# Patient Record
Sex: FEMALE | Race: WHITE | NOT HISPANIC OR LATINO | Employment: OTHER | URBAN - METROPOLITAN AREA
[De-identification: names, ages, dates, MRNs, and addresses within clinical notes are randomized per-mention and may not be internally consistent; named-entity substitution may affect disease eponyms.]

---

## 2017-01-12 ENCOUNTER — OFFICE VISIT (OUTPATIENT)
Dept: UROLOGY | Facility: CLINIC | Age: 72
End: 2017-01-12
Payer: MEDICARE

## 2017-01-12 VITALS
RESPIRATION RATE: 16 BRPM | DIASTOLIC BLOOD PRESSURE: 60 MMHG | BODY MASS INDEX: 25.63 KG/M2 | SYSTOLIC BLOOD PRESSURE: 122 MMHG | HEIGHT: 64 IN | WEIGHT: 150.13 LBS | HEART RATE: 60 BPM

## 2017-01-12 DIAGNOSIS — N39.0 RECURRENT UTI: Primary | ICD-10-CM

## 2017-01-12 PROCEDURE — 1126F AMNT PAIN NOTED NONE PRSNT: CPT | Mod: S$GLB,,, | Performed by: UROLOGY

## 2017-01-12 PROCEDURE — 1157F ADVNC CARE PLAN IN RCRD: CPT | Mod: S$GLB,,, | Performed by: UROLOGY

## 2017-01-12 PROCEDURE — 3078F DIAST BP <80 MM HG: CPT | Mod: S$GLB,,, | Performed by: UROLOGY

## 2017-01-12 PROCEDURE — 99203 OFFICE O/P NEW LOW 30 MIN: CPT | Mod: S$GLB,,, | Performed by: UROLOGY

## 2017-01-12 PROCEDURE — 99999 PR PBB SHADOW E&M-EST. PATIENT-LVL III: CPT | Mod: PBBFAC,,, | Performed by: UROLOGY

## 2017-01-12 PROCEDURE — 1160F RVW MEDS BY RX/DR IN RCRD: CPT | Mod: S$GLB,,, | Performed by: UROLOGY

## 2017-01-12 PROCEDURE — 99499 UNLISTED E&M SERVICE: CPT | Mod: S$GLB,,, | Performed by: UROLOGY

## 2017-01-12 PROCEDURE — 1159F MED LIST DOCD IN RCRD: CPT | Mod: S$GLB,,, | Performed by: UROLOGY

## 2017-01-12 PROCEDURE — 3074F SYST BP LT 130 MM HG: CPT | Mod: S$GLB,,, | Performed by: UROLOGY

## 2017-01-12 RX ORDER — ESTRADIOL 0.1 MG/G
1 CREAM VAGINAL
Qty: 42.5 G | Refills: 11 | Status: SHIPPED | OUTPATIENT
Start: 2017-01-12 | End: 2018-01-12

## 2017-01-12 NOTE — MR AVS SNAPSHOT
[facility] - Urology  [address]  [employer] [address]  Phone: [phone]                  Michael Salgado   2017 1:40 PM   Office Visit    Description:  Female : 1945   Provider:  Nydia Degroot MD   Department:  [facility] - Urology           Reason for Visit     Urinary Tract Infection           Diagnoses this Visit        Comments    Recurrent UTI    -  Primary            To Do List           Goals (5 Years of Data)     None      Follow-Up and Disposition     Return in about 6 months (around 2017) for Follow up on symptoms.       These Medications        Disp Refills Start End    estradiol (ESTRACE) 0.01 % (0.1 mg/gram) vaginal cream 42.5 g 11 2017    Place 1 g vaginally twice a week. - Vaginal    Pharmacy: [facility] [address] [place] [address] AT [facility] [address] Ph #: [phone]         [facility] On Call     [facility] On Call Nurse Care Line -  Assistance  Registered nurses in the [facility] On Call Center provide clinical advisement, health education, appointment booking, and other advisory services.  Call for this free service at [phone].             Medications           Message regarding Medications     Verify the changes and/or additions to your medication regime listed below are the same as discussed with your clinician today.  If any of these changes or additions are incorrect, please notify your healthcare provider.        START taking these NEW medications        Refills    estradiol (ESTRACE) 0.01 % (0.1 mg/gram) vaginal cream 11    Sig: Place 1 g vaginally twice a week.    Class: Normal    Route: Vaginal           Verify that the below list of medications is an accurate representation of the medications you are currently taking.  If none reported, the list may be blank. If incorrect, please contact your healthcare provider. Carry this list with you in case of emergency.            Current Medications     amlodipine (NORVASC) 5 MG tablet Take 1 tablet (5 mg total) by mouth once daily.    aspirin (ECOTRIN) 81 MG EC tablet Take 81 mg by mouth once daily.    atorvastatin (LIPITOR) 40 MG tablet Take 1 tablet (40 mg total) by mouth once daily.    blood sugar diagnostic (TRUETEST TEST STRIPS) Strp Twice daily blood sugar check.    buPROPion (WELLBUTRIN XL) 150 MG TB24 tablet Take 1 tablet (150 mg total) by mouth once daily.    carvedilol (COREG) 25 MG tablet Take 25 mg by mouth 2 (two) times daily with meals.    carvedilol (COREG) 25 MG tablet Take 1 tablet (25 mg total) by mouth 2 (two) times daily with meals.    clobetasol (TEMOVATE) 0.05 % external solution Apply topically 2 (two) times daily. To scalp    diazePAM (VALIUM) 5 MG tablet Take 1 tablet (5 mg total) by mouth every 8 (eight) hours as needed.    diclofenac sodium 1 % Gel Apply topically 2 (two) times daily.    escitalopram oxalate (LEXAPRO) 10 MG tablet Take 1 tablet (10 mg total) by mouth once daily.    furosemide (LASIX) 20 MG tablet TAKE 1/2 TABLET(10 MG) BY MOUTH TWICE DAILY    gabapentin (NEURONTIN) 300 MG capsule Take 300 mg by mouth once daily.    hydrALAZINE (APRESOLINE) 25 MG tablet TAKE 1 TABLET(25 MG) BY MOUTH BID    hydrocodone-acetaminophen 10-325mg (NORCO)  mg Tab Take 1 tablet by mouth every 8 (eight) hours as needed.    insulin glargine (LANTUS) 100 unit/mL injection Inject 20 Units into the skin every evening.    insulin lispro (HUMALOG) 100 unit/mL injection Inject 5 Units into the skin 3 (three) times daily before meals.    levalbuterol (XOPENEX) 1.25 mg/3 mL nebulizer solution USE 1 VIAL VIA NEBULIZER EVERY 8 HOURS AS NEEDED FOR WHEEZING OR SHORTNESS OF BREATH    nut.tx.gluc.intol,lac-free,soy (GLUCERNA) Liqd Once a day oral supplement.    omega-3 acid ethyl esters (LOVAZA) 1 gram capsule TAKE 2 CAPSULES BY MOUTH TWICE DAILY    pantoprazole (PROTONIX) 40 MG tablet TAKE 1 TABLET BY MOUTH EVERY DAY    pen  "needle, diabetic 30 gauge x 5/16" Ndle Use 4 disposable needles per day. Inject medication into skin daily    polyethylene glycol (GLYCOLAX) 17 gram PwPk Take 17 g by mouth once daily.    prasugrel (EFFIENT) 10 mg Tab Take 1 tablet (10 mg total) by mouth once daily.    prasugrel (EFFIENT) 10 mg Tab Take 1 tablet (10 mg total) by mouth once daily.    senna-docusate 8.6-50 mg (PERICOLACE) 8.6-50 mg per tablet Take 1 tablet by mouth 2 (two) times daily.    triamcinolone acetonide 0.1% (KENALOG) 0.1 % cream AAA bid on arms    underpads 23 X 36 " Pads Twice daily change as needed for incontinence.    estradiol (ESTRACE) 0.01 % (0.1 mg/gram) vaginal cream Place 1 g vaginally twice a week.           Clinical Reference Information           Vital Signs - Last Recorded  Most recent update: 1/12/2017  1:21 PM by Azul Lopez MA    BP Pulse Resp Ht Wt LMP    122/60 60 16 5' 4" (1.626 m) 68.1 kg (150 lb 2.1 oz) (LMP Unknown)    BMI                25.77 kg/m2          Blood Pressure          Most Recent Value    BP  122/60      Allergies as of 1/12/2017     Daypro [Oxaprozin]    Pcn [Penicillins]    Vibramycin [Doxycycline Calcium]      Immunizations Administered on Date of Encounter - 1/12/2017     None      [facility] Sign-Up     Activating your [facility] account is as easy as 1-2-3!     1) Visit [email or web address], select Sign Up Now, enter this activation code and your date of birth, then select Next.  [email or web address]  Expires: 2/5/2017  1:12 PM      2) Create a username and password to use when you visit [facility] in the future and select a security question in case you lose your password and select Next.    3) Enter your e-mail address and click Sign Up!    Additional Information  If you have questions, please e-mail [email or web address] or call [phone] to talk to our [facility] staff. Remember, [facility] is NOT to be used for urgent needs. For medical emergencies, dial 911.         "

## 2017-01-12 NOTE — PROGRESS NOTES
"  Subjective:       Michael Salgado is a 71 y.o. female who is a new patient who was referred by Dr Vang for evaluation of recurrent UTIs.      She reports issues with UTIs. She has polypharmacy including DM2 and OAC medications - she has extensive PMH. She has had similar infection since 10/16. These infections were treated with abx from PCP. No infection since 11/16. Denies UTI symptoms currently. UTI symptoms normally were AMS, weakness, frequency - no dysuria. Occasional constipation treated with meds.     She voids in toilet and mostly in diaper at night. Denies UI during day.     She feels that her infections are from her G-tube.     UCx:  11/16 - >100k E coli, >100k Klebsiella (increasing resistances) - given Cipro x 5d and Macrobid x 5d  11/16 - >100k E coli, >100k Klebsiella - given doxycycline x 5d  10/16 - >100k E coli, >100k Klebsiella - given Macrobid x 5d  7/16 - negative  6/16 - >100k E coli      The following portions of the patient's history were reviewed and updated as appropriate: allergies, current medications, past family history, past medical history, past social history, past surgical history and problem list.    Review of Systems  Constitutional: no fever or chills  ENT: no nasal congestion or sore throat  Respiratory: no cough or shortness of breath  Cardiovascular: no chest pain or palpitations  Gastrointestinal: no nausea or vomiting, tolerating diet  Genitourinary: as per HPI  Hematologic/Lymphatic: no easy bruising or lymphadenopathy  Musculoskeletal: no arthralgias or myalgias  Skin: no rashes or lesions  Neurological: no seizures or tremors  Behavioral/Psych: no auditory or visual hallucinations       Objective:    Vitals:   Visit Vitals    /60    Pulse 60    Resp 16    Ht 5' 4" (1.626 m)    Wt 68.1 kg (150 lb 2.1 oz)    LMP  (LMP Unknown)    BMI 25.77 kg/m2       Physical Exam   General: well developed, well nourished in no acute distress  Head: normocephalic, " atraumatic  Neck: supple, trachea midline, no obvious enlargement of thyroid  HEENT: EOMI, mucus membranes moist, sclera anicteric, no hearing impairment  Lungs: symmetric expansion, non-labored breathing  Cardiovascular: regular rate and rhythm, normal pulses  Abdomen: soft, non tender, non distended, no palpable masses, no hepatosplenomegaly, no hernias, no CVA tenderness  Musculoskeletal: no peripheral edema, normal ROM in bilateral upper and lower extremities  Lymphatics: no cervical or inguinal lymphadenopathy  Skin: no rashes or lesions  Neuro: alert and oriented x 3, no gross deficits  Psych: normal judgment and insight, normal mood/affect and non-anxious  Genitourinary:   patient declined exam      Lab Review   Urine analysis today in clinic shows - no urine given     Lab Results   Component Value Date    WBC 4.12 09/16/2016    HGB 9.2 (L) 09/16/2016    HCT 29.3 (L) 09/16/2016    HCT 24 (L) 04/25/2016    MCV 82 09/16/2016     09/16/2016     Lab Results   Component Value Date    CREATININE 1.9 (H) 09/16/2016    BUN 50 (H) 09/16/2016        Imaging  Images and reports were personally reviewed by me and discussed with patient   [facility] 5/16 - wn       Assessment/Plan:      1. Recurrent UTI    - [facility] 5/16 - normal   - Estrace 2x weekly    - Discussed UTI prevention strategies   - Higher risk due to diaper use at night   - No daytime UUI, no need for medication   - Avoid constipation, doing well with this   - Call with UTI symptoms for UCx         Follow up in 6 months

## 2017-01-12 NOTE — LETTER
January 12, 2017      Wesley Watkins MD  [address]  [place] [address]           [facility] - Urology  [address]  [facility] [address]  Phone: [phone]          Patient: Michael Salgado   MR Number: [id number]   YOB: 1945   Date of Visit: 1/12/2017       Dear Dr. Wesley Watkins:    Thank you for referring Michael Salgado to me for evaluation. Attached you will find relevant portions of my assessment and plan of care.    If you have questions, please do not hesitate to call me. I look forward to following Michael Salgado along with you.    Sincerely,    Nydia Degroot MD    Enclosure  CC:  No Recipients    If you would like to receive this communication electronically, please contact [email or web address] or [phone] to request more information on [employer] [facility] access.    For providers and/or their staff who would like to refer a patient to [facility], please contact us through our one-stop-shop provider referral line, [facility] , at [phone].    If you feel you have received this communication in error or would no longer like to receive these types of communications, please e-mail [email or web address]

## 2017-01-13 ENCOUNTER — TELEPHONE (OUTPATIENT)
Dept: FAMILY MEDICINE | Facility: CLINIC | Age: 72
End: 2017-01-13

## 2017-01-13 NOTE — TELEPHONE ENCOUNTER
----- Message from Carrie Leavitt sent at 1/12/2017  9:15 AM CST -----  Contact: daughter  Pt daughter calling to state that PT still has not received orders. Please call today [phone].

## 2017-01-24 RX ORDER — HYDROCODONE BITARTRATE AND ACETAMINOPHEN 10; 325 MG/1; MG/1
1 TABLET ORAL EVERY 8 HOURS PRN
Qty: 90 TABLET | Refills: 0 | Status: CANCELLED | OUTPATIENT
Start: 2017-01-24

## 2017-01-24 NOTE — TELEPHONE ENCOUNTER
----- Message from Rickey Baron sent at 1/24/2017  9:51 AM CST -----  Contact: Miryam/Daughter/[phone]  Refill:  hydrocodone-acetaminophen 10-325mg (NORCO)  mg Tab    Thank you.

## 2017-01-25 RX ORDER — HYDROCODONE BITARTRATE AND ACETAMINOPHEN 10; 325 MG/1; MG/1
1 TABLET ORAL EVERY 8 HOURS PRN
Qty: 90 TABLET | Refills: 0 | Status: SHIPPED | OUTPATIENT
Start: 2017-01-25 | End: 2017-02-20 | Stop reason: SDUPTHER

## 2017-01-25 NOTE — TELEPHONE ENCOUNTER
----- Message from Kelsie Nice sent at 1/25/2017 10:25 AM CST -----  Contact: [phone] Miryam (daughter )  Pt daughter is following up on the refill for hydrocodone-acetaminophen 10-325mg (NORCO)  mg Tab pt fell yesterday and she needs the refill today Please call  at your earliest convenience.  Thanks !

## 2017-01-26 ENCOUNTER — OFFICE VISIT (OUTPATIENT)
Dept: FAMILY MEDICINE | Facility: CLINIC | Age: 72
End: 2017-01-26
Payer: MEDICARE

## 2017-01-26 ENCOUNTER — HOSPITAL ENCOUNTER (OUTPATIENT)
Dept: RADIOLOGY | Facility: HOSPITAL | Age: 72
Discharge: HOME | End: 2017-01-26
Attending: NURSE PRACTITIONER
Payer: MEDICARE

## 2017-01-26 ENCOUNTER — TELEPHONE (OUTPATIENT)
Dept: GASTROENTEROLOGY | Facility: CLINIC | Age: 72
End: 2017-01-26

## 2017-01-26 ENCOUNTER — TELEPHONE (OUTPATIENT)
Dept: FAMILY MEDICINE | Facility: CLINIC | Age: 72
End: 2017-01-26

## 2017-01-26 VITALS
DIASTOLIC BLOOD PRESSURE: 80 MMHG | OXYGEN SATURATION: 99 % | WEIGHT: 154.75 LBS | HEIGHT: 64 IN | HEART RATE: 113 BPM | SYSTOLIC BLOOD PRESSURE: 150 MMHG | TEMPERATURE: 98 F | BODY MASS INDEX: 26.42 KG/M2

## 2017-01-26 DIAGNOSIS — I48.20 CHRONIC ATRIAL FIBRILLATION: ICD-10-CM

## 2017-01-26 DIAGNOSIS — R06.02 SOB (SHORTNESS OF BREATH): ICD-10-CM

## 2017-01-26 DIAGNOSIS — I50.33 ACUTE ON CHRONIC DIASTOLIC CHF (CONGESTIVE HEART FAILURE), NYHA CLASS 3: ICD-10-CM

## 2017-01-26 DIAGNOSIS — R10.9 PAIN IN THE ABDOMEN: ICD-10-CM

## 2017-01-26 DIAGNOSIS — Z79.4 TYPE 2 DIABETES MELLITUS WITH STAGE 4 CHRONIC KIDNEY DISEASE, WITH LONG-TERM CURRENT USE OF INSULIN: ICD-10-CM

## 2017-01-26 DIAGNOSIS — S30.0XXA CONTUSION OF LOWER BACK, INITIAL ENCOUNTER: ICD-10-CM

## 2017-01-26 DIAGNOSIS — N18.4 TYPE 2 DIABETES MELLITUS WITH STAGE 4 CHRONIC KIDNEY DISEASE, WITH LONG-TERM CURRENT USE OF INSULIN: ICD-10-CM

## 2017-01-26 DIAGNOSIS — E11.22 TYPE 2 DIABETES MELLITUS WITH STAGE 4 CHRONIC KIDNEY DISEASE, WITH LONG-TERM CURRENT USE OF INSULIN: ICD-10-CM

## 2017-01-26 DIAGNOSIS — N18.4 CHRONIC KIDNEY DISEASE (CKD) STAGE G4/A1, SEVERELY DECREASED GLOMERULAR FILTRATION RATE (GFR) BETWEEN 15-29 ML/MIN/1.73 SQUARE METER AND ALBUMINURIA CREATININE RATIO LESS THAN 30 MG/G: ICD-10-CM
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
## 2017-01-26 DIAGNOSIS — M54.50 ACUTE RIGHT-SIDED LOW BACK PAIN WITHOUT SCIATICA: ICD-10-CM
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
## 2017-01-26 PROCEDURE — 99215 OFFICE O/P EST HI 40 MIN: CPT | Mod: S$GLB,,, | Performed by: NURSE PRACTITIONER

## 2017-01-26 PROCEDURE — 74000 XR ABDOMEN AP 1 VIEW: CPT | Mod: TC,PO

## 2017-01-26 PROCEDURE — 71020 XR CHEST PA AND LATERAL: CPT | Mod: TC,PO

## 2017-01-26 PROCEDURE — 3060F POS MICROALBUMINURIA REV: CPT | Mod: S$GLB,,, | Performed by: NURSE PRACTITIONER

## 2017-01-26 PROCEDURE — 1160F RVW MEDS BY RX/DR IN RCRD: CPT | Mod: S$GLB,,, | Performed by: NURSE PRACTITIONER

## 2017-01-26 PROCEDURE — 99499 UNLISTED E&M SERVICE: CPT | Mod: S$GLB,,, | Performed by: NURSE PRACTITIONER

## 2017-01-26 PROCEDURE — 74000 XR ABDOMEN AP 1 VIEW: CPT | Mod: 26,,, | Performed by: RADIOLOGY

## 2017-01-26 PROCEDURE — 1157F ADVNC CARE PLAN IN RCRD: CPT | Mod: S$GLB,,, | Performed by: NURSE PRACTITIONER

## 2017-01-26 PROCEDURE — 71020 XR CHEST PA AND LATERAL: CPT | Mod: 26,,, | Performed by: RADIOLOGY

## 2017-01-26 PROCEDURE — 1159F MED LIST DOCD IN RCRD: CPT | Mod: S$GLB,,, | Performed by: NURSE PRACTITIONER

## 2017-01-26 PROCEDURE — 3077F SYST BP >= 140 MM HG: CPT | Mod: S$GLB,,, | Performed by: NURSE PRACTITIONER

## 2017-01-26 PROCEDURE — 3045F PR MOST RECENT HEMOGLOBIN A1C LEVEL 7.0-9.0%: CPT | Mod: S$GLB,,, | Performed by: NURSE PRACTITIONER

## 2017-01-26 PROCEDURE — 99999 PR PBB SHADOW E&M-EST. PATIENT-LVL V: CPT | Mod: PBBFAC,,, | Performed by: NURSE PRACTITIONER

## 2017-01-26 PROCEDURE — 3079F DIAST BP 80-89 MM HG: CPT | Mod: S$GLB,,, | Performed by: NURSE PRACTITIONER

## 2017-01-26 PROCEDURE — 1125F AMNT PAIN NOTED PAIN PRSNT: CPT | Mod: S$GLB,,, | Performed by: NURSE PRACTITIONER

## 2017-01-26 RX ORDER — PREDNISONE 20 MG/1
20 TABLET ORAL 2 TIMES DAILY
Qty: 10 TABLET | Refills: 0 | Status: SHIPPED | OUTPATIENT
Start: 2017-01-26 | End: 2017-01-31

## 2017-01-26 RX ORDER — LEVOFLOXACIN 500 MG/1
500 TABLET, FILM COATED ORAL DAILY
Qty: 10 TABLET | Refills: 0 | Status: SHIPPED | OUTPATIENT
Start: 2017-01-26 | End: 2017-02-05

## 2017-01-26 RX ORDER — HYDROCODONE BITARTRATE AND ACETAMINOPHEN 5; 325 MG/1; MG/1
TABLET ORAL
COMMUNITY
Start: 2017-01-24 | End: 2017-01-30

## 2017-01-26 RX ORDER — AMOXICILLIN 500 MG/1
CAPSULE ORAL
COMMUNITY
Start: 2017-01-24 | End: 2017-05-31

## 2017-01-26 NOTE — TELEPHONE ENCOUNTER
----- Message from Tata Hurd MA sent at 1/24/2017  2:07 PM CST -----  Contact: Miryam (daughter) [phone]  Ray    Needing to set up an appt to have a feeding tube removed  Miryam (daughter) [phone]

## 2017-01-26 NOTE — PROGRESS NOTES
This dictation has been generated using Dragon Dictation some phonetic errors may occur.     Michael was seen today for fall.    Diagnoses and all orders for this visit:    Fall, initial encounter  -     Cancel: X-Ray Abdomen AP 1 View; Future  History of fall.  Discussed position transfers with family.  Patient is in wheelchair.    Acute right-sided low back pain without sciatica  -     Urinalysis; Future  Contusion of lower back, initial encounter  Right-sided low back pain and bruise on lower back.  Check urinalysis and rule out urinary tract infection any hematuria.  I will review results and address accordingly.    SOB (shortness of breath)  -     Comprehensive metabolic panel; Future  -     Brain natriuretic peptide; Future  -     X-Ray Chest PA And Lateral; Future  -     Cancel: X-Ray Abdomen AP 1 View; Future  Short of breath.  History of CHF.  Check BNP and chest x-ray as above and rule out acute heart failure however suspect infectious process.  In reviewing the chest x-ray infiltrate present add Levaquin and prednisone as above.  Called and discussed with family.    Chronic atrial fibrillation  -     Comprehensive metabolic panel; Future  -     Brain natriuretic peptide; Future  -     X-Ray Chest PA And Lateral; Future    Acute on chronic diastolic CHF (congestive heart failure), NYHA class 3  -     Comprehensive metabolic panel; Future  -     Brain natriuretic peptide; Future  -     X-Ray Chest PA And Lateral; Future    Chronic kidney disease (CKD) stage G4/A1, severely decreased glomerular filtration rate (GFR) between 15-29 mL/min/1.73 square meter and albuminuria creatinine ratio less than 30 mg/g  Chronic kidney disease stage IV.  If Lasix required for CHF we will have to do so gently.    Type 2 diabetes mellitus with stage 4 chronic kidney disease, with long-term current use of insulin  Diabetes stable.  Continue with current regimen.  Monitor blood sugar with steroid use.     abdominal pain check  x-ray of abdomen    I have given due consideration to the possibility of pulmonary emboli, but I feel that diagnosis is unlikely based on a careful history and physical examination.     No Follow-up on file.      ________________________________________________________________  ________________________________________________________________        Chief Complaint   Patient presents with    Fall     History of present illness  This 71 y.o. presents today for complaint of fluid retention and fall.  Patient had a fall yesterday.  She indicates that she hit her back.  She has some low back pain.  There is a bruise present.  Patient states pain is a 10 on a scale of 1-10.  She denies any hematuria.  Patient denies any dysuria however frequently absent and elderly females.  Family is concerned about fluid retention and shortness of breath.  She is O2 dependent.  She does have a history of CHF.  Currently short of breath at rest.  Prior chest x-ray does note vascular congestion and pulmonary effusion.  We will repeat chest x-ray and treat accordingly.  Patient notes some cough and cold symptoms.  Review of systems  No fever or chills  No facial pain or pressure  No nausea vomiting or diarrhea.  Patient notes abdominal pain and left flank pain.  Past medical and social history reviewed.    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     Encounter for blood transfusion     Heart attack     History of pleural effusion     HTN (hypertension)     Hyperlipidemia     MI, old     Multiple thyroid nodules     Pulmonary HTN     Right hemiparesis     S/P coronary artery stent  placement     S/P percutaneous endoscopic gastrostomy (PEG) tube placement     Screening for colon cancer      normal 2015 -10 yrs    Stroke        Past Surgical History   Procedure Laterality Date    Knee surgery      Ovary surgery      Tubal ligation      Cardiac bypass      Ovary surgery      Coronary artery bypass graft  6/20/15     [facility]    Coronary angioplasty with stent placement  7/29/15     [facility]    Tonsillectomy         Family History   Problem Relation Age of Onset    Diabetes Mother     Hypertension Mother     Hyperlipidemia Mother     Heart disease Sister     Cancer Sister      colon    Heart disease Brother     Diabetes Brother     Hypertension Brother     No Known Problems Daughter     Heart disease Son     Heart disease Sister     Hypertension Daughter        Social History     Social History    Marital status:      Spouse name: N/A    Number of children: N/A    Years of education: N/A     Social History Main Topics    Smoking status: Never Smoker    Smokeless tobacco: Never Used    Alcohol use No    Drug use: No    Sexual activity: Not Asked     Other Topics Concern    None     Social History Narrative       Current Outpatient Prescriptions   Medication Sig Dispense Refill    amlodipine (NORVASC) 5 MG tablet Take 1 tablet (5 mg total) by mouth once daily. 90 tablet 3    amoxicillin (AMOXIL) 500 MG capsule       aspirin (ECOTRIN) 81 MG EC tablet Take 81 mg by mouth once daily.      atorvastatin (LIPITOR) 40 MG tablet Take 1 tablet (40 mg total) by mouth once daily. 90 tablet 3    blood sugar diagnostic (TRUETEST TEST STRIPS) Strp Twice daily blood sugar check. 200 each 11    buPROPion (WELLBUTRIN XL) 150 MG TB24 tablet Take 1 tablet (150 mg total) by mouth once daily. 30 tablet 12    carvedilol (COREG) 25 MG tablet Take 25 mg by mouth 2 (two) times daily with meals.      carvedilol (COREG) 25 MG tablet Take 1 tablet (25 mg total) by mouth 2 (two) times  "daily with meals. 180 tablet 3    clobetasol (TEMOVATE) 0.05 % external solution Apply topically 2 (two) times daily. To scalp 25 mL 0    diazePAM (VALIUM) 5 MG tablet Take 1 tablet (5 mg total) by mouth every 8 (eight) hours as needed. 45 tablet 3    diclofenac sodium 1 % Gel Apply topically 2 (two) times daily. 100 g 0    escitalopram oxalate (LEXAPRO) 10 MG tablet Take 1 tablet (10 mg total) by mouth once daily. 90 tablet 3    estradiol (ESTRACE) 0.01 % (0.1 mg/gram) vaginal cream Place 1 g vaginally twice a week. 42.5 g 11    furosemide (LASIX) 20 MG tablet TAKE 1/2 TABLET(10 MG) BY MOUTH TWICE DAILY 90 tablet 0    gabapentin (NEURONTIN) 300 MG capsule Take 300 mg by mouth once daily.      hydrALAZINE (APRESOLINE) 25 MG tablet TAKE 1 TABLET(25 MG) BY MOUTH  tablet 3    hydrocodone-acetaminophen 10-325mg (NORCO)  mg Tab Take 1 tablet by mouth every 8 (eight) hours as needed. 90 tablet 0    hydrocodone-acetaminophen 5-325mg (NORCO) 5-325 mg per tablet       insulin glargine (LANTUS) 100 unit/mL injection Inject 20 Units into the skin every evening. 1 vial 3    insulin lispro (HUMALOG) 100 unit/mL injection Inject 5 Units into the skin 3 (three) times daily before meals.      levalbuterol (XOPENEX) 1.25 mg/3 mL nebulizer solution USE 1 VIAL VIA NEBULIZER EVERY 8 HOURS AS NEEDED FOR WHEEZING OR SHORTNESS OF BREATH 792 mL 0    nut.tx.gluc.intol,lac-free,soy (GLUCERNA) Liqd Once a day oral supplement. 30 Bottle 11    omega-3 acid ethyl esters (LOVAZA) 1 gram capsule TAKE 2 CAPSULES BY MOUTH TWICE DAILY (Patient taking differently: TAKE 1 CAPSULE BY MOUTH DAILY AT NIGHT) 360 capsule 0    pantoprazole (PROTONIX) 40 MG tablet TAKE 1 TABLET BY MOUTH EVERY DAY 90 tablet 0    pen needle, diabetic 30 gauge x 5/16" Ndle Use 4 disposable needles per day. Inject medication into skin daily 120 each 11    polyethylene glycol (GLYCOLAX) 17 gram PwPk Take 17 g by mouth once daily. 30 packet 0    " "prasugrel (EFFIENT) 10 mg Tab Take 1 tablet (10 mg total) by mouth once daily. 90 tablet 3    prasugrel (EFFIENT) 10 mg Tab Take 1 tablet (10 mg total) by mouth once daily. 90 tablet 3    senna-docusate 8.6-50 mg (PERICOLACE) 8.6-50 mg per tablet Take 1 tablet by mouth 2 (two) times daily.      triamcinolone acetonide 0.1% (KENALOG) 0.1 % cream AAA bid on arms 454 g 1    underpads 23 X 36 " Pads Twice daily change as needed for incontinence. 150 each 11     No current facility-administered medications for this visit.        Review of patient's allergies indicates:   Allergen Reactions    Daypro [oxaprozin]     Pcn [penicillins] Swelling     Patient states she is not allergic to Penicillin    Vibramycin [doxycycline calcium]        Physical examination  Vitals Reviewed  Gen. Well-dressed well-nourished in mild distress due to pain.  Skin warm dry and intact.  No rashes noted.  HEENT.    Pharynx is unremarkable.  No maxillary or frontal sinus tenderness when percussed.    Neck is supple without adenopathy  Chest.  Respirations are even unlabored.  Lungs are clear to auscultation.  Cardiac regular rate and rhythm.  No chest wall adenopathy noted.  Patient is on 3 L of oxygen per nasal cannula on a concentrator which required plugging in.  She became short of breath when oxygen was discontinued.  She had shortness of breath with standing.  Surprisingly lungs are fairly clear.  Neuro. Awake alert oriented x4.  Normal judgment and cognition noted.  Extremities  no clubbing or cyanosis noted.  There is trace edema noted bilateral lower extremities.     Call or return to clinic prn if these symptoms worsen or fail to improve as anticipated.    "

## 2017-01-26 NOTE — Clinical Note
This is a patient there were short of breath.  There are labs pending which should be in tonight.  I'll try to review them, but may be an box tomorrow.

## 2017-01-27 ENCOUNTER — TELEPHONE (OUTPATIENT)
Dept: GASTROENTEROLOGY | Facility: CLINIC | Age: 72
End: 2017-01-27

## 2017-01-27 ENCOUNTER — TELEPHONE (OUTPATIENT)
Dept: FAMILY MEDICINE | Facility: CLINIC | Age: 72
End: 2017-01-27

## 2017-01-27 DIAGNOSIS — E10.9 TYPE 1 DIABETES MELLITUS WITHOUT COMPLICATION: Primary | ICD-10-CM

## 2017-01-27 DIAGNOSIS — Z43.1 PEG (PERCUTANEOUS ENDOSCOPIC GASTROSTOMY) ADJUSTMENT/REPLACEMENT/REMOVAL: ICD-10-CM

## 2017-01-27 NOTE — TELEPHONE ENCOUNTER
Called daughter about test results. She verbally understood. She wants to know about a xray on her mothers back from her fall and does she need to return for another appointment

## 2017-01-27 NOTE — TELEPHONE ENCOUNTER
----- Message from Luis Alfredo Morel MD sent at 1/27/2017  3:01 PM CST -----  Please schedule EGD for PEG tube removal. She would need to be on 2nd floor. She can continue her ASA for the procedure. Orders are in.

## 2017-01-27 NOTE — TELEPHONE ENCOUNTER
Call family  Blood test are stable. Kidney markers are improved slightly but basically within her usual range. Heart test is also close to last measurement and cxr did not show heart failure. Continue current treatments.

## 2017-01-27 NOTE — TELEPHONE ENCOUNTER
Spoke to patient daughter Miryam she verbalyy understood. I mother is still having problems next week please call clinic back.

## 2017-01-27 NOTE — TELEPHONE ENCOUNTER
Called and spoke with patient's daughter. Will schedule PEG removal on 2nd floor endo (elevated PA pressures) while patient continues her ASA.

## 2017-01-30 ENCOUNTER — TELEPHONE (OUTPATIENT)
Dept: ENDOSCOPY | Facility: HOSPITAL | Age: 72
End: 2017-01-30

## 2017-01-30 ENCOUNTER — HOSPITAL ENCOUNTER (OUTPATIENT)
Dept: RADIOLOGY | Facility: HOSPITAL | Age: 72
Discharge: HOME | End: 2017-01-30
Attending: NURSE PRACTITIONER
Payer: MEDICARE

## 2017-01-30 ENCOUNTER — OFFICE VISIT (OUTPATIENT)
Dept: FAMILY MEDICINE | Facility: CLINIC | Age: 72
End: 2017-01-30
Payer: MEDICARE

## 2017-01-30 VITALS
TEMPERATURE: 99 F | SYSTOLIC BLOOD PRESSURE: 158 MMHG | HEIGHT: 64 IN | BODY MASS INDEX: 25.67 KG/M2 | HEART RATE: 69 BPM | DIASTOLIC BLOOD PRESSURE: 70 MMHG | WEIGHT: 150.38 LBS | OXYGEN SATURATION: 98 %

## 2017-01-30 DIAGNOSIS — E78.5 HYPERLIPIDEMIA, UNSPECIFIED HYPERLIPIDEMIA TYPE: ICD-10-CM

## 2017-01-30 DIAGNOSIS — E11.22 TYPE 2 DIABETES MELLITUS WITH STAGE 4 CHRONIC KIDNEY DISEASE, WITH LONG-TERM CURRENT USE OF INSULIN: ICD-10-CM

## 2017-01-30 DIAGNOSIS — D64.9 ANEMIA OF UNKNOWN ETIOLOGY: ICD-10-CM

## 2017-01-30 DIAGNOSIS — R06.02 SOB (SHORTNESS OF BREATH): ICD-10-CM

## 2017-01-30 DIAGNOSIS — N18.4 CHRONIC KIDNEY DISEASE (CKD) STAGE G4/A1, SEVERELY DECREASED GLOMERULAR FILTRATION RATE (GFR) BETWEEN 15-29 ML/MIN/1.73 SQUARE METER AND ALBUMINURIA CREATININE RATIO LESS THAN 30 MG/G: ICD-10-CM

## 2017-01-30 DIAGNOSIS — I48.20 CHRONIC ATRIAL FIBRILLATION: ICD-10-CM

## 2017-01-30 DIAGNOSIS — I10 ESSENTIAL HYPERTENSION: ICD-10-CM

## 2017-01-30 DIAGNOSIS — R06.02 SOB (SHORTNESS OF BREATH): Primary | ICD-10-CM

## 2017-01-30 DIAGNOSIS — I50.32 CHRONIC DIASTOLIC CONGESTIVE HEART FAILURE: ICD-10-CM

## 2017-01-30 DIAGNOSIS — N18.4 TYPE 2 DIABETES MELLITUS WITH STAGE 4 CHRONIC KIDNEY DISEASE, WITH LONG-TERM CURRENT USE OF INSULIN: ICD-10-CM

## 2017-01-30 DIAGNOSIS — Z79.4 TYPE 2 DIABETES MELLITUS WITH STAGE 4 CHRONIC KIDNEY DISEASE, WITH LONG-TERM CURRENT USE OF INSULIN: ICD-10-CM

## 2017-01-30 PROCEDURE — 1125F AMNT PAIN NOTED PAIN PRSNT: CPT | Mod: S$GLB,,, | Performed by: NURSE PRACTITIONER

## 2017-01-30 PROCEDURE — 3077F SYST BP >= 140 MM HG: CPT | Mod: S$GLB,,, | Performed by: NURSE PRACTITIONER

## 2017-01-30 PROCEDURE — 99999 PR PBB SHADOW E&M-EST. PATIENT-LVL V: CPT | Mod: PBBFAC,,, | Performed by: NURSE PRACTITIONER

## 2017-01-30 PROCEDURE — 99214 OFFICE O/P EST MOD 30 MIN: CPT | Mod: S$GLB,,, | Performed by: NURSE PRACTITIONER

## 2017-01-30 PROCEDURE — 1157F ADVNC CARE PLAN IN RCRD: CPT | Mod: S$GLB,,, | Performed by: NURSE PRACTITIONER

## 2017-01-30 PROCEDURE — 99499 UNLISTED E&M SERVICE: CPT | Mod: S$GLB,,, | Performed by: NURSE PRACTITIONER

## 2017-01-30 PROCEDURE — 71020 XR CHEST PA AND LATERAL: CPT | Mod: TC,PO

## 2017-01-30 PROCEDURE — 3078F DIAST BP <80 MM HG: CPT | Mod: S$GLB,,, | Performed by: NURSE PRACTITIONER

## 2017-01-30 PROCEDURE — 1160F RVW MEDS BY RX/DR IN RCRD: CPT | Mod: S$GLB,,, | Performed by: NURSE PRACTITIONER

## 2017-01-30 PROCEDURE — 1159F MED LIST DOCD IN RCRD: CPT | Mod: S$GLB,,, | Performed by: NURSE PRACTITIONER

## 2017-01-30 PROCEDURE — 3045F PR MOST RECENT HEMOGLOBIN A1C LEVEL 7.0-9.0%: CPT | Mod: S$GLB,,, | Performed by: NURSE PRACTITIONER

## 2017-01-30 PROCEDURE — 71020 XR CHEST PA AND LATERAL: CPT | Mod: 26,,, | Performed by: RADIOLOGY

## 2017-01-30 PROCEDURE — 3060F POS MICROALBUMINURIA REV: CPT | Mod: S$GLB,,, | Performed by: NURSE PRACTITIONER

## 2017-01-30 NOTE — PROGRESS NOTES
Subjective:       Patient ID: Michael Salgado is a 71 y.o. female.    Chief Complaint: Shortness of Breath (back pain left side)    HPI Comments: 71-year-old female presents to the clinic today with her daughter with complaint of shortness of breath.  She is on home oxygen.  Her daughter just started giving her Lasix 20 mg every a.m. Since I Yesterday.   She was seen  by Jitendra Rainey  On 1/26/2017 and was treated with Levaquin and prednisone. Her BNP was 976. She states she feels like she has a little bit of fluid around her lungs. She denies any wheezing.  She denies any known fever, chills, sore throat, sinus congestion, ear pain, abdominal pain, nausea, vomiting, or diarrhea.    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     Encounter for blood transfusion     Heart attack     History of pleural effusion     HTN (hypertension)     Hyperlipidemia     MI, old     Multiple thyroid nodules     Pulmonary HTN     Right hemiparesis     S/P coronary artery stent placement     S/P percutaneous endoscopic gastrostomy (PEG) tube placement     Screening for colon cancer      normal 2015 -10 yrs    Stroke      Past Surgical History   Procedure Laterality Date    Knee surgery      Ovary surgery      Tubal ligation      Cardiac bypass      Ovary surgery      Coronary artery bypass graft  6/20/15     E Dusty    Coronary angioplasty with stent placement  7/29/15     ANGELES Montano    Tonsillectomy        reports that she has never smoked. She has never used smokeless tobacco. She reports that she does not drink alcohol or use illicit drugs.  Review of Systems    Constitutional: Negative for chills and fever.   HENT: Negative for congestion, ear discharge, ear pain, sinus pressure, sneezing and sore throat.    Eyes: Negative for pain, discharge and itching.   Respiratory: Positive for shortness of breath. Negative for cough.    Cardiovascular: Negative for chest pain, palpitations and leg swelling.   Gastrointestinal: Negative for abdominal pain, diarrhea, nausea and vomiting.   Musculoskeletal: Positive for gait problem.        Sitting in a wheelchair    Neurological: Negative for dizziness, light-headedness and headaches.       Objective:      Physical Exam   Constitutional: She is oriented to person, place, and time. She appears well-developed and well-nourished. No distress.   HENT:   Head: Normocephalic and atraumatic.   Right Ear: External ear normal.   Left Ear: External ear normal.   Nose: Nose normal.   Mouth/Throat: Oropharynx is clear and moist. No oropharyngeal exudate.   Eyes: Conjunctivae and EOM are normal. Pupils are equal, round, and reactive to light. Right eye exhibits no discharge. Left eye exhibits no discharge. No scleral icterus.   Neck: Normal range of motion. Neck supple. No JVD present.   Cardiovascular: Normal rate, regular rhythm and normal heart sounds.  Exam reveals no gallop and no friction rub.    No murmur heard.  Pulmonary/Chest: Effort normal and breath sounds normal. No respiratory distress. She has no wheezes. She has no rales.   Abdominal: Soft. Bowel sounds are normal. There is no tenderness.   Musculoskeletal: She exhibits no edema.   Lymphadenopathy:     She has no cervical adenopathy.   Neurological: She is alert and oriented to person, place, and time.   Skin: Skin is warm and dry. She is not diaphoretic.   Psychiatric: She has a normal mood and affect.       Assessment:       1. SOB (shortness of breath)    2. Anemia of unknown etiology    3. Chronic atrial fibrillation    4. Chronic diastolic congestive heart failure    5.  Chronic kidney disease (CKD) stage G4/A1, severely decreased glomerular filtration rate (GFR) between 15-29 mL/min/1.73 square meter and albuminuria creatinine ratio less than 30 mg/g    6. Essential hypertension    7. Hyperlipidemia, unspecified hyperlipidemia type    8. Type 2 diabetes mellitus with stage 4 chronic kidney disease, with long-term current use of insulin        Plan:         SOB (shortness of breath)  -     X-Ray Chest PA And Lateral; Future; Expected date: 1/30/17  I spoke with the patient's daughter and explained that her chest x-ray was normal she verbalized understanding of above     Anemia of unknown etiology  - stable continue to monitor    Chronic atrial fibrillation  - The current medical regimen is effective;  continue present plan and medications.    Chronic diastolic congestive heart failure  -The current medical regimen is effective;  continue present plan and medications.    Chronic kidney disease (CKD) stage G4/A1, severely decreased glomerular filtration rate (GFR) between 15-29 mL/min/1.73 square meter and albuminuria creatinine ratio less than 30 mg/g  - avoid anti-inflammatories     Essential hypertension  - The current medical regimen is effective;  continue present plan and medications.    Hyperlipidemia, unspecified hyperlipidemia type  - The current medical regimen is effective;  continue present plan and medications.    Type 2 diabetes mellitus with stage 4 chronic kidney disease, with long-term current use of insulin  - The current medical regimen is effective;  continue present plan and medications.

## 2017-01-30 NOTE — PROGRESS NOTES
Subjective:       Patient ID: Michael Salgado is a 71 y.o. female.    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     Encounter for blood transfusion     Heart attack     History of pleural effusion     HTN (hypertension)     Hyperlipidemia     MI, old     Multiple thyroid nodules     Pulmonary HTN     Right hemiparesis     S/P coronary artery stent placement     S/P percutaneous endoscopic gastrostomy (PEG) tube placement     Screening for colon cancer      normal 2015 -10 yrs    Stroke      Past Surgical History   Procedure Laterality Date    Knee surgery      Ovary surgery      Tubal ligation      Cardiac bypass      Ovary surgery      Coronary artery bypass graft  6/20/15     E Dusty    Coronary angioplasty with stent placement  7/29/15     E Dusty    Tonsillectomy        reports that she has never smoked. She has never used smokeless tobacco. She reports that she does not drink alcohol or use illicit drugs.  Review of Systems    Objective:      Physical Exam    Assessment:       No diagnosis found.    Plan:       ***    No Follow-up on file.

## 2017-01-30 NOTE — TELEPHONE ENCOUNTER
Pt needs to have peg tube removal, not booked yet, she is on Effient and we need to know if she can hold med 5 days prior to procedure. Please message back with response for documentation.

## 2017-01-31 ENCOUNTER — TELEPHONE (OUTPATIENT)
Dept: FAMILY MEDICINE | Facility: CLINIC | Age: 72
End: 2017-01-31

## 2017-01-31 NOTE — TELEPHONE ENCOUNTER
I left a message for the patient's daughter to call the office to discuss the chest x-ray results.

## 2017-01-31 NOTE — TELEPHONE ENCOUNTER
I spoke with the patient's daughter and explained that her x-ray was normal. I thought it was ok to continue giving her the Lasix 20 mg po daily. Patient's daughter verbalized understanding of above.

## 2017-02-02 RX ORDER — PANTOPRAZOLE SODIUM 40 MG/1
TABLET, DELAYED RELEASE ORAL
Qty: 90 TABLET | Refills: 0 | Status: SHIPPED | OUTPATIENT
Start: 2017-02-02 | End: 2017-05-08 | Stop reason: SDUPTHER

## 2017-02-09 ENCOUNTER — HOSPITAL ENCOUNTER (EMERGENCY)
Facility: HOSPITAL | Age: 72
Discharge: HOME | End: 2017-02-09
Attending: EMERGENCY MEDICINE
Payer: MEDICARE

## 2017-02-09 ENCOUNTER — TELEPHONE (OUTPATIENT)
Dept: FAMILY MEDICINE | Facility: CLINIC | Age: 72
End: 2017-02-09

## 2017-02-09 VITALS
HEART RATE: 70 BPM | HEIGHT: 64 IN | SYSTOLIC BLOOD PRESSURE: 163 MMHG | TEMPERATURE: 98 F | BODY MASS INDEX: 26.29 KG/M2 | DIASTOLIC BLOOD PRESSURE: 66 MMHG | OXYGEN SATURATION: 100 % | RESPIRATION RATE: 18 BRPM | WEIGHT: 154 LBS

## 2017-02-09 DIAGNOSIS — R41.82 ALTERED MENTAL STATUS, UNSPECIFIED ALTERED MENTAL STATUS TYPE: Primary | ICD-10-CM

## 2017-02-09 DIAGNOSIS — E16.2 HYPOGLYCEMIA: ICD-10-CM

## 2017-02-09 LAB
ABO + RH BLD: NORMAL
ALBUMIN SERPL BCP-MCNC: 2.6 G/DL
ALP SERPL-CCNC: 68 U/L
ALT SERPL W/O P-5'-P-CCNC: 9 U/L
ANION GAP SERPL CALC-SCNC: 5 MMOL/L
ANISOCYTOSIS BLD QL SMEAR: SLIGHT
AST SERPL-CCNC: 12 U/L
BACTERIA #/AREA URNS HPF: NORMAL /HPF
BASOPHILS # BLD AUTO: 0.02 K/UL
BASOPHILS NFR BLD: 0.3 %
BILIRUB SERPL-MCNC: 0.3 MG/DL
BILIRUB UR QL STRIP: NEGATIVE
BILIRUB UR QL STRIP: NEGATIVE
BLD GP AB SCN CELLS X3 SERPL QL: NORMAL
BNP SERPL-MCNC: 870 PG/ML
BUN SERPL-MCNC: 29 MG/DL
CALCIUM SERPL-MCNC: 8.4 MG/DL
CHLORIDE SERPL-SCNC: 108 MMOL/L
CLARITY UR: CLEAR
CLARITY UR: CLEAR
CO2 SERPL-SCNC: 30 MMOL/L
COLOR UR: YELLOW
COLOR UR: YELLOW
CREAT SERPL-MCNC: 1.7 MG/DL
DIFFERENTIAL METHOD: ABNORMAL
EOSINOPHIL # BLD AUTO: 0.2 K/UL
EOSINOPHIL NFR BLD: 1.9 %
ERYTHROCYTE [DISTWIDTH] IN BLOOD BY AUTOMATED COUNT: 14.1 %
EST. GFR  (AFRICAN AMERICAN): 34 ML/MIN/1.73 M^2
EST. GFR  (NON AFRICAN AMERICAN): 29 ML/MIN/1.73 M^2
GLUCOSE SERPL-MCNC: 48 MG/DL
GLUCOSE UR QL STRIP: NEGATIVE
GLUCOSE UR QL STRIP: NEGATIVE
HCT VFR BLD AUTO: 31.7 %
HGB BLD-MCNC: 10.1 G/DL
HGB UR QL STRIP: NEGATIVE
HGB UR QL STRIP: NEGATIVE
HYALINE CASTS #/AREA URNS LPF: 0 /LPF
HYPOCHROMIA BLD QL SMEAR: ABNORMAL
INR PPP: 1
KETONES UR QL STRIP: NEGATIVE
KETONES UR QL STRIP: NEGATIVE
LEUKOCYTE ESTERASE UR QL STRIP: NEGATIVE
LEUKOCYTE ESTERASE UR QL STRIP: NEGATIVE
LYMPHOCYTES # BLD AUTO: 1.3 K/UL
LYMPHOCYTES NFR BLD: 16.1 %
MCH RBC QN AUTO: 27 PG
MCHC RBC AUTO-ENTMCNC: 31.9 %
MCV RBC AUTO: 85 FL
MICROSCOPIC COMMENT: NORMAL
MONOCYTES # BLD AUTO: 0.7 K/UL
MONOCYTES NFR BLD: 8.3 %
NEUTROPHILS # BLD AUTO: 5.8 K/UL
NEUTROPHILS NFR BLD: 73.8 %
NITRITE UR QL STRIP: NEGATIVE
NITRITE UR QL STRIP: NEGATIVE
OVALOCYTES BLD QL SMEAR: ABNORMAL
PH UR STRIP: 6 [PH] (ref 5–8)
PH UR STRIP: 6 [PH] (ref 5–8)
PLATELET # BLD AUTO: 255 K/UL
PMV BLD AUTO: 8.8 FL
POCT GLUCOSE: 124 MG/DL (ref 70–110)
POCT GLUCOSE: 47 MG/DL (ref 70–110)
POTASSIUM SERPL-SCNC: 4.4 MMOL/L
PROT SERPL-MCNC: 5.6 G/DL
PROT UR QL STRIP: ABNORMAL
PROT UR QL STRIP: ABNORMAL
PROTHROMBIN TIME: 11 SEC
RBC # BLD AUTO: 3.74 M/UL
RBC #/AREA URNS HPF: 1 /HPF (ref 0–4)
SODIUM SERPL-SCNC: 143 MMOL/L
SP GR UR STRIP: 1.01 (ref 1–1.03)
SP GR UR STRIP: 1.01 (ref 1–1.03)
TROPONIN I SERPL DL<=0.01 NG/ML-MCNC: 0.04 NG/ML
URN SPEC COLLECT METH UR: ABNORMAL
URN SPEC COLLECT METH UR: ABNORMAL
UROBILINOGEN UR STRIP-ACNC: NEGATIVE EU/DL
UROBILINOGEN UR STRIP-ACNC: NEGATIVE EU/DL
WBC # BLD AUTO: 7.93 K/UL
WBC #/AREA URNS HPF: 1 /HPF (ref 0–5)

## 2017-02-09 PROCEDURE — 85610 PROTHROMBIN TIME: CPT

## 2017-02-09 PROCEDURE — 25000003 PHARM REV CODE 250: Performed by: EMERGENCY MEDICINE

## 2017-02-09 PROCEDURE — 87040 BLOOD CULTURE FOR BACTERIA: CPT | Mod: 59

## 2017-02-09 PROCEDURE — 82962 GLUCOSE BLOOD TEST: CPT

## 2017-02-09 PROCEDURE — 81000 URINALYSIS NONAUTO W/SCOPE: CPT

## 2017-02-09 PROCEDURE — 84484 ASSAY OF TROPONIN QUANT: CPT

## 2017-02-09 PROCEDURE — 86901 BLOOD TYPING SEROLOGIC RH(D): CPT

## 2017-02-09 PROCEDURE — 85025 COMPLETE CBC W/AUTO DIFF WBC: CPT

## 2017-02-09 PROCEDURE — 83880 ASSAY OF NATRIURETIC PEPTIDE: CPT

## 2017-02-09 PROCEDURE — 80053 COMPREHEN METABOLIC PANEL: CPT

## 2017-02-09 PROCEDURE — 99284 EMERGENCY DEPT VISIT MOD MDM: CPT | Mod: 25

## 2017-02-09 PROCEDURE — 86900 BLOOD TYPING SEROLOGIC ABO: CPT

## 2017-02-09 RX ORDER — DEXTROSE 50 % IN WATER (D50W) INTRAVENOUS SYRINGE
12.5
Status: COMPLETED | OUTPATIENT
Start: 2017-02-09 | End: 2017-02-09

## 2017-02-09 RX ORDER — INSULIN GLARGINE 100 [IU]/ML
10 INJECTION, SOLUTION SUBCUTANEOUS NIGHTLY
Qty: 1 VIAL | Refills: 3
Start: 2017-02-09

## 2017-02-09 RX ADMIN — DEXTROSE MONOHYDRATE 12.5 G: 25 INJECTION, SOLUTION INTRAVENOUS at 11:02

## 2017-02-09 NOTE — ED NOTES
In/out cath performed on pt using 15 Yoruba cath. Sterile technique maintained throughout procedure. Approximately 200 mls of urine out put obtained

## 2017-02-09 NOTE — ED AVS SNAPSHOT
[facility]  [address] [place] [address]               Michael Salgado   2017 11:13 AM   ED    Description:  Female : 1945   Department:  [facility]           Your Care was Coordinated By:     Provider Role From To    Jin Aragon MD Attending Provider 17 1114 --      Reason for Visit     Altered Mental Status           Diagnoses this Visit        Comments    Altered mental status, unspecified altered mental status type    -  Primary     Hypoglycemia           ED Disposition     ED Disposition Condition Comment    Discharge             To Do List            These Medications        Disp Refills Start End    insulin glargine (LANTUS) 100 unit/mL injection 1 vial 3 2017     Inject 10 Units into the skin every evening. - Subcutaneous    Pharmacy: [facility] [address] [place] [address] AT [facility] [address] Ph #: [phone]         [facility]     [facility] Care [facility] -  Assistance  Registered nurses in the [facility] provide clinical advisement, health education, appointment booking, and other advisory services.  Call for this free service at [phone].             Medications           Message regarding Medications     Verify the changes and/or additions to your medication regime listed below are the same as discussed with your clinician today.  If any of these changes or additions are incorrect, please notify your healthcare provider.        These medications were administered today        Dose Freq    dextrose 50 % in water (D50W) injection 12.5 g 12.5 g ED 1 Time    Sig: Inject 25 mLs (12.5 g total) into the vein ED 1 Time.    Class: Normal    Route: Intravenous      CHANGE how you are taking these medications     Start Taking Instead of    insulin glargine (LANTUS) 100 unit/mL injection insulin glargine (LANTUS) 100 unit/mL injection     Dosage:  Inject 10 Units into the skin every evening. Dosage:  Inject 20 Units into the skin every evening.           Verify that the below list of medications is an accurate representation of the medications you are currently taking.  If none reported, the list may be blank. If incorrect, please contact your healthcare provider. Carry this list with you in case of emergency.           Current Medications     amlodipine (NORVASC) 5 MG tablet Take 1 tablet (5 mg total) by mouth once daily.    amoxicillin (AMOXIL) 500 MG capsule     aspirin (ECOTRIN) 81 MG EC tablet Take 81 mg by mouth once daily.    atorvastatin (LIPITOR) 40 MG tablet Take 1 tablet (40 mg total) by mouth once daily.    blood sugar diagnostic (TRUETEST TEST STRIPS) Strp Twice daily blood sugar check.    buPROPion (WELLBUTRIN XL) 150 MG TB24 tablet Take 1 tablet (150 mg total) by mouth once daily.    carvedilol (COREG) 25 MG tablet Take 25 mg by mouth 2 (two) times daily with meals.    diazePAM (VALIUM) 5 MG tablet Take 1 tablet (5 mg total) by mouth every 8 (eight) hours as needed.    diclofenac sodium 1 % Gel Apply topically 2 (two) times daily.    escitalopram oxalate (LEXAPRO) 10 MG tablet Take 1 tablet (10 mg total) by mouth once daily.    estradiol (ESTRACE) 0.01 % (0.1 mg/gram) vaginal cream Place 1 g vaginally twice a week.    furosemide (LASIX) 20 MG tablet TAKE 1/2 TABLET(10 MG) BY MOUTH TWICE DAILY    gabapentin (NEURONTIN) 300 MG capsule Take 300 mg by mouth once daily.    hydrALAZINE (APRESOLINE) 25 MG tablet TAKE 1 TABLET(25 MG) BY MOUTH BID    hydrocodone-acetaminophen 10-325mg (NORCO)  mg Tab Take 1 tablet by mouth every 8 (eight) hours as needed.    insulin glargine (LANTUS) 100 unit/mL injection Inject 10 Units into the skin every evening.    insulin lispro (HUMALOG) 100 unit/mL injection Inject 5 Units into the skin 3 (three) times daily before meals.    levalbuterol (XOPENEX) 1.25 mg/3 mL nebulizer solution USE 1 VIAL VIA  "NEBULIZER EVERY 8 HOURS AS NEEDED FOR WHEEZING OR SHORTNESS OF BREATH    nut.tx.gluc.intol,lac-free,soy (GLUCERNA) Liqd Once a day oral supplement.    omega-3 acid ethyl esters (LOVAZA) 1 gram capsule TAKE 2 CAPSULES BY MOUTH TWICE DAILY    pantoprazole (PROTONIX) 40 MG tablet TAKE 1 TABLET BY MOUTH EVERY DAY    pen needle, diabetic 30 gauge x 5/16" Ndle Use 4 disposable needles per day. Inject medication into skin daily    polyethylene glycol (GLYCOLAX) 17 gram PwPk Take 17 g by mouth once daily.    prasugrel (EFFIENT) 10 mg Tab Take 1 tablet (10 mg total) by mouth once daily.    underpads 23 X 36 " Pads Twice daily change as needed for incontinence.           Clinical Reference Information           Your Vitals Were     BP Pulse Temp Resp Height Weight    164/72 54 97.7 °F (36.5 °C) (Oral) 18 5' 4" (1.626 m) 69.9 kg (154 lb)    Last Period SpO2 BMI          (LMP Unknown) 100% 26.43 kg/m2        Allergies as of 2/9/2017        Reactions    Daypro [Oxaprozin]     Pcn [Penicillins] Swelling    Patient states she is not allergic to Penicillin    Vibramycin [Doxycycline Calcium]       Immunizations Administered on Date of Encounter - 2/9/2017     None      ED Micro, Lab, POCT     Start Ordered       Status Ordering Provider    02/09/17 1209 02/09/17 1209  POCT glucose  Once      Final result     02/09/17 1133 02/09/17 1132  Blood Culture #1 **CANNOT BE ORDERED STAT**  Once      In process     02/09/17 1133 02/09/17 1132  Blood Culture #2 **CANNOT BE ORDERED STAT**  Once      In process     02/09/17 1132 02/09/17 1131  Urinalysis  STAT      Final result     02/09/17 1132 02/09/17 1131  Urinalysis Catheterized  STAT      Final result     02/09/17 1131 02/09/17 1131  CBC W/ AUTO DIFFERENTIAL  STAT      Final result     02/09/17 1131 02/09/17 1131  Comprehensive metabolic panel  STAT      Final result     02/09/17 1131 02/09/17 1131  Protime-INR  STAT      Final result     02/09/17 1131 02/09/17 1131  Troponin I  STAT      " Final result     02/09/17 1131 02/09/17 1131  B-Type natriuretic peptide  STAT      Final result     02/09/17 1131 02/09/17 1131  POCT glucose  Once      Acknowledged     02/09/17 1131 02/09/17 1131  Urinalysis Microscopic  Once      Final result     02/09/17 1130 02/09/17 1130  POCT glucose  Once      Final result       ED Imaging Orders     Start Ordered       Status Ordering Provider    02/09/17 1130 02/09/17 1131  CT Head Without Contrast  1 time imaging      Final result     02/09/17 1130 02/09/17 1131  X-Ray Chest AP Portable  1 time imaging      Final result       Discharge References/Attachments     CONFUSION (ENGLISH)    DIABETIC INSULIN REACTION (ENGLISH)      Your Scheduled Appointments     Feb 21, 2017  2:20 PM CST   Established Patient [facility] with Wesley [facility] MD   [facility]    [address] [facility] [address]   [phone]            Jul 21, 2017  1:00 PM CDT   Established Patient Visit with Nydia Degroot MD   [facility] - [facility] ([facility]    [address] [facility] [address]  [address]  [facility] [id number] [phone]              Your Future Surgeries/Procedures     Feb 27, 2017   Surgery with Payam Connor MD   [facility] ([facility])    [address] [facility] [id number] [phone]              [facility] Sign-Up     Activating your [facility] account is as easy as 1-2-3!     1) Visit [email or web address], select Sign Up Now, enter this activation code and your date of birth, then select Next.  [email or web address]  Expires: 3/26/2017  2:50 PM      2) Create a username and password to use when you visit [facility] in the future and select a security question in case you lose your password and select Next.    3) Enter your e-mail address and click Sign Up!    Additional Information  If you have questions, please e-mail [email or web address] or call [phone] to talk to our [facility] staff. Remember, [facility] is NOT to be  used for urgent needs. For medical emergencies, dial 911.          [facility] complies with applicable Federal civil rights laws and does not discriminate on the basis of race, color, national origin, age, disability, or sex.        Language Assistance Services     ATTENTION: Language assistance services are available, free of charge. Please call [phone].      ATENCIÓN: Si habla español, tiene a yen disposición servicios gratuitos de asistencia lingüística. Llame al [phone].     CHÚ Ý: N?u b?n nói Ti?ng Vi?t, có các d?ch v? h? tr? ngôn ng? mi?n phí dành cho b?n. G?i s? [phone].

## 2017-02-09 NOTE — TELEPHONE ENCOUNTER
----- Message from Rickey Baron sent at 2/8/2017 11:24 AM CST -----  Contact: Miryam/Daughter/[phone]  Miryam would like an X Ray done on patient's back. She would like to speak to staff in regards to this matter. Thank you.

## 2017-02-09 NOTE — ED PROVIDER NOTES
"Encounter Date: 2/9/2017    SCRIBE #1 NOTE: I, Malgorzata Jennings, am scribing for, and in the presence of,  Jin Aragon MD. I have scribed the following portions of the note - Other sections scribed: HPI and ROS.       History     Chief Complaint   Patient presents with    Altered Mental Status     family states confusion for the past 4 days, fall last week where she hit her head. "i been seeing her have little strokes today"     Review of patient's allergies indicates:   Allergen Reactions    Daypro [oxaprozin]     Pcn [penicillins] Swelling     Patient states she is not allergic to Penicillin    Vibramycin [doxycycline calcium]      HPI Comments: CC: Altered Mental Status    HPI: This 71 y.o. Female who has CVA, Pulmonary HTN, HTN, DM, HLD, MI, CHF, CAD, Chronic headaches, CKD stage 3 presents to the ED for an evaluation of acute onset change in mental status that began today.  Pt's family reports pt appears to have been having "mini strokes" that began this am.  Pt's family reports pt has been having episodes of confusion for the past 4 days and reports pt sustaining a fall 1 week ago in which she hit her head.  Upon arrival to the ED, pt c/o dizziness, generalized weakness, and fatigue.  Pt also reports of shortness of breath, but states she is on oxygen.  Pt denies fever, chills, nausea, emesis, diarrhea, abdominal pain, chest pain, or any other associated symptoms.  No prior tx.  No alleviating factors.    The history is provided by the patient and a relative. No  was used.     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     Encounter for blood " transfusion     Heart attack     History of pleural effusion     HTN (hypertension)     Hyperlipidemia     MI, old     Multiple thyroid nodules     Pulmonary HTN     Right hemiparesis     S/P coronary artery stent placement     S/P percutaneous endoscopic gastrostomy (PEG) tube placement     Screening for colon cancer     normal 2015 -10 yrs    Stroke      Past Medical History Pertinent Negatives:   Diagnosis Date Noted    Transfusion reaction 01/25/2016     Past Surgical History:   Procedure Laterality Date    cardiac bypass      CORONARY ANGIOPLASTY WITH STENT PLACEMENT  7/29/15    [facility]    CORONARY ARTERY BYPASS GRAFT  6/20/15    [facility]    KNEE SURGERY      OVARY SURGERY      OVARY SURGERY      TONSILLECTOMY      TUBAL LIGATION       Family History   Problem Relation Age of Onset    Diabetes Mother     Hypertension Mother     Hyperlipidemia Mother     Heart disease Sister     Cancer Sister      colon    Heart disease Brother     Diabetes Brother     Hypertension Brother     No Known Problems Daughter     Heart disease Son     Heart disease Sister     Hypertension Daughter      Social History   Substance Use Topics    Smoking status: Never Smoker    Smokeless tobacco: Never Used    Alcohol use No     Review of Systems   Constitutional: Positive for activity change and fatigue. Negative for chills and fever.   HENT: Negative for ear pain and sore throat.    Eyes: Negative for pain.   Respiratory: Positive for shortness of breath. Negative for cough.    Cardiovascular: Negative for chest pain.   Gastrointestinal: Negative for abdominal pain, constipation, diarrhea, nausea and vomiting.   Genitourinary: Negative for dysuria.   Musculoskeletal: Negative for back pain and neck pain.   Skin: Negative for rash.   Neurological: Positive for dizziness and weakness. Negative for syncope, numbness and headaches.   Psychiatric/Behavioral: Positive for confusion.       Physical Exam    Initial Vitals   BP Pulse Resp Temp SpO2   02/09/17 1113 02/09/17 1113 02/09/17 1113 02/09/17 1113 02/09/17 1113   180/77 59 18 97.7 °F (36.5 °C) 100 %     Physical Exam  Nursing note and vitals reviewed.  Constitutional: Nontoxic middle-aged female who appears uncomfortable.  HENT:    Head: NC/AT    Eyes: Conjunctivae normal.   (-) scleral icterus.              Mouth/Throat: Dry mucosal membranes..     Neck: Neck supple, normal rom.   Cardiovascular: RRR  Pulmonary/Chest: Diminished BS  Abdominal: Soft. ND/NT w/o guarding or rebound.  (+)BS.  (-) CVA tenderness.  Musculoskeletal: FROM of all major joints. No extremity edema or tenderness.  Neurological: A&Ox4, Normal speech.  No focal motor deficits.  Normal gait  Skin: Skin is warm and dry.   Psychiatric: normal mood and affect.       ED Course   Critical Care  Date/Time: 2/9/2017 11:35 AM  Performed by: ODALIS ANDRADE.  Authorized by: ODALIS ANDRADE   Direct patient critical care time: 20 minutes  Additional history critical care time: 10 minutes  Ordering / reviewing critical care time: 10 minutes  Documentation critical care time: 10 minutes  Total critical care time (exclusive of procedural time) : 50 minutes  Critical care time was exclusive of separately billable procedures and treating other patients and teaching time.  Critical care was necessary to treat or prevent imminent or life-threatening deterioration of the following conditions: endocrine crisis.  Critical care was time spent personally by me on the following activities: development of treatment plan with patient or surrogate, evaluation of patient's response to treatment, examination of patient, obtaining history from patient or surrogate, ordering and performing treatments and interventions, ordering and review of laboratory studies, ordering and review of radiographic studies, pulse oximetry, re-evaluation of patient's condition and review of old charts.        Labs Reviewed   CBC W/  AUTO DIFFERENTIAL - Abnormal; Notable for the following:        Result Value    RBC 3.74 (*)     Hemoglobin 10.1 (*)     Hematocrit 31.7 (*)     MCHC 31.9 (*)     MPV 8.8 (*)     Gran% 73.8 (*)     Lymph% 16.1 (*)     All other components within normal limits   COMPREHENSIVE METABOLIC PANEL - Abnormal; Notable for the following:     CO2 30 (*)     Glucose 48 (*)     BUN, Bld 29 (*)     Creatinine 1.7 (*)     Calcium 8.4 (*)     Total Protein 5.6 (*)     Albumin 2.6 (*)     ALT 9 (*)     Anion Gap 5 (*)     eGFR if  34 (*)     eGFR if non  29 (*)     All other components within normal limits    Narrative:       Glucose critical result(s) called and verbal readback obtained from   Deepika Hargrove., 02/09/2017 12:06   TROPONIN I - Abnormal; Notable for the following:     Troponin I 0.038 (*)     All other components within normal limits   B-TYPE NATRIURETIC PEPTIDE - Abnormal; Notable for the following:      (*)     All other components within normal limits   URINALYSIS - Abnormal; Notable for the following:     Protein, UA 3+ (*)     All other components within normal limits   URINALYSIS - Abnormal; Notable for the following:     Protein, UA 3+ (*)     All other components within normal limits   POCT GLUCOSE - Abnormal; Notable for the following:     POCT Glucose 47 (*)     All other components within normal limits   POCT GLUCOSE - Abnormal; Notable for the following:     POCT Glucose 124 (*)     All other components within normal limits   CULTURE, BLOOD   CULTURE, BLOOD   PROTIME-INR   URINALYSIS MICROSCOPIC   TYPE & SCREEN     EKG Readings: (Independently Interpreted)   Initial Reading: No STEMI.   Sinus bradycardia, rate 54, nonspecific intraventricular block, nonspecific ST/T-wave abnormalities.     X-Rays:   Independently Interpreted Readings:   Chest X-Ray: Normal heart size. Left hemidiaphragm not well-visualized, possible basilar effusion, atelectasis and/or infiltrate.  No  pneumothorax.   Head CT: No hemorrhage.  No skull fracture.  No acute stroke.     Medical Decision Making:   History:   I obtained history from: someone other than patient.  Old Medical Records: I decided to obtain old medical records.  Old Records Summarized: records from clinic visits and other records.  Independently Interpreted Test(s):   I have ordered and independently interpreted X-rays - see prior notes.  I have ordered and independently interpreted EKG Reading(s) - see prior notes  Clinical Tests:   Lab Tests: Ordered and Reviewed  Radiological Study: Ordered and Reviewed  Medical Tests: Ordered and Reviewed    Additional Medical Decision Making:   Emergent evaluation a 71-year-old female with history of diabetes, pulmonary tension, lipidemia, ischemic stroke, CAD, and congestive heart failure who presents the emergency department for evaluation of dizziness, transient episodes of confusion.  Aside from hypertension, initial VS reassuring.  Temp 97.7°.  On exam, she is A&Ox3.  She is warm and clammy to touch.  Initial Accu-Chek 43.  12.5g of D50 emergently given.  CT head, chest x-ray as well as basic labs including cardiac enzymes, blood gas and blood cultures are pending.    - Basic labs significant for a normal white count, baseline H&H and renal insufficiency.  BNP mildly elevated.  Although her EKG is without evidence of acute ischemia or dysrhythmia, and troponin mildly elevated.  Patient denies any chest pain, shortness of breath, nausea vomiting or diaphoresis.  Elevated troponin likely secondary to CHF and chronic renal insufficiency as opposed to ACS.  CT head and chest x-ray without acute findings.  Urinalysis without evidence of infectious/inflammatory process.  After having received IV D50, her mentation has returned and remained at baseline.  She has been observed in the emergency department for several hours during which serial Accu-Cheks were checked.  Her nightly Lantus dose has been  further reduced in an effort to avoid any future hypoglycemic episodes.  She's been advised to follow-up with her primary care physician as soon as possible.  Return instructions discussed prior to discharge.            Scribe Attestation:   Scribe #1: I performed the above scribed service and the documentation accurately describes the services I performed. I attest to the accuracy of the note.    Attending Attestation:           Physician Attestation for Scribe:  Physician Attestation Statement for Scribe #1: I, Jin Aragon MD, reviewed documentation, as scribed by Malgorzata Jennings in my presence, and it is both accurate and complete.                 ED Course     Clinical Impression:   The primary encounter diagnosis was Altered mental status, unspecified altered mental status type. A diagnosis of Hypoglycemia was also pertinent to this visit.    Disposition:   Disposition: Discharged       Jin Aragon MD  02/27/17 2130

## 2017-02-13 RX ORDER — INSULIN LISPRO 100 [IU]/ML
5 INJECTION, SOLUTION INTRAVENOUS; SUBCUTANEOUS
Qty: 100 ML | Refills: 12 | Status: SHIPPED | OUTPATIENT
Start: 2017-02-13

## 2017-02-13 RX ORDER — HYDROCODONE BITARTRATE AND ACETAMINOPHEN 10; 325 MG/1; MG/1
1 TABLET ORAL EVERY 8 HOURS PRN
Qty: 90 TABLET | Refills: 0 | OUTPATIENT
Start: 2017-02-13

## 2017-02-13 NOTE — TELEPHONE ENCOUNTER
----- Message from Soco Goode sent at 2/13/2017  9:04 AM CST -----  Contact: chava Miryam  [phone]  Daughter is requesting to speak to you regarding refill on [place] and her mother insulin and body aches. Pls call daughter Miryam [phone]. Thanks......Luisana

## 2017-02-14 LAB
BACTERIA BLD CULT: NORMAL
BACTERIA BLD CULT: NORMAL

## 2017-02-15 ENCOUNTER — TELEPHONE (OUTPATIENT)
Dept: FAMILY MEDICINE | Facility: CLINIC | Age: 72
End: 2017-02-15

## 2017-02-15 NOTE — TELEPHONE ENCOUNTER
----- Message from Yolis Molina sent at 2/15/2017  8:40 AM CST -----  Contact: Miryam / Daughter  Pt's daughter (Miryam) is requesting a call back .  Please call .    Thanks

## 2017-02-20 RX ORDER — HYDROCODONE BITARTRATE AND ACETAMINOPHEN 10; 325 MG/1; MG/1
1 TABLET ORAL EVERY 8 HOURS PRN
Qty: 90 TABLET | Refills: 0 | Status: SHIPPED | OUTPATIENT
Start: 2017-02-20 | End: 2017-03-24 | Stop reason: SDUPTHER

## 2017-02-20 NOTE — TELEPHONE ENCOUNTER
----- Message from Yolis Molina sent at 2/20/2017 10:36 AM CST -----  Contact: Miryam / daughter  Refill:    hydrocodone-acetaminophen 10-325mg ([facility])  mg Tab    Thanks

## 2017-02-21 ENCOUNTER — TELEPHONE (OUTPATIENT)
Dept: FAMILY MEDICINE | Facility: CLINIC | Age: 72
End: 2017-02-21

## 2017-02-23 ENCOUNTER — LAB VISIT (OUTPATIENT)
Dept: LAB | Facility: HOSPITAL | Age: 72
End: 2017-02-23
Attending: INTERNAL MEDICINE
Payer: MEDICARE

## 2017-02-23 ENCOUNTER — OFFICE VISIT (OUTPATIENT)
Dept: FAMILY MEDICINE | Facility: CLINIC | Age: 72
End: 2017-02-23
Payer: MEDICARE

## 2017-02-23 VITALS
HEIGHT: 64 IN | HEART RATE: 73 BPM | WEIGHT: 153.13 LBS | OXYGEN SATURATION: 96 % | SYSTOLIC BLOOD PRESSURE: 124 MMHG | TEMPERATURE: 98 F | BODY MASS INDEX: 26.14 KG/M2 | DIASTOLIC BLOOD PRESSURE: 60 MMHG
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
## 2017-02-23 DIAGNOSIS — I25.810 CORONARY ARTERY DISEASE INVOLVING AUTOLOGOUS ARTERY CORONARY BYPASS GRAFT WITHOUT ANGINA PECTORIS: ICD-10-CM

## 2017-02-23 DIAGNOSIS — N39.0 FREQUENT UTI: ICD-10-CM

## 2017-02-23 DIAGNOSIS — I50.32 CHRONIC DIASTOLIC CONGESTIVE HEART FAILURE: ICD-10-CM

## 2017-02-23 DIAGNOSIS — M54.50 ACUTE BILATERAL LOW BACK PAIN WITHOUT SCIATICA: Primary | ICD-10-CM

## 2017-02-23 DIAGNOSIS — I10 ESSENTIAL HYPERTENSION: ICD-10-CM

## 2017-02-23 LAB
ALBUMIN SERPL BCP-MCNC: 2.6 G/DL
ALP SERPL-CCNC: 74 U/L
ALT SERPL W/O P-5'-P-CCNC: 10 U/L
ANION GAP SERPL CALC-SCNC: 6 MMOL/L
AST SERPL-CCNC: 20 U/L
BASOPHILS # BLD AUTO: 0.01 K/UL
BASOPHILS NFR BLD: 0.3 %
BILIRUB SERPL-MCNC: 0.2 MG/DL
BUN SERPL-MCNC: 34 MG/DL
CALCIUM SERPL-MCNC: 8.7 MG/DL
CHLORIDE SERPL-SCNC: 109 MMOL/L
CHOLEST/HDLC SERPL: 7.6 {RATIO}
CO2 SERPL-SCNC: 27 MMOL/L
CREAT SERPL-MCNC: 2.2 MG/DL
DIFFERENTIAL METHOD: ABNORMAL
EOSINOPHIL # BLD AUTO: 0.1 K/UL
EOSINOPHIL NFR BLD: 2.4 %
ERYTHROCYTE [DISTWIDTH] IN BLOOD BY AUTOMATED COUNT: 13.8 %
EST. GFR  (AFRICAN AMERICAN): 24.9 ML/MIN/1.73 M^2
EST. GFR  (NON AFRICAN AMERICAN): 21.6 ML/MIN/1.73 M^2
GLUCOSE SERPL-MCNC: 174 MG/DL
HCT VFR BLD AUTO: 30.7 %
HDL/CHOLESTEROL RATIO: 13.1 %
HDLC SERPL-MCNC: 191 MG/DL
HDLC SERPL-MCNC: 25 MG/DL
HGB BLD-MCNC: 9.3 G/DL
LDLC SERPL CALC-MCNC: 130.6 MG/DL
LYMPHOCYTES # BLD AUTO: 1 K/UL
LYMPHOCYTES NFR BLD: 30.8 %
MCH RBC QN AUTO: 26.3 PG
MCHC RBC AUTO-ENTMCNC: 30.3 %
MCV RBC AUTO: 87 FL
MONOCYTES # BLD AUTO: 0.4 K/UL
MONOCYTES NFR BLD: 12.8 %
NEUTROPHILS # BLD AUTO: 1.8 K/UL
NEUTROPHILS NFR BLD: 53.7 %
NONHDLC SERPL-MCNC: 166 MG/DL
PLATELET # BLD AUTO: 181 K/UL
PMV BLD AUTO: 9.4 FL
POTASSIUM SERPL-SCNC: 4.3 MMOL/L
PROT SERPL-MCNC: 5.8 G/DL
RBC # BLD AUTO: 3.54 M/UL
SODIUM SERPL-SCNC: 142 MMOL/L
TRIGL SERPL-MCNC: 177 MG/DL
WBC # BLD AUTO: 3.28 K/UL

## 2017-02-23 PROCEDURE — 1160F RVW MEDS BY RX/DR IN RCRD: CPT | Mod: S$GLB,,, | Performed by: NURSE PRACTITIONER

## 2017-02-23 PROCEDURE — 3060F POS MICROALBUMINURIA REV: CPT | Mod: S$GLB,,, | Performed by: NURSE PRACTITIONER

## 2017-02-23 PROCEDURE — 99214 OFFICE O/P EST MOD 30 MIN: CPT | Mod: S$GLB,,, | Performed by: NURSE PRACTITIONER

## 2017-02-23 PROCEDURE — 3044F HG A1C LEVEL LT 7.0%: CPT | Mod: S$GLB,,, | Performed by: NURSE PRACTITIONER

## 2017-02-23 PROCEDURE — 85025 COMPLETE CBC W/AUTO DIFF WBC: CPT

## 2017-02-23 PROCEDURE — 99999 PR PBB SHADOW E&M-EST. PATIENT-LVL V: CPT | Mod: PBBFAC,,, | Performed by: NURSE PRACTITIONER

## 2017-02-23 PROCEDURE — 3074F SYST BP LT 130 MM HG: CPT | Mod: S$GLB,,, | Performed by: NURSE PRACTITIONER

## 2017-02-23 PROCEDURE — 1157F ADVNC CARE PLAN IN RCRD: CPT | Mod: S$GLB,,, | Performed by: NURSE PRACTITIONER

## 2017-02-23 PROCEDURE — 80053 COMPREHEN METABOLIC PANEL: CPT

## 2017-02-23 PROCEDURE — 1159F MED LIST DOCD IN RCRD: CPT | Mod: S$GLB,,, | Performed by: NURSE PRACTITIONER

## 2017-02-23 PROCEDURE — 1125F AMNT PAIN NOTED PAIN PRSNT: CPT | Mod: S$GLB,,, | Performed by: NURSE PRACTITIONER

## 2017-02-23 PROCEDURE — 3078F DIAST BP <80 MM HG: CPT | Mod: S$GLB,,, | Performed by: NURSE PRACTITIONER

## 2017-02-23 PROCEDURE — 83036 HEMOGLOBIN GLYCOSYLATED A1C: CPT

## 2017-02-23 PROCEDURE — 36415 COLL VENOUS BLD VENIPUNCTURE: CPT | Mod: PO

## 2017-02-23 PROCEDURE — 80061 LIPID PANEL: CPT

## 2017-02-23 PROCEDURE — 99499 UNLISTED E&M SERVICE: CPT | Mod: S$GLB,,, | Performed by: NURSE PRACTITIONER

## 2017-02-23 RX ORDER — INSULIN ASPART 100 [IU]/ML
INJECTION, SOLUTION INTRAVENOUS; SUBCUTANEOUS
COMMUNITY
Start: 2017-02-21 | End: 2017-06-22

## 2017-02-23 NOTE — PROGRESS NOTES
Subjective:       Patient ID: Michael Salgado is a 71 y.o. female.    Chief Complaint: Diabetes and Back Pain    Diabetes   She presents for her follow-up diabetic visit. She has type 2 diabetes mellitus. Her disease course has been improving. Pertinent negatives for hypoglycemia include no dizziness, headaches, pallor, seizures or tremors. Pertinent negatives for diabetes include no blurred vision, no chest pain, no fatigue, no foot ulcerations, no visual change and no weakness. There are no hypoglycemic complications. There are no diabetic complications. Current diabetic treatment includes insulin injections. She is compliant with treatment all of the time. She is following a diabetic diet. She has not had a previous visit with a dietitian. Her home blood glucose trend is decreasing steadily. An ACE inhibitor/angiotensin II receptor blocker is being taken. She does not see a podiatrist.Eye exam is current.   Back Pain   Pertinent negatives include no abdominal pain, chest pain, headaches, numbness or weakness.       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     Social History Main Topics    Smoking status: Never Smoker    Smokeless tobacco: Never Used    Alcohol use No    Drug use: No    Sexual activity: Not on file     Other Topics Concern    Not on file     Social History Narrative       Past Surgical History:   Procedure Laterality Date    cardiac bypass      CORONARY ANGIOPLASTY WITH STENT PLACEMENT  7/29/15    [facility]    CORONARY ARTERY BYPASS GRAFT  6/20/15    [facility]    KNEE SURGERY      OVARY SURGERY      OVARY SURGERY      TONSILLECTOMY      TUBAL LIGATION         Review of Systems   Constitutional: Negative for fatigue and unexpected weight change.   Eyes: Negative for blurred vision, photophobia, redness and visual disturbance.   Respiratory: Negative for chest tightness and shortness of breath.    Cardiovascular: Negative for chest pain, palpitations and leg swelling.   Gastrointestinal: Negative for abdominal pain, nausea and vomiting.   Musculoskeletal: Positive for back pain.   Skin: Negative for pallor.   Neurological: Negative for dizziness, tremors, seizures, syncope, weakness, numbness and headaches.   Hematological: Does not bruise/bleed easily.   All other systems reviewed and are negative.      Objective:     /60  Pulse 73  Temp 98.1 °F (36.7 °C) (Oral)   Ht 5' 4" (1.626 m)  Wt 69.4 kg (153 lb 1.8 oz)  LMP  (LMP Unknown)  SpO2 96%  BMI 26.28 kg/m2     Physical Exam   Constitutional: She is oriented to person, place, and time. She appears well-developed and well-nourished.   HENT:   Head: Normocephalic and atraumatic.   Right Ear: Hearing, tympanic membrane, external ear and ear canal normal.   Left Ear: Hearing, tympanic membrane, external ear and ear canal normal.   Nose: No epistaxis.   Eyes: Conjunctivae, EOM and lids are normal. Pupils are equal, round, and reactive to light.   Neck: Normal carotid pulses and no JVD present. Carotid bruit is not present. No " thyroid mass and no thyromegaly present.   Cardiovascular: Normal rate, regular rhythm and normal heart sounds.    Pulmonary/Chest: Effort normal and breath sounds normal. No respiratory distress. She has no decreased breath sounds.   Abdominal: Soft. Bowel sounds are normal. She exhibits no distension and no mass. There is no tenderness. There is no rebound and no guarding.   Musculoskeletal:        Thoracic back: She exhibits decreased range of motion, tenderness and pain. She exhibits no swelling, no edema and no spasm.   Neurological: She is alert and oriented to person, place, and time.   Skin: Skin is warm, dry and intact. She is not diaphoretic. No pallor.   Psychiatric: She has a normal mood and affect. Her speech is normal and behavior is normal.       Assessment:       1. Acute bilateral low back pain without sciatica    2. Fall against object    3. Uncontrolled type 2 diabetes mellitus with stage 3 chronic kidney disease, with long-term current use of insulin    4. Frequent UTI        Plan:       Michael was seen today for diabetes and back pain.    Diagnoses and all orders for this visit:    Acute bilateral low back pain without sciatica  -     X-Ray Thoracic Spine AP Lateral; Future    Fall against object    Uncontrolled type 2 diabetes mellitus with stage 3 chronic kidney disease, with long-term current use of insulin  -     HEMOGLOBIN A1C; Future    Frequent UTI  -     Urinalysis; Future  -     Urine culture; Future      Will follow up after results available  Return if symptoms worsen or fail to improve.

## 2017-02-24 ENCOUNTER — TELEPHONE (OUTPATIENT)
Dept: FAMILY MEDICINE | Facility: CLINIC | Age: 72
End: 2017-02-24

## 2017-02-24 ENCOUNTER — APPOINTMENT (OUTPATIENT)
Dept: RADIOLOGY | Facility: HOSPITAL | Age: 72
End: 2017-02-24
Attending: NURSE PRACTITIONER
Payer: MEDICARE

## 2017-02-24 DIAGNOSIS — M54.50 ACUTE BILATERAL LOW BACK PAIN WITHOUT SCIATICA: ICD-10-CM

## 2017-02-24 LAB
ESTIMATED AVG GLUCOSE: 128 MG/DL
HBA1C MFR BLD HPLC: 6.1 %

## 2017-02-24 PROCEDURE — 72070 X-RAY EXAM THORAC SPINE 2VWS: CPT | Mod: 26,,, | Performed by: RADIOLOGY

## 2017-02-24 PROCEDURE — 72070 X-RAY EXAM THORAC SPINE 2VWS: CPT | Mod: TC,PN

## 2017-02-24 NOTE — TELEPHONE ENCOUNTER
----- Message from JOLANTA Hanley sent at 2/24/2017 10:03 AM CST -----  Please inform patient's daughter her HgbA1c was 6.1. Her diabetes is well controlled.

## 2017-02-27 ENCOUNTER — ANESTHESIA EVENT (OUTPATIENT)
Dept: ENDOSCOPY | Facility: HOSPITAL | Age: 72
End: 2017-02-27
Payer: MEDICARE

## 2017-02-27 ENCOUNTER — HOSPITAL ENCOUNTER (OUTPATIENT)
Facility: HOSPITAL | Age: 72
Discharge: HOME | End: 2017-02-27
Attending: INTERNAL MEDICINE | Admitting: INTERNAL MEDICINE
Payer: MEDICARE

## 2017-02-27 ENCOUNTER — SURGERY (OUTPATIENT)
Age: 72
End: 2017-02-27

## 2017-02-27 ENCOUNTER — ANESTHESIA (OUTPATIENT)
Dept: ENDOSCOPY | Facility: HOSPITAL | Age: 72
End: 2017-02-27
Payer: MEDICARE

## 2017-02-27 VITALS
WEIGHT: 154 LBS | HEIGHT: 64 IN | HEART RATE: 77 BPM | OXYGEN SATURATION: 100 % | BODY MASS INDEX: 26.29 KG/M2 | SYSTOLIC BLOOD PRESSURE: 134 MMHG | TEMPERATURE: 98 F | RESPIRATION RATE: 20 BRPM | DIASTOLIC BLOOD PRESSURE: 59 MMHG

## 2017-02-27 DIAGNOSIS — Z87.19 HISTORY OF GI BLEED: Primary | ICD-10-CM

## 2017-02-27 DIAGNOSIS — S22.000A: Primary | ICD-10-CM

## 2017-02-27 LAB
POCT GLUCOSE: 169 MG/DL (ref 70–110)
POCT GLUCOSE: 180 MG/DL (ref 70–110)

## 2017-02-27 PROCEDURE — C1773 RET DEV, INSERTABLE: HCPCS | Performed by: INTERNAL MEDICINE

## 2017-02-27 PROCEDURE — D9220A PRA ANESTHESIA: Mod: ,,, | Performed by: ANESTHESIOLOGY

## 2017-02-27 PROCEDURE — 43247 EGD REMOVE FOREIGN BODY: CPT | Mod: GC,,, | Performed by: INTERNAL MEDICINE

## 2017-02-27 PROCEDURE — 37000008 HC ANESTHESIA 1ST 15 MINUTES: Performed by: INTERNAL MEDICINE

## 2017-02-27 PROCEDURE — 25000003 PHARM REV CODE 250: Performed by: INTERNAL MEDICINE

## 2017-02-27 PROCEDURE — 82962 GLUCOSE BLOOD TEST: CPT | Performed by: INTERNAL MEDICINE

## 2017-02-27 PROCEDURE — 63600175 PHARM REV CODE 636 W HCPCS: Performed by: ANESTHESIOLOGY

## 2017-02-27 PROCEDURE — 43247 EGD REMOVE FOREIGN BODY: CPT | Performed by: INTERNAL MEDICINE

## 2017-02-27 PROCEDURE — 37000009 HC ANESTHESIA EA ADD 15 MINS: Performed by: INTERNAL MEDICINE

## 2017-02-27 RX ORDER — LIDOCAINE HYDROCHLORIDE 10 MG/ML
1 INJECTION, SOLUTION EPIDURAL; INFILTRATION; INTRACAUDAL; PERINEURAL ONCE
Status: DISCONTINUED | OUTPATIENT
Start: 2017-02-27 | End: 2017-02-27 | Stop reason: HOSPADM

## 2017-02-27 RX ORDER — SODIUM CHLORIDE 9 MG/ML
INJECTION, SOLUTION INTRAVENOUS CONTINUOUS
Status: DISCONTINUED | OUTPATIENT
Start: 2017-02-27 | End: 2017-02-27 | Stop reason: HOSPADM

## 2017-02-27 RX ORDER — MIDAZOLAM HYDROCHLORIDE 1 MG/ML
INJECTION, SOLUTION INTRAMUSCULAR; INTRAVENOUS
Status: DISCONTINUED | OUTPATIENT
Start: 2017-02-27 | End: 2017-02-27

## 2017-02-27 RX ORDER — ONDANSETRON 2 MG/ML
INJECTION INTRAMUSCULAR; INTRAVENOUS
Status: DISCONTINUED | OUTPATIENT
Start: 2017-02-27 | End: 2017-02-27

## 2017-02-27 RX ORDER — FENTANYL CITRATE 50 UG/ML
INJECTION, SOLUTION INTRAMUSCULAR; INTRAVENOUS
Status: DISCONTINUED | OUTPATIENT
Start: 2017-02-27 | End: 2017-02-27

## 2017-02-27 RX ORDER — PROPOFOL 10 MG/ML
VIAL (ML) INTRAVENOUS CONTINUOUS PRN
Status: DISCONTINUED | OUTPATIENT
Start: 2017-02-27 | End: 2017-02-27

## 2017-02-27 RX ADMIN — PROPOFOL 50 MCG/KG/MIN: 10 INJECTION, EMULSION INTRAVENOUS at 09:02

## 2017-02-27 RX ADMIN — ONDANSETRON 4 MG: 2 INJECTION INTRAMUSCULAR; INTRAVENOUS at 10:02

## 2017-02-27 RX ADMIN — MIDAZOLAM HYDROCHLORIDE 1 MG: 1 INJECTION, SOLUTION INTRAMUSCULAR; INTRAVENOUS at 09:02

## 2017-02-27 RX ADMIN — FENTANYL CITRATE 25 MCG: 50 INJECTION, SOLUTION INTRAMUSCULAR; INTRAVENOUS at 09:02

## 2017-02-27 RX ADMIN — SODIUM CHLORIDE: 0.9 INJECTION, SOLUTION INTRAVENOUS at 09:02

## 2017-02-27 RX ADMIN — SODIUM CHLORIDE: 0.9 INJECTION, SOLUTION INTRAVENOUS at 08:02

## 2017-02-27 NOTE — PLAN OF CARE
Discharge instructions reviewed with patient and daughter. Verbalizes understanding. Copies of instructions given to patient. Tolerating PO fluids. Denies pain or nausea. Safety maintained.

## 2017-02-27 NOTE — IP AVS SNAPSHOT
[facility]  [address]  [facility] [address]  Phone: [phone]           Patient Discharge Instructions     Our goal is to set you up for success. This packet includes information on your condition, medications, and your home care. It will help you to care for yourself so you don't get sicker and need to go back to the hospital.     Please ask your nurse if you have any questions.        There are many details to remember when preparing to leave the hospital. Here is what you will need to do:    1. Take your medicine. If you are prescribed medications, review your Medication List in the following pages. You may have new medications to  at the pharmacy and others that you'll need to stop taking. Review the instructions for how and when to take your medications. Talk with your doctor or nurses if you are unsure of what to do.     2. Go to your follow-up appointments. Specific follow-up information is listed in the following pages. Your may be contacted by a transition nurse or clinical provider about future appointments. Be sure we have all of the phone numbers to reach you, if needed. Please contact your provider's office if you are unable to make an appointment.     3. Watch for warning signs. Your doctor or nurse will give you detailed warning signs to watch for and when to call for assistance. These instructions may also include educational information about your condition. If you experience any of warning signs to your health, call your doctor.               [facility]  Unless otherwise directed by your provider, please contact [facility], our nurse care line that is available for 24/7 assistance.     [phone] (toll-free)    Registered nurses in the [facility] provide clinical advisement, health education, appointment booking, and other advisory services.                    ** Verify the list of medication(s) below is accurate and up  to date. Carry this with you in case of emergency. If your medications have changed, please notify your healthcare provider.             Medication List      CHANGE how you take these medications        Additional Info                      omega-3 acid ethyl esters 1 gram capsule   Commonly known as:  LOVAZA   Quantity:  360 capsule   Refills:  0   What changed:  See the new instructions.   Comments:  **Patient requests 90 days supply**    Instructions:  TAKE 2 CAPSULES BY MOUTH TWICE DAILY     Begin Date    AM    Noon    PM    Bedtime         CONTINUE taking these medications        Additional Info                      amlodipine 5 MG tablet   Commonly known as:  NORVASC   Quantity:  90 tablet   Refills:  3   Dose:  5 mg   Comments:  Pt states she takes amlodipine 5 mg daily not 10 mg.     Instructions:  Take 1 tablet (5 mg total) by mouth once daily.     Begin Date    AM    Noon    PM    Bedtime       amoxicillin 500 MG capsule   Commonly known as:  AMOXIL   Refills:  0      Begin Date    AM    Noon    PM    Bedtime       aspirin 81 MG EC tablet   Commonly known as:  ECOTRIN   Refills:  0   Dose:  81 mg    Instructions:  Take 81 mg by mouth once daily.     Begin Date    AM    Noon    PM    Bedtime       atorvastatin 40 MG tablet   Commonly known as:  LIPITOR   Quantity:  90 tablet   Refills:  3   Dose:  40 mg    Instructions:  Take 1 tablet (40 mg total) by mouth once daily.     Begin Date    AM    Noon    PM    Bedtime       blood sugar diagnostic Strp   Commonly known as:  TRUETEST TEST STRIPS   Quantity:  200 each   Refills:  11    Instructions:  Twice daily blood sugar check.     Begin Date    AM    Noon    PM    Bedtime       buPROPion 150 MG TB24 tablet   Commonly known as:  WELLBUTRIN XL   Quantity:  30 tablet   Refills:  12   Dose:  150 mg    Instructions:  Take 1 tablet (150 mg total) by mouth once daily.     Begin Date    AM    Noon    PM    Bedtime       carvedilol 25 MG tablet   Commonly known as:   COREG   Refills:  0   Dose:  25 mg    Instructions:  Take 25 mg by mouth 2 (two) times daily with meals.     Begin Date    AM    Noon    PM    Bedtime       diazePAM 5 MG tablet   Commonly known as:  VALIUM   Quantity:  45 tablet   Refills:  3   Dose:  5 mg    Instructions:  Take 1 tablet (5 mg total) by mouth every 8 (eight) hours as needed.     Begin Date    AM    Noon    PM    Bedtime       diclofenac sodium 1 % Gel   Quantity:  100 g   Refills:  0    Instructions:  Apply topically 2 (two) times daily.     Begin Date    AM    Noon    PM    Bedtime       escitalopram oxalate 10 MG tablet   Commonly known as:  LEXAPRO   Quantity:  90 tablet   Refills:  3   Dose:  10 mg    Instructions:  Take 1 tablet (10 mg total) by mouth once daily.     Begin Date    AM    Noon    PM    Bedtime       estradiol 0.01 % (0.1 mg/gram) vaginal cream   Commonly known as:  ESTRACE   Quantity:  42.5 g   Refills:  11   Dose:  1 g    Instructions:  Place 1 g vaginally twice a week.     Begin Date    AM    Noon    PM    Bedtime       furosemide 20 MG tablet   Commonly known as:  LASIX   Quantity:  90 tablet   Refills:  0   Comments:  **Patient requests 90 days supply**    Instructions:  TAKE 1/2 TABLET(10 MG) BY MOUTH TWICE DAILY     Begin Date    AM    Noon    PM    Bedtime       gabapentin 300 MG capsule   Commonly known as:  NEURONTIN   Refills:  0   Dose:  300 mg    Instructions:  Take 300 mg by mouth once daily.     Begin Date    AM    Noon    PM    Bedtime       hydrALAZINE 25 MG tablet   Commonly known as:  APRESOLINE   Quantity:  180 tablet   Refills:  3   Comments:  **Patient requests 90 days supply**    Instructions:  TAKE 1 TABLET(25 MG) BY MOUTH BID     Begin Date    AM    Noon    PM    Bedtime       hydrocodone-acetaminophen 10-325mg  mg Tab   Commonly known as:  NORCO   Quantity:  90 tablet   Refills:  0   Dose:  1 tablet    Instructions:  Take 1 tablet by mouth every 8 (eight) hours as needed.     Begin Date    AM     "Noon    PM    Bedtime       insulin glargine 100 unit/mL injection   Commonly known as:  LANTUS   Quantity:  1 vial   Refills:  3   Dose:  10 Units    Instructions:  Inject 10 Units into the skin every evening.     Begin Date    AM    Noon    PM    Bedtime       insulin lispro 100 unit/mL injection   Commonly known as:  HUMALOG   Quantity:  100 mL   Refills:  12   Dose:  5 Units    Instructions:  Inject 5 Units into the skin 3 (three) times daily before meals.     Begin Date    AM    Noon    PM    Bedtime       levalbuterol 1.25 mg/3 mL nebulizer solution   Commonly known as:  XOPENEX   Quantity:  792 mL   Refills:  0   Comments:  **Patient requests 90 days supply**    Instructions:  USE 1 VIAL VIA NEBULIZER EVERY 8 HOURS AS NEEDED FOR WHEEZING OR SHORTNESS OF BREATH     Begin Date    AM    Noon    PM    Bedtime       NOVOLOG 100 unit/mL injection   Refills:  0   Generic drug:  insulin aspart      Begin Date    AM    Noon    PM    Bedtime       nut.tx.gluc.intol,lac-free,soy Liqd   Commonly known as:  GLUCERNA   Quantity:  30 Bottle   Refills:  11    Instructions:  Once a day oral supplement.     Begin Date    AM    Noon    PM    Bedtime       pantoprazole 40 MG tablet   Commonly known as:  PROTONIX   Quantity:  90 tablet   Refills:  0    Instructions:  TAKE 1 TABLET BY MOUTH EVERY DAY     Begin Date    AM    Noon    PM    Bedtime       pen needle, diabetic 30 gauge x 5/16" Ndle   Quantity:  120 each   Refills:  11    Instructions:  Use 4 disposable needles per day. Inject medication into skin daily     Begin Date    AM    Noon    PM    Bedtime       polyethylene glycol 17 gram Pwpk   Commonly known as:  GLYCOLAX   Quantity:  30 packet   Refills:  0   Dose:  17 g    Instructions:  Take 17 g by mouth once daily.     Begin Date    AM    Noon    PM    Bedtime       prasugrel 10 mg Tab   Commonly known as:  EFFIENT   Quantity:  90 tablet   Refills:  3   Dose:  10 mg    Instructions:  Take 1 tablet (10 mg total) by mouth " "once daily.     Begin Date    AM    Noon    PM    Bedtime       underpads 23 X 36 " Pads   Quantity:  150 each   Refills:  11    Instructions:  Twice daily change as needed for incontinence.     Begin Date    AM    Noon    PM    Bedtime                  Please bring to all follow up appointments:    1. A copy of your discharge instructions.  2. All medicines you are currently taking in their original bottles.  3. Identification and insurance card.    Please arrive 15 minutes ahead of scheduled appointment time.    Please call 24 hours in advance if you must reschedule your appointment and/or time.        Your Scheduled Appointments     Jul 21, 2017  1:00 PM CDT   Established Patient Visit with Nydia Degroot MD   [facility] ([facility])    [address] [facility] [address]  [address]  [facility] [address] [phone]                Discharge Instructions     Future Orders    Diet general     Questions:    Total calories:      Fat restriction, if any:      Protein restriction, if any:      Na restriction, if any:      Fluid restriction:      Additional restrictions:          Discharge Instructions         Upper GI Endoscopy     During endoscopy, a long, flexible tube is used to view the inside of your upper GI tract.      Upper GI endoscopy allows your healthcare provider to look directly into the beginning of your gastrointestinal (GI) tract. The esophagus, stomach, and duodenum (the first part of the small intestine) make up the upper GI tract.   Before the exam  Follow these and any other instructions you are given before your endoscopy. If you dont follow the healthcare providers instructions carefully, the test may need to be canceled or done over:  · Don't eat or drink anything after midnight the night before your exam. If your exam is in the afternoon, drink only clear liquids in the morning. Don't eat or drink anything for 8 hours before the exam. In some cases, you may be able to take medicines with " sips of water until 2 hours before the procedure. Speak with your healthcare provider about this.   · Bring your X-rays and any other test results you have.  · Because you will be sedated, arrange for an adult to drive you home after the exam.  · Tell your healthcare provider before the exam if you are taking any medicines or have any medical problems.  The procedure  Here is what to expect:  · You will lie on the endoscopy table. Usually patients lie on the left side.  · You will be monitored and given oxygen.  · Your throat may be numbed with a spray or gargle. You are given medicine through an intravenous (IV) line that will help you relax and remain comfortable. You may be awake or asleep during the procedure.  · The healthcare provider will put the endoscope in your mouth and down your esophagus. It is thinner than most pieces of food that you swallow. It will not affect your breathing. The medicine helps keep you from gagging.  · Air is put into your GI tract to expand it. It can make you burp.  · During the procedure, the healthcare provider can take biopsies (tissue samples), remove abnormalities, such as polyps, or treat abnormalities through a variety of devices placed through the endoscope. You will not feel this.   · The endoscope carries images of your upper GI tract to a video screen. If you are awake, you may be able to look at the images.  · After the procedure is done, you will rest for a time. An adult must drive you home.  When to call your healthcare provider  Contact your healthcare provider if you have:  · Black or tarry stools, or blood in your stool  · Fever  · Pain in your belly that does not go away  · Nausea and vomiting, or vomiting blood   Date Last Reviewed: 7/1/2016  © 6633-1839 [employer]. [address] [place] [address], [place] [address]. All rights reserved. This information is not intended as a substitute for professional medical care. Always follow your healthcare  professional's instructions.            Admission Information     Date & Time Provider Department CSN    2/27/2017  7:20 AM Payam Connor MD [facility] [id number]      Care Providers     Provider Role Specialty Primary office phone    Payam Connor MD Attending Provider Gastroenterology [phone]    Payam Connor MD Surgeon  Gastroenterology [phone]      Your Vitals Were     BP                   142/55           Recent Lab Values        3/3/2015 12/15/2015 1/20/2016 4/26/2016 7/6/2016 9/16/2016 2/23/2017         6:24 AM  4:48 AM  4:21 AM  3:13 AM  6:56 PM 11:12 AM  4:09 PM     A1C 7.7 (H) 10.4 (H) 7.7 (H) 7.0 (H) 6.4 (H) 7.2 (H) 6.1     Comment for A1C at  6:56 PM on 7/6/2016:  According to ADA guidelines, hemoglobin A1C <7.0% represents  optimal control in non-pregnant diabetic patients.  Different  metrics may apply to specific populations.   Standards of Medical Care in Diabetes - 2016.  For the purpose of screening for the presence of diabetes:  <5.7%     Consistent with the absence of diabetes  5.7-6.4%  Consistent with increasing risk for diabetes   (prediabetes)  >or=6.5%  Consistent with diabetes  Currently no consensus exists for use of hemoglobin A1C  for diagnosis of diabetes for children.      Comment for A1C at 11:12 AM on 9/16/2016:  According to ADA guidelines, hemoglobin A1C <7.0% represents  optimal control in non-pregnant diabetic patients.  Different  metrics may apply to specific populations.   Standards of Medical Care in Diabetes - 2016.  For the purpose of screening for the presence of diabetes:  <5.7%     Consistent with the absence of diabetes  5.7-6.4%  Consistent with increasing risk for diabetes   (prediabetes)  >or=6.5%  Consistent with diabetes  Currently no consensus exists for use of hemoglobin A1C  for diagnosis of diabetes for children.      Comment for A1C at  4:09 PM on 2/23/2017:  According to ADA guidelines, hemoglobin A1C <7.0% represents  optimal control in  non-pregnant diabetic patients.  Different  metrics may apply to specific populations.   Standards of Medical Care in Diabetes - 2016.  For the purpose of screening for the presence of diabetes:  <5.7%     Consistent with the absence of diabetes  5.7-6.4%  Consistent with increasing risk for diabetes   (prediabetes)  >or=6.5%  Consistent with diabetes  Currently no consensus exists for use of hemoglobin A1C  for diagnosis of diabetes for children.        Allergies as of 2/27/2017        Reactions    Daypro [Oxaprozin]     Pcn [Penicillins] Swelling    Patient states she is not allergic to Penicillin    Vibramycin [Doxycycline Calcium]       Advance Directives     An advance directive is a document which, in the event you are no longer able to make decisions for yourself, tells your healthcare team what kind of treatment you do or do not want to receive, or who you would like to make those decisions for you.  If you do not currently have an advance directive, [facility] encourages you to create one.  For more information call:  [phone]), [phone] ([phone]),  or log on to [email or web address].        Language Assistance Services     ATTENTION: Language assistance services are available, free of charge. Please call [phone].      ATENCIÓN: Si sylvie gonzales, tiene a yen disposición servicios gratuitos de asistencia lingüística. Llame al [phone].     [facility] Ý: N?u b?n nói Ti?ng Vi?t, có các d?ch v? h? tr? ngôn ng? mi?n phí dành cho b?n. G?i s? [phone].        Stroke Education              Heart Failure Education       Heart Failure: Being Active  You have a condition called heart failure. Being active doesnt mean that you have to wear yourself out. Even a little movement each day helps to strengthen your heart. If you cant get out to exercise, you can do simple stretching and strengthening exercises at home. These are good ways to keep you well-conditioned and prevent you  and your heart from becoming excessively weak.    Ideas to get you started  · Add a little movement to things you do now. Walk to mail letters. Park your car at the far end of the parking lot and walk to the store. Walk up a flight of stairs instead of taking the elevator.  · Choose activities you enjoy. You might walk, swim, or ride an exercise bike. Things like gardening and washing the car count, too. Other possibilities include: washing dishes, walking the dog, walking around the mall, and doing aerobic activities with friends.  · Join a group exercise program at a [facility], a senior center, or a community center. Or look into a hospital cardiac rehabilitation program. Ask your doctor if you qualify.  Tips to keep you going  · Get up and get dressed each day. Go to a coffee shop and read a newspaper or go somewhere that you'll be in the presence of other active people. Youll feel more like being active.  · Make a plan. Choose one or more activities that you enjoy and that you can easily do. Then plan to do at least one each day. You might write your plan on a calendar.  · Go with a friend or a group if you like company. This can help you feel supported and stay motivated, too.  · Plan social events that you enjoy. This will keep you mentally engaged as well as physically motivated to do things you find pleasure in.  For your safety  · Talk with your healthcare provider before starting an exercise program.  · Exercise indoors when its too hot or too cold outside, or when the air quality is poor. Try walking at a shopping mall.  · Wear socks and sturdy shoes to maintain your balance and prevent falls.  · Start slowly. Do a few minutes several times a day at first. Increase your time and speed little by little.  · Stop and rest whenever you feel tired or get short of breath.  · Dont push yourself on days when you dont feel well.  Date Last Reviewed: 3/20/2016  © 3730-7698 [employer] [address]  [place] [address]. All rights reserved. This information is not intended as a substitute for professional medical care. Always follow your healthcare professional's instructions.              Heart Failure: Evaluating Your Heart  You have a condition called heart failure. To evaluate your condition, your doctor will examine you, ask questions, and do some tests. Along with looking for signs of heart failure, the doctor looks for any other health problems that may have led to heart failure. The results of your evaluation will help your doctor form a treatment plan.  Health history and physical exam  Your visit will start with a health history. Tell the doctor about any symptoms youve noticed and about all medicines you take. Then youll have a physical exam. This includes listening to your heartbeat and breathing. Youll also be checked for swelling (edema) in your legs and neck. When you have fluid buildup or fluid in the lungs, it may be called congestive heart failure.  Diagnosing heart failure     During an echocardiogram, sound waves bounce off the heart. These are converted into a picture on the screen.   The following may be done to help your doctor form a diagnosis:  · X-rays show the size and shape of your heart. These pictures can also show fluid in your lungs.  · An electrocardiogram (ECG or EKG) shows the pattern of your heartbeat. Small pads (electrodes) are placed on your chest, arms, and legs. Wires connect the pads to the ECG machine, which records your hearts electrical signals. This can give the doctor information about heart function.  · An echocardiogram uses ultrasound waves to show the structure and movement of your heart muscle. This shows how well the heart pumps. It also shows the thickness of the heart walls, and if the heart is enlarged. It is one of the most useful, non-invasive tests as it provides information about the heart's general function. This helps your  doctor make treatment decisions.  · Lab tests evaluate small amounts of blood or urine for signs of problems. A BNP lab test can help diagnose and evaluate heart failure. BNP stands for B-type natriuretic peptide. The ventricles secrete more BNP when heart failure worsens. Lab tests can also provide information about metabolic dysfunction or heart dysfunction.  Your treatment plan  Based on the results of your evaluation and tests, your doctor will develop a treatment plan. This plan is designed to relieve some of your heart failure symptoms and help make you more comfortable. Your treatment plan may include:  · Medicine to help your heart work better and improve your quality of life  · Changes in what you eat and drink to help prevent fluid from backing up in your body  · Daily monitoring of your weight and heart failure symptoms to see how well your treatment plan is working  · Exercise to help you stay healthy  · Help with quitting smoking  · Emotional and psychological support to help adjust to the changes  · Referrals to other specialists to make sure you are being treated comprehensively  Date Last Reviewed: 3/21/2016  © 7351-4511 [employer]. [address] [place] [address]. All rights reserved. This information is not intended as a substitute for professional medical care. Always follow your healthcare professional's instructions.              Heart Failure: Making Changes to Your Diet  You have a condition called heart failure. When you have heart failure, excess fluid is more likely to build up in your body because your heart isn't working well. This makes the heart work harder to pump blood. Fluid buildup causes symptoms such as shortness of breath and swelling (edema). This is often referred to as congestive heart failure or CHF. Controlling the amount of salt (sodium) you eat may help stop fluid from building up. Your doctor may also tell you to reduce the amount of fluid you  drink.  Reading food labels    Your healthcare provider will tell you how much sodium you can eat each day. Read food labels to keep track. Keep in mind that certain foods are high in salt. These include canned, frozen, and processed foods. Check the amount of sodium in each serving. Watch out for high-sodium ingredients. These include MSG (monosodium glutamate), baking soda, and sodium phosphate.   Eating less salt  Give yourself time to get used to eating less salt. It may take a little while. Here are some tips to help:  · Take the saltshaker off the table. Replace it with salt-free herb mixes and spices.  · Eat fresh or plain frozen vegetables. These have much less salt than canned vegetables.  · Choose low-sodium snacks like sodium-free pretzels, crackers, or air-popped popcorn.  · Dont add salt to your food when youre cooking. Instead, season your foods with pepper, lemon, garlic, or onion.  · When you eat out, ask that your food be cooked without added salt.  · Avoid eating fried foods as these often have a great deal of salt.  If youre told to limit fluids  You may need to limit how much fluid you have to help prevent swelling. This includes anything that is liquid at room temperature, such as ice cream and soup. If your doctor tells you to limit fluid, try these tips:  · Measure drinks in a measuring cup before you drink them. This will help you meet daily goals.  · Chill drinks to make them more refreshing.  · Suck on frozen lemon wedges to quench thirst.  · Only drink when youre thirsty.  · Chew sugarless gum or suck on hard candy to keep your mouth moist.  · Weigh yourself daily to know if your body's fluid content is rising.  My sodium goal  Your healthcare provider may give you a sodium goal to meet each day. This includes sodium found in food as well as salt that you add. My goal is to eat no more than ___________ mg of sodium per day.     When to call your doctor  Call your doctor right away if  you have any symptoms of worsening heart failure. These can include:  · Sudden weight gain  · Increased swelling of your legs or ankles  · Trouble breathing when youre resting or at night  · Increase in the number of pillows you have to sleep on  · Chest pain, pressure, discomfort, or pain in the jaw, neck, or back   Date Last Reviewed: 3/21/2016  © 7400-3380 [employer]. [address] [place] [address], [place] [address]. All rights reserved. This information is not intended as a substitute for professional medical care. Always follow your healthcare professional's instructions.              Heart Failure: Medicines to Help Your Heart    You have a condition called heart failure (also known as congestive heart failure, or CHF). Your doctor will likely prescribe medicines for heart failure and any underlying health problems you have. Most heart failure patients take one or more types of medicinen. Your healthcare provider will work to find the combination of medicines that works best for you.  Heart failure medicines  Here are the most common heart failure medicines:  · ACE inhibitors lower blood pressure and decrease strain on the heart. This makes it easier for the heart to pump. Angiotensin receptor blockers have similar effects. These are prescribed for some patients instead of ACE inhibitors.  · Beta-blockers relieve stress on the heart. They also improve symptoms. They may also improve the heart's pumping action over time.  · Diuretics (also called water pills) help rid your body of excess water. This can help rid your body of swelling (edema). Having less fluid to pump means your heart doesnt have to work as hard. Some diuretics make your body lose a mineral called potassium. Your doctor will tell you if you need to take supplements or eat more foods high in potassium.  · Digoxin helps your heart pump with more strength. This helps your heart pump more blood with each beat. So, more oxygen-rich blood  travels to the rest of the body.  · Aldosterone antagonists help alter hormones and decrease strain on the heart.  · Hydralazine and nitrates are two separate medicines used together to treat heart failure. They may come in one combination pill. They lower blood pressure and decrease how hard the heart has to pump.  Medicines for related conditions  Controlling other heart problems helps keep heart failure under control, too. Depending on other heart problems you have, medicines may be prescribed to:  · Lower blood pressure (antihypertensives).  · Lower cholesterol levels (statins).  · Prevent blood clots (anticoagulants or aspirin).  · Keep the heartbeat steady (antiarrhythmics).  Date Last Reviewed: 3/5/2016  © 1348-0313 [employer]. [address] [place] [address], [place] [address]. All rights reserved. This information is not intended as a substitute for professional medical care. Always follow your healthcare professional's instructions.              Heart Failure: Procedures That May Help    The heart is a muscle that pumps oxygen-rich blood to all parts of the body. When you have heart failure, the heart is not able to pump as well as it should. Blood and fluid may back up into the lungs (congestive heart failure), and some parts of the body dont get enough oxygen-rich blood to work normally. These problems lead to the symptoms of heart failure.     Certain procedures may help the heart pump better in some cases of heart failure. Some procedures are done to treat health problems that may have caused the heart failure such as coronary artery disease or heart rhythm problems. For more serious heart failure, other options are available.  Treating artery and valve problems  If you have coronary artery disease or valve disease, procedures may be done to improve blood flow. This helps the heart pump better, which can improve heart failure symptoms. First, your doctor may do a cardiac catheterization to  help detect clogged blood vessels or valve damage. During this procedure, a  thin tube (catheter) in inserted into a blood vessel and guided to the heart. There a dye is injected and a special type of X-ray (angiogram) is taken of the blood vessels. Procedures to open a blocked artery or fix damaged valves can also be done using catheterization.  · Angioplasty uses a balloon-tipped instrument at the end of the catheter. The balloon is inflated to widen the narrowed artery. In many cases, a stent is expanded to further support the narrowed artery. A stent is a metal mesh tube.  · Valve surgery repairs or replacement of faulty valves can also be done during catheterization so blood can flow properly through the chambers of the heart.  Bypass surgery is another option to help treat blocked arteries. It uses a healthy blood vessel from elsewhere in the body. The healthy blood vessel is attached above and below the blocked area so that blood can flow around the blocked artery.  Treating heart rhythm problems  A device may be placed in the chest to help a weak heart maintain a healthy, heartbeat so the heart can pump more effectively:  · Pacemaker. A pacemaker is an implanted device that regulates your heartbeat electronically. It monitors your heart's rhythm and generates a painless electric impulse that helps the heart beat in a regular rhythm. A pacemaker is programmed to meet your specific heart rhythm needs.  · Biventricular pacing/cardiac resynchronization therapy. A type of pacemaker that paces both pumping chambers of the heart at the same time to coordinate contractions and to improve the heart's function. Some people with heart failure are candidates for this therapy.  · Implantable cardioverter defibrillator. A device similar to a pacemaker that senses when the heart is beating too fast and delivers an electrical shock to convert the fast rhythm to a normal rhythm. This can be a life saving device.  In severe  cases  In more serious cases of heart failure when other treatments no longer work, other options may include:  · Ventricular assist devices (VADs). These are mechanical devices used to take over the pumping function for one or both of the heart's ventricles, or pumping chambers. A VAD may be necessary when heart failure progresses to the point that medicines and other treatments no longer help. In some cases, a VAD may be used as a bridge to transplant.  · Heart transplant. This is replacing the diseased heart with a healthy one from a donor. This is an option for a few people who are very sick. A heart transplant is very serious and not an option for all patients. Your doctor can tell you more.  Date Last Reviewed: 3/20/2016  © 0137-3579 [employer]. [address] [place] [address], [place] [address]. All rights reserved. This information is not intended as a substitute for professional medical care. Always follow your healthcare professional's instructions.              Heart Failure: Tracking Your Weight  You have a condition called heart failure. When you have heart failure, a sudden weight gain or a steady rise in weight is a warning sign that your body is retaining too much water and salt. This could mean your heart failure is getting worse. If left untreated, it can cause problems for your lungs and result in shortness of breath. Weighing yourself each day is the best way to know if youre retaining water. If your weight goes up quickly, call your doctor. You will be given instructions on how to get rid of the excess water. You will likely need medicines and to avoid salt. This will help your heart work better.  Call your doctor if you gain more than 2 pounds in 1 day, more than 5 pounds in 1 week, or whatever weight gain you were told to report by your doctor. This is often a sign of worsening heart failure and needs to be evaluated and treated. Your doctor will tell you what to do next.   Tips for  weighing yourself    · Weigh yourself at the same time each morning, wearing the same clothes. Weigh yourself after urinating and before eating.  · Use the same scale each day. Make sure the numbers are easy to read. Put the scale on a flat, hard surface -- not on a rug or carpet.  · Do not stop weighing yourself. If you forget one day, weigh again the next morning.  How to use your weight chart  · Keep your weight chart near the scale. Write your weight on the chart as soon as you get off the scale.  · Fill in the month and the start date on the chart. Then write down your weight each day. Your chart will look like this:    · If you miss a day, leave the space blank. Weigh yourself the next day and write your weight in the next space.  · Take your weight chart with you when you go to see your doctor.  Date Last Reviewed: 3/20/2016  © 5254-4100 "[employer]. [address] [place] [address], [place] [address]. All rights reserved. This information is not intended as a substitute for professional medical care. Always follow your healthcare professional's instructions.              Heart Failure: Warning Signs of a Flare-Up  You have a condition called heart failure. Once you have heart failure, flare-ups can happen. Below are signs that can mean your heart failure is getting worse. If you notice any of these warning signs, call your healthcare provider.  Swelling    · Your feet, ankles, or lower legs get puffier.  · You notice skin changes on your lower legs.  · Your shoes feel too tight.  · Your clothes are tighter in the waist.  · You have trouble getting rings on or off your fingers.  Shortness of breath  · You have to breathe harder even when youre doing your normal activities or when youre resting.  · You are short of breath walking up stairs or even short distances.  · You wake up at night short of breath or coughing.  · You need to use more pillows or sit up to sleep.  · You wake up tired or  restless.  Other warning signs  · You feel weaker, dizzy, or more tired.  · You have chest pain or changes in your heartbeat.  · You have a cough that wont go away.  · You cant remember things or dont feel like eating.  Tracking your weight  Gaining weight is often the first warning sign that heart failure is getting worse. Gaining even a few pounds can be a sign that your body is retaining excess water and salt. Weighing yourself each day in the morning after you urinate and before you eat, is the best way to know if you're retaining water. Get a scale that is easy to read and make sure you wear the same clothes and use the same scale every time you weigh. Your healthcare provider will show you how to track your weight. Call your doctor if you gain more than 2 pounds in 1 day, 5 pounds in 1 week, or whatever weight gain you were told to report by your doctor. This is often a sign of worsening heart failure and needs to be evaluated and treated before it compromises your breathing. Your doctor will tell you what to do next.    Date Last Reviewed: 3/15/2016  © 1938-1249 [employer]. [address] [place] [address]. All rights reserved. This information is not intended as a substitute for professional medical care. Always follow your healthcare professional's instructions.              Chronic Kindey Disease Education             Diabetes Discharge Instructions                                   [facility] Sign-Up     Activating your [facility] account is as easy as 1-2-3!     1) Visit [email or web address], select Sign Up Now, enter this activation code and your date of birth, then select Next.  [email or web address]  Expires: 3/26/2017  2:50 PM      2) Create a username and password to use when you visit [facility] in the future and select a security question in case you lose your password and select Next.    3) Enter your e-mail address and click Sign Up!    Additional Information  If you have  questions, please e-mail [email or web address] or call [phone] to talk to our [facility] staff. Remember, [facility] is NOT to be used for urgent needs. For medical emergencies, dial 911.          [facility] complies with applicable Federal civil rights laws and does not discriminate on the basis of race, color, national origin, age, disability, or sex.

## 2017-02-27 NOTE — ANESTHESIA PREPROCEDURE EVALUATION
02/27/2017  Michael Salgado is a 71 y.o., female.    [facility] Anesthesia Evaluation         Review of Systems  Anesthesia Hx:  No problems with previous Anesthesia   Cardiovascular:   Hypertension Past MI CAD asymptomatic CABG/stent Dysrhythmias atrial fibrillation  Denies Angina. CHF hyperlipidemia Echo 04/16 EF 60-65% PAP 65   Pulmonary:   On O2 3L   Renal/:   Chronic Renal Disease, CRI Creat 2.2   Neurological:   CVA, residual symptoms Right hemiparesis   Endocrine:   Diabetes, well controlled, using insulin    Psych:   Psychiatric History Dementia         Physical Exam  General:  Well nourished    Airway/Jaw/Neck:  Airway Findings: Mouth Opening: Normal Tongue: Large  Mallampati: III  Improves to II with phonation.  TM Distance: Normal, at least 6 cm  Jaw/Neck Findings:     Neck ROM: Normal ROM      Dental:  Dental Findings: In tact, lower partial dentures   Chest/Lungs:  Chest/Lungs Findings: Clear to auscultation, Normal Respiratory Rate     Heart/Vascular:  Heart Findings: Rate: Normal  Rhythm: Regular Rhythm  Sounds: Normal        Mental Status:  Mental Status Findings:  Cooperative         Anesthesia Plan  Type of Anesthesia, risks & benefits discussed:  Anesthesia Type:  general  Patient's Preference:   Intra-op Monitoring Plan:   Intra-op Monitoring Plan Comments:   Post Op Pain Control Plan:   Post Op Pain Control Plan Comments:   Induction:   IV  Beta Blocker:  Patient is on a Beta-Blocker and has received one dose within the past 24 hours (No further documentation required).       Informed Consent: Patient representative understands risks and agrees with Anesthesia plan.  Questions answered. Anesthesia consent signed with patient representative.  ASA Score: 3     Day of Surgery Review of History & Physical:            Ready For Surgery From Anesthesia Perspective.

## 2017-02-27 NOTE — PROGRESS NOTES
Called for family in waiting room - no answer  Called number listed [phone] this number is not in service  Called [phone] there was no answer

## 2017-02-27 NOTE — DISCHARGE INSTRUCTIONS

## 2017-02-27 NOTE — ANESTHESIA RELEASE NOTE
Anesthesia Release from PACU Note    Anesthesia Release from PACU note    Patient: Michael Salgado    Procedure(s) Performed: Procedure(s) (LRB):  ESOPHAGOGASTRODUODENOSCOPY (EGD) (N/A)    Anesthesia type: general    Post pain: Adequate analgesia    Post assessment: no apparent anesthetic complications, tolerated procedure well and no evidence of recall    Last Vitals:   Vitals:    02/27/17 1030   BP: (!) 142/58   Pulse: 61   Resp: 18   Temp:    SpO2: 100%       Post vital signs: stable    Level of consciousness: awake, alert  and oriented    Nausea/Vomiting: no nausea/no vomiting    Complications: none    Airway Patency: patent    Respiratory: unassisted    Cardiovascular: stable and blood pressure at baseline    Hydration: euvolemic

## 2017-02-27 NOTE — PLAN OF CARE
Problem: Patient Care Overview  Goal: Plan of Care Review  Outcome: Outcome(s) achieved Date Met:  02/27/17    Vitals stable, no complaints from patient.Consents in chart. Patient verbalized understanding discharge instructions.

## 2017-02-27 NOTE — TRANSFER OF CARE
"Anesthesia Transfer of Care Note    Patient: Michael Salgado    Procedure(s) Performed: Procedure(s) (LRB):  ESOPHAGOGASTRODUODENOSCOPY (EGD) (N/A)    Patient location: PACU    Anesthesia Type: general    Transport from OR: Transported from OR on 2-3 L/min O2 by NC with adequate spontaneous ventilation    Post pain: adequate analgesia    Post assessment: no apparent anesthetic complications    Post vital signs: stable    Level of consciousness: responds to stimulation    Nausea/Vomiting: no nausea/vomiting    Complications: none          Last vitals:   Visit Vitals    BP (!) 121/50    Pulse (!) 59    Temp 36.6 °C (97.9 °F) (Temporal)    Resp 17    Ht 5' 4" (1.626 m)    Wt 69.9 kg (154 lb)    LMP  (LMP Unknown)    SpO2 100%    Breastfeeding No    BMI 26.43 kg/m2     "

## 2017-02-27 NOTE — ANESTHESIA POSTPROCEDURE EVALUATION
"Anesthesia Post Evaluation    Patient: Michael Salgado    Procedure(s) Performed: Procedure(s) (LRB):  ESOPHAGOGASTRODUODENOSCOPY (EGD) (N/A)    Final Anesthesia Type: general  Patient location during evaluation: PACU  Patient participation: Yes- Able to Participate  Level of consciousness: awake and alert  Post-procedure vital signs: reviewed and stable  Pain management: adequate  Airway patency: patent  PONV status at discharge: No PONV  Anesthetic complications: no      Cardiovascular status: hemodynamically stable and blood pressure returned to baseline  Respiratory status: unassisted and spontaneous ventilation  Hydration status: euvolemic  Follow-up not needed.        Visit Vitals    BP (!) 142/58    Pulse 61    Temp 36.6 °C (97.9 °F) (Temporal)    Resp 18    Ht 5' 4" (1.626 m)    Wt 69.9 kg (154 lb)    LMP  (LMP Unknown)    SpO2 100%    Breastfeeding No    BMI 26.43 kg/m2       Pain/Jovani Score: Pain Assessment Performed: Yes (2/27/2017 10:08 AM)  Presence of Pain: denies (2/27/2017 10:08 AM)  Jovani Score: 8 (2/27/2017 10:08 AM)      "

## 2017-02-27 NOTE — H&P
Short Stay Endoscopy History and Physical    PCP - Wesley Watkins MD     Procedure - EGD  ASA - per anesthesia  Mallampati - per anesthesia  History of Anesthesia problems - no  Family history Anesthesia problems -  no   Plan of anesthesia - General    HPI:  This is a 71 y.o. female here for evaluation of :     PEG tube removal. Does not want it pulled, would like it endoscopically removed. Has recovered from GI standpoint from stroke.    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    Surgical History:  has a past surgical history that includes Knee surgery; Ovary surgery; Tubal ligation; cardiac bypass; Ovary surgery; Coronary artery bypass graft (6/20/15); Coronary angioplasty with stent (7/29/15); and Tonsillectomy.    Family History: family history includes Cancer in her sister; Diabetes in her brother and mother; Heart disease in her brother, sister, sister, and son; Hyperlipidemia in her mother; Hypertension in her brother, daughter, and mother; No Known Problems in her daughter.. Otherwise no colon cancer, inflammatory bowel disease, or GI malignancies.    Social History:  reports that she has never smoked. She has never  used smokeless tobacco. She reports that she does not drink alcohol or use illicit drugs.    Review of patient's allergies indicates:   Allergen Reactions    Daypro [oxaprozin]     Pcn [penicillins] Swelling     Patient states she is not allergic to Penicillin    Vibramycin [doxycycline calcium]        Medications:   Prescriptions Prior to Admission   Medication Sig Dispense Refill Last Dose    amlodipine (NORVASC) 5 MG tablet Take 1 tablet (5 mg total) by mouth once daily. 90 tablet 3 2/27/2017 at Unknown time    aspirin (ECOTRIN) 81 MG EC tablet Take 81 mg by mouth once daily.   2/27/2017 at Unknown time    atorvastatin (LIPITOR) 40 MG tablet Take 1 tablet (40 mg total) by mouth once daily. 90 tablet 3 2/27/2017 at Unknown time    blood sugar diagnostic (TRUETEST TEST STRIPS) Strp Twice daily blood sugar check. 200 each 11 2/26/2017 at Unknown time    buPROPion (WELLBUTRIN XL) 150 MG TB24 tablet Take 1 tablet (150 mg total) by mouth once daily. 30 tablet 12 2/27/2017 at Unknown time    carvedilol (COREG) 25 MG tablet Take 25 mg by mouth 2 (two) times daily with meals.   2/27/2017 at Unknown time    diazePAM (VALIUM) 5 MG tablet Take 1 tablet (5 mg total) by mouth every 8 (eight) hours as needed. 45 tablet 3 2/26/2017 at Unknown time    escitalopram oxalate (LEXAPRO) 10 MG tablet Take 1 tablet (10 mg total) by mouth once daily. 90 tablet 3 2/27/2017 at Unknown time    estradiol (ESTRACE) 0.01 % (0.1 mg/gram) vaginal cream Place 1 g vaginally twice a week. 42.5 g 11 2/27/2017 at Unknown time    furosemide (LASIX) 20 MG tablet TAKE 1/2 TABLET(10 MG) BY MOUTH TWICE DAILY 90 tablet 0 2/27/2017 at Unknown time    gabapentin (NEURONTIN) 300 MG capsule Take 300 mg by mouth once daily.   2/27/2017 at Unknown time    hydrALAZINE (APRESOLINE) 25 MG tablet TAKE 1 TABLET(25 MG) BY MOUTH  tablet 3 2/27/2017 at Unknown time    hydrocodone-acetaminophen 10-325mg (NORCO)  mg Tab Take 1 tablet by mouth  "every 8 (eight) hours as needed. 90 tablet 0 2/27/2017 at Unknown time    insulin glargine (LANTUS) 100 unit/mL injection Inject 10 Units into the skin every evening. 1 vial 3 2/26/2017 at Unknown time    insulin lispro (HUMALOG) 100 unit/mL injection Inject 5 Units into the skin 3 (three) times daily before meals. 100 mL 12 2/26/2017 at Unknown time    levalbuterol (XOPENEX) 1.25 mg/3 mL nebulizer solution USE 1 VIAL VIA NEBULIZER EVERY 8 HOURS AS NEEDED FOR WHEEZING OR SHORTNESS OF BREATH 792 mL 0 2/27/2017 at Unknown time    NOVOLOG 100 unit/mL injection    2/26/2017 at Unknown time    nut.tx.gluc.intol,lac-free,soy (GLUCERNA) Liqd Once a day oral supplement. 30 Bottle 11 2/26/2017 at Unknown time    omega-3 acid ethyl esters (LOVAZA) 1 gram capsule TAKE 2 CAPSULES BY MOUTH TWICE DAILY (Patient taking differently: TAKE 1 CAPSULE BY MOUTH DAILY AT NIGHT) 360 capsule 0 2/27/2017 at Unknown time    pantoprazole (PROTONIX) 40 MG tablet TAKE 1 TABLET BY MOUTH EVERY DAY 90 tablet 0 2/27/2017 at Unknown time    pen needle, diabetic 30 gauge x 5/16" Ndle Use 4 disposable needles per day. Inject medication into skin daily 120 each 11 2/26/2017 at Unknown time    polyethylene glycol (GLYCOLAX) 17 gram PwPk Take 17 g by mouth once daily. 30 packet 0 2/26/2017 at Unknown time    prasugrel (EFFIENT) 10 mg Tab Take 1 tablet (10 mg total) by mouth once daily. 90 tablet 3 2/27/2017 at Unknown time    underpads 23 X 36 " Pads Twice daily change as needed for incontinence. 150 each 11 2/26/2017 at Unknown time    amoxicillin (AMOXIL) 500 MG capsule    More than a month at Unknown time    diclofenac sodium 1 % Gel Apply topically 2 (two) times daily. 100 g 0 Taking       Physical Exam:    Vital Signs:   Vitals:    02/27/17 0804   BP: (!) 174/73   Pulse: 62   Resp: 16   Temp: 97.8 °F (36.6 °C)       General Appearance: Well appearing in no acute distress  Eyes:    No scleral icterus  ENT: Neck supple, Lips, mucosa, and " tongue normal; teeth and gums normal  Lungs: CTA anteriorly  Heart:  Regular rate, S1, S2 normal, no murmurs heard.  Abdomen: Soft, non tender, non distended with normal bowel sounds. No hepatosplenomegaly, ascites, or mass.  Extremities: No edema  Skin: No rash    Labs:  Lab Results   Component Value Date    WBC 3.28 (L) 02/23/2017    HGB 9.3 (L) 02/23/2017    HCT 30.7 (L) 02/23/2017     02/23/2017    CHOL 191 02/23/2017    TRIG 177 (H) 02/23/2017    HDL 25 (L) 02/23/2017    ALT 10 02/23/2017    AST 20 02/23/2017     02/23/2017    K 4.3 02/23/2017     02/23/2017    CREATININE 2.2 (H) 02/23/2017    BUN 34 (H) 02/23/2017    CO2 27 02/23/2017    TSH 5.427 (H) 09/16/2016    INR 1.0 02/09/2017    HGBA1C 6.1 02/23/2017       I have explained the risks and benefits of endoscopy procedures to the patient including but not limited to bleeding, perforation, infection, and death.      Payam Connor MD

## 2017-03-02 ENCOUNTER — TELEPHONE (OUTPATIENT)
Dept: FAMILY MEDICINE | Facility: CLINIC | Age: 72
End: 2017-03-02

## 2017-03-02 DIAGNOSIS — Z12.31 OTHER SCREENING MAMMOGRAM: ICD-10-CM

## 2017-03-02 NOTE — TELEPHONE ENCOUNTER
----- Message from JOLANTA Hanley sent at 2/27/2017  7:48 AM CST -----  Please inform patient xray shows she has compression fracture. Will place a referral to orthopedics.

## 2017-03-02 NOTE — TELEPHONE ENCOUNTER
I spoke with the patient daughter, Ms. Wilkerson regarding a referral to Orthopedic, she states that she will be contacting the clinic to schedule once the patient finish with PT.

## 2017-03-24 RX ORDER — AMLODIPINE BESYLATE 10 MG/1
TABLET ORAL
Qty: 30 TABLET | Refills: 0 | Status: SHIPPED | OUTPATIENT
Start: 2017-03-24 | End: 2017-03-24 | Stop reason: SDUPTHER

## 2017-03-24 RX ORDER — HYDROCODONE BITARTRATE AND ACETAMINOPHEN 10; 325 MG/1; MG/1
1 TABLET ORAL EVERY 8 HOURS PRN
Qty: 90 TABLET | Refills: 0 | Status: SHIPPED | OUTPATIENT
Start: 2017-03-24 | End: 2017-04-17 | Stop reason: SDUPTHER

## 2017-03-24 NOTE — TELEPHONE ENCOUNTER
----- Message from Soco Goode sent at 3/24/2017  1:59 PM CDT -----  Contact: daughter   Miryam   [phone]  Pt needs refill on hydrocodone . Pls call leatha Witt [phone]. Thanks........Luisana

## 2017-03-26 RX ORDER — AMLODIPINE BESYLATE 10 MG/1
TABLET ORAL
Qty: 90 TABLET | Refills: 0 | Status: SHIPPED | OUTPATIENT
Start: 2017-03-26 | End: 2017-06-22 | Stop reason: DRUGHIGH

## 2017-03-27 ENCOUNTER — TELEPHONE (OUTPATIENT)
Dept: FAMILY MEDICINE | Facility: CLINIC | Age: 72
End: 2017-03-27

## 2017-03-27 NOTE — TELEPHONE ENCOUNTER
----- Message from Alexa Ma sent at 3/27/2017 12:29 PM CDT -----  Contact: daughter- Miryam  Patient's daughter says patient has therapy tomorrow and need her medication refilled  before she  Goes. Please call Miryam at  [phone]

## 2017-04-17 RX ORDER — HYDROCODONE BITARTRATE AND ACETAMINOPHEN 10; 325 MG/1; MG/1
1 TABLET ORAL EVERY 8 HOURS PRN
Qty: 90 TABLET | Refills: 0 | Status: SHIPPED | OUTPATIENT
Start: 2017-04-17 | End: 2017-05-22 | Stop reason: SDUPTHER

## 2017-04-17 NOTE — TELEPHONE ENCOUNTER
----- Message from Rickey Baron sent at 4/17/2017  1:12 PM CDT -----  Contact: Miryam/Elmer/[phone]  Refill:  hydrocodone-acetaminophen 10-325mg (NORCO)  mg Tab    Thank you.

## 2017-04-25 DIAGNOSIS — R52 PAIN: ICD-10-CM

## 2017-04-25 DIAGNOSIS — E78.5 HYPERLIPIDEMIA, UNSPECIFIED HYPERLIPIDEMIA TYPE: Primary | ICD-10-CM

## 2017-04-26 RX ORDER — AMLODIPINE BESYLATE 10 MG/1
TABLET ORAL
Qty: 30 TABLET | Refills: 12 | Status: SHIPPED | OUTPATIENT
Start: 2017-04-26 | End: 2017-06-22 | Stop reason: SDUPTHER

## 2017-04-26 NOTE — TELEPHONE ENCOUNTER
----- Message from Katrin Tinoco sent at 4/26/2017  1:50 PM CDT -----  Contact: daughter  Pt needs omega-3 acid ethyl esters (LOVAZA) 1 gram capsule and gabapentin (NEURONTIN) 300 MG capsule. Please call [phone]. Thanks

## 2017-04-27 RX ORDER — GABAPENTIN 300 MG/1
300 CAPSULE ORAL DAILY
Qty: 90 CAPSULE | Refills: 0 | Status: SHIPPED | OUTPATIENT
Start: 2017-04-27 | End: 2017-08-25 | Stop reason: SDUPTHER

## 2017-04-27 RX ORDER — OMEGA-3-ACID ETHYL ESTERS 1 G/1
2 CAPSULE, LIQUID FILLED ORAL 2 TIMES DAILY
Qty: 360 CAPSULE | Refills: 0 | Status: ON HOLD | OUTPATIENT
Start: 2017-04-27 | End: 2017-07-29

## 2017-05-03 ENCOUNTER — OFFICE VISIT (OUTPATIENT)
Dept: OPTOMETRY | Facility: CLINIC | Age: 72
End: 2017-05-03
Payer: MEDICARE

## 2017-05-03 DIAGNOSIS — E11.9 DIABETES MELLITUS TYPE 2 WITHOUT RETINOPATHY: Primary | ICD-10-CM

## 2017-05-03 DIAGNOSIS — H52.4 HYPEROPIA WITH ASTIGMATISM AND PRESBYOPIA, BILATERAL: ICD-10-CM

## 2017-05-03 DIAGNOSIS — H52.203 HYPEROPIA WITH ASTIGMATISM AND PRESBYOPIA, BILATERAL: ICD-10-CM

## 2017-05-03 DIAGNOSIS — H52.03 HYPEROPIA WITH ASTIGMATISM AND PRESBYOPIA, BILATERAL: ICD-10-CM

## 2017-05-03 DIAGNOSIS — Z13.5 GLAUCOMA SCREENING: ICD-10-CM

## 2017-05-03 DIAGNOSIS — H25.13 NUCLEAR SCLEROSIS, BILATERAL: ICD-10-CM

## 2017-05-03 PROCEDURE — 99999 PR PBB SHADOW E&M-EST. PATIENT-LVL II: CPT | Mod: PBBFAC,,, | Performed by: OPTOMETRIST

## 2017-05-03 PROCEDURE — 92014 COMPRE OPH EXAM EST PT 1/>: CPT | Mod: S$GLB,,, | Performed by: OPTOMETRIST

## 2017-05-03 PROCEDURE — 92015 DETERMINE REFRACTIVE STATE: CPT | Mod: S$GLB,,, | Performed by: OPTOMETRIST

## 2017-05-03 NOTE — PROGRESS NOTES
HPI     Pt is here for diabetic eye exam.  Pt reports blurry vision distance and near with WRx .  (-)Flashes (-)Floaters  (+)Itch, (+)tear, (+)burn, (+)Dryness. (-) OTC Drops   (-)Photophobia  (+)Glare (-)diplopia (+) headaches 2x a week; pt reports HA front and back   of head    Hemoglobin A1C       Date                     Value               Ref Range             Status                02/23/2017               6.1                 4.5 - 6.2 %           Final                     Last edited by Vanessa Huerta, PCT on 5/3/2017  2:28 PM.         Assessment /Plan     For exam results, see Encounter Report.    Diabetes mellitus type 2 without retinopathy  -No retinopathy noted today.  Continued control with primary care physician and annual comprehensive eye exam.    Nuclear sclerosis, bilateral  -Educated patient on presence of cataracts at today's exam, monitor at annual dilated fundus exam. 2-6 years surgical estimate.    Glaucoma screening  -Monitor with annual eye exam and IOP check    Hyperopia with astigmatism and presbyopia, bilateral  Eyeglass Final Rx     Eyeglass Final Rx      Sphere Cylinder Axis Add   Right +2.00 +1.25 175 +2.50   Left +1.50 +1.25 005 +2.50       Type:  Bifocal    Expiration Date:  5/4/2018                  RTC 1 yr

## 2017-05-08 RX ORDER — PANTOPRAZOLE SODIUM 40 MG/1
TABLET, DELAYED RELEASE ORAL
Qty: 90 TABLET | Refills: 0 | Status: SHIPPED | OUTPATIENT
Start: 2017-05-08 | End: 2017-08-20 | Stop reason: SDUPTHER

## 2017-05-17 ENCOUNTER — TELEPHONE (OUTPATIENT)
Dept: FAMILY MEDICINE | Facility: CLINIC | Age: 72
End: 2017-05-17

## 2017-05-17 NOTE — TELEPHONE ENCOUNTER
----- Message from Guerline Holt sent at 5/16/2017  4:18 PM CDT -----  Contact: DAUGHTER -Rhea   Requesting an appt for Thursday with Dr Watkins . She has shingles . She refuses to see anyone else .          [phone]           LL

## 2017-05-18 ENCOUNTER — OFFICE VISIT (OUTPATIENT)
Dept: FAMILY MEDICINE | Facility: CLINIC | Age: 72
End: 2017-05-18
Payer: MEDICARE

## 2017-05-18 VITALS
DIASTOLIC BLOOD PRESSURE: 62 MMHG | HEART RATE: 50 BPM | SYSTOLIC BLOOD PRESSURE: 110 MMHG | HEIGHT: 64 IN | WEIGHT: 144 LBS | TEMPERATURE: 98 F | BODY MASS INDEX: 24.59 KG/M2 | OXYGEN SATURATION: 100 %

## 2017-05-18 DIAGNOSIS — G81.91 RIGHT HEMIPARESIS: ICD-10-CM

## 2017-05-18 DIAGNOSIS — B02.23 ACUTE HERPES ZOSTER NEUROPATHY: Primary | ICD-10-CM

## 2017-05-18 DIAGNOSIS — N18.30 CHRONIC RENAL INSUFFICIENCY, STAGE 3 (MODERATE): ICD-10-CM

## 2017-05-18 DIAGNOSIS — I10 ESSENTIAL HYPERTENSION: ICD-10-CM

## 2017-05-18 DIAGNOSIS — I62.03 CHRONIC SUBDURAL HEMATOMA: ICD-10-CM

## 2017-05-18 PROCEDURE — 1125F AMNT PAIN NOTED PAIN PRSNT: CPT | Mod: S$GLB,,, | Performed by: INTERNAL MEDICINE

## 2017-05-18 PROCEDURE — 3078F DIAST BP <80 MM HG: CPT | Mod: S$GLB,,, | Performed by: INTERNAL MEDICINE

## 2017-05-18 PROCEDURE — 99999 PR PBB SHADOW E&M-EST. PATIENT-LVL III: CPT | Mod: PBBFAC,,, | Performed by: INTERNAL MEDICINE

## 2017-05-18 PROCEDURE — 99214 OFFICE O/P EST MOD 30 MIN: CPT | Mod: S$GLB,,, | Performed by: INTERNAL MEDICINE

## 2017-05-18 PROCEDURE — 99499 UNLISTED E&M SERVICE: CPT | Mod: S$GLB,,, | Performed by: INTERNAL MEDICINE

## 2017-05-18 PROCEDURE — 1159F MED LIST DOCD IN RCRD: CPT | Mod: S$GLB,,, | Performed by: INTERNAL MEDICINE

## 2017-05-18 PROCEDURE — 1160F RVW MEDS BY RX/DR IN RCRD: CPT | Mod: S$GLB,,, | Performed by: INTERNAL MEDICINE

## 2017-05-18 PROCEDURE — 3074F SYST BP LT 130 MM HG: CPT | Mod: S$GLB,,, | Performed by: INTERNAL MEDICINE

## 2017-05-18 RX ORDER — ACYCLOVIR 800 MG/1
TABLET ORAL
COMMUNITY
Start: 2017-05-17 | End: 2017-06-22

## 2017-05-18 RX ORDER — GABAPENTIN 100 MG/1
CAPSULE ORAL
Qty: 120 CAPSULE | Refills: 11 | Status: SHIPPED | OUTPATIENT
Start: 2017-05-18 | End: 2017-05-18 | Stop reason: SDUPTHER

## 2017-05-18 RX ORDER — PREDNISOLONE ACETATE 10 MG/ML
SUSPENSION/ DROPS OPHTHALMIC
COMMUNITY
Start: 2017-05-17 | End: 2017-06-22

## 2017-05-18 RX ORDER — TOBRAMYCIN AND DEXAMETHASONE 3; 1 MG/ML; MG/ML
SUSPENSION/ DROPS OPHTHALMIC
COMMUNITY
Start: 2017-05-10 | End: 2017-06-22

## 2017-05-18 NOTE — PROGRESS NOTES
Chief complaint: Follow-up on shingles    71-year-old white female recently developed shingles over the right face.  It was severely painful for about 5 days.  She is using drops now on is followed by ophthalmology.  The drops seem to be helping.  It started with pain in the eye and then progressed to the typical rash across the 4 head.  The rash is drying up.  She was on gabapentin in the past but was told to reduce it during hospitalization possibly due to fluid retention since she does have CHF.  She has no active CHF at this time.  We discussed increasing the gabapentin by adding a 100 mg pill twice a day and continuing the 300 and they will monitor weight and so forth.  I offered her pain medications but she does not feel she needs them at this time not even tramadol but I be open to give it to her in the future.  All her other medical issues were reviewed as well.  She does have a defect over the right scalp where she had a craniotomy and I reviewed the most recent CT scan which confirms this.  She tends to play with the area according to the family.  I reassured her about it.  The right side of her skull is tender secondary to the shingles recently      ROS:   CONST: weight stable. EYES: no vision change. ENT: no sore throat. CV: no chest pain w/ exertion. RESP: no shortness of breath. GI: no nausea, vomiting, diarrhea. No dysphagia. : no urinary issues. MUSCULOSKELETAL: no new myalgias or arthralgias. SKIN: no new changes. NEURO: no focal deficits. PSYCH: no new issues. ENDOCRINE: no polyuria. HEME: no lymph nodes. ALLERGY: no general pruritis.    Past Medical History   Diagnosis Date    Blood clotting tendency     CAD (coronary artery disease)      stents    CHF (congestive heart failure) -DIASTOLIC      HFpEF    Chronic headaches     CKD stage 3 due to type 2 diabetes mellitus 3/18/2016    CVA (cerebral infarction)     Diabetes     Diabetes mellitus type I     Diverticulosis     Dysphagia as  late effect of cerebrovascular disease     Encounter for blood transfusion     Heart attack     History of pleural effusion     HTN (hypertension)     Hyperlipidemia     MI, old     Multiple thyroid nodules     Pulmonary HTN     Right hemiparesis     S/P coronary artery stent placement     S/P percutaneous endoscopic gastrostomy (PEG) tube placement     Screening for colon cancer      normal 2015 -10 yrs    Stroke    Subdural hematoma 2016 with craniotomy  Zoster 2017      Past Surgical History:   Procedure Laterality Date    cardiac bypass      CORONARY ANGIOPLASTY WITH STENT PLACEMENT  7/29/15    [facility]    CORONARY ARTERY BYPASS GRAFT  6/20/15    [facility]    KNEE SURGERY      OVARY SURGERY      OVARY SURGERY      TONSILLECTOMY      TUBAL LIGATION       family history includes Cancer in her sister; Diabetes in her brother and mother; Heart disease in her brother, sister, sister, and son; Hyperlipidemia in her mother; Hypertension in her brother, daughter, and mother; No Known Problems in her daughter.     Social History     Social History    Marital status:      Spouse name: N/A    Number of children: N/A    Years of education: N/A     Occupational History    Not on file.     Social History Main Topics    Smoking status: Never Smoker    Smokeless tobacco: Never Used    Alcohol use No    Drug use: No    Sexual activity: Not on file     Other Topics Concern    Not on file     Social History Narrative     Gen: no distress   Palpable skull defect on the right.  She has a healing dry area of shingles over the right 4 head    Fabaria was seen today for herpes zoster.    Diagnoses and all orders for this visit:    Acute herpes zoster neuropathy, long discussion regarding the acute and chronic treatment of zoster as well as postherpetic neuralgia.  Her pain severity appears to be decreasing which is a good prognosticator.  Offered her pain medication but she does not feel she needs  anything or than Tylenol.  Discussed potential for recurrence, discussed that she does not need a shingles vaccine, discussed the pathophysiology to patient and her daughter.  We will increase gabapentin taking into account her renal insufficiency and underlying CHF which does not appear to be an active issue at this timeTotal time over 25 minutes with over 50% counseling.    Chronic subdural hematoma, status post craniotomy, CT reviewed    Chronic renal insufficiency, stage 3 (moderate) labs reviewed    Essential hypertension, chronic and stable    Right hemiparesis, no new neurological issues    Other orders  -     gabapentin (NEURONTIN) 100 MG capsule; 1-2 pills morning and afternoon to go along with the other 300mg you take at night

## 2017-05-19 RX ORDER — GABAPENTIN 100 MG/1
CAPSULE ORAL
Qty: 360 CAPSULE | Refills: 11 | Status: ON HOLD | OUTPATIENT
Start: 2017-05-19 | End: 2017-07-14

## 2017-05-22 NOTE — TELEPHONE ENCOUNTER
----- Message from Meka Joy sent at 5/22/2017 10:52 AM CDT -----  Contact: miryam-daughter  Miryam is requesting a refill for the pt. hydrocodone-acetaminophen 10-325mg (NORCO)  mg Tab. [phone]

## 2017-05-24 RX ORDER — HYDROCODONE BITARTRATE AND ACETAMINOPHEN 10; 325 MG/1; MG/1
1 TABLET ORAL EVERY 8 HOURS PRN
Qty: 90 TABLET | Refills: 0 | Status: SHIPPED | OUTPATIENT
Start: 2017-05-24 | End: 2017-06-22

## 2017-05-25 ENCOUNTER — TELEPHONE (OUTPATIENT)
Dept: FAMILY MEDICINE | Facility: CLINIC | Age: 72
End: 2017-05-25

## 2017-05-25 ENCOUNTER — TELEPHONE (OUTPATIENT)
Dept: CARDIOLOGY | Facility: CLINIC | Age: 72
End: 2017-05-25

## 2017-05-25 NOTE — TELEPHONE ENCOUNTER
Stated, mom has been retaining fluid for the past couple of days. Requesting a increase for (Lasix) medication. Medication therapy is being monitor by her Cardiologist. Informed that Dr Watkins is on vacation. Instructed to contact Cardiologist for further instructions. No discomfort/distress reported. Advised to contact office if further assistance is needed. Will refer to any available provider.

## 2017-05-25 NOTE — TELEPHONE ENCOUNTER
----- Message from Connietesha Montgomery sent at 5/25/2017  9:52 AM CDT -----  Contact: Gregory of pt called(Rhea)  Gregory of pt called(Rhea), states she will like to speak with you regarding pt's Lasix. She believes her mother is retaining fluid and pt has shortness of breath. Ph [phone]. Pt of Dr. Davis and LOV 12/22/16. Thank you

## 2017-05-25 NOTE — TELEPHONE ENCOUNTER
Pt c/o SOB. Pt took Lasix 20 mg last night and this morning. Pt refused to come in . Has shingles and is not feeling well. Dr Davis notified.

## 2017-05-25 NOTE — TELEPHONE ENCOUNTER
----- Message from Meka Joy sent at 5/25/2017  9:12 AM CDT -----  Contact: daughter-ashwin Wilkerson called to discuss pt's meds.  furosemide (LASIX) 40 MG tablet  [phone]

## 2017-05-31 ENCOUNTER — OFFICE VISIT (OUTPATIENT)
Dept: FAMILY MEDICINE | Facility: CLINIC | Age: 72
End: 2017-05-31
Payer: MEDICARE

## 2017-05-31 ENCOUNTER — TELEPHONE (OUTPATIENT)
Dept: FAMILY MEDICINE | Facility: CLINIC | Age: 72
End: 2017-05-31

## 2017-05-31 VITALS
BODY MASS INDEX: 25.9 KG/M2 | WEIGHT: 151.69 LBS | DIASTOLIC BLOOD PRESSURE: 58 MMHG | SYSTOLIC BLOOD PRESSURE: 118 MMHG | HEIGHT: 64 IN | OXYGEN SATURATION: 98 % | HEART RATE: 82 BPM | TEMPERATURE: 98 F

## 2017-05-31 DIAGNOSIS — B02.29 PHN (POSTHERPETIC NEURALGIA): Primary | ICD-10-CM

## 2017-05-31 DIAGNOSIS — R51.9 FACIAL PAIN: ICD-10-CM

## 2017-05-31 PROCEDURE — 1125F AMNT PAIN NOTED PAIN PRSNT: CPT | Mod: S$GLB,,, | Performed by: INTERNAL MEDICINE

## 2017-05-31 PROCEDURE — 99499 UNLISTED E&M SERVICE: CPT | Mod: S$GLB,,, | Performed by: INTERNAL MEDICINE

## 2017-05-31 PROCEDURE — 1159F MED LIST DOCD IN RCRD: CPT | Mod: S$GLB,,, | Performed by: INTERNAL MEDICINE

## 2017-05-31 PROCEDURE — 99999 PR PBB SHADOW E&M-EST. PATIENT-LVL III: CPT | Mod: PBBFAC,,, | Performed by: INTERNAL MEDICINE

## 2017-05-31 PROCEDURE — 99215 OFFICE O/P EST HI 40 MIN: CPT | Mod: S$GLB,,, | Performed by: INTERNAL MEDICINE

## 2017-05-31 RX ORDER — PREDNISONE 20 MG/1
40 TABLET ORAL DAILY
Qty: 10 TABLET | Refills: 0 | Status: SHIPPED | OUTPATIENT
Start: 2017-05-31 | End: 2017-06-05

## 2017-05-31 RX ORDER — BACITRACIN 500 [USP'U]/G
OINTMENT OPHTHALMIC
COMMUNITY
Start: 2017-05-24 | End: 2017-06-22

## 2017-05-31 NOTE — TELEPHONE ENCOUNTER
Requesting a portable oxygen tank. Unfortunately, patient's insurance will only cover one or the other. Patient has a tank in possession already.

## 2017-05-31 NOTE — PROGRESS NOTES
Chief complaint: Follow-up on shingles    71-year-old white female recently developed shingles over the right face.  It was severely painful for about 5 days.  She is using drops now on is followed by ophthalmology.  The drops seem to be helping.  It started with pain in the eye and then progressed to the typical rash across the 4 head.  The rash is drying up.  She was on gabapentin in the past but was told to reduce it during hospitalization possibly due to fluid retention since she does have CHF.  She has no active CHF at this time.  We discussed increasing the gabapentin by adding a 100 mg pill twice a day and continuing the 300 and they will monitor weight and so forth.  Apparently she had been given prescriptions of hydrocodone and one of her daughters named Miryam had been calling for them and actually picking them up and feeling them at a pharmacy and had been abusing the medications.  Patient herself never got any of this medication.  The current daughter is the primary caregiver and she has taken steps to assure that this does not happen again.  I placed a high alert on the chart and we reviewed the pharmacy monitoring site confirming that at least for prescriptions #90 were filled at today's pharmacy.  Only recently did refill some very small supplies of hydrocodone from the emergency room.  She continues with severe pain and itching.  She is tolerating the 300 mg of gabapentin at night with good control.  She is only taking about 100 and the morning and then taking 100 up to 300 during the day.  We discussed that length that she has had a good response and the need to titrate up possibly to 300 twice a day and at night and use the 100 mg pills to titrate up even further if necessary.  She has not had any recurrent fluid retention and shortness of breath.  She did have a little bit lately and the Lasix was increased with resolution.  We also discussed a referral to pain management doctor mario who can  possibly evaluate for injections.  We discussed at length today that the duration of her symptoms is unpredictable at this point.  Family wanted her to get back to general physical therapy because she is getting so debilitated and currently the degree of her pain is prohibiting her from going.  I reassured them there is no contagious issues at this point.  Counseled at great length regarding the multitude of these issues.Total time over 45 minutes with over 50% counseling.      ROS:   CONST: weight stable. EYES: no vision change.  No other neurological deficits, no ENT symptoms, no constipation, currently no shortness of breath    Past Medical History   Diagnosis Date    Blood clotting tendency     CAD (coronary artery disease)      stents    CHF (congestive heart failure) -DIASTOLIC      HFpEF    Chronic headaches     CKD stage 3 due to type 2 diabetes mellitus 3/18/2016    CVA (cerebral infarction)     Diabetes     Diabetes mellitus type I     Diverticulosis     Dysphagia as late effect of cerebrovascular disease     Encounter for blood transfusion     Heart attack     History of pleural effusion     HTN (hypertension)     Hyperlipidemia     MI, old     Multiple thyroid nodules     Pulmonary HTN     Right hemiparesis     S/P coronary artery stent placement     S/P percutaneous endoscopic gastrostomy (PEG) tube placement     Screening for colon cancer      normal 2015 -10 yrs    Stroke    Subdural hematoma 2016 with craniotomy  Zoster 2017      Past Surgical History:   Procedure Laterality Date    cardiac bypass      CORONARY ANGIOPLASTY WITH STENT PLACEMENT  7/29/15    [facility]    CORONARY ARTERY BYPASS GRAFT  6/20/15    [facility]    KNEE SURGERY      OVARY SURGERY      OVARY SURGERY      TONSILLECTOMY      TUBAL LIGATION       family history includes Cancer in her sister; Diabetes in her brother and mother; Heart disease in her brother, sister, sister, and son; Hyperlipidemia in her  mother; Hypertension in her brother, daughter, and mother; No Known Problems in her daughter.     Social History     Social History    Marital status:      Spouse name: N/A    Number of children: N/A    Years of education: N/A     Occupational History    Not on file.     Social History Main Topics    Smoking status: Never Smoker    Smokeless tobacco: Never Used    Alcohol use No    Drug use: No    Sexual activity: Not on file     Other Topics Concern    Not on file     Social History Narrative    No narrative on file     Gen: no distress   Resolving dermatitis with slight hyperpigmentation over the right 4 head    Michael was seen today for herpes zoster.    Diagnoses and all orders for this visit:    PHN (postherpetic neuralgia) plan as above.  Issues regarding family member abusing narcotics as above and gave him a copy of the pharmacy monitoring site and encouraged daughter to again discussed with a pharmacy about what can be done to prevent daughter from filling prescriptions and other places so as to not enable her medication abuse problem.  I have put in a high alert on the chart in attempts to prevent Miryam from picking up prescriptions.  We will increase gabapentin and refer for potential evaluation for injection.  He was suggested by cardiology the perhaps a course of steroids may be in order.  We discussed there is no proven indication for steroids and his lungs it does not significant increase her sugar we can try a short course of prednisone 40 mg although at this point in the state of her neuropathy, cannot guarantee in any way that it will add any benefit but may not hurt.  All other side effects of prednisone discussed as well including fluid retention, agitation, insomnia and so forth.  -     Ambulatory referral to Pain Clinic    Facial pain  -     Ambulatory referral to Pain Clinic    Other orders  -     predniSONE (DELTASONE) 20 MG tablet; Take 2 tablets (40 mg total) by mouth  once daily.

## 2017-06-01 RX ORDER — INSULIN GLARGINE 100 [IU]/ML
INJECTION, SOLUTION SUBCUTANEOUS
Qty: 10 ML | Refills: 0 | OUTPATIENT
Start: 2017-06-01

## 2017-06-02 ENCOUNTER — TELEPHONE (OUTPATIENT)
Dept: FAMILY MEDICINE | Facility: CLINIC | Age: 72
End: 2017-06-02

## 2017-06-02 NOTE — TELEPHONE ENCOUNTER
Daughter stated, mom has has a cold for a couple of days. Symptoms: cough, mucus (light yellow) and chest congestion. No other symptom reported. Requesting medication. Please advise.

## 2017-06-02 NOTE — TELEPHONE ENCOUNTER
----- Message from Meka Joy sent at 6/2/2017 11:02 AM CDT -----  Contact: rhea-daughter  Rhea is requesting a script for cough & congestion. Please advise. [phone]

## 2017-06-06 ENCOUNTER — TELEPHONE (OUTPATIENT)
Dept: FAMILY MEDICINE | Facility: CLINIC | Age: 72
End: 2017-06-06

## 2017-06-06 NOTE — TELEPHONE ENCOUNTER
Spoke with patient's daughter Rhea and will get the mucinex and continue with the delsym. Will call if she should get worse.

## 2017-06-06 NOTE — TELEPHONE ENCOUNTER
----- Message from Guerline Holt sent at 6/6/2017 10:20 AM CDT -----  Contact: daughter -Rhea   On Friday she started taking Delsum cough syrup . She is getting worse . She has yellow phlegm . Requesting prescription cough syrup .    [facility] in [place]            Daughter's #   [phone]             LL

## 2017-06-06 NOTE — TELEPHONE ENCOUNTER
Please assess if patient getting worse in other more worrisome ways such as more short of breath, more coughing.    Reassure family that if the cough suppressant does suppress the cough, they will be some buildup of mucus that eventually  will have to come out.     is really not sure what other prescription cough medication will help.  If the mucus is thick there is over-the-counter Mucinex which can break up the mucus but Delsym is still the best cough suppressant

## 2017-06-12 ENCOUNTER — TELEPHONE (OUTPATIENT)
Dept: FAMILY MEDICINE | Facility: CLINIC | Age: 72
End: 2017-06-12

## 2017-06-12 NOTE — LETTER
June 12, 2017    Michael Salgado  [address]  [address]  [place] [address]             [facility]  [address]  [place] [address]  Phone: [phone]  Fax: [phone] Dear Ms. Salgado:    Sorry we were unable to contact you to schedule your pain management appointment. Please give the referral office a call to schedule. [phone].        If you have any questions or concerns, please don't hesitate to call.    Sincerely,        Yolie Cuellar MA

## 2017-06-14 ENCOUNTER — TELEPHONE (OUTPATIENT)
Dept: FAMILY MEDICINE | Facility: CLINIC | Age: 72
End: 2017-06-14

## 2017-06-14 ENCOUNTER — HOSPITAL ENCOUNTER (OUTPATIENT)
Dept: RADIOLOGY | Facility: HOSPITAL | Age: 72
Discharge: HOME | End: 2017-06-14
Attending: NURSE PRACTITIONER
Payer: MEDICARE

## 2017-06-14 ENCOUNTER — OFFICE VISIT (OUTPATIENT)
Dept: FAMILY MEDICINE | Facility: CLINIC | Age: 72
End: 2017-06-14
Payer: MEDICARE

## 2017-06-14 VITALS
HEIGHT: 64 IN | HEART RATE: 88 BPM | DIASTOLIC BLOOD PRESSURE: 58 MMHG | OXYGEN SATURATION: 99 % | SYSTOLIC BLOOD PRESSURE: 99 MMHG | BODY MASS INDEX: 25.9 KG/M2 | WEIGHT: 151.69 LBS | TEMPERATURE: 98 F

## 2017-06-14 DIAGNOSIS — R09.81 NASAL CONGESTION: ICD-10-CM

## 2017-06-14 DIAGNOSIS — I10 ESSENTIAL HYPERTENSION: ICD-10-CM

## 2017-06-14 DIAGNOSIS — R93.89 NEW ABNORMALITY ON CHEST X-RAY: ICD-10-CM

## 2017-06-14 DIAGNOSIS — I48.20 CHRONIC ATRIAL FIBRILLATION: ICD-10-CM

## 2017-06-14 DIAGNOSIS — R09.82 POST-NASAL DRIP: ICD-10-CM

## 2017-06-14 DIAGNOSIS — R06.02 SOB (SHORTNESS OF BREATH): ICD-10-CM

## 2017-06-14 DIAGNOSIS — R91.1 ABNORMAL X-RAY OF LUNGS WITH SINGLE PULMONARY NODULE: Primary | ICD-10-CM

## 2017-06-14 DIAGNOSIS — I27.20 PULMONARY HTN: ICD-10-CM

## 2017-06-14 DIAGNOSIS — I50.33 ACUTE ON CHRONIC DIASTOLIC CONGESTIVE HEART FAILURE: ICD-10-CM

## 2017-06-14 DIAGNOSIS — J06.9 VIRAL URI WITH COUGH: ICD-10-CM

## 2017-06-14 DIAGNOSIS — N18.30 CHRONIC RENAL INSUFFICIENCY, STAGE 3 (MODERATE): ICD-10-CM

## 2017-06-14 PROCEDURE — 99499 UNLISTED E&M SERVICE: CPT | Mod: S$GLB,,, | Performed by: NURSE PRACTITIONER

## 2017-06-14 PROCEDURE — 99999 PR PBB SHADOW E&M-EST. PATIENT-LVL V: CPT | Mod: PBBFAC,,, | Performed by: NURSE PRACTITIONER

## 2017-06-14 PROCEDURE — 71010 XR CHEST 1 VIEW: CPT | Mod: TC,PO

## 2017-06-14 PROCEDURE — 71010 XR CHEST 1 VIEW: CPT | Mod: 26,,, | Performed by: RADIOLOGY

## 2017-06-14 PROCEDURE — 3044F HG A1C LEVEL LT 7.0%: CPT | Mod: S$GLB,,, | Performed by: NURSE PRACTITIONER

## 2017-06-14 PROCEDURE — 1159F MED LIST DOCD IN RCRD: CPT | Mod: S$GLB,,, | Performed by: NURSE PRACTITIONER

## 2017-06-14 PROCEDURE — 99214 OFFICE O/P EST MOD 30 MIN: CPT | Mod: S$GLB,,, | Performed by: NURSE PRACTITIONER

## 2017-06-14 RX ORDER — AZELASTINE 1 MG/ML
1 SPRAY, METERED NASAL 2 TIMES DAILY
Qty: 30 ML | Refills: 0 | Status: SHIPPED | OUTPATIENT
Start: 2017-06-14 | End: 2018-06-14

## 2017-06-14 RX ORDER — LEVOFLOXACIN 250 MG/1
250 TABLET ORAL DAILY
Qty: 10 TABLET | Refills: 0 | Status: SHIPPED | OUTPATIENT
Start: 2017-06-14 | End: 2017-06-24

## 2017-06-14 RX ORDER — FLUTICASONE PROPIONATE 50 MCG
1 SPRAY, SUSPENSION (ML) NASAL DAILY
Qty: 1 BOTTLE | Refills: 0 | Status: SHIPPED | OUTPATIENT
Start: 2017-06-14

## 2017-06-14 NOTE — PATIENT INSTRUCTIONS
Viral Upper Respiratory Illness with Wheezing (Adult)  You have a viral upper respiratory illness (URI), which is another term for the common cold. When the infection causes a lot of irritation, the air passages can go into spasm. This causes wheezing and shortness of breath.    This illness is contagious during the first few days. It is spread through the air by coughing and sneezing. It may also be spread by direct contact (touching the sick person and then touching your own eyes, nose, or mouth). Frequent handwashing will decrease the risk.  Most viral illnesses go away within 7 to 10 days with rest and simple home remedies. Sometimes the illness may last for several weeks. Antibiotics will not kill a virus, and they are generally not prescribed for this condition.  Home care  · If symptoms are severe, rest at home for the first 2 to 3 days. When you resume activity, don't let yourself get too tired.  · Avoid being exposed to cigarette smoke (yours or others).  · You may use acetaminophen or ibuprofen to control pain and fever, unless another medicine was prescribed. (Note: If you have chronic liver or kidney disease, have ever had a stomach ulcer or gastrointestinal bleeding, or are taking blood-thinning medicines, talk with your healthcare provider before using these medicines.) Aspirin should never be given to anyone under 18 years of age who is ill with a viral infection or fever. It may cause severe liver or brain damage.  · Your appetite may be poor, so a light diet is fine. Avoid dehydration by drinking 6 to 8 glasses of fluids per day (water, soft drinks, juices, tea, or soup). Extra fluids will help loosen secretions in the nose and lungs.  · Over-the-counter cold medicines will not shorten the length of time youre sick, but they may be helpful for the following symptoms: cough, sore throat, and nasal and sinus congestion. (Note: Do not use decongestants if you have high blood pressure.)  Follow-up  care  Follow up with your healthcare provider, or as advised.  When to seek medical advice  Call your healthcare provider right away if any of these occur:  · Cough with lots of colored sputum (mucus)  · Severe headache; face, neck, or ear pain  · Difficulty swallowing due to throat pain  · Fever of 100.4ºF (38ºC) or higher, or as directed by your healthcare provider  Call 911, or get immediate medical care  Call emergency services right away if any of these occur:  · Chest pain, shortness of breath, worsening wheezing, or difficulty breathing  · Coughing up blood  · Inability to swallow due to throat pain  Date Last Reviewed: 9/13/2015  © 6186-9562 [employer]. [address] [place] [address], [place] [address]. All rights reserved. This information is not intended as a substitute for professional medical care. Always follow your healthcare professional's instructions.

## 2017-06-14 NOTE — PROGRESS NOTES
"History of Present Illness   Michael Salgado is a 71 y.o. woman with medical history as listed below who presents today for evaluation of persistent URI symptoms for "months." Her son states that since about September she has been having problems with nasal congestion, post nasal drip, sore throat, and a dry hacking cough. Throughout this she has had no fevers or chills. Over the past week her symptoms seem to have worsened greatly. She now feels short of breath during coughing spells and while talking and exerted herself minimally. She also has a decreased appetite and feels fatigued. However, her shortness of breath and fatigue are chronic, and she is on home O2 per nasal canula at 3L. The son does state this seems to be worse than usual. She is taking Delsym and Mucinex for symptoms with no relief. She has had no chest pain, palpitations, diaphoresis, dizziness, or LOC. She has history of GI bleed, son has not noticed any recent black tarry stool or blood in stools. She does have swelling to RLE which son states is normal with right sided hemiparesis. They have noticed no additional lower extremity swelling or abdominal swelling.    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     Encounter for blood transfusion     Heart attack     History of pleural effusion     HTN (hypertension)     Hyperlipidemia     MI, old     Multiple thyroid nodules     Pulmonary HTN     Right hemiparesis     S/P coronary artery stent placement     S/P percutaneous endoscopic gastrostomy (PEG) tube placement     Screening for colon cancer     normal 2015 -10 yrs    Stroke  " "      Past Surgical History:   Procedure Laterality Date    cardiac bypass      CORONARY ANGIOPLASTY WITH STENT PLACEMENT  7/29/15    [facility]    CORONARY ARTERY BYPASS GRAFT  6/20/15    [facility]    KNEE SURGERY      OVARY SURGERY      OVARY SURGERY      TONSILLECTOMY      TUBAL LIGATION         Social History     Social History    Marital status:      Spouse name: N/A    Number of children: N/A    Years of education: N/A     Social History Main Topics    Smoking status: Never Smoker    Smokeless tobacco: Never Used    Alcohol use No    Drug use: No    Sexual activity: Not Asked     Other Topics Concern    None     Social History Narrative    None       Family History   Problem Relation Age of Onset    Diabetes Mother     Hypertension Mother     Hyperlipidemia Mother     Heart disease Sister     Cancer Sister      colon    Heart disease Brother     Diabetes Brother     Hypertension Brother     No Known Problems Daughter     Heart disease Son     Heart disease Sister     Hypertension Daughter        Review of Systems  Review of Systems   Constitutional: Positive for malaise/fatigue. Negative for chills and fever.   HENT: Positive for congestion and sore throat.    Respiratory: Positive for cough and shortness of breath. Negative for sputum production and wheezing.    Cardiovascular: Negative for chest pain, palpitations and leg swelling.   Neurological: Negative for dizziness, loss of consciousness and headaches.     A complete review of systems was otherwise negative.    Physical Exam  BP (!) 99/58 (BP Location: Left arm, Patient Position: Sitting, BP Method: Manual)   Pulse 88   Temp 98.1 °F (36.7 °C) (Oral)   Ht 5' 4" (1.626 m)   Wt 68.8 kg (151 lb 10.8 oz)   LMP  (LMP Unknown)   SpO2 99%   BMI 26.04 kg/m²   General appearance: alert, appears stated age, cooperative, mild distress and wheelchair bound with home O2 per nasal canula in place. speaking in short sentences, " baseline for patient   Eyes: negative findings: lids and lashes normal and conjunctivae and sclerae normal  Ears: normal TM's and external ear canals both ears  Nose: clear discharge, moderate congestion, turbinates red, swollen, no sinus tenderness  Throat: lips, mucosa, and tongue normal; teeth and gums normal and moderate oropharyngeal erythema with clear PND   Neck: supple, trache midline, no JVD  Lungs: clear to auscultation bilaterally and no wheezez, rales, or rhonchi. Increased effort with normal rate  Heart: regularly irregular rhythm and no murmur, rub, or gallop  Extremities: extremities normal, atraumatic, no cyanosis or edema, edema to RLE chronic with right sided hemiparesis   Pulses: 2+ and symmetric  irregular  Skin: pale appearance with dry mucus membranes, nailbed clubbing, no cyanosis noted, skin cool and dry, no diaphoresis  Lymph nodes: Cervical, supraclavicular, and axillary nodes normal.  Neurologic: Grossly normal     Chest X-ray:   1.  Interval increase in cardiac size and pulmonary vascularity with development of mild diffusely increased interstitial opacification in both lungs and bilateral pleural effusions since 2/9/17.  Overall, the findings suggest congestive heart failure.  2.  Interval development of 3 cm nodular opacity in the right upper lung.  The patient's reported history is acute respiratory infection and, certainly, this could represent a focal area of consolidation.  Nonetheless, a pulmonary mass cannot be excluded and followup radiographs to document resolution are recommended.  Alternatively, further evaluation with CT could be performed.    1. Likely CHF exacerbation, will have patient increase Lasix to 20mg twice daily.  2. CAP vs aspiration pneumonia, will cover with low dose Levaquin and have CT of chest performed.    Assessment/Plan  Abnormal x-ray of lungs with single pulmonary nodule  X-ray result as listed above.  Will treat with Levaquin for possible pneumonia with  acute CHF exacerbation.  CT of chest for further evaluation.  Will obtain CBC, CMP, and BNP.  Will increase Lasix to 20mg twice daily.  Patient was stable and at baseline today per son, ER precautions were given and son verbalized understanding.  Continue all other medications as prescribed and continue home O2 per nasal canula.  -     CT Chest Without Contrast; Future; Expected date: 06/14/2017  -     levoFLOXacin (LEVAQUIN) 250 MG tablet; Take 1 tablet (250 mg total) by mouth once daily.  Dispense: 10 tablet; Refill: 0  -     CBC auto differential; Future; Expected date: 06/14/2017  -     Comprehensive metabolic panel; Future; Expected date: 06/14/2017  -     Brain natriuretic peptide; Future; Expected date: 06/14/2017    New abnormality on chest x-ray  As above.  -     X-Ray Chest 1 View; Future; Expected date: 06/14/2017  -     CT Chest Without Contrast; Future; Expected date: 06/14/2017  -     levoFLOXacin (LEVAQUIN) 250 MG tablet; Take 1 tablet (250 mg total) by mouth once daily.  Dispense: 10 tablet; Refill: 0  -     CBC auto differential; Future; Expected date: 06/14/2017  -     Comprehensive metabolic panel; Future; Expected date: 06/14/2017  -     Brain natriuretic peptide; Future; Expected date: 06/14/2017    Acute on chronic diastolic congestive heart failure  As above.  -     CT Chest Without Contrast; Future; Expected date: 06/14/2017  -     CBC auto differential; Future; Expected date: 06/14/2017  -     Comprehensive metabolic panel; Future; Expected date: 06/14/2017  -     Brain natriuretic peptide; Future; Expected date: 06/14/2017    Pulmonary HTN  As above.  The current medical regimen is effective;  continue present plan and medications.  -     CT Chest Without Contrast; Future; Expected date: 06/14/2017  -     CBC auto differential; Future; Expected date: 06/14/2017  -     Comprehensive metabolic panel; Future; Expected date: 06/14/2017  -     Brain natriuretic peptide; Future; Expected date:  06/14/2017    Chronic atrial fibrillation  As above.  The current medical regimen is effective;  continue present plan and medications.  -     CBC auto differential; Future; Expected date: 06/14/2017  -     Comprehensive metabolic panel; Future; Expected date: 06/14/2017  -     Brain natriuretic peptide; Future; Expected date: 06/14/2017    Essential hypertension  The current medical regimen is effective;  continue present plan and medications.  -     CBC auto differential; Future; Expected date: 06/14/2017  -     Comprehensive metabolic panel; Future; Expected date: 06/14/2017  -     Brain natriuretic peptide; Future; Expected date: 06/14/2017    SOB (shortness of breath)  The current medical regimen is effective;  continue present plan and medications.  -     CT Chest Without Contrast; Future; Expected date: 06/14/2017  -     CBC auto differential; Future; Expected date: 06/14/2017  -     Comprehensive metabolic panel; Future; Expected date: 06/14/2017  -     Brain natriuretic peptide; Future; Expected date: 06/14/2017    Uncontrolled type 2 diabetes mellitus with stage 3 chronic kidney disease, with long-term current use of insulin  The current medical regimen is effective;  continue present plan and medications.  -     CBC auto differential; Future; Expected date: 06/14/2017  -     Comprehensive metabolic panel; Future; Expected date: 06/14/2017  -     Brain natriuretic peptide; Future; Expected date: 06/14/2017    Chronic renal insufficiency, stage 3 (moderate)  The current medical regimen is effective;  continue present plan and medications.  -     CBC auto differential; Future; Expected date: 06/14/2017  -     Comprehensive metabolic panel; Future; Expected date: 06/14/2017  -     Brain natriuretic peptide; Future; Expected date: 06/14/2017    Post-nasal drip  As above.  -     azelastine (ASTELIN) 137 mcg (0.1 %) nasal spray; 1 spray (137 mcg total) by Nasal route 2 (two) times daily.  Dispense: 30 mL;  Refill: 0  -     fluticasone (FLONASE) 50 mcg/actuation nasal spray; 1 spray by Each Nare route once daily.  Dispense: 1 Bottle; Refill: 0  -     CBC auto differential; Future; Expected date: 06/14/2017  -     Comprehensive metabolic panel; Future; Expected date: 06/14/2017  -     Brain natriuretic peptide; Future; Expected date: 06/14/2017    Nasal congestion  As above.  -     azelastine (ASTELIN) 137 mcg (0.1 %) nasal spray; 1 spray (137 mcg total) by Nasal route 2 (two) times daily.  Dispense: 30 mL; Refill: 0  -     fluticasone (FLONASE) 50 mcg/actuation nasal spray; 1 spray by Each Nare route once daily.  Dispense: 1 Bottle; Refill: 0  -     CBC auto differential; Future; Expected date: 06/14/2017  -     Comprehensive metabolic panel; Future; Expected date: 06/14/2017  -     Brain natriuretic peptide; Future; Expected date: 06/14/2017    Discussed above assessment and plan with Dr. Isaacs, who agrees.  Will follow-up with testing as scheduled.  ER precautions initiated.  Follow-up with PCP in ten days after completion of antibiotics for re-evaluation or sooner if warranted.

## 2017-06-14 NOTE — TELEPHONE ENCOUNTER
----- Message from Maddie Ma sent at 6/14/2017  8:32 AM CDT -----  Daughter, mishel, thinks patient needs antibiotics and something better than OTC Delsym for congestion. She has been dealing with this for a few days and now her throat is sore. Please call into [facility]. Daughter can be reached at [phone] Thank you!

## 2017-06-14 NOTE — TELEPHONE ENCOUNTER
Last seen 5/31 so , if getting worse w a cough, it might be safest for her to see someone to check chest --after hours appt today ???    Get more info on cough, just a little sore throat does NOT need abx

## 2017-06-15 ENCOUNTER — TELEPHONE (OUTPATIENT)
Dept: FAMILY MEDICINE | Facility: CLINIC | Age: 72
End: 2017-06-15

## 2017-06-15 ENCOUNTER — HOSPITAL ENCOUNTER (OUTPATIENT)
Dept: RADIOLOGY | Facility: HOSPITAL | Age: 72
Discharge: HOME | End: 2017-06-15
Attending: NURSE PRACTITIONER
Payer: MEDICARE

## 2017-06-15 DIAGNOSIS — R91.1 ABNORMAL X-RAY OF LUNGS WITH SINGLE PULMONARY NODULE: ICD-10-CM

## 2017-06-15 DIAGNOSIS — R93.89 NEW ABNORMALITY ON CHEST X-RAY: ICD-10-CM

## 2017-06-15 DIAGNOSIS — I27.20 PULMONARY HTN: ICD-10-CM

## 2017-06-15 DIAGNOSIS — I50.33 ACUTE ON CHRONIC DIASTOLIC CONGESTIVE HEART FAILURE: ICD-10-CM

## 2017-06-15 DIAGNOSIS — R06.02 SOB (SHORTNESS OF BREATH): ICD-10-CM

## 2017-06-15 PROBLEM — I71.21 ANEURYSM, ASCENDING AORTA: Status: ACTIVE | Noted: 2017-06-15

## 2017-06-15 PROBLEM — I70.0 ARTERIOSCLEROSIS OF AORTA: Status: ACTIVE | Noted: 2017-06-15

## 2017-06-15 PROCEDURE — 71250 CT THORAX DX C-: CPT | Mod: 26,,, | Performed by: RADIOLOGY

## 2017-06-15 PROCEDURE — 71250 CT THORAX DX C-: CPT | Mod: TC

## 2017-06-15 NOTE — TELEPHONE ENCOUNTER
Spoke with patient's son to discuss test results. They did get the Levaquin from the pharmacy yesterday and started medication today. Will increase Lasix to 20mg twice daily. Will call to schedule blood work and CT scan of chest today. Will follow-up with those results when available. We discussed ER precautions again. Will have patient follow-up with PCP in ten days at completion of treatment for re-evaluation. Son verbalized understanding and repeated this information back to me.     I will call to check on patient in the next few days to assess for worsening or improvement.    Milena Mock, TRACEY-C

## 2017-06-15 NOTE — TELEPHONE ENCOUNTER
Spoke with patient's brother regarding test results. He will continue to increase the Lasix to 20mg BID as well as take the Levaquin until complete. Additionally, I have suggested weighing the patient daily to monitor for weight gain/further fluid restriction. He will also limit fluid intake. Discussed ER precautions further. Given history and test findings, recommend close follow-up with PCP and cardiologist. Verbalized understanding and repeated plan back to me.    Will follow-up with patient tomorrow.    KELLY Trinidad

## 2017-06-16 ENCOUNTER — TELEPHONE (OUTPATIENT)
Dept: FAMILY MEDICINE | Facility: CLINIC | Age: 72
End: 2017-06-16

## 2017-06-21 DIAGNOSIS — R60.9 EDEMA: ICD-10-CM

## 2017-06-21 DIAGNOSIS — R60.9 EDEMA, UNSPECIFIED TYPE: Primary | ICD-10-CM

## 2017-06-22 ENCOUNTER — HOSPITAL ENCOUNTER (OUTPATIENT)
Dept: CARDIOLOGY | Facility: CLINIC | Age: 72
Discharge: HOME | End: 2017-06-22
Payer: MEDICARE

## 2017-06-22 ENCOUNTER — OFFICE VISIT (OUTPATIENT)
Dept: CARDIOLOGY | Facility: CLINIC | Age: 72
End: 2017-06-22
Payer: MEDICARE

## 2017-06-22 ENCOUNTER — CLINICAL SUPPORT (OUTPATIENT)
Dept: CARDIOLOGY | Facility: CLINIC | Age: 72
End: 2017-06-22
Payer: MEDICARE

## 2017-06-22 ENCOUNTER — TELEPHONE (OUTPATIENT)
Dept: CARDIOLOGY | Facility: CLINIC | Age: 72
End: 2017-06-22

## 2017-06-22 ENCOUNTER — ANTI-COAG VISIT (OUTPATIENT)
Dept: CARDIOLOGY | Facility: CLINIC | Age: 72
End: 2017-06-22

## 2017-06-22 VITALS — SYSTOLIC BLOOD PRESSURE: 109 MMHG | DIASTOLIC BLOOD PRESSURE: 63 MMHG | HEIGHT: 65 IN | HEART RATE: 103 BPM

## 2017-06-22 DIAGNOSIS — Z79.01 LONG TERM (CURRENT) USE OF ANTICOAGULANTS: Primary | ICD-10-CM

## 2017-06-22 DIAGNOSIS — I27.20 PULMONARY HTN: ICD-10-CM

## 2017-06-22 DIAGNOSIS — R60.9 EDEMA, UNSPECIFIED TYPE: ICD-10-CM

## 2017-06-22 DIAGNOSIS — Z79.01 CHRONIC ANTICOAGULATION: ICD-10-CM

## 2017-06-22 DIAGNOSIS — Z95.1 S/P CABG X 2: ICD-10-CM

## 2017-06-22 DIAGNOSIS — Z95.5 S/P CORONARY ARTERY STENT PLACEMENT: ICD-10-CM

## 2017-06-22 DIAGNOSIS — R60.9 EDEMA: ICD-10-CM

## 2017-06-22 DIAGNOSIS — R09.89 SUSPECTED DEEP VEIN THROMBOSIS (DVT): ICD-10-CM

## 2017-06-22 DIAGNOSIS — N18.4 CHRONIC KIDNEY DISEASE (CKD) STAGE G4/A1, SEVERELY DECREASED GLOMERULAR FILTRATION RATE (GFR) BETWEEN 15-29 ML/MIN/1.73 SQUARE METER AND ALBUMINURIA CREATININE RATIO LESS THAN 30 MG/G: ICD-10-CM

## 2017-06-22 DIAGNOSIS — I48.20 CHRONIC ATRIAL FIBRILLATION: Chronic | ICD-10-CM

## 2017-06-22 DIAGNOSIS — I10 ESSENTIAL HYPERTENSION: Chronic | ICD-10-CM

## 2017-06-22 DIAGNOSIS — I50.32 CHRONIC DIASTOLIC CONGESTIVE HEART FAILURE: ICD-10-CM

## 2017-06-22 DIAGNOSIS — I48.20 CHRONIC ATRIAL FIBRILLATION: ICD-10-CM

## 2017-06-22 DIAGNOSIS — E78.00 PURE HYPERCHOLESTEROLEMIA: Chronic | ICD-10-CM

## 2017-06-22 DIAGNOSIS — I50.33 ACUTE ON CHRONIC DIASTOLIC CHF (CONGESTIVE HEART FAILURE), NYHA CLASS 4: Chronic | ICD-10-CM

## 2017-06-22 DIAGNOSIS — I25.810 CORONARY ARTERY DISEASE INVOLVING AUTOLOGOUS ARTERY CORONARY BYPASS GRAFT WITHOUT ANGINA PECTORIS: Chronic | ICD-10-CM

## 2017-06-22 PROCEDURE — 1159F MED LIST DOCD IN RCRD: CPT | Mod: S$GLB,,, | Performed by: NURSE PRACTITIONER

## 2017-06-22 PROCEDURE — 99215 OFFICE O/P EST HI 40 MIN: CPT | Mod: S$GLB,,, | Performed by: NURSE PRACTITIONER

## 2017-06-22 PROCEDURE — 99999 PR PBB SHADOW E&M-EST. PATIENT-LVL III: CPT | Mod: PBBFAC,,, | Performed by: NURSE PRACTITIONER

## 2017-06-22 PROCEDURE — 99499 UNLISTED E&M SERVICE: CPT | Mod: S$GLB,,, | Performed by: NURSE PRACTITIONER

## 2017-06-22 PROCEDURE — 1126F AMNT PAIN NOTED NONE PRSNT: CPT | Mod: S$GLB,,, | Performed by: NURSE PRACTITIONER

## 2017-06-22 PROCEDURE — 93971 EXTREMITY STUDY: CPT | Mod: S$GLB,,, | Performed by: INTERNAL MEDICINE

## 2017-06-22 PROCEDURE — 93000 ELECTROCARDIOGRAM COMPLETE: CPT | Mod: S$GLB,,, | Performed by: INTERNAL MEDICINE

## 2017-06-22 RX ORDER — WARFARIN SODIUM 5 MG/1
5 TABLET ORAL DAILY
Qty: 30 TABLET | Refills: 11 | Status: ON HOLD | OUTPATIENT
Start: 2017-06-22 | End: 2017-07-14

## 2017-06-22 RX ORDER — QUETIAPINE FUMARATE 100 MG/1
100 TABLET, FILM COATED ORAL NIGHTLY
COMMUNITY
End: 2017-08-01

## 2017-06-22 RX ORDER — AMLODIPINE BESYLATE 5 MG/1
5 TABLET ORAL DAILY
COMMUNITY

## 2017-06-22 NOTE — TELEPHONE ENCOUNTER
----- Message from Georgie Fabian MA sent at 6/22/2017  4:02 PM CDT -----  Contact: Teofilo Lamb [phone]  Pt. was seen today 6/22 by Sumaya Dunne/.  Effient is not on list,it was d/c'd. She  wants to know if pt. is suppose to be taking med with Warfarin. Please call Olga

## 2017-06-22 NOTE — PROGRESS NOTES
"72yo F with PMHx: Chronic Afib, blood clotting tendency, hx of CVA, CHF, CAD, HTN, CKD, HLD, pulm HTN, s/p CABG x 2, chronic headaches, DMT1, diverticulosis, hx of MI, multiple thyroid nodules, right hemiparesis, hx of shingles (2017) and hx of subdural hematoma with craniotomy (2016).  Per note on 6/22: "Recommend full anticoagulation with coumadin given atrial fibrillation and CHADS2-Vasc score of 8."    I was able to reach the pt's daughter, Rhea, on 6/23.  She has not yet picked up the pt's Rx for warfarin, but intends to do so later today.  Per the pt's med card, she was given Warfarin 5mg tablets.  She knows to take the warfarin in the evenings and has our contact info.  She will go to the lab for an INR on 6/27 and is booked for a new pt education session on 7/10 at the [facility].  "

## 2017-06-22 NOTE — PROGRESS NOTES
"Ms. Salgado is a patient of Dr. Davis and was last seen in [facility] Cardiology Visit 12/22/16.    Subjective:   Patient ID:  Michael Salgado is a 71 y.o. female who presents for follow-up of Edema    HPI  Ms. Salgado is a 71y.o.  female with a 2 week h/o SOB.  Her daughter is with her. She was started on levoquin and lasix on 6/14/17.  She has a h/o coronary artery disease with MI, stents and CABGx3 in 2015 at [facility], HFpEF (EF 60%), CKD stage 4, DM type II, CVA, IDDM, hypertension, and right hemiparesis secondary to CVA.  Patient denies chest pain with exertion or at rest, palpitations, dizziness, syncope, PND, or claudication.  Reports having one dose left of levaquin. She states that she feels a little better since starting the abx therapy but still feels "lousy and short of breath."  She was unable to stand for her weight today in clinic.  It is difficult to determine if she has gained weight to help in determining her fluid status.  She reports orthopnea.  Denies any underlying lung disease and states that she was placed on oxygen following her CABG in 2015 and has been on it ever since.  Last echo was in 4/2016 which revealed diastolic dysfunction, pulmonary edema, eccentric remodeling, and a preserved EF of 60%.      Review of Systems   Constitution: Negative for decreased appetite, diaphoresis, weakness, malaise/fatigue, weight gain and weight loss.   HENT: Negative for headaches.    Eyes: Negative for visual disturbance.   Cardiovascular: Positive for dyspnea on exertion, leg swelling and orthopnea. Negative for chest pain, claudication, irregular heartbeat, near-syncope, palpitations, paroxysmal nocturnal dyspnea and syncope.        Reports chest pressure   Respiratory: Positive for shortness of breath and sleep disturbances due to breathing. Negative for cough, hemoptysis and snoring.    Endocrine: Negative for cold intolerance and heat intolerance.   Hematologic/Lymphatic: Negative for " bleeding problem. Does not bruise/bleed easily.   Musculoskeletal: Negative for myalgias.   Gastrointestinal: Negative for bloating, abdominal pain, anorexia, change in bowel habit, constipation, diarrhea, nausea and vomiting.   Neurological: Negative for difficulty with concentration, disturbances in coordination, excessive daytime sleepiness, dizziness, light-headedness, loss of balance and numbness.   Psychiatric/Behavioral: The patient does not have insomnia.      Allergies and current medications updated and reviewed:  Review of patient's allergies indicates:   Allergen Reactions    Daypro [oxaprozin]      Tolerates other NSAIDs    Pcn [penicillins] Swelling     Patient states she is not allergic to Penicillin    Vibramycin [doxycycline calcium]      Current Outpatient Prescriptions   Medication Sig    amlodipine (NORVASC) 5 MG tablet Take 5 mg by mouth once daily.    aspirin (ECOTRIN) 81 MG EC tablet Take 81 mg by mouth once daily.    atorvastatin (LIPITOR) 40 MG tablet Take 1 tablet (40 mg total) by mouth once daily.    azelastine (ASTELIN) 137 mcg (0.1 %) nasal spray 1 spray (137 mcg total) by Nasal route 2 (two) times daily.    blood sugar diagnostic (TRUETEST TEST STRIPS) Strp Twice daily blood sugar check.    buPROPion (WELLBUTRIN XL) 150 MG TB24 tablet Take 1 tablet (150 mg total) by mouth once daily.    butalbital-acetaminophen-caffeine -40 mg (FIORICET, ESGIC) -40 mg per tablet Take 1 tablet by mouth every 8 (eight) hours as needed for Headaches.    carvedilol (COREG) 25 MG tablet Take 25 mg by mouth 2 (two) times daily with meals.    dextran 70-hypromellose (TEARS) ophthalmic solution One drop into affected eye 4-6 times daily as needed    diazePAM (VALIUM) 5 MG tablet Take 1 tablet (5 mg total) by mouth every 8 (eight) hours as needed.    diclofenac sodium 1 % Gel Apply topically 2 (two) times daily.    escitalopram oxalate (LEXAPRO) 10 MG tablet Take 1 tablet (10 mg total)  "by mouth once daily. (Patient taking differently: Take 5 mg by mouth once daily. )    estradiol (ESTRACE) 0.01 % (0.1 mg/gram) vaginal cream Place 1 g vaginally twice a week.    fluticasone (FLONASE) 50 mcg/actuation nasal spray 1 spray by Each Nare route once daily.    furosemide (LASIX) 20 MG tablet TAKE 1/2 TABLET(10 MG) BY MOUTH TWICE DAILY (Patient taking differently: TAKE 1 TABLET BY MOUTH TWICE DAILY)    gabapentin (NEURONTIN) 100 MG capsule TAKE 1 TO 2 CAPSULES BY MOUTH EVERY MORNING AND AFTERNOON ALONG WITH 300MG AT NIGHT    gabapentin (NEURONTIN) 300 MG capsule Take 1 capsule (300 mg total) by mouth once daily.    hydrALAZINE (APRESOLINE) 25 MG tablet TAKE 1 TABLET(25 MG) BY MOUTH BID    insulin glargine (LANTUS) 100 unit/mL injection Inject 10 Units into the skin every evening.    insulin lispro (HUMALOG) 100 unit/mL injection Inject 5 Units into the skin 3 (three) times daily before meals.    levalbuterol (XOPENEX) 1.25 mg/3 mL nebulizer solution USE 1 VIAL VIA NEBULIZER EVERY 8 HOURS AS NEEDED FOR WHEEZING OR SHORTNESS OF BREATH    levoFLOXacin (LEVAQUIN) 250 MG tablet Take 1 tablet (250 mg total) by mouth once daily.    omega-3 acid ethyl esters (LOVAZA) 1 gram capsule Take 2 capsules (2 g total) by mouth 2 (two) times daily.    OXYGEN-AIR DELIVERY SYSTEMS MISC by Misc.(Non-Drug; Combo Route) route. Family states that patient is on 2.5 liters nasal cannula at home    pantoprazole (PROTONIX) 40 MG tablet TAKE 1 TABLET BY MOUTH EVERY DAY    pen needle, diabetic 30 gauge x 5/16" Ndle Use 4 disposable needles per day. Inject medication into skin daily    polyethylene glycol (GLYCOLAX) 17 gram PwPk Take 17 g by mouth once daily.    prasugrel (EFFIENT) 10 mg Tab Take 1 tablet (10 mg total) by mouth once daily.    quetiapine (SEROQUEL) 100 MG Tab Take 100 mg by mouth once daily.    underpads 23 X 36 " Pads Twice daily change as needed for incontinence.     No current facility-administered " "medications for this visit.      Objective:     Left Arm BP - Sittin/63 (17 1325)    /63 (BP Location: Left arm, Patient Position: Sitting, BP Method: Automatic)   Pulse 103   Ht 5' 4.5" (1.638 m)   LMP  (LMP Unknown)     Physical Exam   Constitutional: She is oriented to person, place, and time. Vital signs are normal. She appears well-developed and well-nourished. She appears lethargic. She is active and cooperative. She has a sickly appearance. She appears ill. No distress.   Exam performed in the wheel chair because patient too weak to stand and get onto the exam table.    HENT:   Head: Normocephalic and atraumatic.   Eyes: Conjunctivae and lids are normal. No scleral icterus.   Neck: Neck supple. Normal carotid pulses, no hepatojugular reflux and no JVD present. Carotid bruit is not present.   Cardiovascular: Normal rate, S1 normal, S2 normal and intact distal pulses.  An irregularly irregular rhythm present. PMI is not displaced.  Exam reveals no gallop and no friction rub.    Murmur heard.   Harsh midsystolic murmur is present with a grade of 1/6  at the upper right sternal border radiating to the neck  Pulses:       Carotid pulses are 2+ on the right side, and 2+ on the left side.       Radial pulses are 2+ on the right side, and 2+ on the left side.        Dorsalis pedis pulses are 0 on the right side, and 1+ on the left side.        Posterior tibial pulses are 0 on the right side, and 0 on the left side.   Pulmonary/Chest: Effort normal. No respiratory distress. She has decreased breath sounds in the right lower field and the left lower field. She has no wheezes. She has no rhonchi. She has no rales. She exhibits no tenderness.   Breathless with talking   Abdominal: Soft. Normal appearance and bowel sounds are normal. She exhibits no distension, no fluid wave, no abdominal bruit, no ascites and no pulsatile midline mass. There is no hepatosplenomegaly. There is no tenderness. "   Musculoskeletal: She exhibits edema (R>L +3 and +2 respectively ). She exhibits no tenderness.   Neurological: She is oriented to person, place, and time. She appears lethargic. She exhibits abnormal muscle tone (RUE and LLE flaccid, hemiparesis is residual of previous CVA). Gait normal.   Skin: Skin is warm, dry and intact. No rash noted. She is not diaphoretic. There is pallor. Nails show no clubbing.   Psychiatric: She has a normal mood and affect. Her speech is normal and behavior is normal. Judgment and thought content normal. Cognition and memory are normal.   Nursing note and vitals reviewed.      Chemistry        Component Value Date/Time     06/15/2017 1210    K 3.8 06/15/2017 1210     (H) 06/15/2017 1210    CO2 21 (L) 06/15/2017 1210    BUN 69 (H) 06/15/2017 1210    CREATININE 3.2 (H) 06/15/2017 1210     (H) 06/15/2017 1210        Component Value Date/Time    CALCIUM 8.2 (L) 06/15/2017 1210    ALKPHOS 62 06/15/2017 1210    AST 10 06/15/2017 1210    ALT 11 06/15/2017 1210    BILITOT 0.2 06/15/2017 1210        Lab Results   Component Value Date    HGBA1C 6.1 02/23/2017       Recent Labs  Lab 09/16/16  1112 01/26/17  1248 02/09/17  1131 02/23/17  1609  06/15/17  1210   WHITE BLOOD CELL COUNT 4.12  --  7.93 3.28 L  < > 9.03   HEMOGLOBIN 9.2 L  --  10.1 L 9.3 L  < > 8.1 L   HEMATOCRIT 29.3 L  --  31.7 L 30.7 L  < > 26.3 L   MCV 82  --  85 87  < > 90   PLATELETS 188  --  255 181  < > 314   BNP  --  976 H 870 H  --   --  1,698 H   TSH 5.427 H  --   --   --   --   --    CHOLESTEROL 156  --   --  191  --   --    HDL 24 L  --   --  25 L  --   --    LDL CHOLESTEROL 95.4  --   --  130.6  --   --    TRIGLYCERIDES 183 H  --   --  177 H  --   --    HDL/CHOLESTEROL RATIO 15.4 L  --   --  13.1 L  --   --    < > = values in this interval not displayed.      Recent Labs  Lab 04/25/16  1457 05/18/16  2215 02/09/17  1131 05/07/17  1032   INR 1.0 1.0 1.0 1.1      Test(s) Reviewed  I have reviewed the  following in detail:  [] Stress test   [] Angiography   [x] Echocardiogram   [x] Labs   [] Other:       Assessment:     1. Chronic diastolic congestive heart failure  BNP and CRT elevated on 6/15/17; Lower extremity edema; SOB at rest without hypoxia (POX 97% on 2.5L NC).    2. Chronic atrial fibrillation  Not anticoagulated.  Taking a platelet aggregator, prasugrel. Rate controlled on carvediolol.    3. Coronary artery disease involving autologous artery coronary bypass graft without angina pectoris  Not taking ASA. Taking high intensity statin therapy. Asymptomatic   4. Essential hypertension  Well controlled.    5. Chronic kidney disease (CKD) stage G4/A1, severely decreased glomerular filtration rate (GFR) between 15-29 mL/min/1.73 square meter and albuminuria creatinine ratio less than 30 mg/g  Worsening. Consider cardiorenal syndrome: acute HF leads to worsening kidney function (type 1 CRS).  In HF, an elevated BUN/Cr should not deter decongestive or diuretic therapies if evidence of clinical congestion is present. A reduction in renal perfusion may be induced by acute decompensated HF prior to treatment.Patients can be on the flat part of the Barber-Starling curve in which changes in left ventricular end-diastolic pressure have little or no effect on cardiac performance, while others have an increase in GFR following diuretic therapy (uptodate)   6. Chronic anticoagulation  Taking platelet aggregator but not anticoagulation   7. Pure hypercholesterolemia  LDL not at goal on Atorvastatin 40mg. Questionable compliance with therapy   8. Pulmonary HTN     9. S/P CABG x 2     10. S/P coronary artery stent placement        Plan:     Chronic diastolic congestive heart failure  -     Brain natriuretic peptide; Future; Expected date: 06/22/2017  -     2D Echo w/ Color Flow Doppler; Future    Chronic atrial fibrillation  Comments:  Stop prasugrel. Start coumadin. Referred to coumadin clinic for management  Orders:  -      warfarin (COUMADIN) 5 MG tablet; Take 1 tablet (5 mg total) by mouth Daily.  Dispense: 30 tablet; Refill: 11  -     Ambulatory Referral to Anticoagulation Monitoring    Coronary artery disease involving autologous artery coronary bypass graft without angina pectoris  Comments:  Continue atorvastatin 40mg    Essential hypertension  Comments:  Continue current regimen    Chronic kidney disease (CKD) stage G4/A1, severely decreased glomerular filtration rate (GFR) between 15-29 mL/min/1.73 square meter and albuminuria creatinine ratio less than 30 mg/g  Comments:  Renal function worsened after her lasix was increased.   Orders:  -     Basic metabolic panel; Future; Expected date: 06/22/2017    Chronic anticoagulation  -     Ambulatory Referral to Anticoagulation Monitoring    Pure hypercholesterolemia  Comments:  Questionable compliance. Discussed importance of statin therapy    Pulmonary HTN  -     2D Echo w/ Color Flow Doppler; Future    S/P CABG x 2    S/P coronary artery stent placement    Suspected deep vein thrombosis (DVT)  Comments:  Edema in RLE much more significant than left. Ultrasound of RLE to evaluate whether she has a DVT  Orders:  -     CAR Ultrasound doppler venous leg right; Future; Expected date: 06/22/2017    Acute on chronic diastolic CHF (congestive heart failure), NYHA class 4  Comments:  Recommend admission. Pt refused admission. Echo and repeat BNP ordered      Return in about 2 weeks (around 7/6/2017).

## 2017-06-22 NOTE — TELEPHONE ENCOUNTER
Spoke with pharmacy tech at [facility] and informed her that Effient was stopped today and pt started on warfarin - she verbalized understanding.

## 2017-06-23 ENCOUNTER — HOSPITAL ENCOUNTER (OUTPATIENT)
Dept: CARDIOLOGY | Facility: CLINIC | Age: 72
Discharge: HOME | End: 2017-06-23
Payer: MEDICARE

## 2017-06-23 DIAGNOSIS — I50.32 CHRONIC DIASTOLIC CONGESTIVE HEART FAILURE: Primary | ICD-10-CM

## 2017-06-23 DIAGNOSIS — I27.20 PULMONARY HTN: ICD-10-CM

## 2017-06-23 DIAGNOSIS — I50.33 ACUTE ON CHRONIC DIASTOLIC CHF (CONGESTIVE HEART FAILURE), NYHA CLASS 4: ICD-10-CM

## 2017-06-23 DIAGNOSIS — N18.30 CHRONIC RENAL INSUFFICIENCY, STAGE 3 (MODERATE): ICD-10-CM

## 2017-06-23 LAB
ESTIMATED PA SYSTOLIC PRESSURE: 40.95
GLOBAL PERICARDIAL EFFUSION: ABNORMAL
RETIRED EF AND QEF - SEE NOTES: 40 (ref 55–65)
TRICUSPID VALVE REGURGITATION: ABNORMAL

## 2017-06-23 PROCEDURE — 93306 TTE W/DOPPLER COMPLETE: CPT | Mod: S$GLB,,, | Performed by: INTERNAL MEDICINE

## 2017-06-24 ENCOUNTER — TELEPHONE (OUTPATIENT)
Dept: CARDIOLOGY | Facility: CLINIC | Age: 72
End: 2017-06-24

## 2017-06-24 DIAGNOSIS — I50.33 ACUTE ON CHRONIC DIASTOLIC CHF (CONGESTIVE HEART FAILURE), NYHA CLASS 4: Primary | ICD-10-CM

## 2017-06-24 RX ORDER — FUROSEMIDE 20 MG/1
20 TABLET ORAL 2 TIMES DAILY
Qty: 60 TABLET | Refills: 11 | Status: ON HOLD | OUTPATIENT
Start: 2017-06-24 | End: 2017-07-29 | Stop reason: HOSPADM

## 2017-06-24 NOTE — TELEPHONE ENCOUNTER
Renal function improved with diuresis likely cardiorenal syndrome. Discussed results with Dr. Frias and contacted patient.  Instructed patient's care taker, Yesenia her niece, to increase her lasix to a whole pill two times a day for a dose of 20mg twice a day.  Asked her to have Ms. Salgado come into have her blood drawn in one week. Reinforced daily weights and to call if she does not start losing weight and have increased urine output.

## 2017-06-25 DIAGNOSIS — R93.89 NEW ABNORMALITY ON CHEST X-RAY: ICD-10-CM

## 2017-06-25 DIAGNOSIS — R91.1 ABNORMAL X-RAY OF LUNGS WITH SINGLE PULMONARY NODULE: ICD-10-CM

## 2017-06-25 RX ORDER — ESCITALOPRAM OXALATE 10 MG/1
TABLET ORAL
Qty: 90 TABLET | Refills: 0 | Status: ON HOLD | OUTPATIENT
Start: 2017-06-25 | End: 2017-07-14

## 2017-06-26 RX ORDER — LEVOFLOXACIN 250 MG/1
TABLET ORAL
Qty: 10 TABLET | Refills: 0 | OUTPATIENT
Start: 2017-06-26

## 2017-06-27 ENCOUNTER — ANTI-COAG VISIT (OUTPATIENT)
Dept: CARDIOLOGY | Facility: CLINIC | Age: 72
End: 2017-06-27

## 2017-06-27 DIAGNOSIS — Z79.01 LONG TERM (CURRENT) USE OF ANTICOAGULANTS: ICD-10-CM

## 2017-06-27 DIAGNOSIS — I48.20 CHRONIC ATRIAL FIBRILLATION: ICD-10-CM

## 2017-06-30 ENCOUNTER — ANTI-COAG VISIT (OUTPATIENT)
Dept: CARDIOLOGY | Facility: CLINIC | Age: 72
End: 2017-06-30

## 2017-06-30 DIAGNOSIS — I48.20 CHRONIC ATRIAL FIBRILLATION: ICD-10-CM

## 2017-06-30 DIAGNOSIS — Z79.01 LONG TERM (CURRENT) USE OF ANTICOAGULANTS: ICD-10-CM

## 2017-06-30 NOTE — PROGRESS NOTES
The pt's daughter wanted to re-schedule her labs from 7/5 to 7/13, due to an out-of-town trip. Instead, she re-scheduled to 7/3.

## 2017-07-03 ENCOUNTER — ANTI-COAG VISIT (OUTPATIENT)
Dept: CARDIOLOGY | Facility: CLINIC | Age: 72
End: 2017-07-03

## 2017-07-03 DIAGNOSIS — Z79.01 LONG TERM (CURRENT) USE OF ANTICOAGULANTS: ICD-10-CM

## 2017-07-03 DIAGNOSIS — I48.20 CHRONIC ATRIAL FIBRILLATION: ICD-10-CM

## 2017-07-10 ENCOUNTER — TELEPHONE (OUTPATIENT)
Dept: FAMILY MEDICINE | Facility: CLINIC | Age: 72
End: 2017-07-10

## 2017-07-10 NOTE — TELEPHONE ENCOUNTER
----- Message from Guerline Holt sent at 7/10/2017 10:17 AM CDT -----  Contact: niece - Yesenia   Requesting for something to be called in for wheezing . She had pneumonia a few weeks ago . She has had two breathing treatments today .        [phone]      LL

## 2017-07-10 NOTE — TELEPHONE ENCOUNTER
Spoke with Yesenia and explained that pcp is out and that she would need to be seen for an xray to make sure that is what is wrong. Also advised walk in.

## 2017-07-12 ENCOUNTER — HOSPITAL ENCOUNTER (INPATIENT)
Facility: HOSPITAL | Age: 72
LOS: 17 days | Discharge: HOME HEALTH | DRG: 291 | End: 2017-07-29
Attending: EMERGENCY MEDICINE | Admitting: HOSPITALIST
Payer: MEDICARE

## 2017-07-12 ENCOUNTER — ANTI-COAG VISIT (OUTPATIENT)
Dept: CARDIOLOGY | Facility: CLINIC | Age: 72
End: 2017-07-12
Payer: MEDICARE

## 2017-07-12 ENCOUNTER — OFFICE VISIT (OUTPATIENT)
Dept: CARDIOLOGY | Facility: CLINIC | Age: 72
End: 2017-07-12
Payer: MEDICARE

## 2017-07-12 VITALS
SYSTOLIC BLOOD PRESSURE: 107 MMHG | RESPIRATION RATE: 28 BRPM | DIASTOLIC BLOOD PRESSURE: 54 MMHG | OXYGEN SATURATION: 97 % | HEART RATE: 90 BPM

## 2017-07-12 DIAGNOSIS — Z79.01 LONG TERM (CURRENT) USE OF ANTICOAGULANTS: Primary | ICD-10-CM

## 2017-07-12 DIAGNOSIS — J90 PLEURAL EFFUSION: ICD-10-CM

## 2017-07-12 DIAGNOSIS — I50.23 ACUTE ON CHRONIC SYSTOLIC CONGESTIVE HEART FAILURE: ICD-10-CM

## 2017-07-12 DIAGNOSIS — I27.20 PULMONARY HTN: ICD-10-CM

## 2017-07-12 DIAGNOSIS — J81.0 ACUTE PULMONARY EDEMA: ICD-10-CM

## 2017-07-12 DIAGNOSIS — R06.02 SOB (SHORTNESS OF BREATH): ICD-10-CM

## 2017-07-12 DIAGNOSIS — Z86.73 H/O: CVA (CEREBROVASCULAR ACCIDENT): ICD-10-CM

## 2017-07-12 DIAGNOSIS — N18.4 CHRONIC KIDNEY DISEASE (CKD) STAGE G4/A1, SEVERELY DECREASED GLOMERULAR FILTRATION RATE (GFR) BETWEEN 15-29 ML/MIN/1.73 SQUARE METER AND ALBUMINURIA CREATININE RATIO LESS THAN 30 MG/G: ICD-10-CM

## 2017-07-12 DIAGNOSIS — I50.9 CHF (CONGESTIVE HEART FAILURE): ICD-10-CM

## 2017-07-12 DIAGNOSIS — I50.9 ACUTE EXACERBATION OF CHF (CONGESTIVE HEART FAILURE): ICD-10-CM

## 2017-07-12 DIAGNOSIS — I10 ESSENTIAL HYPERTENSION: ICD-10-CM

## 2017-07-12 DIAGNOSIS — I50.33 ACUTE ON CHRONIC DIASTOLIC CONGESTIVE HEART FAILURE: Primary | ICD-10-CM

## 2017-07-12 DIAGNOSIS — I48.20 CHRONIC ATRIAL FIBRILLATION WITH RVR: Primary | ICD-10-CM

## 2017-07-12 DIAGNOSIS — R07.9 CHEST PAIN: ICD-10-CM

## 2017-07-12 DIAGNOSIS — I25.810 CORONARY ARTERY DISEASE INVOLVING AUTOLOGOUS ARTERY CORONARY BYPASS GRAFT WITHOUT ANGINA PECTORIS: ICD-10-CM

## 2017-07-12 DIAGNOSIS — Z79.01 CHRONIC ANTICOAGULATION: ICD-10-CM

## 2017-07-12 DIAGNOSIS — R80.1 PERSISTENT PROTEINURIA: ICD-10-CM

## 2017-07-12 DIAGNOSIS — I48.20 CHRONIC ATRIAL FIBRILLATION: ICD-10-CM

## 2017-07-12 DIAGNOSIS — E78.5 HYPERLIPIDEMIA, UNSPECIFIED HYPERLIPIDEMIA TYPE: ICD-10-CM

## 2017-07-12 DIAGNOSIS — J96.21 ACUTE ON CHRONIC RESPIRATORY FAILURE WITH HYPOXIA: ICD-10-CM

## 2017-07-12 DIAGNOSIS — I25.2 MI, OLD: ICD-10-CM

## 2017-07-12 DIAGNOSIS — I50.33 ACUTE ON CHRONIC DIASTOLIC CONGESTIVE HEART FAILURE: ICD-10-CM

## 2017-07-12 DIAGNOSIS — E78.00 PURE HYPERCHOLESTEROLEMIA: ICD-10-CM

## 2017-07-12 LAB
ALBUMIN SERPL BCP-MCNC: 2 G/DL
ALP SERPL-CCNC: 61 U/L
ALT SERPL W/O P-5'-P-CCNC: 10 U/L
ANION GAP SERPL CALC-SCNC: 10 MMOL/L
AST SERPL-CCNC: 21 U/L
BASOPHILS # BLD AUTO: 0.04 K/UL
BASOPHILS NFR BLD: 0.8 %
BILIRUB SERPL-MCNC: 0.3 MG/DL
BNP SERPL-MCNC: 1413 PG/ML
BUN SERPL-MCNC: 44 MG/DL
CALCIUM SERPL-MCNC: 8.1 MG/DL
CHLORIDE SERPL-SCNC: 109 MMOL/L
CO2 SERPL-SCNC: 22 MMOL/L
CREAT SERPL-MCNC: 2.5 MG/DL
DIFFERENTIAL METHOD: ABNORMAL
EOSINOPHIL # BLD AUTO: 0.1 K/UL
EOSINOPHIL NFR BLD: 1.4 %
ERYTHROCYTE [DISTWIDTH] IN BLOOD BY AUTOMATED COUNT: 16.4 %
EST. GFR  (AFRICAN AMERICAN): 21.3 ML/MIN/1.73 M^2
EST. GFR  (NON AFRICAN AMERICAN): 18.5 ML/MIN/1.73 M^2
GLUCOSE SERPL-MCNC: 178 MG/DL
HCT VFR BLD AUTO: 31.8 %
HGB BLD-MCNC: 9.9 G/DL
INR PPP: 4.4
INR PPP: 4.8 (ref 2–3)
LYMPHOCYTES # BLD AUTO: 1.1 K/UL
LYMPHOCYTES NFR BLD: 20.9 %
MCH RBC QN AUTO: 27.8 PG
MCHC RBC AUTO-ENTMCNC: 31.1 %
MCV RBC AUTO: 89 FL
MONOCYTES # BLD AUTO: 0.5 K/UL
MONOCYTES NFR BLD: 10.1 %
NEUTROPHILS # BLD AUTO: 3.4 K/UL
NEUTROPHILS NFR BLD: 66.4 %
PLATELET # BLD AUTO: 251 K/UL
PMV BLD AUTO: 9.3 FL
POTASSIUM SERPL-SCNC: 4.6 MMOL/L
PROT SERPL-MCNC: 6 G/DL
PROTHROMBIN TIME: 44.5 SEC
RBC # BLD AUTO: 3.56 M/UL
SODIUM SERPL-SCNC: 141 MMOL/L
TROPONIN I SERPL DL<=0.01 NG/ML-MCNC: 0.04 NG/ML
WBC # BLD AUTO: 5.17 K/UL

## 2017-07-12 PROCEDURE — 11000001 HC ACUTE MED/SURG PRIVATE ROOM

## 2017-07-12 PROCEDURE — 63600175 PHARM REV CODE 636 W HCPCS: Performed by: STUDENT IN AN ORGANIZED HEALTH CARE EDUCATION/TRAINING PROGRAM

## 2017-07-12 PROCEDURE — 1159F MED LIST DOCD IN RCRD: CPT | Mod: S$GLB,,, | Performed by: NURSE PRACTITIONER

## 2017-07-12 PROCEDURE — 93005 ELECTROCARDIOGRAM TRACING: CPT

## 2017-07-12 PROCEDURE — 99499 UNLISTED E&M SERVICE: CPT | Mod: S$GLB,,, | Performed by: NURSE PRACTITIONER

## 2017-07-12 PROCEDURE — 99999 PR PBB SHADOW E&M-EST. PATIENT-LVL III: CPT | Mod: PBBFAC,,, | Performed by: NURSE PRACTITIONER

## 2017-07-12 PROCEDURE — 99285 EMERGENCY DEPT VISIT HI MDM: CPT | Mod: 25

## 2017-07-12 PROCEDURE — 99285 EMERGENCY DEPT VISIT HI MDM: CPT | Mod: ,,, | Performed by: EMERGENCY MEDICINE

## 2017-07-12 PROCEDURE — 99215 OFFICE O/P EST HI 40 MIN: CPT | Mod: S$GLB,,, | Performed by: NURSE PRACTITIONER

## 2017-07-12 PROCEDURE — 93010 ELECTROCARDIOGRAM REPORT: CPT | Mod: ,,, | Performed by: INTERNAL MEDICINE

## 2017-07-12 PROCEDURE — 99211 OFF/OP EST MAY X REQ PHY/QHP: CPT | Mod: 25,S$GLB,, | Performed by: PHARMACIST

## 2017-07-12 PROCEDURE — 84484 ASSAY OF TROPONIN QUANT: CPT

## 2017-07-12 PROCEDURE — 96374 THER/PROPH/DIAG INJ IV PUSH: CPT

## 2017-07-12 PROCEDURE — 85610 PROTHROMBIN TIME: CPT

## 2017-07-12 PROCEDURE — 80053 COMPREHEN METABOLIC PANEL: CPT

## 2017-07-12 PROCEDURE — 85025 COMPLETE CBC W/AUTO DIFF WBC: CPT

## 2017-07-12 PROCEDURE — 83880 ASSAY OF NATRIURETIC PEPTIDE: CPT

## 2017-07-12 PROCEDURE — 85610 PROTHROMBIN TIME: CPT | Mod: QW,S$GLB,, | Performed by: PHARMACIST

## 2017-07-12 RX ORDER — CARVEDILOL 25 MG/1
25 TABLET ORAL 2 TIMES DAILY WITH MEALS
Status: DISCONTINUED | OUTPATIENT
Start: 2017-07-13 | End: 2017-07-16

## 2017-07-12 RX ORDER — AMLODIPINE BESYLATE 5 MG/1
5 TABLET ORAL DAILY
Status: DISCONTINUED | OUTPATIENT
Start: 2017-07-13 | End: 2017-07-15

## 2017-07-12 RX ORDER — ACETAMINOPHEN 325 MG/1
650 TABLET ORAL EVERY 6 HOURS PRN
Status: DISCONTINUED | OUTPATIENT
Start: 2017-07-13 | End: 2017-07-29 | Stop reason: HOSPADM

## 2017-07-12 RX ORDER — QUETIAPINE FUMARATE 100 MG/1
100 TABLET, FILM COATED ORAL DAILY
Status: DISCONTINUED | OUTPATIENT
Start: 2017-07-13 | End: 2017-07-14

## 2017-07-12 RX ORDER — SODIUM CHLORIDE 0.9 % (FLUSH) 0.9 %
3 SYRINGE (ML) INJECTION EVERY 8 HOURS
Status: DISCONTINUED | OUTPATIENT
Start: 2017-07-13 | End: 2017-07-29 | Stop reason: HOSPADM

## 2017-07-12 RX ORDER — ESCITALOPRAM OXALATE 5 MG/1
5 TABLET ORAL DAILY
Status: DISCONTINUED | OUTPATIENT
Start: 2017-07-13 | End: 2017-07-26

## 2017-07-12 RX ORDER — ENOXAPARIN SODIUM 100 MG/ML
40 INJECTION SUBCUTANEOUS EVERY 24 HOURS
Status: DISCONTINUED | OUTPATIENT
Start: 2017-07-13 | End: 2017-07-13

## 2017-07-12 RX ORDER — BUTALBITAL, ACETAMINOPHEN AND CAFFEINE 50; 325; 40 MG/1; MG/1; MG/1
1 TABLET ORAL EVERY 8 HOURS PRN
Status: DISCONTINUED | OUTPATIENT
Start: 2017-07-13 | End: 2017-07-23

## 2017-07-12 RX ORDER — POLYETHYLENE GLYCOL 3350 17 G/17G
17 POWDER, FOR SOLUTION ORAL DAILY
Status: DISCONTINUED | OUTPATIENT
Start: 2017-07-13 | End: 2017-07-15

## 2017-07-12 RX ORDER — GABAPENTIN 300 MG/1
300 CAPSULE ORAL NIGHTLY
Status: DISCONTINUED | OUTPATIENT
Start: 2017-07-13 | End: 2017-07-29 | Stop reason: HOSPADM

## 2017-07-12 RX ORDER — ONDANSETRON 8 MG/1
8 TABLET, ORALLY DISINTEGRATING ORAL EVERY 8 HOURS PRN
Status: DISCONTINUED | OUTPATIENT
Start: 2017-07-13 | End: 2017-07-29 | Stop reason: HOSPADM

## 2017-07-12 RX ORDER — FUROSEMIDE 10 MG/ML
40 INJECTION INTRAMUSCULAR; INTRAVENOUS 2 TIMES DAILY
Status: DISCONTINUED | OUTPATIENT
Start: 2017-07-13 | End: 2017-07-14

## 2017-07-12 RX ORDER — ASPIRIN 81 MG/1
81 TABLET ORAL DAILY
Status: DISCONTINUED | OUTPATIENT
Start: 2017-07-13 | End: 2017-07-13

## 2017-07-12 RX ORDER — FUROSEMIDE 10 MG/ML
80 INJECTION INTRAMUSCULAR; INTRAVENOUS
Status: COMPLETED | OUTPATIENT
Start: 2017-07-12 | End: 2017-07-12

## 2017-07-12 RX ORDER — PANTOPRAZOLE SODIUM 40 MG/1
40 TABLET, DELAYED RELEASE ORAL DAILY
Status: DISCONTINUED | OUTPATIENT
Start: 2017-07-13 | End: 2017-07-29 | Stop reason: HOSPADM

## 2017-07-12 RX ORDER — BUPROPION HYDROCHLORIDE 150 MG/1
150 TABLET ORAL DAILY
Status: DISCONTINUED | OUTPATIENT
Start: 2017-07-13 | End: 2017-07-26

## 2017-07-12 RX ORDER — ATORVASTATIN CALCIUM 20 MG/1
40 TABLET, FILM COATED ORAL DAILY
Status: DISCONTINUED | OUTPATIENT
Start: 2017-07-13 | End: 2017-07-29 | Stop reason: HOSPADM

## 2017-07-12 RX ORDER — HYDRALAZINE HYDROCHLORIDE 25 MG/1
25 TABLET, FILM COATED ORAL EVERY 12 HOURS
Status: DISCONTINUED | OUTPATIENT
Start: 2017-07-13 | End: 2017-07-15

## 2017-07-12 RX ADMIN — FUROSEMIDE 80 MG: 10 INJECTION, SOLUTION INTRAVENOUS at 06:07

## 2017-07-12 NOTE — ED NOTES
Appearance:  Pt awake, alert & oriented to person, place & time.   Skin:  Skin warm, dry & intact.  Mucous membranes dry.   Skin turgor normal.  Respiratory:  Respirations shallow and tachypneic.   Neurologic:  Pt moving all extremities without difficulty.  Sensation intact.     Peripheral Vascular:  All peripheral pulses present via doppler..  4+ edema to BLE, 1+ to BUE.    Abdomen:  Abdomen soft.  Non tender.

## 2017-07-12 NOTE — PROGRESS NOTES
Written and verbal instructions given to the patient on the importance of follow-up monitoring, compliance issues, dietary restrictions, and potential for adverse drug reactions and interactions. All questions were answered to his/her satisfaction and understanding.  The pt reports that she likes greens and would like to continue eating them regularly.  She denied any alcohol intake, no changes to diet, no recent extra doses of warfarin and no new medications.  She does report having swelling in her arm and feet.  As her INR is elevated, I have recommended to hold her coumadin x 2 days and decrease her weekly dose.  She reports that she does not want to continue her coumadin and would prefer to resume effient.  She has an appt with cardiology later today and will be discussing this with them at that time.  I will check status to see if she will agree to resume her warfarin after holding x 2 days.

## 2017-07-12 NOTE — ED TRIAGE NOTES
"Pt c/o worse than normal SOB x few days.  Daughter states "she filled up with fluid last night".  Pt went to a her routine appt in clinic today and was sent down to ED to have Lasix infusion.  Denies chest pain.   "

## 2017-07-12 NOTE — ED PROVIDER NOTES
Encounter Date: 7/12/2017    SCRIBE #1 NOTE: I, Lyndsey Buck, am scribing for, and in the presence of,  Dr. Corey. I have scribed the following portions of the note - the Resident attestation.       History     Chief Complaint   Patient presents with    Shortness of Breath     on home oxygen sent from cards clinic,      HPI   Patient is 70 yo female with history of CVA, diastolic CHF (EF=60%) with NYHA 4, CAD (CABGx3), CKD and A.fib on coumadin who presents with recurrent dyspnea that has gotten worse for the past 4 days. Patient has orthopnea, PND and is wheelchair bound. Patient recently had pneumonia and shingles since then she has not recovered fully and has not used her walker. She now sleeps on a chair due to severe orthopnea. She denies chest pain, cough, fever, chills.   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     Encounter for blood transfusion     Heart attack     History of pleural effusion     HTN (hypertension)     Hyperlipidemia     MI, old     Multiple thyroid nodules     Pulmonary HTN     Right hemiparesis     S/P coronary artery stent placement     S/P percutaneous endoscopic gastrostomy (PEG) tube placement     Screening for colon cancer     normal 2015 -10 yrs    Stroke      Past Surgical History:   Procedure Laterality Date    cardiac bypass      CORONARY ANGIOPLASTY WITH STENT PLACEMENT  7/29/15    E  Dusty    CORONARY ARTERY BYPASS GRAFT  6/20/15    ANGELES Dusty    KNEE SURGERY      OVARY SURGERY      OVARY SURGERY      TONSILLECTOMY      TUBAL LIGATION       Family History   Problem Relation Age of Onset    Diabetes Mother     Hypertension Mother     Hyperlipidemia Mother     Heart disease Sister     Cancer Sister      colon    Heart disease Brother     Diabetes Brother     Hypertension Brother     No Known Problems Daughter     Heart disease Son     Heart disease Sister     Hypertension Daughter      Social History   Substance Use Topics    Smoking status: Never Smoker    Smokeless tobacco: Never Used    Alcohol use No     Review of Systems   Constitutional: Positive for fatigue. Negative for chills, diaphoresis, fever and unexpected weight change.   HENT: Negative for sinus pressure, sore throat, trouble swallowing and voice change.    Eyes: Negative for photophobia and visual disturbance.   Respiratory: Positive for shortness of breath. Negative for cough and chest tightness.    Cardiovascular: Positive for leg swelling. Negative for chest pain and palpitations.   Gastrointestinal: Negative for abdominal distention, abdominal pain, constipation, diarrhea, nausea and vomiting.   Genitourinary: Negative for difficulty urinating, dysuria, flank pain, frequency and urgency.   Musculoskeletal: Negative for myalgias, neck pain and neck stiffness.   Skin: Negative for pallor and rash.   Neurological: Negative for dizziness, tremors, weakness, light-headedness and headaches.       Physical Exam     Initial Vitals [07/12/17 1425]   BP Pulse Resp Temp SpO2   123/65 100 (!) 24 98.3 °F (36.8 °C) 97 %      MAP       84.33         Physical Exam    Constitutional: She appears well-developed and well-nourished.   Patient speaks in phrases with obvious dyspnea   HENT:   Head: Normocephalic and atraumatic.   Eyes: EOM are normal. Pupils are equal, round, and reactive to light.   Neck: Normal range of motion.  Neck supple.   Cardiovascular: Normal rate and regular rhythm. Exam reveals no friction rub.    Murmur heard.  Pulmonary/Chest: No respiratory distress. She has no wheezes. She has rales.   Abdominal: Soft. Bowel sounds are normal. She exhibits no distension. There is tenderness. There is no rebound and no guarding.   Musculoskeletal: Normal range of motion. She exhibits edema. She exhibits no tenderness.   Bilateral LE edema   Neurological: She is alert and oriented to person, place, and time. No cranial nerve deficit.   Skin: Skin is warm. No erythema.         ED Course   Procedures  Labs Reviewed   CBC W/ AUTO DIFFERENTIAL - Abnormal; Notable for the following:        Result Value    RBC 3.56 (*)     Hemoglobin 9.9 (*)     Hematocrit 31.8 (*)     MCHC 31.1 (*)     RDW 16.4 (*)     All other components within normal limits   COMPREHENSIVE METABOLIC PANEL - Abnormal; Notable for the following:     CO2 22 (*)     Glucose 178 (*)     BUN, Bld 44 (*)     Creatinine 2.5 (*)     Calcium 8.1 (*)     Albumin 2.0 (*)     eGFR if  21.3 (*)     eGFR if non  18.5 (*)     All other components within normal limits   TROPONIN I - Abnormal; Notable for the following:     Troponin I 0.044 (*)     All other components within normal limits   B-TYPE NATRIURETIC PEPTIDE - Abnormal; Notable for the following:     BNP 1,413 (*)     All other components within normal limits   PROTIME-INR - Abnormal; Notable for the following:     Prothrombin Time 44.5 (*)     INR 4.4 (*)     All other components within normal limits             Medical Decision Making:   History:   Old Medical Records: I decided to obtain old medical records.  Initial Assessment:   Patient is 72 yo female with history of CVA, diastolic CHF (EF=60%) with NYHA 4, CAD (CABGx3), CKD and A.fib on coumadin who presents with recurrent dyspnea that has gotten worse for the past 4 days. Patient has orthopnea, PND and is wheelchair  bound.  Differential Diagnosis:   CHF exacerbation vs pleural effusion  Clinical Tests:   Lab Tests: Ordered and Reviewed  Radiological Study: Ordered and Reviewed  Medical Tests: Ordered and Reviewed  ED Management:  CBC  CMP  Troponin   Protime-INR  Chest xray  BNP  Gave 80 mg IV lasix  Patient will be admitted to medicine for further treatment for CHF exacerbation.             Scribe Attestation:   Scribe #1: I performed the above scribed service and the documentation accurately describes the services I performed. I attest to the accuracy of the note.    Attending Attestation:   Physician Attestation Statement for Resident:  As the supervising MD   Physician Attestation Statement: I have personally seen and examined this patient.   I agree with the above history. -: 71 y.o. female who presents with SOB, sent from Cardiology clinic for diuresis.  She speaks 1-2 words before needing to breath.  She will need to be admitted.  Discussed with Medicine.   As the supervising MD I agree with the above PE.    As the supervising MD I agree with the above treatment, course, plan, and disposition.          Physician Attestation for Scribe:  Physician Attestation Statement for Scribe #1: I, Dr. Corey, reviewed documentation, as scribed by Lyndsey Buck in my presence, and it is both accurate and complete.                 ED Course     Clinical Impression:   The primary encounter diagnosis was Acute on chronic diastolic congestive heart failure. Diagnoses of SOB (shortness of breath), Acute exacerbation of CHF (congestive heart failure), Chest pain, and Chronic atrial fibrillation were also pertinent to this visit.                           Shahid Corey MD  07/15/17 0012

## 2017-07-13 PROBLEM — F32.A DEPRESSION: Status: ACTIVE | Noted: 2017-07-13

## 2017-07-13 PROBLEM — R30.0 DYSURIA: Status: ACTIVE | Noted: 2017-07-13

## 2017-07-13 LAB
ABO + RH BLD: NORMAL
ALBUMIN SERPL BCP-MCNC: 1.7 G/DL
ALLENS TEST: NORMAL
ALP SERPL-CCNC: 54 U/L
ALT SERPL W/O P-5'-P-CCNC: 7 U/L
ANION GAP SERPL CALC-SCNC: 8 MMOL/L
AST SERPL-CCNC: 12 U/L
BASOPHILS # BLD AUTO: 0.01 K/UL
BASOPHILS # BLD AUTO: 0.02 K/UL
BASOPHILS # BLD AUTO: 0.02 K/UL
BASOPHILS NFR BLD: 0.3 %
BASOPHILS NFR BLD: 0.6 %
BASOPHILS NFR BLD: 0.7 %
BILIRUB SERPL-MCNC: 0.3 MG/DL
BLD GP AB SCN CELLS X3 SERPL QL: NORMAL
BUN SERPL-MCNC: 44 MG/DL
CALCIUM SERPL-MCNC: 7.8 MG/DL
CHLORIDE SERPL-SCNC: 111 MMOL/L
CO2 SERPL-SCNC: 25 MMOL/L
CREAT SERPL-MCNC: 2.4 MG/DL
DIFFERENTIAL METHOD: ABNORMAL
EOSINOPHIL # BLD AUTO: 0.1 K/UL
EOSINOPHIL NFR BLD: 1.7 %
EOSINOPHIL NFR BLD: 1.9 %
EOSINOPHIL NFR BLD: 1.9 %
ERYTHROCYTE [DISTWIDTH] IN BLOOD BY AUTOMATED COUNT: 16.2 %
ERYTHROCYTE [DISTWIDTH] IN BLOOD BY AUTOMATED COUNT: 16.3 %
ERYTHROCYTE [DISTWIDTH] IN BLOOD BY AUTOMATED COUNT: 16.4 %
EST. GFR  (AFRICAN AMERICAN): 22.4 ML/MIN/1.73 M^2
EST. GFR  (NON AFRICAN AMERICAN): 19.4 ML/MIN/1.73 M^2
ESTIMATED AVG GLUCOSE: 120 MG/DL
GLUCOSE SERPL-MCNC: 151 MG/DL
HAPTOGLOB SERPL-MCNC: 241 MG/DL
HAPTOGLOB SERPL-MCNC: 244 MG/DL
HBA1C MFR BLD HPLC: 5.8 %
HCO3 UR-SCNC: 25.6 MMOL/L (ref 24–28)
HCT VFR BLD AUTO: 25.9 %
HCT VFR BLD AUTO: 26.7 %
HCT VFR BLD AUTO: 27.2 %
HGB BLD-MCNC: 7.9 G/DL
HGB BLD-MCNC: 8.1 G/DL
HGB BLD-MCNC: 8.1 G/DL
INR PPP: 4.3
LACTATE SERPL-SCNC: 1.4 MMOL/L
LDH SERPL L TO P-CCNC: 168 U/L
LYMPHOCYTES # BLD AUTO: 0.7 K/UL
LYMPHOCYTES # BLD AUTO: 0.8 K/UL
LYMPHOCYTES # BLD AUTO: 1 K/UL
LYMPHOCYTES NFR BLD: 22.6 %
LYMPHOCYTES NFR BLD: 24.6 %
LYMPHOCYTES NFR BLD: 26 %
MAGNESIUM SERPL-MCNC: 1.4 MG/DL
MCH RBC QN AUTO: 27 PG
MCH RBC QN AUTO: 27.2 PG
MCH RBC QN AUTO: 27.3 PG
MCHC RBC AUTO-ENTMCNC: 29.8 %
MCHC RBC AUTO-ENTMCNC: 30.3 %
MCHC RBC AUTO-ENTMCNC: 30.5 %
MCV RBC AUTO: 90 FL
MCV RBC AUTO: 90 FL
MCV RBC AUTO: 91 FL
MONOCYTES # BLD AUTO: 0.3 K/UL
MONOCYTES # BLD AUTO: 0.4 K/UL
MONOCYTES # BLD AUTO: 0.4 K/UL
MONOCYTES NFR BLD: 11.6 %
MONOCYTES NFR BLD: 7.9 %
MONOCYTES NFR BLD: 9.5 %
NEUTROPHILS # BLD AUTO: 1.9 K/UL
NEUTROPHILS # BLD AUTO: 2.1 K/UL
NEUTROPHILS # BLD AUTO: 2.3 K/UL
NEUTROPHILS NFR BLD: 62 %
NEUTROPHILS NFR BLD: 63.1 %
NEUTROPHILS NFR BLD: 64.7 %
PCO2 BLDA: 43.7 MMHG (ref 35–45)
PH SMN: 7.38 [PH] (ref 7.35–7.45)
PHOSPHATE SERPL-MCNC: 4.1 MG/DL
PLATELET # BLD AUTO: 165 K/UL
PLATELET # BLD AUTO: 168 K/UL
PLATELET # BLD AUTO: 173 K/UL
PMV BLD AUTO: 9.3 FL
PMV BLD AUTO: 9.3 FL
PMV BLD AUTO: 9.6 FL
PO2 BLDA: 99 MMHG (ref 80–100)
POC BE: 0 MMOL/L
POC SATURATED O2: 98 % (ref 95–100)
POC TCO2: 27 MMOL/L (ref 23–27)
POCT GLUCOSE: 108 MG/DL (ref 70–110)
POCT GLUCOSE: 146 MG/DL (ref 70–110)
POCT GLUCOSE: 193 MG/DL (ref 70–110)
POCT GLUCOSE: 250 MG/DL (ref 70–110)
POTASSIUM SERPL-SCNC: 3.8 MMOL/L
PROCALCITONIN SERPL IA-MCNC: 0.09 NG/ML
PROT SERPL-MCNC: 4.5 G/DL
PROTHROMBIN TIME: 43.3 SEC
RBC # BLD AUTO: 2.89 M/UL
RBC # BLD AUTO: 2.98 M/UL
RBC # BLD AUTO: 3 M/UL
SAMPLE: NORMAL
SITE: NORMAL
SODIUM SERPL-SCNC: 144 MMOL/L
TROPONIN I SERPL DL<=0.01 NG/ML-MCNC: 0.04 NG/ML
TROPONIN I SERPL DL<=0.01 NG/ML-MCNC: 0.05 NG/ML
WBC # BLD AUTO: 3.01 K/UL
WBC # BLD AUTO: 3.17 K/UL
WBC # BLD AUTO: 3.77 K/UL

## 2017-07-13 PROCEDURE — 99223 1ST HOSP IP/OBS HIGH 75: CPT | Mod: AI,GC,, | Performed by: HOSPITALIST

## 2017-07-13 PROCEDURE — 27000221 HC OXYGEN, UP TO 24 HOURS

## 2017-07-13 PROCEDURE — 25000003 PHARM REV CODE 250: Performed by: STUDENT IN AN ORGANIZED HEALTH CARE EDUCATION/TRAINING PROGRAM

## 2017-07-13 PROCEDURE — 86920 COMPATIBILITY TEST SPIN: CPT

## 2017-07-13 PROCEDURE — 93010 ELECTROCARDIOGRAM REPORT: CPT | Mod: ,,, | Performed by: INTERNAL MEDICINE

## 2017-07-13 PROCEDURE — 82308 ASSAY OF CALCITONIN: CPT

## 2017-07-13 PROCEDURE — 84145 PROCALCITONIN (PCT): CPT

## 2017-07-13 PROCEDURE — 86901 BLOOD TYPING SEROLOGIC RH(D): CPT

## 2017-07-13 PROCEDURE — 36415 COLL VENOUS BLD VENIPUNCTURE: CPT

## 2017-07-13 PROCEDURE — 93010 ELECTROCARDIOGRAM REPORT: CPT | Mod: 77,,, | Performed by: INTERNAL MEDICINE

## 2017-07-13 PROCEDURE — 94761 N-INVAS EAR/PLS OXIMETRY MLT: CPT

## 2017-07-13 PROCEDURE — 82803 BLOOD GASES ANY COMBINATION: CPT

## 2017-07-13 PROCEDURE — 83010 ASSAY OF HAPTOGLOBIN QUANT: CPT | Mod: 91

## 2017-07-13 PROCEDURE — 25000242 PHARM REV CODE 250 ALT 637 W/ HCPCS: Performed by: HOSPITALIST

## 2017-07-13 PROCEDURE — 11000001 HC ACUTE MED/SURG PRIVATE ROOM

## 2017-07-13 PROCEDURE — 84484 ASSAY OF TROPONIN QUANT: CPT

## 2017-07-13 PROCEDURE — 63600175 PHARM REV CODE 636 W HCPCS: Performed by: HOSPITALIST

## 2017-07-13 PROCEDURE — 93005 ELECTROCARDIOGRAM TRACING: CPT

## 2017-07-13 PROCEDURE — 86900 BLOOD TYPING SEROLOGIC ABO: CPT

## 2017-07-13 PROCEDURE — 84100 ASSAY OF PHOSPHORUS: CPT

## 2017-07-13 PROCEDURE — 83735 ASSAY OF MAGNESIUM: CPT

## 2017-07-13 PROCEDURE — 80053 COMPREHEN METABOLIC PANEL: CPT

## 2017-07-13 PROCEDURE — 25000003 PHARM REV CODE 250: Performed by: HOSPITALIST

## 2017-07-13 PROCEDURE — 85025 COMPLETE CBC W/AUTO DIFF WBC: CPT

## 2017-07-13 PROCEDURE — 83010 ASSAY OF HAPTOGLOBIN QUANT: CPT

## 2017-07-13 PROCEDURE — 94640 AIRWAY INHALATION TREATMENT: CPT

## 2017-07-13 PROCEDURE — 83036 HEMOGLOBIN GLYCOSYLATED A1C: CPT

## 2017-07-13 PROCEDURE — 36600 WITHDRAWAL OF ARTERIAL BLOOD: CPT

## 2017-07-13 PROCEDURE — 94760 N-INVAS EAR/PLS OXIMETRY 1: CPT

## 2017-07-13 PROCEDURE — 99900035 HC TECH TIME PER 15 MIN (STAT)

## 2017-07-13 PROCEDURE — 84484 ASSAY OF TROPONIN QUANT: CPT | Mod: 91

## 2017-07-13 PROCEDURE — 63600175 PHARM REV CODE 636 W HCPCS: Performed by: STUDENT IN AN ORGANIZED HEALTH CARE EDUCATION/TRAINING PROGRAM

## 2017-07-13 PROCEDURE — 85610 PROTHROMBIN TIME: CPT

## 2017-07-13 PROCEDURE — 83605 ASSAY OF LACTIC ACID: CPT

## 2017-07-13 PROCEDURE — 83615 LACTATE (LD) (LDH) ENZYME: CPT

## 2017-07-13 RX ORDER — INSULIN ASPART 100 [IU]/ML
1-10 INJECTION, SOLUTION INTRAVENOUS; SUBCUTANEOUS
Status: DISCONTINUED | OUTPATIENT
Start: 2017-07-13 | End: 2017-07-29 | Stop reason: HOSPADM

## 2017-07-13 RX ORDER — GLUCAGON 1 MG
1 KIT INJECTION
Status: DISCONTINUED | OUTPATIENT
Start: 2017-07-13 | End: 2017-07-29 | Stop reason: HOSPADM

## 2017-07-13 RX ORDER — ACETAMINOPHEN 325 MG/1
650 TABLET ORAL
Status: DISCONTINUED | OUTPATIENT
Start: 2017-07-13 | End: 2017-07-25

## 2017-07-13 RX ORDER — HYDROCODONE BITARTRATE AND ACETAMINOPHEN 500; 5 MG/1; MG/1
TABLET ORAL
Status: DISCONTINUED | OUTPATIENT
Start: 2017-07-13 | End: 2017-07-24

## 2017-07-13 RX ORDER — DIPHENHYDRAMINE HCL 25 MG
25 CAPSULE ORAL
Status: DISCONTINUED | OUTPATIENT
Start: 2017-07-13 | End: 2017-07-25

## 2017-07-13 RX ORDER — PHYTONADIONE 5 MG/1
10 TABLET ORAL DAILY
Status: DISCONTINUED | OUTPATIENT
Start: 2017-07-13 | End: 2017-07-14

## 2017-07-13 RX ORDER — IBUPROFEN 200 MG
16 TABLET ORAL
Status: DISCONTINUED | OUTPATIENT
Start: 2017-07-13 | End: 2017-07-29 | Stop reason: HOSPADM

## 2017-07-13 RX ORDER — INSULIN ASPART 100 [IU]/ML
5 INJECTION, SOLUTION INTRAVENOUS; SUBCUTANEOUS
Status: DISCONTINUED | OUTPATIENT
Start: 2017-07-13 | End: 2017-07-13

## 2017-07-13 RX ORDER — INSULIN ASPART 100 [IU]/ML
3 INJECTION, SOLUTION INTRAVENOUS; SUBCUTANEOUS
Status: DISCONTINUED | OUTPATIENT
Start: 2017-07-13 | End: 2017-07-16

## 2017-07-13 RX ORDER — HEPARIN SODIUM 5000 [USP'U]/ML
5000 INJECTION, SOLUTION INTRAVENOUS; SUBCUTANEOUS EVERY 8 HOURS
Status: DISCONTINUED | OUTPATIENT
Start: 2017-07-13 | End: 2017-07-13

## 2017-07-13 RX ORDER — IBUPROFEN 200 MG
24 TABLET ORAL
Status: DISCONTINUED | OUTPATIENT
Start: 2017-07-13 | End: 2017-07-29 | Stop reason: HOSPADM

## 2017-07-13 RX ORDER — ENOXAPARIN SODIUM 100 MG/ML
40 INJECTION SUBCUTANEOUS EVERY 24 HOURS
Status: DISCONTINUED | OUTPATIENT
Start: 2017-07-13 | End: 2017-07-13

## 2017-07-13 RX ORDER — AZITHROMYCIN 250 MG/1
500 TABLET, FILM COATED ORAL DAILY
Status: DISCONTINUED | OUTPATIENT
Start: 2017-07-13 | End: 2017-07-14

## 2017-07-13 RX ORDER — IPRATROPIUM BROMIDE AND ALBUTEROL SULFATE 2.5; .5 MG/3ML; MG/3ML
3 SOLUTION RESPIRATORY (INHALATION) EVERY 6 HOURS
Status: DISCONTINUED | OUTPATIENT
Start: 2017-07-13 | End: 2017-07-15

## 2017-07-13 RX ADMIN — QUETIAPINE FUMARATE 100 MG: 100 TABLET, FILM COATED ORAL at 01:07

## 2017-07-13 RX ADMIN — GABAPENTIN 300 MG: 300 CAPSULE ORAL at 01:07

## 2017-07-13 RX ADMIN — PANTOPRAZOLE SODIUM 40 MG: 40 TABLET, DELAYED RELEASE ORAL at 08:07

## 2017-07-13 RX ADMIN — ASPIRIN 81 MG: 81 TABLET, COATED ORAL at 08:07

## 2017-07-13 RX ADMIN — GABAPENTIN 300 MG: 300 CAPSULE ORAL at 09:07

## 2017-07-13 RX ADMIN — IPRATROPIUM BROMIDE AND ALBUTEROL SULFATE 3 ML: .5; 3 SOLUTION RESPIRATORY (INHALATION) at 12:07

## 2017-07-13 RX ADMIN — CARVEDILOL 25 MG: 25 TABLET, FILM COATED ORAL at 08:07

## 2017-07-13 RX ADMIN — FUROSEMIDE 40 MG: 10 INJECTION, SOLUTION INTRAVENOUS at 08:07

## 2017-07-13 RX ADMIN — QUETIAPINE FUMARATE 100 MG: 100 TABLET, FILM COATED ORAL at 08:07

## 2017-07-13 RX ADMIN — Medication 3 ML: at 03:07

## 2017-07-13 RX ADMIN — AZITHROMYCIN 500 MG: 250 TABLET, FILM COATED ORAL at 04:07

## 2017-07-13 RX ADMIN — IPRATROPIUM BROMIDE AND ALBUTEROL SULFATE 3 ML: .5; 3 SOLUTION RESPIRATORY (INHALATION) at 07:07

## 2017-07-13 RX ADMIN — HYDRALAZINE HYDROCHLORIDE 25 MG: 25 TABLET, FILM COATED ORAL at 01:07

## 2017-07-13 RX ADMIN — ATORVASTATIN CALCIUM 40 MG: 20 TABLET, FILM COATED ORAL at 08:07

## 2017-07-13 RX ADMIN — BUTALBITAL, ACETAMINOPHEN AND CAFFEINE 1 TABLET: 50; 325; 40 TABLET ORAL at 09:07

## 2017-07-13 RX ADMIN — HEPARIN SODIUM 5000 UNITS: 5000 INJECTION, SOLUTION INTRAVENOUS; SUBCUTANEOUS at 05:07

## 2017-07-13 RX ADMIN — INSULIN ASPART 3 UNITS: 100 INJECTION, SOLUTION INTRAVENOUS; SUBCUTANEOUS at 12:07

## 2017-07-13 RX ADMIN — HYDRALAZINE HYDROCHLORIDE 25 MG: 25 TABLET, FILM COATED ORAL at 09:07

## 2017-07-13 RX ADMIN — CEFTRIAXONE 1 G: 1 INJECTION, SOLUTION INTRAVENOUS at 05:07

## 2017-07-13 RX ADMIN — INSULIN ASPART 3 UNITS: 100 INJECTION, SOLUTION INTRAVENOUS; SUBCUTANEOUS at 05:07

## 2017-07-13 RX ADMIN — Medication 3 ML: at 09:07

## 2017-07-13 RX ADMIN — AMLODIPINE BESYLATE 5 MG: 5 TABLET ORAL at 08:07

## 2017-07-13 RX ADMIN — INSULIN DETEMIR 8 UNITS: 100 INJECTION, SOLUTION SUBCUTANEOUS at 09:07

## 2017-07-13 RX ADMIN — POLYETHYLENE GLYCOL 3350 17 G: 17 POWDER, FOR SOLUTION ORAL at 08:07

## 2017-07-13 RX ADMIN — HYDRALAZINE HYDROCHLORIDE 25 MG: 25 TABLET, FILM COATED ORAL at 08:07

## 2017-07-13 RX ADMIN — INSULIN ASPART 2 UNITS: 100 INJECTION, SOLUTION INTRAVENOUS; SUBCUTANEOUS at 05:07

## 2017-07-13 RX ADMIN — FUROSEMIDE 40 MG: 10 INJECTION, SOLUTION INTRAVENOUS at 05:07

## 2017-07-13 RX ADMIN — ESCITALOPRAM 5 MG: 5 TABLET, FILM COATED ORAL at 08:07

## 2017-07-13 RX ADMIN — BUPROPION HYDROCHLORIDE 150 MG: 150 TABLET, FILM COATED, EXTENDED RELEASE ORAL at 08:07

## 2017-07-13 RX ADMIN — Medication 3 ML: at 05:07

## 2017-07-13 RX ADMIN — PHYTONADIONE 10 MG: 5 TABLET ORAL at 05:07

## 2017-07-13 NOTE — PROGRESS NOTES
Pt blood sugar 250. No orders for PRN sliding scale insulin ordered. Dr Sibley and IM 1 paged (2 separate pages). Page back states IM 1 team is in a conference meeting. Will continue to monitor pt.     1237- Lunch tray at bedside. Still no orders for PRN sliding scale insulin. Administered scheduled insulin 3 units (see MAR). Will continue to monitor pt.

## 2017-07-13 NOTE — PROGRESS NOTES
Pt c/o SOB and tightness in chest. Vitals stable (see flowsheet). BP 91/52. Dr Sibley notified. Dr Sibley at bedside. Stat CBC and troponin ordered. Will continue to monitor.

## 2017-07-13 NOTE — PT/OT/SLP PROGRESS
Physical Therapy      Michael CL Salgado  MRN: [id number]    Patient not seen today secondary to declining to work with therapy services. Pt stated she did not want to do anything and was not going to get OOB. PT/OT educated pt on the benefits of OOB activity to improve functional outcomes. Pt continued to decline therapy services  . Will follow-up tomorrow.    Markie Salgado, PT

## 2017-07-13 NOTE — ASSESSMENT & PLAN NOTE
-Cr on admission 2.5, improved from 3.2 ~1 mo ago  -continue to monitor RFTs  -avoid nephrotoxic agents

## 2017-07-13 NOTE — PT/OT/SLP PROGRESS
Occupational Therapy      Michael CL Salgado  MRN: [id number]    Patient not seen today secondary to Patient unwilling to participate. Writing therapist attempted to evaluate pt this PM. Pt refused to participate in evaluation with max encouragement and education on importance of OOB activity. Will follow-up as scheduled.    Kathi pacheco OT  7/13/2017

## 2017-07-13 NOTE — PROGRESS NOTES
Pt refused scheduled mealtime insulin. Pt BS = 146 this AM. Pt states she does not take insulin at home if her BS is under 150 because her sugar drops and it makes her feel sick. IM 1 paged concerning situation. Waiting on response. Will continue to monitor.     0911- Homra with IM 1 notified and states okay to hold insulin and check BS again at lunch. Will continue to monitor.

## 2017-07-13 NOTE — ASSESSMENT & PLAN NOTE
-daughter reports to takes LA insulin 10 units qhs and SA insulin 5 units WM  -A1c 6.1 2/2017  -LD SSI, detemir 10, aspart 5 WM, continue to monitor BG and adjust basal, prandial regimen as needed

## 2017-07-13 NOTE — H&P
[facility] Medicine  History & Physical    Patient Name: Michael Salgado  MRN: [id number]  Admission Date: 7/12/2017  Attending Physician: Kira Ruiz MD   Primary Care Provider: Wesley Watkins MD    [facility] Team: [facility] MED 1 Emelia Rosado MD     Patient information was obtained from patient, relative(s) and ER records.     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 Patient has baseline MILIAN, orthopnea, PND, LE edema, and abdo swelling that has also progressively worsened over the past few days. She is compliant with her home medications which include lasix 20 BID, coreg, hydralazine, amlodipine, and warfarin. She follows a low salt, fluid restrict diet and she has not deviated from it recently. She reports similar symptoms about a year ago that required hospitalization for CHF exacerbation. Patient recently had pneumonia and shingles in May and has not recovered full function since. She previously was able to ambulate with a walker and now uses a wheelchair. Prior to the shingles and PNA, she only required 10 mg lasix, but her lasix was increased afterwords due to increased SOB and volume overload.  She uses home O2 2.5 L. She is complaining of decreased UO, dysuria, and abdominal discomfort. She denies chest pain, palpitations, constipation (last BM today), diarrhea, cough, fever, chills, or any other complaints at this time. She lives at home with her daughter who  provides with with significant assistance with her ADL's. Her cardiologist is Dr. Davis. She was last seen in cardiology clinic today and was sent to the ED from there due to her SOB and edema.     In the ED, CXR showed increased pulmonary edema, BNP 1413 (which is above her baseline), tn 0.044, EKG with a fib with RVR but no acute ischemic changes. Patient was given lasix IV 80 mg.    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     Encounter for blood transfusion     Heart attack     History of pleural effusion     HTN (hypertension)     Hyperlipidemia     MI, old     Multiple thyroid nodules     Pulmonary HTN     Right hemiparesis     S/P coronary artery stent placement     S/P percutaneous endoscopic gastrostomy (PEG) tube placement     Screening for colon cancer     normal 2015 -10 yrs    Stroke        Past Surgical History:   Procedure Laterality Date    cardiac bypass      CORONARY ANGIOPLASTY WITH STENT PLACEMENT  7/29/15    [facility]    CORONARY ARTERY BYPASS GRAFT  6/20/15    [facility]    KNEE SURGERY      OVARY SURGERY      OVARY SURGERY      TONSILLECTOMY      TUBAL LIGATION         Review of patient's allergies indicates:   Allergen Reactions    Daypro [oxaprozin] Itching     Tolerates other NSAIDs    Pcn [penicillins] Swelling     Patient states she is not allergic to Penicillin    Vibramycin [doxycycline calcium]        No current facility-administered medications on file prior to encounter.      Current Outpatient Prescriptions on File Prior to Encounter   Medication Sig    amlodipine (NORVASC) 5 MG tablet Take 5 mg by mouth once daily.     aspirin (ECOTRIN) 81 MG EC tablet Take 81 mg by mouth once daily.    atorvastatin (LIPITOR) 40 MG tablet Take 1 tablet (40 mg total) by mouth once daily.    azelastine (ASTELIN) 137 mcg (0.1 %) nasal spray 1 spray (137 mcg total) by Nasal route 2 (two) times daily.    blood sugar diagnostic (TRUETEST TEST STRIPS) Strp Twice daily blood sugar check.    buPROPion (WELLBUTRIN XL) 150 MG TB24 tablet Take 1 tablet (150 mg total) by mouth once daily.    butalbital-acetaminophen-caffeine -40 mg (FIORICET, ESGIC) -40 mg per tablet Take 1 tablet by mouth every 8 (eight) hours as needed for Headaches.    carvedilol (COREG) 25 MG tablet Take 25 mg by mouth 2 (two) times daily with meals.    dextran 70-hypromellose (TEARS) ophthalmic solution One drop into affected eye 4-6 times daily as needed    diazePAM (VALIUM) 5 MG tablet Take 1 tablet (5 mg total) by mouth every 8 (eight) hours as needed.    diclofenac sodium 1 % Gel Apply topically 2 (two) times daily.    escitalopram oxalate (LEXAPRO) 10 MG tablet Take 1 tablet (10 mg total) by mouth once daily. (Patient taking differently: Take 5 mg by mouth once daily. )    escitalopram oxalate (LEXAPRO) 10 MG tablet TAKE 1 TABLET(10 MG) BY MOUTH EVERY DAY    estradiol (ESTRACE) 0.01 % (0.1 mg/gram) vaginal cream Place 1 g vaginally twice a week.    fluticasone (FLONASE) 50 mcg/actuation nasal spray 1 spray by Each Nare route once daily.    furosemide (LASIX) 20 MG tablet Take 1 tablet (20 mg total) by mouth 2 (two) times daily.    gabapentin (NEURONTIN) 100 MG capsule TAKE 1 TO 2 CAPSULES BY MOUTH EVERY MORNING AND AFTERNOON ALONG WITH 300MG AT NIGHT    gabapentin (NEURONTIN) 300 MG capsule Take 1 capsule (300 mg total) by mouth once daily.    hydrALAZINE (APRESOLINE) 25 MG tablet TAKE 1 TABLET(25 MG) BY MOUTH BID    insulin glargine (LANTUS) 100 unit/mL injection Inject 10 Units into the skin every evening.    insulin lispro (HUMALOG) 100 unit/mL  "injection Inject 5 Units into the skin 3 (three) times daily before meals.    levalbuterol (XOPENEX) 1.25 mg/3 mL nebulizer solution USE 1 VIAL VIA NEBULIZER EVERY 8 HOURS AS NEEDED FOR WHEEZING OR SHORTNESS OF BREATH    omega-3 acid ethyl esters (LOVAZA) 1 gram capsule Take 2 capsules (2 g total) by mouth 2 (two) times daily.    OXYGEN-AIR DELIVERY SYSTEMS MISC by [facility].(Non-Drug; Combo Route) route. Family states that patient is on 2.5 liters nasal cannula at home    pantoprazole (PROTONIX) 40 MG tablet TAKE 1 TABLET BY MOUTH EVERY DAY    pen needle, diabetic 30 gauge x 5/16" Ndle Use 4 disposable needles per day. Inject medication into skin daily    polyethylene glycol (GLYCOLAX) 17 gram PwPk Take 17 g by mouth once daily.    quetiapine (SEROQUEL) 100 MG Tab Take 100 mg by mouth once daily.    underpads 23 X 36 " Pads Twice daily change as needed for incontinence.    warfarin (COUMADIN) 5 MG tablet Take 1 tablet (5 mg total) by mouth Daily.     Family History     Problem Relation (Age of Onset)    Cancer Sister    Diabetes Mother, Brother    Heart disease Sister, Brother, Son, Sister    Hyperlipidemia Mother    Hypertension Mother, Brother, Daughter    No Known Problems Daughter        Social History Main Topics    Smoking status: Never Smoker    Smokeless tobacco: Never Used    Alcohol use No    Drug use: No    Sexual activity: Not on file     Review of Systems   Constitutional: Negative for chills and fever.   HENT: Negative for congestion and rhinorrhea.    Eyes: Negative for pain and visual disturbance.   Respiratory: Positive for shortness of breath. Negative for cough.    Cardiovascular: Positive for leg swelling. Negative for chest pain and palpitations.   Gastrointestinal: Positive for abdominal distention and abdominal pain. Negative for blood in stool, constipation, diarrhea, nausea and vomiting.   Genitourinary: Positive for difficulty urinating and dysuria. Negative for frequency and " hematuria.   Skin: Negative for rash and wound.   Neurological: Negative for syncope, light-headedness and headaches.   Psychiatric/Behavioral: Negative for agitation and behavioral problems.     Objective:     Vital Signs (Most Recent):  Temp: 98.3 °F (36.8 °C) (07/12/17 1425)  Pulse: 94 (07/12/17 2300)  Resp: (!) 24 (07/12/17 1958)  BP: 119/65 (07/12/17 2131)  SpO2: 99 % (07/12/17 2253) Vital Signs (24h Range):  Temp:  [98.3 °F (36.8 °C)] 98.3 °F (36.8 °C)  Pulse:  [] 94  Resp:  [22-28] 24  SpO2:  [95 %-99 %] 99 %  BP: (103-136)/(54-79) 119/65     Weight: 66.7 kg (147 lb)  Body mass index is 25.23 kg/m².    Physical Exam   Constitutional: She is oriented to person, place, and time. She appears well-developed and well-nourished. No distress.   HENT:   Head: Normocephalic and atraumatic.   Nose: Nose normal.   Mouth/Throat: Oropharynx is clear and moist. No oropharyngeal exudate.   Eyes: EOM are normal. Pupils are equal, round, and reactive to light. No scleral icterus.   Neck: Neck supple. No JVD present. No thyromegaly present.   Cardiovascular: Normal rate, normal heart sounds and intact distal pulses.    No murmur heard.  Irregular rhythm   Pulmonary/Chest: Effort normal. No respiratory distress. She has no wheezes. She has rales.   Coarse BS and crackles bilaterally   Abdominal: Soft. Bowel sounds are normal. She exhibits no distension. There is tenderness (mild, RLQ).   Musculoskeletal: She exhibits edema (1+ LE's). She exhibits no tenderness or deformity.   Lymphadenopathy:     She has no cervical adenopathy.   Neurological: She is alert and oriented to person, place, and time. No cranial nerve deficit.   Skin: Skin is warm and dry.   Right forehead hyperpigmentation, does not cross midline, from resolving shingles rash   Psychiatric: She has a normal mood and affect. Her behavior is normal.        Significant Labs:   A1C:   Recent Labs  Lab 02/23/17  1609   HGBA1C 6.1     CBC:   Recent Labs  Lab  07/12/17  1739   WBC 5.17   HGB 9.9*   HCT 31.8*        CMP:   Recent Labs  Lab 07/12/17  1739      K 4.6      CO2 22*   *   BUN 44*   CREATININE 2.5*   CALCIUM 8.1*   PROT 6.0   ALBUMIN 2.0*   BILITOT 0.3   ALKPHOS 61   AST 21   ALT 10   ANIONGAP 10   EGFRNONAA 18.5*     Cardiac Markers:   Recent Labs  Lab 07/12/17  1739   BNP 1,413*     Coagulation:   Recent Labs  Lab 07/12/17  1739   INR 4.4*     Troponin:   Recent Labs  Lab 07/12/17  1739   TROPONINI 0.044*       Significant Imaging: I have reviewed all pertinent imaging results/findings within the past 24 hours.     Imaging Results          X-Ray Chest AP Portable (Final result)  Result time 07/12/17 18:42:10    Final result by Sridhar Nelson MD (07/12/17 18:42:10)                 Impression:      1. Increased bilateral middle and lower lung zone airspace opacities, more prominent on the right and suspicious for edema or infectious/inflammatory disease.    2. Increased moderate right and small left pleural effusions.      Electronically signed by: SRIDHAR NELSON MD  Date:     07/12/17  Time:    18:42              Narrative:    Chest AP    Comparison: June 14, 2017    Findings: Increased bilateral middle and lower lung zone airspace opacities, more prominent on the right. Increased moderate right and small left pleural effusions. No pneumothorax. No displaced fracture. Sternotomy wires.                                Assessment/Plan:     Depression    -continue lexapro 5 mg, Wellbutrin 150 mg  -Seroquel qhs           Dysuria    -pt reports decreased UO and dysuria for a few days, abdominal discomfort RLQ, but could be secondary to fluid overload. Denies hematuria   -no fever, no leukocytosis  -ordered UA          Chronic kidney disease (CKD) stage G4/A1, severely decreased glomerular filtration rate (GFR) between 15-29 mL/min/1.73 square meter and albuminuria creatinine ratio less than 30 mg/g    -Cr on admission 2.5, improved from  3.2 ~1 mo ago  -continue to monitor RFTs  -avoid nephrotoxic agents          Atrial fibrillation    -patient denying palpitations, lightheadedness, CP  -admission EKG with a fib with RVR   -chadsvasc of 8  -INR 4.4, continue to monitor  -goal INR of 2-3  -holding warfarin for now  -patient states she does not like warfarin and wishes to try another drug  -continue coreg 25 BID          Insulin dependent diabetes mellitus    -daughter reports to takes LA insulin 10 units qhs and SA insulin 5 units WM  -A1c 6.1 2/2017  -LD SSI, detemir 10, aspart 5 WM, continue to monitor BG and adjust basal, prandial regimen as needed          Hyperlipidemia    -atorvastatin 40 mg          H/O: CVA (cerebrovascular accident)    -hx of stroke before 2012 per daughter  -pt with residual right sided deficit   -continue home ASA and atorvastatin 50 mg          HTN (hypertension)    -amlodipine 5, coreg 25 BID, hyralazine 25 BID, lasix 40 IV BID  -on admission pt normotensive with /79, continue to monitor and hold home meds PRN for low BP          * Acute on chronic diastolic congestive heart failure    -pt presents from cardiology clinic with SOB, MILIAN, LE edema for past 5 days  -on home O2 2.5 L  -patient with increase lasix requirement since shingles and PNA in May (got abx as an out pt)  -home meds include lasix 20 mg BID, coreg 25 BID, hydralazine 25 BID, amlodipine 5 mg  -she is compliant with her home meds and a low salt, fluid restricted diet  -most recent echo 6/2017 below  -in ED, CXR shows worsening pulmonary edema, BNP 1413 (above baseline), tn 0.04, EKG shows afib with RVR without acute ischemic changes  -given lasix 80 mg IV in ED  -replete lytes to keep potassium >4, mg >2  -Cr on admission 2.5, continue to monitor RFTs  -continue diuresis with 40 mg IV BID  -strict I/o, daily weights, low salt fluid restricted diet  -consider cardiology consult if no improvement with diuresis     Echo 6/2017  1 - Mildly to moderately  depressed left ventricular systolic function (EF 40-45%). Abnormal septal motion due to underlying LBBB. Significant drop in LV function, compared to the prior reported, Images could not be retrieved for comparison.     2 - Normal right ventricular systolic function .     3 - Pulmonary hypertension. The estimated PA systolic pressure is 41 mmHg.     4 - Mild tricuspid regurgitation.     5 - Small pericardial effusion.     6 - Intermediate central venous pressure.     7 - Biatrial enlargement.          VTE Risk Mitigation         Ordered     heparin (porcine) injection 5,000 Units  Every 8 hours     Route:  Subcutaneous        07/13/17 0018     Medium Risk of VTE  Once      07/12/17 7020        Emelia Rosado MD  Department of Hospital Medicine   [facility]

## 2017-07-13 NOTE — HOSPITAL COURSE
Ms. Salgado presented with acute heart failure decompensation after several days of worsening dyspnea. Her hospital course included intensive diuresis with discordant urine output (possibly unmeasured). She slowly improved to the point where she only required PO lasix. She had received IV lasix blouses and gtts, metolazone, and clorothiazide. She developed contraction alakalosis and elevated BUN. Metolazone was added to decrease her bicarb. There was some discussion of a therapeutic thoracentesis to provide symptomatic relief of her SOB, however it was determined that the risks outweighed the benefit. Her other hospital problem of chronic AFib was addressed by replacing her home coreg with metoprolol for better control as per cardiology. She was also started on apixaban and her coumadin was discontinued. She was discharged home in fair condition to be followed by home health. Her detailed hospital course is as reported below.    7/13: Admitted to IM1. Started on Lasix 40 mg IV BID. Change in HH, 9.9->7.9. Denies changes in color/consistency of BMs and FOBT negative. Hemolytic anemia work-up negative. ABG normal. Added duoneb q6h.  7/14: Continued respiratory effort despite duoneb therapy. Vitals have remained stable. Pulmonology consult advised against thoracentesis at this moment after ultrasound showed bilateral effusions are likely not contributing to SOB. Also recommends discontinuing antibiotics as pneumonia is unlikely and it is adding fluids to her volume overload. Additional recommendation to increase diuretic therapy despite CKD. Consults to cards and pulm placed.    7/15: Patient's respiratory effort improving. Decided to discontinue coumadin at home. Patient is now taking Apixaban and will continue at home upon discharge.  7/16: Patient markedly improved today. Restarted Aspirin now that hemoglobin has stabilized.  7/17: Continued improvement. Increased hydralazine to 50 TID and replaced coreg with  metoprolol for better AFib control, per cardiology.  7/18: Pt's family is refusing stress test. Lasix gtt transitioned to IV pushes.  7/19: Patient regressed overnight per family. Patient is dyspneic when speaking and can only make a few words/sounds. Lasix IV pushes transitioned back to Lasix infusion with increase to 20 mg/hr and an initial Lasix 80 mg IV push. Barraza placed for better monitoring of I/Os. Nephrology consulted for low urine output. Appreciate recommendations when available.  7/20: Patient improved on Lasix gtt. Metolazone to enhance diuresis. Barraza removed secondary to patient wishes.  7/21: Continued improvement. Patient responded well to metolazone with higher urine output. CT chest showed moderate pleural effusions  7/22: Pulmonology consulted for pleural effusions. Effusions may be contributing to SOB more so than pulmonary edema as diuresis and urine output has been adequate. thora planned for 7/24, apixaban is held. Cont to refuse barraza and with unclear I/O, tenuous resp status with inadequate diuresis  7/23: Continued diuretic therapy. Patient remains SOB and has intermittent trouble breathing. Holding apixaban for thoracentesis tomorrow.  7/24: Pulmonology re-evaluated today and believes the risk of thoracentesis outweighs benefits - advises that IR may be able to perform a therapeutic thoracentesis. Patient continues to be SOB this morning. Hemoglobin at 6.8 this morning, but no more symptomatic than normal. Transfused 1 PRBC and continuing diuresis with lasix and doses of chlorothiazide. Repeat 2D echo unchanged from previous.  7/25: No change in patient's dyspnea. Planned to transition patient to Lasix 80 mg PO BID per cardiology recommendation however we will continue Lasix 80 mg IV BID with Metolazone 5 mg daily to encourage further diuresis in hopes of resolving pleural effusions. Consulted IR today who believe pulmonology would be better suited for thoracentesis. Holding apixaban  until this is resolved.   7/26: Cardiology has signed off secondary to adequate diuresis with contraction alkalosis, elevated BUN, and no clinical signs of volume overload. Patient slightly improved, but dyspneic with coarse lung sounds. Thoracentesis for therapeutic tap still an option.  7/27: CXR lateral decubitus today for pleural effusions evaluation. Will coordinate with IR or pulmonology  7/2: Cont diuresis. No thora. Correcting metabolic alkalosis with diamox  7/29: Patient improved back to baseline. She is speaking in more complete sentences without getting SOB. As she has been thoroughly diuresed and the pleural effusions remain unchanged, there is no further medical reason to have her remain inpatient. Both patient and family are eager for her to return home. She will continue her lasix and add metolazone as needed for weight gain. Home health will be coordinated as outpatient.

## 2017-07-13 NOTE — PROGRESS NOTES
Patient with history of L CVA with residual R hemiparesis, use of wheelchair. Assist with ADLs provided by daughter, however unable to stand during recent appt. Not a candidate for DM HF Program at this time.    Removed from hf list.

## 2017-07-13 NOTE — ASSESSMENT & PLAN NOTE
-amlodipine 5, coreg 25 BID, hyralazine 25 BID, lasix 40 IV BID  -on admission pt normotensive with /79, continue to monitor and hold home meds PRN for low BP

## 2017-07-13 NOTE — PLAN OF CARE
Extended Emergency Contact Information  Primary Emergency Contact: Rhea Apple   [place]  Mobile Phone: [phone]  Relation: Daughter    Wesley KAYLYN MD June  [address] / [place] [id number]    Future Appointments  Date Time Provider Department Center   7/21/2017 1:00 PM Nydia Degroot MD [facility]     Payor: [employer]ICARE / Plan: [employer] CHOICES 65 / Product Type: Medicare Advantage /       [facility] [id number] [place] [address] [id number] AT [facility] [address]  [address]  [facility] [address]  Phone: [phone] Fax: [phone]       07/13/17 1705   Discharge Assessment   Assessment Type Discharge Planning Assessment   Confirmed/corrected address and phone number on facesheet? Yes   Assessment information obtained from? Patient;Caregiver;Medical Record   Expected Length of Stay (days) 4   Communicated expected length of stay with patient/caregiver yes   Prior to hospitilization cognitive status: Alert/Oriented   Prior to hospitalization functional status: Assistive Equipment   Current cognitive status: Alert/Oriented   Current Functional Status: Assistive Equipment   Arrived From home or self-care   Lives With child(amado), adult;spouse   Able to Return to Prior Arrangements yes   Is patient able to care for self after discharge? Unable to determine at this time (comments)   How many people do you have in your home that can help with your care after discharge? 1   Who are your caregiver(s) and their phone number(s)? Rhea Apple Daughter     [phone]    Patient's perception of discharge disposition home or selfcare   Readmission Within The Last 30 Days no previous admission in last 30 days   Patient currently being followed by outpatient case management? No   Patient currently receives home health services? No   Does the patient currently use HME? Yes   Patient currently receives private duty nursing? N/A    Patient currently receives any other outside agency services? No   Equipment Currently Used at Home oxygen;bedside commode;3-in-1 commode;CPAP;wheelchair;walker, rolling   Do you have any problems affording any of your prescribed medications? No   Is the patient taking medications as prescribed? yes   Do you have any financial concerns preventing you from receiving the healthcare you need? No   Does the patient have transportation to healthcare appointments? Yes   Transportation Available van, wheelchair accessible   On Dialysis? No   Does the patient receive services at the Coumadin Clinic? No   Are there any open cases? No   Discharge Plan A Home with family   Discharge Plan B Home with family;Home Health   Patient/Family In Agreement With Plan yes

## 2017-07-13 NOTE — PLAN OF CARE
Problem: Pressure Ulcer Risk (Shiva Scale) (Adult,Obstetrics,Pediatric)  Goal: Identify Related Risk Factors and Signs and Symptoms  Related risk factors and signs and symptoms are identified upon initiation of Human Response Clinical Practice Guideline (CPG)   Outcome: Ongoing (interventions implemented as appropriate)  Pt admitted. Debilitated with numerous conditions. Tonite we are to start her care, give pt plenty of rest, monitor pt with tele, with glucometer and also per Wick device to measure urine.

## 2017-07-13 NOTE — ASSESSMENT & PLAN NOTE
-pt presents from cardiology clinic with SOB, MILIAN, LE edema for past 5 days  -on home O2 2.5 L  -patient with increase lasix requirement since shingles and PNA in May (got abx as an out pt)  -home meds include lasix 20 mg BID, coreg 25 BID, hydralazine 25 BID, amlodipine 5 mg  -she is compliant with her home meds and a low salt, fluid restricted diet  -most recent echo 6/2017 below  -in ED, CXR shows worsening pulmonary edema, BMP 1413 (above baseline), tn 0.04, EKG shows afib with RVR without acute ischemic changes  -given lasix 80 mg IV in ED  -replete lytes to keep potassium >4, mg >2  -Cr on admission 2.5, continue to monitor RFTs  -continue diuresis with 40 mg IV BID  -strict I/o, daily weights, low salt fluid restricted diet  -consider cardiology consult if no improvement with diuresis     Echo 6/2017  1 - Mildly to moderately depressed left ventricular systolic function (EF 40-45%). Abnormal septal motion due to underlying LBBB. Significant drop in LV function, compared to the prior reported, Images could not be retrieved for comparison.     2 - Normal right ventricular systolic function .     3 - Pulmonary hypertension. The estimated PA systolic pressure is 41 mmHg.     4 - Mild tricuspid regurgitation.     5 - Small pericardial effusion.     6 - Intermediate central venous pressure.     7 - Biatrial enlargement.

## 2017-07-13 NOTE — ASSESSMENT & PLAN NOTE
-pt reports decreased UO and dysuria for a few days, abdominal discomfort RLQ, but could be secondary to fluid overload. Denies hematuria   -no fever, no leukocytosis  -ordered UA

## 2017-07-13 NOTE — PLAN OF CARE
Problem: Patient Care Overview  Goal: Plan of Care Review  Outcome: Ongoing (interventions implemented as appropriate)  Pt is free from injury and falls during shift. Pt shows no signs of acute distress. Pt denies pain. AAO. Vitals stable. Blood sugar checked during shift with insulin administered as ordered (see MAR). Bed is in low position with breaks locked. Side rails are up x 2. Environment is clutter free. Call light is within reach of the patient. Will continue to monitor.

## 2017-07-13 NOTE — ED NOTES
Attempted to call report to SARAHI Conde. Nurse is in the middle of 9pm med pass and will call back for report.

## 2017-07-13 NOTE — ASSESSMENT & PLAN NOTE
-patient denying palpitations, lightheadedness, CP  -admission EKG with a fib with RVR   -chadsvasc of 8  -INR 4.4, continue to monitor  -goal INR of 2-3  -holding warfarin for now  -patient states she does not like warfarin and wishes to try another drug  -continue coreg 25 BID

## 2017-07-13 NOTE — HPI
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 Patient has baseline MILIAN, orthopnea, PND, LE edema, and abdo swelling that has also progressively worsened over the past few days. She is compliant with her home medications which include lasix 20 BID, coreg, hydralazine, amlodipine, and warfarin. She follows a low salt, fluid restrict diet and she has not deviated from it recently. She reports similar symptoms about a year ago that required hospitalization for CHF exacerbation. Patient recently had pneumonia and shingles in May and has not recovered full function since. She previously was able to ambulate with a walker and now uses a wheelchair. Prior to the shingles and PNA, she only required 10 mg lasix, but her lasix was increased afterwords due to increased SOB and volume overload.  She uses home O2 2.5 L. She is complaining of decreased UO, dysuria, and abdominal discomfort. She denies chest pain, palpitations, constipation (last BM today), diarrhea, cough, fever, chills, or any other complaints at this time. She lives at home with her daughter who provides with with significant assistance with her ADL's. Her cardiologist is Dr. Davis. She was last seen in cardiology clinic today and was sent to the ED from there due to her SOB and edema.     In the ED, CXR showed increased pulmonary edema, BNP 1413 (which is above her baseline), tn 0.044, EKG with a fib with RVR but no acute ischemic changes. Patient was given lasix IV 80 mg.

## 2017-07-14 PROBLEM — J18.9 CAP (COMMUNITY ACQUIRED PNEUMONIA): Status: ACTIVE | Noted: 2017-07-14

## 2017-07-14 PROBLEM — J18.9 PNEUMONIA DUE TO INFECTIOUS ORGANISM: Status: ACTIVE | Noted: 2017-07-14

## 2017-07-14 PROBLEM — J96.21 ACUTE ON CHRONIC RESPIRATORY FAILURE WITH HYPOXEMIA: Status: ACTIVE | Noted: 2017-07-14

## 2017-07-14 LAB
ALBUMIN SERPL BCP-MCNC: 1.8 G/DL
ALBUMIN SERPL BCP-MCNC: 1.9 G/DL
ALP SERPL-CCNC: 56 U/L
ALP SERPL-CCNC: 59 U/L
ALT SERPL W/O P-5'-P-CCNC: 8 U/L
ALT SERPL W/O P-5'-P-CCNC: 9 U/L
ANION GAP SERPL CALC-SCNC: 8 MMOL/L
ANION GAP SERPL CALC-SCNC: 8 MMOL/L
AST SERPL-CCNC: 13 U/L
AST SERPL-CCNC: 14 U/L
BASOPHILS # BLD AUTO: 0.02 K/UL
BASOPHILS NFR BLD: 0.5 %
BILIRUB SERPL-MCNC: 0.2 MG/DL
BILIRUB SERPL-MCNC: 0.3 MG/DL
BUN SERPL-MCNC: 47 MG/DL
BUN SERPL-MCNC: 47 MG/DL
CALCIUM SERPL-MCNC: 7.7 MG/DL
CALCIUM SERPL-MCNC: 7.8 MG/DL
CHLORIDE SERPL-SCNC: 106 MMOL/L
CHLORIDE SERPL-SCNC: 109 MMOL/L
CO2 SERPL-SCNC: 24 MMOL/L
CO2 SERPL-SCNC: 25 MMOL/L
CREAT SERPL-MCNC: 2.6 MG/DL
CREAT SERPL-MCNC: 2.6 MG/DL
DIFFERENTIAL METHOD: ABNORMAL
EOSINOPHIL # BLD AUTO: 0.1 K/UL
EOSINOPHIL NFR BLD: 1.8 %
ERYTHROCYTE [DISTWIDTH] IN BLOOD BY AUTOMATED COUNT: 16.2 %
EST. GFR  (AFRICAN AMERICAN): 20.3 ML/MIN/1.73 M^2
EST. GFR  (AFRICAN AMERICAN): 20.3 ML/MIN/1.73 M^2
EST. GFR  (NON AFRICAN AMERICAN): 17.6 ML/MIN/1.73 M^2
EST. GFR  (NON AFRICAN AMERICAN): 17.6 ML/MIN/1.73 M^2
GLUCOSE SERPL-MCNC: 143 MG/DL
GLUCOSE SERPL-MCNC: 156 MG/DL
HCT VFR BLD AUTO: 26.7 %
HGB BLD-MCNC: 8.1 G/DL
INR PPP: 2
LDH SERPL L TO P-CCNC: 179 U/L
LYMPHOCYTES # BLD AUTO: 1.1 K/UL
LYMPHOCYTES NFR BLD: 25.6 %
MAGNESIUM SERPL-MCNC: 1.4 MG/DL
MCH RBC QN AUTO: 27.6 PG
MCHC RBC AUTO-ENTMCNC: 30.3 %
MCV RBC AUTO: 91 FL
MONOCYTES # BLD AUTO: 0.5 K/UL
MONOCYTES NFR BLD: 11.6 %
NEUTROPHILS # BLD AUTO: 2.6 K/UL
NEUTROPHILS NFR BLD: 60.3 %
PHOSPHATE SERPL-MCNC: 3.9 MG/DL
PLATELET # BLD AUTO: 202 K/UL
PMV BLD AUTO: 9.7 FL
POCT GLUCOSE: 128 MG/DL (ref 70–110)
POCT GLUCOSE: 162 MG/DL (ref 70–110)
POCT GLUCOSE: 181 MG/DL (ref 70–110)
POCT GLUCOSE: 189 MG/DL (ref 70–110)
POTASSIUM SERPL-SCNC: 4 MMOL/L
POTASSIUM SERPL-SCNC: 4.8 MMOL/L
PROT SERPL-MCNC: 4.8 G/DL
PROT SERPL-MCNC: 5.3 G/DL
PROTHROMBIN TIME: 20.1 SEC
RBC # BLD AUTO: 2.93 M/UL
SODIUM SERPL-SCNC: 138 MMOL/L
SODIUM SERPL-SCNC: 142 MMOL/L
WBC # BLD AUTO: 4.38 K/UL

## 2017-07-14 PROCEDURE — 83735 ASSAY OF MAGNESIUM: CPT

## 2017-07-14 PROCEDURE — 25000242 PHARM REV CODE 250 ALT 637 W/ HCPCS: Performed by: HOSPITALIST

## 2017-07-14 PROCEDURE — 99233 SBSQ HOSP IP/OBS HIGH 50: CPT | Mod: GC,,, | Performed by: INTERNAL MEDICINE

## 2017-07-14 PROCEDURE — 80053 COMPREHEN METABOLIC PANEL: CPT | Mod: 91

## 2017-07-14 PROCEDURE — 94761 N-INVAS EAR/PLS OXIMETRY MLT: CPT

## 2017-07-14 PROCEDURE — 36415 COLL VENOUS BLD VENIPUNCTURE: CPT

## 2017-07-14 PROCEDURE — 99233 SBSQ HOSP IP/OBS HIGH 50: CPT | Mod: GC,,, | Performed by: HOSPITALIST

## 2017-07-14 PROCEDURE — 94640 AIRWAY INHALATION TREATMENT: CPT

## 2017-07-14 PROCEDURE — 84100 ASSAY OF PHOSPHORUS: CPT

## 2017-07-14 PROCEDURE — 97162 PT EVAL MOD COMPLEX 30 MIN: CPT

## 2017-07-14 PROCEDURE — 11000001 HC ACUTE MED/SURG PRIVATE ROOM

## 2017-07-14 PROCEDURE — 27000221 HC OXYGEN, UP TO 24 HOURS

## 2017-07-14 PROCEDURE — 25000003 PHARM REV CODE 250: Performed by: STUDENT IN AN ORGANIZED HEALTH CARE EDUCATION/TRAINING PROGRAM

## 2017-07-14 PROCEDURE — 85610 PROTHROMBIN TIME: CPT

## 2017-07-14 PROCEDURE — 80053 COMPREHEN METABOLIC PANEL: CPT

## 2017-07-14 PROCEDURE — 85025 COMPLETE CBC W/AUTO DIFF WBC: CPT

## 2017-07-14 PROCEDURE — 97166 OT EVAL MOD COMPLEX 45 MIN: CPT

## 2017-07-14 PROCEDURE — 63600175 PHARM REV CODE 636 W HCPCS: Performed by: STUDENT IN AN ORGANIZED HEALTH CARE EDUCATION/TRAINING PROGRAM

## 2017-07-14 PROCEDURE — 83615 LACTATE (LD) (LDH) ENZYME: CPT

## 2017-07-14 PROCEDURE — 25000003 PHARM REV CODE 250: Performed by: HOSPITALIST

## 2017-07-14 PROCEDURE — 97530 THERAPEUTIC ACTIVITIES: CPT

## 2017-07-14 PROCEDURE — 99222 1ST HOSP IP/OBS MODERATE 55: CPT | Mod: GC,,, | Performed by: INTERNAL MEDICINE

## 2017-07-14 RX ORDER — MULTIVITAMIN
0.5 TABLET ORAL 2 TIMES DAILY
COMMUNITY

## 2017-07-14 RX ORDER — PRASUGREL 10 MG/1
10 TABLET, FILM COATED ORAL DAILY
Status: ON HOLD | COMMUNITY
End: 2017-07-29 | Stop reason: HOSPADM

## 2017-07-14 RX ORDER — IBUPROFEN 400 MG/1
400 TABLET ORAL DAILY PRN
COMMUNITY

## 2017-07-14 RX ORDER — WARFARIN 2.5 MG/1
2.5 TABLET ORAL DAILY
Status: ON HOLD | COMMUNITY
End: 2017-07-29 | Stop reason: HOSPADM

## 2017-07-14 RX ORDER — QUETIAPINE FUMARATE 25 MG/1
100 TABLET, FILM COATED ORAL NIGHTLY
Status: DISCONTINUED | OUTPATIENT
Start: 2017-07-14 | End: 2017-07-27

## 2017-07-14 RX ORDER — LANOLIN ALCOHOL/MO/W.PET/CERES
100 CREAM (GRAM) TOPICAL DAILY
COMMUNITY

## 2017-07-14 RX ORDER — FUROSEMIDE 10 MG/ML
80 INJECTION INTRAMUSCULAR; INTRAVENOUS ONCE
Status: COMPLETED | OUTPATIENT
Start: 2017-07-14 | End: 2017-07-14

## 2017-07-14 RX ORDER — DOCUSATE SODIUM 100 MG/1
100 CAPSULE, LIQUID FILLED ORAL DAILY
COMMUNITY

## 2017-07-14 RX ORDER — ACETAMINOPHEN 500 MG
1000 TABLET ORAL DAILY PRN
COMMUNITY

## 2017-07-14 RX ORDER — ESCITALOPRAM OXALATE 5 MG/1
5 TABLET ORAL DAILY
COMMUNITY

## 2017-07-14 RX ORDER — IPRATROPIUM BROMIDE 0.5 MG/2.5ML
SOLUTION RESPIRATORY (INHALATION)
COMMUNITY

## 2017-07-14 RX ORDER — ERYTHROMYCIN 5 MG/G
OINTMENT OPHTHALMIC NIGHTLY
COMMUNITY

## 2017-07-14 RX ORDER — MAGNESIUM SULFATE HEPTAHYDRATE 40 MG/ML
2 INJECTION, SOLUTION INTRAVENOUS
Status: COMPLETED | OUTPATIENT
Start: 2017-07-14 | End: 2017-07-14

## 2017-07-14 RX ADMIN — HYDRALAZINE HYDROCHLORIDE 25 MG: 25 TABLET, FILM COATED ORAL at 08:07

## 2017-07-14 RX ADMIN — INSULIN ASPART 1 UNITS: 100 INJECTION, SOLUTION INTRAVENOUS; SUBCUTANEOUS at 08:07

## 2017-07-14 RX ADMIN — POTASSIUM BICARBONATE 50 MEQ: 25 TABLET, EFFERVESCENT ORAL at 12:07

## 2017-07-14 RX ADMIN — INSULIN ASPART 3 UNITS: 100 INJECTION, SOLUTION INTRAVENOUS; SUBCUTANEOUS at 08:07

## 2017-07-14 RX ADMIN — QUETIAPINE FUMARATE 100 MG: 100 TABLET, FILM COATED ORAL at 08:07

## 2017-07-14 RX ADMIN — CARVEDILOL 25 MG: 25 TABLET, FILM COATED ORAL at 08:07

## 2017-07-14 RX ADMIN — FUROSEMIDE 80 MG: 10 INJECTION, SOLUTION INTRAVENOUS at 04:07

## 2017-07-14 RX ADMIN — IPRATROPIUM BROMIDE AND ALBUTEROL SULFATE 3 ML: .5; 3 SOLUTION RESPIRATORY (INHALATION) at 01:07

## 2017-07-14 RX ADMIN — INSULIN ASPART 3 UNITS: 100 INJECTION, SOLUTION INTRAVENOUS; SUBCUTANEOUS at 12:07

## 2017-07-14 RX ADMIN — GABAPENTIN 300 MG: 300 CAPSULE ORAL at 08:07

## 2017-07-14 RX ADMIN — FUROSEMIDE 40 MG: 10 INJECTION, SOLUTION INTRAVENOUS at 08:07

## 2017-07-14 RX ADMIN — ACETAMINOPHEN 650 MG: 325 TABLET ORAL at 08:07

## 2017-07-14 RX ADMIN — Medication 3 ML: at 03:07

## 2017-07-14 RX ADMIN — FUROSEMIDE 10 MG/HR: 10 INJECTION, SOLUTION INTRAVENOUS at 06:07

## 2017-07-14 RX ADMIN — IPRATROPIUM BROMIDE AND ALBUTEROL SULFATE 3 ML: .5; 3 SOLUTION RESPIRATORY (INHALATION) at 08:07

## 2017-07-14 RX ADMIN — AZITHROMYCIN 500 MG: 250 TABLET, FILM COATED ORAL at 08:07

## 2017-07-14 RX ADMIN — POLYETHYLENE GLYCOL 3350 17 G: 17 POWDER, FOR SOLUTION ORAL at 08:07

## 2017-07-14 RX ADMIN — PANTOPRAZOLE SODIUM 40 MG: 40 TABLET, DELAYED RELEASE ORAL at 08:07

## 2017-07-14 RX ADMIN — ATORVASTATIN CALCIUM 40 MG: 20 TABLET, FILM COATED ORAL at 08:07

## 2017-07-14 RX ADMIN — PHYTONADIONE 10 MG: 5 TABLET ORAL at 08:07

## 2017-07-14 RX ADMIN — INSULIN ASPART 3 UNITS: 100 INJECTION, SOLUTION INTRAVENOUS; SUBCUTANEOUS at 05:07

## 2017-07-14 RX ADMIN — BUPROPION HYDROCHLORIDE 150 MG: 150 TABLET, FILM COATED, EXTENDED RELEASE ORAL at 08:07

## 2017-07-14 RX ADMIN — POTASSIUM BICARBONATE 50 MEQ: 25 TABLET, EFFERVESCENT ORAL at 04:07

## 2017-07-14 RX ADMIN — AMLODIPINE BESYLATE 5 MG: 5 TABLET ORAL at 08:07

## 2017-07-14 RX ADMIN — ESCITALOPRAM 5 MG: 5 TABLET, FILM COATED ORAL at 08:07

## 2017-07-14 RX ADMIN — INSULIN ASPART 2 UNITS: 100 INJECTION, SOLUTION INTRAVENOUS; SUBCUTANEOUS at 08:07

## 2017-07-14 RX ADMIN — MAGNESIUM SULFATE IN WATER 2 G: 40 INJECTION, SOLUTION INTRAVENOUS at 05:07

## 2017-07-14 RX ADMIN — INSULIN DETEMIR 8 UNITS: 100 INJECTION, SOLUTION SUBCUTANEOUS at 08:07

## 2017-07-14 RX ADMIN — CARVEDILOL 25 MG: 25 TABLET, FILM COATED ORAL at 05:07

## 2017-07-14 RX ADMIN — Medication 3 ML: at 08:07

## 2017-07-14 RX ADMIN — Medication 3 ML: at 07:07

## 2017-07-14 RX ADMIN — MAGNESIUM SULFATE IN WATER 2 G: 40 INJECTION, SOLUTION INTRAVENOUS at 03:07

## 2017-07-14 NOTE — HPI
71yoF with hx of CVA, CAD s/p 3vCABG, HFrEF, CKD IV, AFib who presented with ~5 days of progressive dyspnea.  Has baseline dyspnea, orthopnea and wears 2.5L of O2 at home.  She was treated for a possible pneumonia 2 weeks ago after a clinic visit where she had some increased dyspnea.  Feels like she did not recover.  No worsening cough productive of sputum.  No fevers, chills. She states she has had a thoracentesis in the past but it may have been related to her CABG procedure.

## 2017-07-14 NOTE — PROGRESS NOTES
[facility] Hospital Medicine  Progress Note    Patient Name: Michael Salgado  MRN: [id number]  Patient Class: IP- Inpatient   Admission Date: 7/12/2017  Length of Stay: 2 days  Attending Physician: Kira Ruiz MD  Primary Care Provider: Wesley Watkins MD    [facility] Medicine Team: [facility] MED 1 Deuce Sexton MD    Subjective:     Principal Problem:Acute on chronic diastolic congestive heart failure    HPI:  71 year old  yo female with history of CAD s/p CABGx3 2015, ICM, HFrEF (EF 40%, PA 41 echo 6/2017), CKD stage IV, IDDM2, HTN, 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 Patient has baseline MILIAN, orthopnea, PND, LE edema, and abdo swelling that has also progressively worsened over the past few days. She is compliant with her home medications which include lasix 20 BID, coreg, hydralazine, amlodipine, and warfarin. She follows a low salt, fluid restrict diet and she has not deviated from it recently. She reports similar symptoms about a year ago that required hospitalization for CHF exacerbation. Patient recently had pneumonia and shingles in May and has not recovered full function since. She previously was able to ambulate with a walker and now uses a wheelchair. Prior to the shingles and PNA, she only required 10 mg lasix, but her lasix was increased afterwords due to increased SOB and volume overload.  She uses home O2 2.5 L. She is complaining of decreased UO, dysuria, and abdominal discomfort. She denies chest pain, palpitations, constipation (last BM today), diarrhea, cough, fever, chills, or any other complaints at this time. She lives at home with her daughter who provides with with significant assistance with her ADL's. Her cardiologist is Dr. Davis. She was last seen in cardiology clinic today and was sent to the ED from  there due to her SOB and edema.     In the ED, CXR showed increased pulmonary edema, BNP 1413 (which is above her baseline), tn 0.044, EKG with a fib with RVR but no acute ischemic changes. Patient was given lasix IV 80 mg.    Hospital Course:  7/13: Admitted to IM1. Started on Lasix 40 mg IV BID. Change in HH, 9.9->7.9. Denies changes in color/consistency of BMs and FOBT negative. Hemolytic anemia work-up negative. ABG normal. Added duoneb q6h.  7/14: Continued respiratory effort despite duoneb therapy. Vitals have remained stable. Consults to cards and pulm placed.    Interval History: No events overnight. Patient does not feel any improvement. She still labors to breath and reports fatigue, despite duoneb therapy. Discussed our goals to get her feeling better and ensuring this won't reoccur before discharging her home.     Review of Systems   Constitutional: Negative for chills and fever.   HENT: Negative for congestion and rhinorrhea.    Eyes: Negative for pain and visual disturbance.   Respiratory: Positive for shortness of breath. Negative for cough.    Cardiovascular: Positive for leg swelling. Negative for chest pain and palpitations.   Gastrointestinal: Positive for abdominal distention and abdominal pain. Negative for blood in stool, constipation, diarrhea, nausea and vomiting.   Genitourinary: Negative for difficulty urinating, dysuria, frequency and hematuria.   Skin: Negative for rash and wound.   Neurological: Negative for syncope, light-headedness and headaches.   Psychiatric/Behavioral: Negative for agitation and behavioral problems.     Objective:     Vital Signs (Most Recent):  Temp: 98 °F (36.7 °C) (07/14/17 0800)  Pulse: 105 (07/14/17 1049)  Resp: 18 (07/14/17 0858)  BP: (!) 156/78 (07/14/17 0800)  SpO2: 98 % (07/14/17 0858) Vital Signs (24h Range):  Temp:  [97.9 °F (36.6 °C)-99 °F (37.2 °C)] 98 °F (36.7 °C)  Pulse:  [] 105  Resp:  [18-28] 18  SpO2:  [95 %-99 %] 98 %  BP: ()/(52-80)  156/78     Weight: 75.7 kg (166 lb 12.8 oz)  Body mass index is 28.63 kg/m².    Intake/Output Summary (Last 24 hours) at 07/14/17 1140  Last data filed at 07/14/17 0630   Gross per 24 hour   Intake                0 ml   Output              700 ml   Net             -700 ml      Physical Exam   Constitutional: She is oriented to person, place, and time. She appears well-developed and well-nourished. No distress.   HENT:   Head: Normocephalic and atraumatic.   Nose: Nose normal.   Mouth/Throat: Oropharynx is clear and moist. No oropharyngeal exudate.   Eyes: EOM are normal. Pupils are equal, round, and reactive to light. No scleral icterus.   Neck: Neck supple. No JVD present. No thyromegaly present.   Cardiovascular: Normal rate, normal heart sounds and intact distal pulses.    No murmur heard.  Irregular rhythm   Pulmonary/Chest: Effort normal. No respiratory distress. She has no wheezes. She has rales.   Coarse BS and crackles bilaterally   Abdominal: Soft. Bowel sounds are normal. She exhibits no distension. There is tenderness (mild, RLQ).   Musculoskeletal: She exhibits edema (1+ LE's). She exhibits no tenderness or deformity.   Lymphadenopathy:     She has no cervical adenopathy.   Neurological: She is alert and oriented to person, place, and time. No cranial nerve deficit.   Skin: Skin is warm and dry.   Right forehead hyperpigmentation, does not cross midline, from resolving shingles rash   Psychiatric: She has a normal mood and affect. Her behavior is normal.       Significant Labs:   BMP:   Recent Labs  Lab 07/14/17  0441   *      K 4.0      CO2 25   BUN 47*   CREATININE 2.6*   CALCIUM 7.7*   MG 1.4*     CBC:   Recent Labs  Lab 07/13/17  1039 07/13/17  1507 07/14/17  0440   WBC 3.01* 3.17* 4.38   HGB 7.9* 8.1* 8.1*   HCT 25.9* 27.2* 26.7*    168 202       Significant Imaging: CXR: I have reviewed all pertinent results/findings within the past 24 hours and my personal findings are:   7/14 CXR showing partial clearing of air space consolidation in the right mid and lower lung zones and decreasing pleural fluid compared to 7/12    Assessment/Plan:      Pneumonia due to infectious organism    - On admit (7/12) CXR showed R-sided airspace opacities concerning for edema or infectious process (SIRS 2/4, HR and RR). No fevers/chills or productive cough  - ABG normal  - Previously treated PNA with Levaquin on 6/15  - Started on azithromycin and ceftriaxone for CAP          Depression    -continue lexapro 5 mg, Wellbutrin 150 mg  -Seroquel qhs           Dysuria    -pt reports decreased UO and dysuria for a few days, abdominal discomfort RLQ, but could be secondary to fluid overload. Denies hematuria   -no fever, no leukocytosis  -ordered UA          Chronic kidney disease (CKD) stage G4/A1, severely decreased glomerular filtration rate (GFR) between 15-29 mL/min/1.73 square meter and albuminuria creatinine ratio less than 30 mg/g    -Cr on admission 2.5, improved from 3.2 ~1 mo ago  -continue to monitor RFTs  -avoid nephrotoxic agents          Atrial fibrillation    -patient denying palpitations, lightheadedness, CP  -admission EKG with a fib with RVR   -chadsvasc of 8  -INR 4.4, continue to monitor  -goal INR of 2-3  -holding warfarin for now  -patient states she does not like warfarin and wishes to try another drug  -continue coreg 25 BID          Anemia of unknown etiology    - On admit (7/12), Hgb 9.9 and dropped to 7.9 (7/13) w/ SOB  - Hemolysis work-up negative, FOBT negative with no reported changes in stool  - Stable at 7.9-8.1, maintain low-threshold to transfuse          Insulin dependent diabetes mellitus    -daughter reports to takes LA insulin 10 units qhs and SA insulin 5 units WM  -A1c 6.1 2/2017  -LD SSI, detemir 8, aspart 3 WM, continue to monitor BG and adjust basal, prandial regimen as needed          Hyperlipidemia    -atorvastatin 40 mg          H/O: CVA (cerebrovascular accident)     -hx of stroke before 2012 per daughter  -pt with residual right sided deficit   -continue atorvastatin 50 mg  -Supratherapeutic on warfarin (INR 4.0) on admit -> now INR 2.0  - Holding heparin and asa, received 2/3 doses vit. K.          HTN (hypertension)    -amlodipine 5, coreg 25 BID, hyralazine 25 BID, lasix 40 IV BID  -on admission pt normotensive with /79, continue to monitor and hold home meds PRN for low BP          * Acute on chronic diastolic congestive heart failure    -pt presents from cardiology clinic with SOB, MILIAN, LE edema for past 5 days  -on home O2 2.5 L  -patient with increase lasix requirement since shingles and PNA in May (got abx as an out pt)  -home meds include lasix 20 mg BID, coreg 25 BID, hydralazine 25 BID, amlodipine 5 mg  -she is compliant with her home meds and a low salt, fluid restricted diet  -most recent echo 6/2017 below  -On admit (7/12), CXR shows worsening pulmonary edema, BMP 1413 (above baseline), tn 0.04, EKG shows afib with RVR without acute ischemic changes  -Cr on admission 2.5, continue to monitor RFTs  -continue diuresis with 40 mg IV BID  - Net -0.8L from admission  - CXR 7/14 shows interval improvement from 7/12  - Pulmonology and cardiology consulted    Echo 6/2017  1 - Mildly to moderately depressed left ventricular systolic function (EF 40-45%). Abnormal septal motion due to underlying LBBB. Significant drop in LV function, compared to the prior reported, Images could not be retrieved for comparison.     2 - Normal right ventricular systolic function .     3 - Pulmonary hypertension. The estimated PA systolic pressure is 41 mmHg.     4 - Mild tricuspid regurgitation.     5 - Small pericardial effusion.     6 - Intermediate central venous pressure.     7 - Biatrial enlargement.          VTE Risk Mitigation         Ordered     Medium Risk of VTE  Once      07/12/17 6158        Dispo: Pending recommendations from pulmonology and cardiology, will discharge to  home following resolution of acute hypoxic respiratory failure.    Deuce Sexton MD  Department of Hospital Medicine   [facility]

## 2017-07-14 NOTE — SUBJECTIVE & OBJECTIVE
Interval History: No events overnight. Patient does not feel any improvement. She still labors to breath and reports fatigue, despite duoneb therapy. Discussed our goals to get her feeling better and ensuring this won't reoccur before discharging her home.     Review of Systems   Constitutional: Negative for chills and fever.   HENT: Negative for congestion and rhinorrhea.    Eyes: Negative for pain and visual disturbance.   Respiratory: Positive for shortness of breath. Negative for cough.    Cardiovascular: Positive for leg swelling. Negative for chest pain and palpitations.   Gastrointestinal: Positive for abdominal distention and abdominal pain. Negative for blood in stool, constipation, diarrhea, nausea and vomiting.   Genitourinary: Negative for difficulty urinating, dysuria, frequency and hematuria.   Skin: Negative for rash and wound.   Neurological: Negative for syncope, light-headedness and headaches.   Psychiatric/Behavioral: Negative for agitation and behavioral problems.     Objective:     Vital Signs (Most Recent):  Temp: 98 °F (36.7 °C) (07/14/17 0800)  Pulse: 105 (07/14/17 1049)  Resp: 18 (07/14/17 0858)  BP: (!) 156/78 (07/14/17 0800)  SpO2: 98 % (07/14/17 0858) Vital Signs (24h Range):  Temp:  [97.9 °F (36.6 °C)-99 °F (37.2 °C)] 98 °F (36.7 °C)  Pulse:  [] 105  Resp:  [18-28] 18  SpO2:  [95 %-99 %] 98 %  BP: ()/(52-80) 156/78     Weight: 75.7 kg (166 lb 12.8 oz)  Body mass index is 28.63 kg/m².    Intake/Output Summary (Last 24 hours) at 07/14/17 1140  Last data filed at 07/14/17 0630   Gross per 24 hour   Intake                0 ml   Output              700 ml   Net             -700 ml      Physical Exam   Constitutional: She is oriented to person, place, and time. She appears well-developed and well-nourished. No distress.   HENT:   Head: Normocephalic and atraumatic.   Nose: Nose normal.   Mouth/Throat: Oropharynx is clear and moist. No oropharyngeal exudate.   Eyes: EOM are normal.  Pupils are equal, round, and reactive to light. No scleral icterus.   Neck: Neck supple. No JVD present. No thyromegaly present.   Cardiovascular: Normal rate, normal heart sounds and intact distal pulses.    No murmur heard.  Irregular rhythm   Pulmonary/Chest: Effort normal. No respiratory distress. She has no wheezes. She has rales.   Coarse BS and crackles bilaterally   Abdominal: Soft. Bowel sounds are normal. She exhibits no distension. There is tenderness (mild, RLQ).   Musculoskeletal: She exhibits edema (1+ LE's). She exhibits no tenderness or deformity.   Lymphadenopathy:     She has no cervical adenopathy.   Neurological: She is alert and oriented to person, place, and time. No cranial nerve deficit.   Skin: Skin is warm and dry.   Right forehead hyperpigmentation, does not cross midline, from resolving shingles rash   Psychiatric: She has a normal mood and affect. Her behavior is normal.       Significant Labs:   BMP:   Recent Labs  Lab 07/14/17  0441   *      K 4.0      CO2 25   BUN 47*   CREATININE 2.6*   CALCIUM 7.7*   MG 1.4*     CBC:   Recent Labs  Lab 07/13/17  1039 07/13/17  1507 07/14/17  0440   WBC 3.01* 3.17* 4.38   HGB 7.9* 8.1* 8.1*   HCT 25.9* 27.2* 26.7*    168 202       Significant Imaging: CXR: I have reviewed all pertinent results/findings within the past 24 hours and my personal findings are:  7/14 CXR showing partial clearing of air space consolidation in the right mid and lower lung zones and decreasing pleural fluid compared to 7/12

## 2017-07-14 NOTE — PLAN OF CARE
Problem: Physical Therapy Goal  Goal: Physical Therapy Goal  Goals to be met by: 2017    Patient will increase functional independence with mobility by performin. Supine to sit with MInimal Assistance  2. Sit to supine with MInimal Assistance  3. Rolling to Right with Minimal Assistance.  4. Sit to stand transfer with Stand-by Assistance  5. Bed to chair transfer with Stand-by Assistance using Rolling Walker  6. Gait  x 10 feet with Minimal Assistance using Rolling Walker.       Outcome: Ongoing (interventions implemented as appropriate)  Evaluation complete. Goals appropriate to improve functional mobility.    Al Lyman III, DPT  2017

## 2017-07-14 NOTE — PLAN OF CARE
Problem: Occupational Therapy Goal  Goal: Occupational Therapy Goal  Goals to be met by: 07/30     Patient will increase functional independence with ADLs by performing:    UE Dressing with Stand-by Assistance.  LE Dressing with Moderate Assistance and Assistive Devices as needed.  Grooming while seated with Stand-by Assistance.  Toileting from bedside commode with Moderate Assistance and Assistive Devices as needed for hygiene and clothing management.   Stand pivot transfers with Contact Guard Assistance.  Toilet transfer to bedside commode with Contact Guard Assistance.      LURDES Hinton  7/14/2017

## 2017-07-14 NOTE — ASSESSMENT & PLAN NOTE
Recently treated for pneumonia though there was not particularly strong evidence at that time for infection (though there was a more focal area of alveolar filling seen on CXR at that time).  Since admission, limited evidence to suggest ongoing infectious process.  Her Procalcitonin is negative.    - Would recommend discontinuing abx as they are just contributing more volume at this point; using oral agents may be a reasonable alternative if there is considerable concern for infection

## 2017-07-14 NOTE — ASSESSMENT & PLAN NOTE
-daughter reports to takes LA insulin 10 units qhs and SA insulin 5 units WM  -A1c 6.1 2/2017  -LD SSI, detemir 8, aspart 3 WM, continue to monitor BG and adjust basal, prandial regimen as needed

## 2017-07-14 NOTE — PROGRESS NOTES
IV infiltrated this AM. PICC consult placed after 3 attempts to start IV. Darshan with IM 1 notified. Darshan states it is okay for scheduled mag to be given once peripheral IV is placed. Will continue to monitor.

## 2017-07-14 NOTE — PT/OT/SLP EVAL
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     Encounter for blood transfusion     Heart attack     History of pleural effusion     HTN (hypertension)     Hyperlipidemia     MI, old     Multiple thyroid nodules     Pulmonary HTN     Right hemiparesis     S/P coronary artery stent placement     S/P percutaneous endoscopic gastrostomy (PEG) tube placement     Screening for colon cancer     normal 2015 -10 yrs    Stroke       Past Surgical History:   Procedure Laterality Date    cardiac bypass      CORONARY ANGIOPLASTY WITH STENT PLACEMENT  7/29/15    [facility]    CORONARY ARTERY BYPASS GRAFT  6/20/15    [facility]    KNEE SURGERY      OVARY SURGERY      OVARY SURGERY      TONSILLECTOMY      TUBAL LIGATION         General Precautions: Standard, fall    Patient History:  Living Environment  Lives With: spouse  Living Arrangements: house  Home Accessibility:  (no concerns)  Home Layout: Able to live on 1st floor  Living Environment Comment: Patient lives with spouse in 1-story home with ramp to enter. Regular tub/toilet with grab bars. Assist with ADLs and mobility PTA. Patient states  is not in good health. Daughter reports current 24hr assist from family to assist with mobility and  ADLs.  Equipment Currently Used at Home: CPAP, oxygen, bedside commode, 3-in-1 commode, wheelchair, walker, rolling    Prior level of function:   Bed Mobility/Transfers: needs device  Grooming: independent  Bathing: needs device and assist  Upper Body Dressing: independent  Lower Body Dressing: needs assist  Toileting: independent    Subjective:  Communicated with RN prior to session.    Pt agreeable to Evaluation with encouragement    Pain/Comfort  Pain Rating 1: 0/10    Objective:  Patient found with: oxygen (purewick)    Upper Extremity Range of Motion:  Right Upper Extremity: no AROM  Left Upper Extremity: WFL    Upper Extremity Strength:  Right Upper Extremity: 0-1/5  Left Upper Extremity: 4/5    Functional Mobility:  Bed Mobility:  Scooting/Bridging: Moderate Assistance  Supine to Sit: Moderate Assistance     Transfers:  Sit <> Stand Assistance: Minimum Assistance 2 trials  Sit <> Stand Assistive Device: No Assistive Device    Bed <> Chair Technique: Stand Pivot  Bed <> Chair Transfer Assistance: Moderate Assistance  Bed <> Chair Assistive Device: No Assistive Device    Activities of Daily Living:  UE Dressing Level of Assistance: Moderate assistance    LE Dressing Level of Assistance: Total assistance    Therapeutic Activities and Exercises:  Pt educated on  · OT role/POC  · Importance of OOB activity and sitting UIC throughout hospitalization    AM-PAC 6 CLICK ADL  How much help from another person does this patient currently need?  1 = Unable, Total/Dependent Assistance  2 = A lot, Maximum/Moderate Assistance  3 = A little, Minimum/Contact Guard/Supervision  4 = None, Modified [place]/Independent    Putting on and taking off regular lower body clothing? : 2  Bathing (including washing, rinsing, drying)?: 2  Toileting, which includes using toilet, bedpan, or urinal? : 2  Putting on and taking off regular upper body clothing?: 2  Taking care of personal grooming such as brushing teeth?: 3  Eating meals?:  "3  Total Score: 14    AM-PAC Raw Score CMS "G-Code Modifier Level of Impairment Assistance   6 % Total / Unable   7 - 9 CM 80 - 100% Maximal Assist   10-14 CL 60 - 80% Moderate Assist   15 - 19 CK 40 - 60% Moderate Assist   20 - 22 CJ 20 - 40% Minimal Assist   23 CI 1-20% SBA / CGA   24 CH 0% Independent/ Mod I       Patient left up in chair with all lines intact, call button in reach and RN notified    Assessment:  Michael Salgado is a 71 y.o. female with a medical diagnosis of Acute on chronic diastolic congestive heart failure and presents with overall strength and endurance deficits impeding her ability to perform ADLs and functional mobility and would benefit from OT services to maximize functional (I) and safety.    Rehab identified problem list/impairments: Rehab identified problem list/impairments: weakness, impaired endurance, impaired self care skills, impaired functional mobilty, gait instability, impaired balance, decreased lower extremity function, decreased upper extremity function, decreased safety awareness, decreased ROM    Rehab potential is good.    Activity tolerance: Good    Discharge recommendations: Discharge Facility/Level Of Care Needs: nursing facility, skilled     Barriers to discharge: Barriers to Discharge: None    Equipment recommendations: none     GOALS:    Occupational Therapy Goals        Problem: Occupational Therapy Goal    Goal Priority Disciplines Outcome Interventions   Occupational Therapy Goal     OT, PT/OT     Description:  Goals to be met by: 07/30     Patient will increase functional independence with ADLs by performing:    UE Dressing with Stand-by Assistance.  LE Dressing with Moderate Assistance and Assistive Devices as needed.  Grooming while seated with Stand-by Assistance.  Toileting from bedside commode with Moderate Assistance and Assistive Devices as needed for hygiene and clothing management.   Stand pivot transfers with Contact Guard " Assistance.  Toilet transfer to bedside commode with Contact Guard Assistance.                      PLAN:  Patient to be seen 4 x/week to address the above listed problems via self-care/home management, therapeutic activities, therapeutic exercises  Plan of Care reviewed with: patient, daughter      Alanna BEJARANO LURDES Shaffer  07/14/2017

## 2017-07-14 NOTE — ASSESSMENT & PLAN NOTE
- On admit (7/12), Hgb 9.9 and dropped to 7.9 (7/13) w/ SOB  - Hemolysis work-up negative, FOBT negative with no reported changes in stool  - Stable at 7.9-8.1, maintain low-threshold to transfuse

## 2017-07-14 NOTE — PLAN OF CARE
Problem: Patient Care Overview  Goal: Plan of Care Review  Outcome: Ongoing (interventions implemented as appropriate)  Pt is free from injury and falls during shift. Pt shows no signs of acute distress. Pt denies pain. AAO. Vitals stable. Bed is in low position with breaks locked. Side rails are up x 2. Environment is clutter free. Call light is within reach of the patient. Will continue to monitor.

## 2017-07-14 NOTE — HOSPITAL COURSE
She has been diuresed with IV Lasix BID with low-moderate UOP.  Net I/Os not recorded but if her outputs are accurate, suspect she is at best even.  She is being covered for possible CAP as well.  She reports not feel much better from a respiratory standpoint since admission.  CXR on 7/12 with suggestion of moderate pleural effusions; repeat CXR today with some improvement.

## 2017-07-14 NOTE — ASSESSMENT & PLAN NOTE
Suspect this in large part due to significant pulmonary edema.  Her lung ultrasound suggests significant edema and her estimated EF at bedside appeared notably less than her previous EF of 40%.      - Escalate diuresis; her baseline CKD may make it more difficult to diurese her although she is on relatively low doses as an outpatient; could consider adding a synergistic agent like  Metolazone - Cardiology consult may be warranted  - Re: her pleural effusions; on ultrasound, the R effusion does not appear as though it can be safely tapped; the left is somewhat larger and could conceivably be drained but it's size does not suggest that it would be contributing significantly to her degree of dyspnea  - Her INR is presently 2.0, in the event we perform a thoracentesis, would like it < 1.8; could bridge her with lovenox (heparin gtt would give her additional volume) and hold the day before the procedure  - Though her INR has been supratherapeutic, can consider getting a LE ultrasound to evaluate for VTE; if present then using an alternative agent for anticoagulation would be necessary - We will follow with you and if she fails to improve clinically with more aggressive diuresis, can consider thoracentesis (risk of procedure if it can be managed strictly with medical therapy likely not warranted)

## 2017-07-14 NOTE — PHARMACY MED REC
"Admission Medication Reconciliation - Pharmacy Consult Note    The home medication history was taken by Chasidy Brennan, Pharmacy Tech. Based on information gathered and subsequent review by the clinical pharmacist, the items below may need attention.    You may go to "Admission" then "Reconcile Home Medications" tabs to review and/or act upon these items.      No issues noted with the medication reconciliation.      Leah Avitia, PharmD  [id number]      Patient's prior to admission medication regimen was as follows:  Medication Sig    acetaminophen (TYLENOL) 500 MG tablet Take 1,000 mg by mouth daily as needed for Pain.    amlodipine (NORVASC) 5 MG tablet Take 5 mg by mouth once daily.    aspirin (ECOTRIN) 81 MG EC tablet Take 81 mg by mouth once daily.    atorvastatin (LIPITOR) 40 MG tablet Take 1 tablet (40 mg total) by mouth once daily.    azelastine (ASTELIN) 137 mcg (0.1 %) nasal spray 1 spray (137 mcg total) by Nasal route 2 (two) times daily. (Patient taking differently: 1 spray by Nasal route 2 (two) times daily as needed for Rhinitis. )    blood sugar diagnostic (TRUETEST TEST STRIPS) Strp Twice daily blood sugar check. (Patient taking differently: Three times daily for blood sugar check.)    buPROPion (WELLBUTRIN XL) 150 MG TB24 tablet Take 1 tablet (150 mg total) by mouth once daily.    butalbital-acetaminophen-caffeine -40 mg (FIORICET, ESGIC) -40 mg per tablet Take 1 tablet by mouth every 8 (eight) hours as needed for Headaches.    carvedilol (COREG) 25 MG tablet Take 25 mg by mouth 2 (two) times daily with meals.    cyanocobalamin (VITAMIN B-12) 1000 MCG tablet Take 100 mcg by mouth once daily.    dextran 70-hypromellose (TEARS) ophthalmic solution One drop into affected eye 4-6 times daily as needed    diazePAM (VALIUM) 5 MG tablet Take 1 tablet (5 mg total) by mouth every 8 (eight) hours as needed. (Patient taking differently: Take 5 mg by mouth every 8 (eight) hours as needed for " Anxiety. )    diclofenac sodium 1 % Gel Apply topically 2 (two) times daily. (Patient taking differently: Apply topically 2 (two) times daily as needed (for pain). )    docusate sodium (COLACE) 100 MG capsule Take 100 mg by mouth once daily.    erythromycin (ROMYCIN) ophthalmic ointment Place into the right eye every evening.    escitalopram oxalate (LEXAPRO) 5 MG Tab Take 5 mg by mouth once daily.    estradiol (ESTRACE) 0.01 % (0.1 mg/gram) vaginal cream Place 1 g vaginally twice a week. (Patient taking differently: Place 1 g vaginally twice a week. As needed)    fluticasone (FLONASE) 50 mcg/actuation nasal spray 1 spray by Each Nare route once daily. (Patient taking differently: 1 spray by Each Nare route daily as needed for Rhinitis or Allergies. )    furosemide (LASIX) 20 MG tablet Take 1 tablet (20 mg total) by mouth 2 (two) times daily.    gabapentin (NEURONTIN) 300 MG capsule Take 1 capsule (300 mg total) by mouth once daily.    hydrALAZINE (APRESOLINE) 25 MG tablet TAKE 1 TABLET(25 MG) BY MOUTH BID (Patient taking differently: TAKE 1 TABLET(25 MG) BY MOUTH TWICE DAILY)    ibuprofen (ADVIL,MOTRIN) 400 MG tablet Take 400 mg by mouth daily as needed (PAIN).    insulin glargine (LANTUS) 100 unit/mL injection Inject 10 Units into the skin every evening.    insulin lispro (HUMALOG) 100 unit/mL injection Inject 5 Units into the skin 3 (three) times daily before meals.    ipratropium (ATROVENT) 0.02 % nebulizer solution USE VIA NEBULIZER DAILY AS NEEDED FOR SHORTNESS OF BREATH/WHEEZING    levalbuterol (XOPENEX) 1.25 mg/3 mL nebulizer solution USE 1 VIAL VIA NEBULIZER EVERY 8 HOURS AS NEEDED FOR WHEEZING OR SHORTNESS OF BREATH    multivitamin (THERAGRAN) per tablet Take 0.5 tablets by mouth 2 (two) times daily.    omega-3 acid ethyl esters (LOVAZA) 1 gram capsule Take 2 capsules (2 g total) by mouth 2 (two) times daily. (Patient taking differently: Take 1 g by mouth once daily. )    pantoprazole  "(PROTONIX) 40 MG tablet TAKE 1 TABLET BY MOUTH EVERY DAY    pen needle, diabetic 30 gauge x 5/16" Ndle Use 4 disposable needles per day. Inject medication into skin daily    polyethylene glycol (GLYCOLAX) 17 gram PwPk Take 17 g by mouth once daily. (Patient taking differently: Take 17 g by mouth daily as needed (CONSTIPATION). )    prasugrel (EFFIENT) 10 mg Tab Take 10 mg by mouth once daily.    quetiapine (SEROQUEL) 100 MG Tab Take 100 mg by mouth nightly.     warfarin (COUMADIN) 2.5 MG tablet Take 2.5 mg by mouth Daily.    OXYGEN-AIR DELIVERY SYSTEMS MISC by Misc.(Non-Drug; Combo Route) route. Family states that patient is on 2.5 liters nasal cannula at home    underpads 23 X 36 " Pads Twice daily change as needed for incontinence.         Please add appropriate    SmartPhrase below:        "

## 2017-07-14 NOTE — ASSESSMENT & PLAN NOTE
-hx of stroke before 2012 per daughter  -pt with residual right sided deficit   -continue atorvastatin 50 mg  -Supratherapeutic on warfarin (INR 4.0) on admit -> now INR 2.0  - Holding heparin and asa, received 2/3 doses vit. K.

## 2017-07-14 NOTE — SUBJECTIVE & OBJECTIVE
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     Encounter for blood transfusion     Heart attack     History of pleural effusion     HTN (hypertension)     Hyperlipidemia     MI, old     Multiple thyroid nodules     Pulmonary HTN     Right hemiparesis     S/P coronary artery stent placement     S/P percutaneous endoscopic gastrostomy (PEG) tube placement     Screening for colon cancer     normal 2015 -10 yrs    Stroke        Past Surgical History:   Procedure Laterality Date    cardiac bypass      CORONARY ANGIOPLASTY WITH STENT PLACEMENT  7/29/15    [facility]    CORONARY ARTERY BYPASS GRAFT  6/20/15    [facility]    KNEE SURGERY      OVARY SURGERY      OVARY SURGERY      TONSILLECTOMY      TUBAL LIGATION         Review of patient's allergies indicates:   Allergen Reactions    Daypro [oxaprozin] Itching     Tolerates other NSAIDs    Vibramycin [doxycycline calcium]        Family History     Problem Relation (Age of Onset)    Cancer Sister    Diabetes Mother, Brother    Heart disease Sister, Brother, Son, Sister    Hyperlipidemia Mother    Hypertension Mother, Brother, Daughter    No Known Problems Daughter        Social History Main Topics    Smoking status: Never Smoker    Smokeless tobacco: Never Used    Alcohol use No    Drug use: No    Sexual activity: Not on file         Review of Systems   Constitutional: Negative for fatigue, fever and unexpected weight change.   HENT: Negative.    Respiratory: Positive for shortness of breath. Negative for cough, wheezing and stridor.    Cardiovascular: Positive for leg swelling. Negative for palpitations.    Gastrointestinal: Positive for abdominal distention.   Endocrine: Negative.    Allergic/Immunologic: Negative.    Hematological: Negative.      Objective:     Vital Signs (Most Recent):  Temp: 97.8 °F (36.6 °C) (07/14/17 1238)  Pulse: (!) 113 (07/14/17 1238)  Resp: 18 (07/14/17 1238)  BP: 126/77 (07/14/17 1238)  SpO2: (!) 93 % (07/14/17 1238) Vital Signs (24h Range):  Temp:  [97.8 °F (36.6 °C)-99 °F (37.2 °C)] 97.8 °F (36.6 °C)  Pulse:  [] 113  Resp:  [18-28] 18  SpO2:  [93 %-99 %] 93 %  BP: ()/(52-80) 126/77     Weight: 75.7 kg (166 lb 12.8 oz)  Body mass index is 28.63 kg/m².      Intake/Output Summary (Last 24 hours) at 07/14/17 1313  Last data filed at 07/14/17 0630   Gross per 24 hour   Intake                0 ml   Output              700 ml   Net             -700 ml       Physical Exam   Vitals reviewed.    General: Moderate work of breathing, on nasal cannula  HEENT: JVP elevated  Cardiac: Irregular, tachycardic  Respiratory: Using some accessory muscles, crackles bilaterally  Abdomen: Soft, NT  Extremities: 2+ edema, R>L (reportedly chronic)  Neuro: Grossly intact to brief exam.    Vents:       Lines/Drains/Airways     Drain                 Gastrostomy/Enterostomy 01/25/16 0835 Percutaneous endoscopic gastrostomy (PEG) midline feeding 536 days    Female External Urinary Catheter 07/13/17 1208 1 day                Significant Labs:    CBC/Anemia Profile:    Recent Labs  Lab 07/13/17  1039 07/13/17  1507 07/14/17  0440   WBC 3.01* 3.17* 4.38   HGB 7.9* 8.1* 8.1*   HCT 25.9* 27.2* 26.7*    168 202   MCV 90 91 91   RDW 16.2* 16.4* 16.2*        Chemistries:    Recent Labs  Lab 07/12/17  1739 07/13/17  0412 07/14/17  0441    144 142   K 4.6 3.8 4.0    111* 109   CO2 22* 25 25   BUN 44* 44* 47*   CREATININE 2.5* 2.4* 2.6*   CALCIUM 8.1* 7.8* 7.7*   ALBUMIN 2.0* 1.7* 1.8*   PROT 6.0 4.5* 4.8*   BILITOT 0.3 0.3 0.3   ALKPHOS 61 54* 56   ALT 10 7* 9*   AST 21 12 14   MG  --  1.4* 1.4*    PHOS  --  4.1 3.9       CMP:   Recent Labs  Lab 07/12/17  1739 07/13/17  0412 07/14/17  0441    144 142   K 4.6 3.8 4.0    111* 109   CO2 22* 25 25   * 151* 143*   BUN 44* 44* 47*   CREATININE 2.5* 2.4* 2.6*   CALCIUM 8.1* 7.8* 7.7*   PROT 6.0 4.5* 4.8*   ALBUMIN 2.0* 1.7* 1.8*   BILITOT 0.3 0.3 0.3   ALKPHOS 61 54* 56   AST 21 12 14   ALT 10 7* 9*   ANIONGAP 10 8 8   EGFRNONAA 18.5* 19.4* 17.6*     Coagulation:   Recent Labs  Lab 07/14/17  0441   INR 2.0*       Significant Imaging:   CXR: I have reviewed all pertinent results/findings within the past 24 hours and my personal findings are:  Bilateral interstitial and alveolar opacities with mild/moderate sized pleural effusions

## 2017-07-14 NOTE — ASSESSMENT & PLAN NOTE
- On admit (7/12) CXR showed R-sided airspace opacities concerning for edema or infectious process (SIRS 2/4, HR and RR). No fevers/chills or productive cough  - ABG normal  - Previously treated PNA with Levaquin on 6/15  - Started on azithromycin and ceftriaxone for CAP

## 2017-07-14 NOTE — CONSULTS
[facility]  Pulmonology  Consult Note    Patient Name: Michael Salgado  MRN: [id number]  Admission Date: 7/12/2017  Hospital Length of Stay: 2 days  Code Status: Full Code  Attending Physician: Kira Ruiz MD  Primary Care Provider: Wesley Watkins MD   Principal Problem: Acute on chronic diastolic congestive heart failure    Inpatient consult to Pulmonology  Consult performed by: ANNALISE BALDERRAMA  Consult ordered by: BURT ALVAREZ        Subjective:     HPI:  71yoF with hx of CVA, CAD s/p 3vCABG, HFrEF, CKD IV, AFib who presented with ~5 days of progressive dyspnea.  Has baseline dyspnea, orthopnea and wears 2.5L of O2 at home.  She was treated for a possible pneumonia 2 weeks ago after a clinic visit where she had some increased dyspnea.  Feels like she did not recover.  No worsening cough productive of sputum.  No fevers, chills. She states she has had a thoracentesis in the past but it may have been related to her CABG procedure.    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     Encounter for blood transfusion     Heart attack     History of pleural effusion     HTN (hypertension)     Hyperlipidemia     MI, old     Multiple thyroid nodules     Pulmonary HTN     Right hemiparesis     S/P coronary artery stent placement     S/P percutaneous endoscopic gastrostomy (PEG) tube placement     Screening for colon cancer     normal 2015 -10 yrs    Stroke        Past Surgical History:   Procedure Laterality Date    cardiac bypass      CORONARY ANGIOPLASTY WITH STENT PLACEMENT  7/29/15    E Dusty    CORONARY ARTERY BYPASS GRAFT  6/20/15     ANGELES Dusty    KNEE SURGERY      OVARY SURGERY      OVARY SURGERY      TONSILLECTOMY      TUBAL LIGATION         Review of patient's allergies indicates:   Allergen Reactions    Daypro [oxaprozin] Itching     Tolerates other NSAIDs    Vibramycin [doxycycline calcium]        Family History     Problem Relation (Age of Onset)    Cancer Sister    Diabetes Mother, Brother    Heart disease Sister, Brother, Son, Sister    Hyperlipidemia Mother    Hypertension Mother, Brother, Daughter    No Known Problems Daughter        Social History Main Topics    Smoking status: Never Smoker    Smokeless tobacco: Never Used    Alcohol use No    Drug use: No    Sexual activity: Not on file         Review of Systems   Constitutional: Negative for fatigue, fever and unexpected weight change.   HENT: Negative.    Respiratory: Positive for shortness of breath. Negative for cough, wheezing and stridor.    Cardiovascular: Positive for leg swelling. Negative for palpitations.   Gastrointestinal: Positive for abdominal distention.   Endocrine: Negative.    Allergic/Immunologic: Negative.    Hematological: Negative.      Objective:     Vital Signs (Most Recent):  Temp: 97.8 °F (36.6 °C) (07/14/17 1238)  Pulse: (!) 113 (07/14/17 1238)  Resp: 18 (07/14/17 1238)  BP: 126/77 (07/14/17 1238)  SpO2: (!) 93 % (07/14/17 1238) Vital Signs (24h Range):  Temp:  [97.8 °F (36.6 °C)-99 °F (37.2 °C)] 97.8 °F (36.6 °C)  Pulse:  [] 113  Resp:  [18-28] 18  SpO2:  [93 %-99 %] 93 %  BP: ()/(52-80) 126/77     Weight: 75.7 kg (166 lb 12.8 oz)  Body mass index is 28.63 kg/m².      Intake/Output Summary (Last 24 hours) at 07/14/17 1313  Last data filed at 07/14/17 0630   Gross per 24 hour   Intake                0 ml   Output              700 ml   Net             -700 ml       Physical Exam   Vitals reviewed.    General: Moderate work of breathing, on nasal cannula  HEENT: JVP elevated  Cardiac: Irregular, tachycardic  Respiratory: Using some  accessory muscles, crackles bilaterally  Abdomen: Soft, NT  Extremities: 2+ edema, R>L (reportedly chronic)  Neuro: Grossly intact to brief exam.    Vents:       Lines/Drains/Airways     Drain                 Gastrostomy/Enterostomy 01/25/16 0835 Percutaneous endoscopic gastrostomy (PEG) midline feeding 536 days    Female External Urinary Catheter 07/13/17 1208 1 day                Significant Labs:    CBC/Anemia Profile:    Recent Labs  Lab 07/13/17  1039 07/13/17  1507 07/14/17  0440   WBC 3.01* 3.17* 4.38   HGB 7.9* 8.1* 8.1*   HCT 25.9* 27.2* 26.7*    168 202   MCV 90 91 91   RDW 16.2* 16.4* 16.2*        Chemistries:    Recent Labs  Lab 07/12/17  1739 07/13/17  0412 07/14/17  0441    144 142   K 4.6 3.8 4.0    111* 109   CO2 22* 25 25   BUN 44* 44* 47*   CREATININE 2.5* 2.4* 2.6*   CALCIUM 8.1* 7.8* 7.7*   ALBUMIN 2.0* 1.7* 1.8*   PROT 6.0 4.5* 4.8*   BILITOT 0.3 0.3 0.3   ALKPHOS 61 54* 56   ALT 10 7* 9*   AST 21 12 14   MG  --  1.4* 1.4*   PHOS  --  4.1 3.9       CMP:   Recent Labs  Lab 07/12/17  1739 07/13/17  0412 07/14/17  0441    144 142   K 4.6 3.8 4.0    111* 109   CO2 22* 25 25   * 151* 143*   BUN 44* 44* 47*   CREATININE 2.5* 2.4* 2.6*   CALCIUM 8.1* 7.8* 7.7*   PROT 6.0 4.5* 4.8*   ALBUMIN 2.0* 1.7* 1.8*   BILITOT 0.3 0.3 0.3   ALKPHOS 61 54* 56   AST 21 12 14   ALT 10 7* 9*   ANIONGAP 10 8 8   EGFRNONAA 18.5* 19.4* 17.6*     Coagulation:   Recent Labs  Lab 07/14/17  0441   INR 2.0*       Significant Imaging:   CXR: I have reviewed all pertinent results/findings within the past 24 hours and my personal findings are:  Bilateral interstitial and alveolar opacities with mild/moderate sized pleural effusions    Assessment/Plan:     CAP (community acquired pneumonia)    Recently treated for pneumonia though there was not particularly strong evidence at that time for infection (though there was a more focal area of alveolar filling seen on CXR at that time).  Since  admission, limited evidence to suggest ongoing infectious process.  Her Procalcitonin is negative.    - Would recommend discontinuing abx as they are just contributing more volume at this point; using oral agents may be a reasonable alternative if there is considerable concern for infection        Acute on chronic respiratory failure with hypoxemia    Suspect this in large part due to significant pulmonary edema.  Her lung ultrasound suggests significant edema and her estimated EF at bedside appeared notably less than her previous EF of 40%.      - Escalate diuresis; her baseline CKD may make it more difficult to diurese her although she is on relatively low doses as an outpatient; could consider adding a synergistic agent like  Metolazone - Cardiology consult may be warranted  - Re: her pleural effusions; on ultrasound, the R effusion does not appear as though it can be safely tapped; the left is somewhat larger and could conceivably be drained but it's size does not suggest that it would be contributing significantly to her degree of dyspnea  - Her INR is presently 2.0, in the event we perform a thoracentesis, would like it < 1.8; could bridge her with lovenox (heparin gtt would give her additional volume) and hold the day before the procedure  - Though her INR has been supratherapeutic, can consider getting a LE ultrasound to evaluate for VTE; if present then using an alternative agent for anticoagulation would be necessary - We will follow with you and if she fails to improve clinically with more aggressive diuresis, can consider thoracentesis (risk of procedure if it can be managed strictly with medical therapy likely not warranted)        * Acute on chronic diastolic congestive heart failure    Please see plan for acute on chronic respiratory failure for details              Thank you for your consult. I will follow-up with patient. Please contact us if you have any additional questions.     Rocky Cr,  MD  Pulmonology  [facility]

## 2017-07-14 NOTE — CONSULTS
Cardiology Consult Note  Attending Physician: Kira Ruiz MD  Chief Complaint: Shortness of Breath (on home oxygen sent from cards clinic, )     HPI:   71 y.o. 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 She had been seen in Cardiology clinic on 06/22 and was found to be profoundly volume overloaded, and thus her diuretic regimen was increased, but despite that, she developed worsening SOB and was found to have HERNANDEZ on labwork and was thus seen in clinic on 07/12, where she was advised to be admitted for heart failure exacerbation, for which she declined. She however, changed her mind and thankfully presented to the ED, where she was diuresed with lasix 40 IV BID with minimal improvement. She states that she has poor appetite, but when she does eat, she'll have beans (and adds a ham hock into it), and does not watch her fluid intake. She also does not weigh herself daily at this time.     A 2D echo was performed with the initial signs of volume overload, and she was found to have a new onset HFrEF with a normal LVEF 1 year ago. She denies CP, but does admit to SOB, orthopnea, PND and LE swelling.   ROS:    Constitution: Negative for fever, chills, weight loss or gain.   HENT: Negative for sore throat, rhinorrhea, or headache.  Eyes: Negative for blurred or double vision.   Cardiovascular: See above  Pulmonary: Positive for SOB   Gastrointestinal: Negative for abdominal pain, nausea, vomiting, or diarrhea.   : Negative for dysuria.   Neurological: Negative for focal weakness or sensory changes.  PMH:     PTA Medications   Medication Sig    acetaminophen (TYLENOL) 500 MG tablet Take 1,000 mg by mouth daily as needed for Pain.    amlodipine (NORVASC) 5 MG tablet Take 5  mg by mouth once daily.    aspirin (ECOTRIN) 81 MG EC tablet Take 81 mg by mouth once daily.    atorvastatin (LIPITOR) 40 MG tablet Take 1 tablet (40 mg total) by mouth once daily.    azelastine (ASTELIN) 137 mcg (0.1 %) nasal spray 1 spray (137 mcg total) by Nasal route 2 (two) times daily. (Patient taking differently: 1 spray by Nasal route 2 (two) times daily as needed for Rhinitis. )    blood sugar diagnostic (TRUETEST TEST STRIPS) Strp Twice daily blood sugar check. (Patient taking differently: Three times daily for blood sugar check.)    buPROPion (WELLBUTRIN XL) 150 MG TB24 tablet Take 1 tablet (150 mg total) by mouth once daily.    butalbital-acetaminophen-caffeine -40 mg (FIORICET, ESGIC) -40 mg per tablet Take 1 tablet by mouth every 8 (eight) hours as needed for Headaches.    carvedilol (COREG) 25 MG tablet Take 25 mg by mouth 2 (two) times daily with meals.    cyanocobalamin (VITAMIN B-12) 1000 MCG tablet Take 100 mcg by mouth once daily.    dextran 70-hypromellose (TEARS) ophthalmic solution One drop into affected eye 4-6 times daily as needed    diazePAM (VALIUM) 5 MG tablet Take 1 tablet (5 mg total) by mouth every 8 (eight) hours as needed. (Patient taking differently: Take 5 mg by mouth every 8 (eight) hours as needed for Anxiety. )    diclofenac sodium 1 % Gel Apply topically 2 (two) times daily. (Patient taking differently: Apply topically 2 (two) times daily as needed (for pain). )    docusate sodium (COLACE) 100 MG capsule Take 100 mg by mouth once daily.    erythromycin (ROMYCIN) ophthalmic ointment Place into the right eye every evening.    escitalopram oxalate (LEXAPRO) 5 MG Tab Take 5 mg by mouth once daily.    estradiol (ESTRACE) 0.01 % (0.1 mg/gram) vaginal cream Place 1 g vaginally twice a week. (Patient taking differently: Place 1 g vaginally twice a week. As needed)    fluticasone (FLONASE) 50 mcg/actuation nasal spray 1 spray by Each Nare route once daily.  "(Patient taking differently: 1 spray by Each Nare route daily as needed for Rhinitis or Allergies. )    furosemide (LASIX) 20 MG tablet Take 1 tablet (20 mg total) by mouth 2 (two) times daily.    gabapentin (NEURONTIN) 300 MG capsule Take 1 capsule (300 mg total) by mouth once daily.    hydrALAZINE (APRESOLINE) 25 MG tablet TAKE 1 TABLET(25 MG) BY MOUTH BID (Patient taking differently: TAKE 1 TABLET(25 MG) BY MOUTH TWICE DAILY)    ibuprofen (ADVIL,MOTRIN) 400 MG tablet Take 400 mg by mouth daily as needed (PAIN).    insulin glargine (LANTUS) 100 unit/mL injection Inject 10 Units into the skin every evening.    insulin lispro (HUMALOG) 100 unit/mL injection Inject 5 Units into the skin 3 (three) times daily before meals.    ipratropium (ATROVENT) 0.02 % nebulizer solution USE VIA NEBULIZER DAILY AS NEEDED FOR SHORTNESS OF BREATH/WHEEZING    levalbuterol (XOPENEX) 1.25 mg/3 mL nebulizer solution USE 1 VIAL VIA NEBULIZER EVERY 8 HOURS AS NEEDED FOR WHEEZING OR SHORTNESS OF BREATH    multivitamin (THERAGRAN) per tablet Take 0.5 tablets by mouth 2 (two) times daily.    omega-3 acid ethyl esters (LOVAZA) 1 gram capsule Take 2 capsules (2 g total) by mouth 2 (two) times daily. (Patient taking differently: Take 1 g by mouth once daily. )    pantoprazole (PROTONIX) 40 MG tablet TAKE 1 TABLET BY MOUTH EVERY DAY    pen needle, diabetic 30 gauge x 5/16" Ndle Use 4 disposable needles per day. Inject medication into skin daily    polyethylene glycol (GLYCOLAX) 17 gram PwPk Take 17 g by mouth once daily. (Patient taking differently: Take 17 g by mouth daily as needed (CONSTIPATION). )    prasugrel (EFFIENT) 10 mg Tab Take 10 mg by mouth once daily.    quetiapine (SEROQUEL) 100 MG Tab Take 100 mg by mouth nightly.     warfarin (COUMADIN) 2.5 MG tablet Take 2.5 mg by mouth Daily.    OXYGEN-AIR DELIVERY SYSTEMS MISC by Misc.(Non-Drug; Combo Route) route. Family states that patient is on 2.5 liters nasal cannula at " "home    underpads 23 X 36 " Pads Twice daily change as needed for incontinence.      Review of patient's allergies indicates:   Allergen Reactions    Daypro [oxaprozin] Itching     Tolerates other NSAIDs    Vibramycin [doxycycline calcium]        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     Encounter for blood transfusion     Heart attack     History of pleural effusion     HTN (hypertension)     Hyperlipidemia     MI, old     Multiple thyroid nodules     Pulmonary HTN     Right hemiparesis     S/P coronary artery stent placement     S/P percutaneous endoscopic gastrostomy (PEG) tube placement     Screening for colon cancer     normal 2015 -10 yrs    Stroke        Past Surgical History:   Procedure Laterality Date    cardiac bypass      CORONARY ANGIOPLASTY WITH STENT PLACEMENT  7/29/15    [facility]    CORONARY ARTERY BYPASS GRAFT  6/20/15    [facility]    KNEE SURGERY      OVARY SURGERY      OVARY SURGERY      TONSILLECTOMY      TUBAL LIGATION        Family History   Problem Relation Age of Onset    Diabetes Mother     Hypertension Mother     Hyperlipidemia Mother     Heart disease Sister     Cancer Sister      colon    Heart disease Brother     Diabetes Brother     Hypertension Brother     No Known Problems Daughter     Heart disease Son     Heart disease Sister     Hypertension Daughter        Social History   Substance Use Topics    Smoking status: Never Smoker    Smokeless tobacco: Never Used    Alcohol use No        Allergies:     Review of patient's allergies indicates:   Allergen Reactions    Daypro [oxaprozin] Itching     Tolerates " other NSAIDs    Vibramycin [doxycycline calcium]      Medications:     No current facility-administered medications on file prior to encounter.      Current Outpatient Prescriptions on File Prior to Encounter   Medication Sig Dispense Refill    amlodipine (NORVASC) 5 MG tablet Take 5 mg by mouth once daily.      aspirin (ECOTRIN) 81 MG EC tablet Take 81 mg by mouth once daily.      atorvastatin (LIPITOR) 40 MG tablet Take 1 tablet (40 mg total) by mouth once daily. 90 tablet 3    azelastine (ASTELIN) 137 mcg (0.1 %) nasal spray 1 spray (137 mcg total) by Nasal route 2 (two) times daily. (Patient taking differently: 1 spray by Nasal route 2 (two) times daily as needed for Rhinitis. ) 30 mL 0    blood sugar diagnostic (TRUETEST TEST STRIPS) Strp Twice daily blood sugar check. (Patient taking differently: Three times daily for blood sugar check.) 200 each 11    buPROPion (WELLBUTRIN XL) 150 MG TB24 tablet Take 1 tablet (150 mg total) by mouth once daily. 30 tablet 12    butalbital-acetaminophen-caffeine -40 mg (FIORICET, ESGIC) -40 mg per tablet Take 1 tablet by mouth every 8 (eight) hours as needed for Headaches. 10 tablet 0    carvedilol (COREG) 25 MG tablet Take 25 mg by mouth 2 (two) times daily with meals.      dextran 70-hypromellose (TEARS) ophthalmic solution One drop into affected eye 4-6 times daily as needed 15 mL 0    diazePAM (VALIUM) 5 MG tablet Take 1 tablet (5 mg total) by mouth every 8 (eight) hours as needed. (Patient taking differently: Take 5 mg by mouth every 8 (eight) hours as needed for Anxiety. ) 45 tablet 3    diclofenac sodium 1 % Gel Apply topically 2 (two) times daily. (Patient taking differently: Apply topically 2 (two) times daily as needed (for pain). ) 100 g 0    estradiol (ESTRACE) 0.01 % (0.1 mg/gram) vaginal cream Place 1 g vaginally twice a week. (Patient taking differently: Place 1 g vaginally twice a week. As needed) 42.5 g 11    fluticasone (FLONASE) 50  "mcg/actuation nasal spray 1 spray by Each Nare route once daily. (Patient taking differently: 1 spray by Each Nare route daily as needed for Rhinitis or Allergies. ) 1 Bottle 0    furosemide (LASIX) 20 MG tablet Take 1 tablet (20 mg total) by mouth 2 (two) times daily. 60 tablet 11    gabapentin (NEURONTIN) 300 MG capsule Take 1 capsule (300 mg total) by mouth once daily. 90 capsule 0    hydrALAZINE (APRESOLINE) 25 MG tablet TAKE 1 TABLET(25 MG) BY MOUTH BID (Patient taking differently: TAKE 1 TABLET(25 MG) BY MOUTH TWICE DAILY) 180 tablet 3    insulin glargine (LANTUS) 100 unit/mL injection Inject 10 Units into the skin every evening. 1 vial 3    insulin lispro (HUMALOG) 100 unit/mL injection Inject 5 Units into the skin 3 (three) times daily before meals. 100 mL 12    levalbuterol (XOPENEX) 1.25 mg/3 mL nebulizer solution USE 1 VIAL VIA NEBULIZER EVERY 8 HOURS AS NEEDED FOR WHEEZING OR SHORTNESS OF BREATH 792 mL 0    omega-3 acid ethyl esters (LOVAZA) 1 gram capsule Take 2 capsules (2 g total) by mouth 2 (two) times daily. (Patient taking differently: Take 1 g by mouth once daily. ) 360 capsule 0    pantoprazole (PROTONIX) 40 MG tablet TAKE 1 TABLET BY MOUTH EVERY DAY 90 tablet 0    pen needle, diabetic 30 gauge x 5/16" Ndle Use 4 disposable needles per day. Inject medication into skin daily 120 each 11    polyethylene glycol (GLYCOLAX) 17 gram PwPk Take 17 g by mouth once daily. (Patient taking differently: Take 17 g by mouth daily as needed (CONSTIPATION). ) 30 packet 0    quetiapine (SEROQUEL) 100 MG Tab Take 100 mg by mouth nightly.       OXYGEN-A[place]IV[place]S [place] [facility] [place]; Combo Route) route. Family states that patient is on 2.5 liters nasal cannula at home      underpads 23 X 36 " Pads Twice daily change as needed for incontinence. 150 each 11    [DISCONTINUED] escitalopram oxalate (LEXAPRO) 10 MG tablet Take 1 tablet (10 mg total) by mouth once daily. (Patient taking " differently: Take 5 mg by mouth once daily. ) 90 tablet 3    [DISCONTINUED] escitalopram oxalate (LEXAPRO) 10 MG tablet TAKE 1 TABLET(10 MG) BY MOUTH EVERY DAY 90 tablet 0    [DISCONTINUED] gabapentin (NEURONTIN) 100 MG capsule TAKE 1 TO 2 CAPSULES BY MOUTH EVERY MORNING AND AFTERNOON ALONG WITH 300MG AT NIGHT 360 capsule 11    [DISCONTINUED] warfarin (COUMADIN) 5 MG tablet Take 1 tablet (5 mg total) by mouth Daily. 30 tablet 11       Inpatient Medications   Continuous Infusions:   furosemide (LASIX) 5 mg/mL infusion (non-titrating)       Scheduled Meds:   albuterol-ipratropium 2.5mg-0.5mg/3mL  3 mL Nebulization Q6H    amlodipine  5 mg Oral Daily    atorvastatin  40 mg Oral Daily    buPROPion  150 mg Oral Daily    carvedilol  25 mg Oral BID WM    escitalopram oxalate  5 mg Oral Daily    furosemide  80 mg Intravenous Once    gabapentin  300 mg Oral QHS    hydrALAZINE  25 mg Oral Q12H    insulin aspart  3 Units Subcutaneous TIDWM    insulin detemir  8 Units Subcutaneous QHS    magnesium sulfate in water  2 g Intravenous Q2H    pantoprazole  40 mg Oral Daily    polyethylene glycol  17 g Oral Daily    potassium bicarbonate  50 mEq Oral Once    quetiapine  100 mg Oral QHS    sodium chloride 0.9%  3 mL Intravenous Q8H     PRN Meds:sodium chloride, acetaminophen, acetaminophen, butalbital-acetaminophen-caffeine -40 mg, dextrose 50%, dextrose 50%, diphenhydrAMINE, glucagon (human recombinant), glucose, glucose, insulin aspart, ondansetron     Social History:     Social History   Substance Use Topics    Smoking status: Never Smoker    Smokeless tobacco: Never Used    Alcohol use No     Family History:     Family History   Problem Relation Age of Onset    Diabetes Mother     Hypertension Mother     Hyperlipidemia Mother     Heart disease Sister     Cancer Sister      colon    Heart disease Brother     Diabetes Brother     Hypertension Brother     No Known Problems Daughter     Heart  disease Son     Heart disease Sister     Hypertension Daughter      Physical Exam:     Vitals:  Temp:  [97.8 °F (36.6 °C)-99 °F (37.2 °C)]   Pulse:  [100-121]   Resp:  [18-28]   BP: (108-156)/(65-80)   SpO2:  [93 %-99 %]  on 2L N/C I/O's:    Intake/Output Summary (Last 24 hours) at 07/14/17 1601  Last data filed at 07/14/17 0630   Gross per 24 hour   Intake                0 ml   Output              700 ml   Net             -700 ml        Constitutional: Appearing dyspneic  HEENT: Sclera anicteric, PERRLA, EOMI  Neck: 16-18 cm JVD, plethoric neck veins  CV: Irregularly irregular, no murmur, normal S1/S2  Pulm: Bibasilar crackles and decreased breath sounds at the bases.  GI: Abdomen soft, NTND, +BS  Extremities: 3+ doughy LE edema and 3+ sacral edema, warm and well perfused  Skin: No ecchymosis, erythema, or ulcers  Psych: AOx3, appropriate affect  Neuro: CNII-XII intact, no focal deficits    Labs:     CBC: CMP:      Recent Labs  Lab 07/13/17  1039 07/13/17  1507 07/14/17  0440   WBC 3.01* 3.17* 4.38   HGB 7.9* 8.1* 8.1*   HCT 25.9* 27.2* 26.7*    168 202   MCV 90 91 91   RDW 16.2* 16.4* 16.2*      Recent Labs  Lab 07/12/17  1739 07/13/17  0412 07/14/17  0441    144 142   K 4.6 3.8 4.0    111* 109   CO2 22* 25 25   BUN 44* 44* 47*   CREATININE 2.5* 2.4* 2.6*   CALCIUM 8.1* 7.8* 7.7*   PROT 6.0 4.5* 4.8*   BILITOT 0.3 0.3 0.3   ALKPHOS 61 54* 56   ALT 10 7* 9*   AST 21 12 14   MG  --  1.4* 1.4*   PHOS  --  4.1 3.9        Cholesterol: Coagulation   Lab Results   Component Value Date    CHOL 191 02/23/2017    HDL 25 (L) 02/23/2017    LDLCALC 130.6 02/23/2017    TRIG 177 (H) 02/23/2017      Recent Labs  Lab 07/14/17  0441   INR 2.0*        Misc:     Recent Labs  Lab 07/13/17  1506   TROPONINI 0.037*      Lab Results   Component Value Date    HGBA1C 5.8 (H) 07/13/2017        Micro:   Microbiology Results (last 7 days)     ** No results found for the last 168 hours. **           Imaging:     CXR  (07/14/2017):   Partial clearing of air space consolidation in the right mid and lower lung zones since the most recent prior chest radiograph of 7/12/17 at 6:12 PM is observed, as is slight diminution in the volume of pleural fluid on this side.  No significant detrimental interval change in the appearance of the chest since that time is appreciated.     EKG (07/13/2017):   Atrial Fibrillation with old LBBB     Telemetry:   AFib     2D Echo (06/23/2017):   1 - Mildly to moderately depressed left ventricular systolic function (EF 40-45%). Abnormal septal motion due to underlying LBBB. Significant drop in LV function, compared to the prior reported, Images could not be retrieved for comparison.     2 - Normal right ventricular systolic function .     3 - Pulmonary hypertension. The estimated PA systolic pressure is 41 mmHg.     4 - Mild tricuspid regurgitation.     5 - Small pericardial effusion.     6 - Intermediate central venous pressure.     7 - Biatrial enlargement.      Assessment:     71 y.o. 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 Titrate up as tolerated  --strict I&O's with fluid restriction of 1.5L  --daily weights  --patient will require ischemic evaluation via PET scan when euvolemic  --CPAP/BiPAP PRN for respiratory distress    #Chronic AFib  --patient is chronically in AFib at this time  --[id number] of 8 - patient requires anticoagulation. With her prior CVA, she must be on  anticoagulation to prevent future strokes  --agree with discontinuation of effient.    Thank you for the consult. We will continue to follow. Staff addendum to follow    Signed:  Fabiana Mendoza MD  Cardiology Fellow, PGY5  7/14/2017 4:01 PM

## 2017-07-14 NOTE — PT/OT/SLP EVAL
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     Encounter for blood transfusion     Heart attack     History of pleural effusion     HTN (hypertension)     Hyperlipidemia     MI, old     Multiple thyroid nodules     Pulmonary HTN     Right hemiparesis     S/P coronary artery stent placement     S/P percutaneous endoscopic gastrostomy (PEG) tube placement     Screening for colon cancer     normal 2015 -10 yrs    Stroke       Past Surgical History:   Procedure Laterality Date    cardiac bypass      CORONARY ANGIOPLASTY WITH STENT PLACEMENT  7/29/15    [facility]    CORONARY ARTERY BYPASS GRAFT  6/20/15    [facility]    KNEE SURGERY      OVARY SURGERY      OVARY SURGERY      TONSILLECTOMY      TUBAL LIGATION         Referring physician: Joseph  Date referred to PT: 7/13/2017    General Precautions: Standard, fall  Orthopedic Precautions: N/A   Braces: N/A       Do you have any cultural, spiritual, Yazidi conflicts, given your current situation?: none stated    Patient History:  Lives With: spouse  Living Arrangements: house  Home Accessibility:  (ramp to enter: no concerns)  Transportation Available: van, wheelchair accessible  Living Environment Comment: Patient lives with spouse in 1-story home with ramp to enter.  "Regular tub/toilet with grab bars. Assist with ADLs and mobility PTA. Patient states  is not in good health. Daughter reports current 24hr assist from family to assist with mobility and ADLs.  Equipment Currently Used at Home: oxygen, CPAP, bedside commode, walker, rolling, wheelchair, 3-in-1 commode  DME owned (not currently used): none    Previous Level of Function:  Ambulation Skills: needs device and assist  Transfer Skills: needs device and assist  ADL Skills: needs device and assist    Subjective:  Communicated with RN prior to session.  Patient agreeable to PT session. Daughter present. Patient states, "I dont want to fall" "if you drop me I'll punch you in the mouth". Daughter reports patient has not walked in two months.  Chief Complaint: "R leg is heavy"  Patient goals: return home    Pain/Comfort  Pain Rating 1: 0/10  Pain Rating Post-Intervention 1: 0/10      Objective:   Patient found with: oxygen (purewick)     Cognitive Exam:  Oriented to: Person, Place, Time and Situation    Follows Commands/attention: Follows multistep  commands  Communication: clear/fluent  Safety awareness/insight to disability: intact    Physical Exam:  Postural examination/scapula alignment: Rounded shoulder, Head forward and Kyphosis    Skin integrity: Visible skin intact  Edema: Mild RLE    Sensation:   Intact to LLE; impaired RLE    Lower Extremity Range of Motion:  Right Lower Extremity: PROM WFL; AROM limited by decreased strength of RLE secondary to hemiparesis  Left Lower Extremity: WFL    Lower Extremity Strength:  Right Lower Extremity: Deficits: generalized 2-/5  Left Lower Extremity: WFL    Gross motor coordination: impaired secondary to R hemiparesis    Functional Mobility:  Bed Mobility:  Rolling/Turning Right: Moderate assistance  Scooting/Bridging: Moderate Assistance  Supine to Sit: Moderate Assistance  Sit to Supine:  (no assessed patient up in bedside chair)    Transfers:  Sit <> Stand Assistance: " Minimum Assistance (x2)  Sit <> Stand Assistive Device: No Assistive Device  Bed <> Chair Technique: Stand Pivot  Bed <> Chair Assistance: Moderate Assistance  Bed <> Chair Assistive Device: No Assistive Device    Gait:   Gait Distance: 3ft to bedside chair  Assistance 1: Moderate assistance  Gait Assistive Device: No device  Gait Pattern: swing-to gait  Gait Deviation(s):  (no foot clearance with RLE. Stepping with LLE and pivoted on RLE. )    Balance:   Static Sit: FAIR: Maintains without assist, but unable to take any challenges   Dynamic Sit: FAIR+: Maintains balance through MINIMAL excursions of active trunk motion  Static Stand: POOR+: Needs MINIMAL assist to maintain  Dynamic stand: FAIR: Needs Moderate Assist during gait    Therapeutic Activities and Exercises:  Patient educated on role of PT/POC.  Patient needs maximum encouragement to participate with rehab services.    Bed mobility: R rolling, supine<>sit Mod Assist  Sit<>stand x2: Oswald with R hand on bedrail  Stand pivot to bedside chair Mod Assist without assistive device  Gait 3ft to bedside chair with Mod Assist without assistive device    White board updated: safe to transfer with RN staff to [facility].    AM-PAC 6 CLICK MOBILITY  How much help from another person does this patient currently need?   1 = Unable, Total/Dependent Assistance  2 = A lot, Maximum/Moderate Assistance  3 = A little, Minimum/Contact Guard/Supervision  4 = None, Modified [place]/Independent    Turning over in bed (including adjusting bedclothes, sheets and blankets)?: 2  Sitting down on and standing up from a chair with arms (e.g., wheelchair, bedside commode, etc.): 3  Moving from lying on back to sitting on the side of the bed?: 2  Moving to and from a bed to a chair (including a wheelchair)?: 2  Need to walk in hospital room?: 2  Climbing 3-5 steps with a railing?: 1  Total Score: 12     AM-PAC Raw Score CMS G-Code Modifier Level of Impairment Assistance   6 %  Total / Unable   7 - 9 CM 80 - 100% Maximal Assist   10 - 14 CL 60 - 80% Moderate Assist   15 - 19 CK 40 - 60% Moderate Assist   20 - 22 CJ 20 - 40% Minimal Assist   23 CI 1-20% SBA / CGA   24 CH 0% Independent/ Mod I     Patient left up in chair with all lines intact, call button in reach, RN notified and daughter and MD present.    Assessment:   Michael Salgado is a 71 y.o. female with a medical diagnosis of Acute on chronic diastolic congestive heart failure and presents with R-sided weakness, impaired dynamic balance, and gait instability limiting functional mobility. Stand pivot transfer to bedside chair with Mod Assist. To benefit from continued skilled intervention to address deficits prior to transition to SNF to improve safety and overall functional mobility.    History: personal factors and/or comorbidities that impact the plan of care: acute on chronic CHF, R-hemiparesis, fall risk  Evaluation of Body Systems: cardiovascular/pulmonary, integumentary, musculoskeletal, neuromuscular, cognition/communication  Functional Outcome Tools: AMPAC, ROM, MMT  Clinical Presentation: evolving      Evaluation Complexity Level: Moderate     Rehab identified problem list/impairments: Rehab identified problem list/impairments: weakness, impaired endurance, impaired sensation, gait instability, edema, impaired functional mobilty, impaired self care skills, impaired balance, decreased coordination, decreased lower extremity function, decreased safety awareness, impaired coordination, impaired cardiopulmonary response to activity    Rehab potential is good.    Activity tolerance: Fair    Discharge recommendations: Discharge Facility/Level Of Care Needs: nursing facility, skilled     Barriers to discharge: Barriers to Discharge: None    Equipment recommendations: Equipment Needed After Discharge: none     GOALS:    Physical Therapy Goals        Problem: Physical Therapy Goal    Goal Priority Disciplines Outcome Goal  Variances Interventions   Physical Therapy Goal     PT/OT, PT Ongoing (interventions implemented as appropriate)     Description:  Goals to be met by: 2017    Patient will increase functional independence with mobility by performin. Supine to sit with MInimal Assistance  2. Sit to supine with MInimal Assistance  3. Rolling to Right with Minimal Assistance.  4. Sit to stand transfer with Stand-by Assistance  5. Bed to chair transfer with Stand-by Assistance using Rolling Walker  6. Gait  x 10 feet with Minimal Assistance using Rolling Walker.                         PLAN:    Patient to be seen 4 x/week to address the above listed problems via gait training, therapeutic activities, therapeutic exercises, neuromuscular re-education, wheelchair management/training  Plan of Care expires: 17  Plan of Care reviewed with: patient, daughter          Al PATIÑO Esmer CLINE, PT  2017

## 2017-07-14 NOTE — ASSESSMENT & PLAN NOTE
-pt presents from cardiology clinic with SOB, MILIAN, LE edema for past 5 days  -on home O2 2.5 L  -patient with increase lasix requirement since shingles and PNA in May (got abx as an out pt)  -home meds include lasix 20 mg BID, coreg 25 BID, hydralazine 25 BID, amlodipine 5 mg  -she is compliant with her home meds and a low salt, fluid restricted diet  -most recent echo 6/2017 below  -On admit (7/12), CXR shows worsening pulmonary edema, BMP 1413 (above baseline), tn 0.04, EKG shows afib with RVR without acute ischemic changes  -Cr on admission 2.5, continue to monitor RFTs  -continue diuresis with 40 mg IV BID  - Net -0.8L from admission  - CXR 7/14 shows interval improvement from 7/12  - Pulmonology and cardiology consulted    Echo 6/2017  1 - Mildly to moderately depressed left ventricular systolic function (EF 40-45%). Abnormal septal motion due to underlying LBBB. Significant drop in LV function, compared to the prior reported, Images could not be retrieved for comparison.     2 - Normal right ventricular systolic function .     3 - Pulmonary hypertension. The estimated PA systolic pressure is 41 mmHg.     4 - Mild tricuspid regurgitation.     5 - Small pericardial effusion.     6 - Intermediate central venous pressure.     7 - Biatrial enlargement.

## 2017-07-14 NOTE — PLAN OF CARE
"Problem: Patient Care Overview  Goal: Plan of Care Review  Outcome: Ongoing (interventions implemented as appropriate)  Discussed with patient plan of care. Anxious to get home. Verbalizes "nothing is being done." Reminded pt of tx plan: to monitor heart per telemetry, to receive IV meds, to monitor symptoms for new meds, to be seen by heart specialist, to evaluate and update tx plan. Pt and daughter a little more relaxed after explanations.       "

## 2017-07-15 LAB
ALBUMIN SERPL BCP-MCNC: 1.8 G/DL
ALBUMIN SERPL BCP-MCNC: 2 G/DL
ALP SERPL-CCNC: 56 U/L
ALP SERPL-CCNC: 59 U/L
ALT SERPL W/O P-5'-P-CCNC: 7 U/L
ALT SERPL W/O P-5'-P-CCNC: 8 U/L
ANION GAP SERPL CALC-SCNC: 7 MMOL/L
ANION GAP SERPL CALC-SCNC: 7 MMOL/L
AST SERPL-CCNC: 11 U/L
AST SERPL-CCNC: 13 U/L
BASOPHILS # BLD AUTO: 0.02 K/UL
BASOPHILS NFR BLD: 0.5 %
BILIRUB SERPL-MCNC: 0.3 MG/DL
BILIRUB SERPL-MCNC: 0.3 MG/DL
BUN SERPL-MCNC: 48 MG/DL
BUN SERPL-MCNC: 49 MG/DL
CALCIT SERPL-MCNC: <5 PG/ML
CALCIUM SERPL-MCNC: 7.6 MG/DL
CALCIUM SERPL-MCNC: 8.2 MG/DL
CHLORIDE SERPL-SCNC: 107 MMOL/L
CHLORIDE SERPL-SCNC: 108 MMOL/L
CO2 SERPL-SCNC: 27 MMOL/L
CO2 SERPL-SCNC: 28 MMOL/L
CREAT SERPL-MCNC: 2.6 MG/DL
CREAT SERPL-MCNC: 2.6 MG/DL
DIFFERENTIAL METHOD: ABNORMAL
EOSINOPHIL # BLD AUTO: 0.1 K/UL
EOSINOPHIL NFR BLD: 1.1 %
ERYTHROCYTE [DISTWIDTH] IN BLOOD BY AUTOMATED COUNT: 16.4 %
EST. GFR  (AFRICAN AMERICAN): 20.3 ML/MIN/1.73 M^2
EST. GFR  (AFRICAN AMERICAN): 20.3 ML/MIN/1.73 M^2
EST. GFR  (NON AFRICAN AMERICAN): 17.6 ML/MIN/1.73 M^2
EST. GFR  (NON AFRICAN AMERICAN): 17.6 ML/MIN/1.73 M^2
GLUCOSE SERPL-MCNC: 145 MG/DL
GLUCOSE SERPL-MCNC: 172 MG/DL
HCT VFR BLD AUTO: 26.9 %
HGB BLD-MCNC: 8 G/DL
INR PPP: 1.1
LDH SERPL L TO P-CCNC: 178 U/L
LYMPHOCYTES # BLD AUTO: 1.3 K/UL
LYMPHOCYTES NFR BLD: 28.6 %
MAGNESIUM SERPL-MCNC: 2.1 MG/DL
MCH RBC QN AUTO: 27 PG
MCHC RBC AUTO-ENTMCNC: 29.7 %
MCV RBC AUTO: 91 FL
MONOCYTES # BLD AUTO: 0.4 K/UL
MONOCYTES NFR BLD: 9.8 %
NEUTROPHILS # BLD AUTO: 2.6 K/UL
NEUTROPHILS NFR BLD: 59.5 %
PHOSPHATE SERPL-MCNC: 3.4 MG/DL
PLATELET # BLD AUTO: 202 K/UL
PMV BLD AUTO: 9.7 FL
POCT GLUCOSE: 128 MG/DL (ref 70–110)
POCT GLUCOSE: 140 MG/DL (ref 70–110)
POCT GLUCOSE: 233 MG/DL (ref 70–110)
POCT GLUCOSE: 272 MG/DL (ref 70–110)
POCT GLUCOSE: 62 MG/DL (ref 70–110)
POTASSIUM SERPL-SCNC: 4.8 MMOL/L
POTASSIUM SERPL-SCNC: 5.1 MMOL/L
PROT SERPL-MCNC: 4.9 G/DL
PROT SERPL-MCNC: 5.3 G/DL
PROTHROMBIN TIME: 11.8 SEC
RBC # BLD AUTO: 2.96 M/UL
SODIUM SERPL-SCNC: 142 MMOL/L
SODIUM SERPL-SCNC: 142 MMOL/L
WBC # BLD AUTO: 4.41 K/UL

## 2017-07-15 PROCEDURE — 94640 AIRWAY INHALATION TREATMENT: CPT

## 2017-07-15 PROCEDURE — 25000242 PHARM REV CODE 250 ALT 637 W/ HCPCS: Performed by: HOSPITALIST

## 2017-07-15 PROCEDURE — 85610 PROTHROMBIN TIME: CPT

## 2017-07-15 PROCEDURE — 25000003 PHARM REV CODE 250: Performed by: STUDENT IN AN ORGANIZED HEALTH CARE EDUCATION/TRAINING PROGRAM

## 2017-07-15 PROCEDURE — 11000001 HC ACUTE MED/SURG PRIVATE ROOM

## 2017-07-15 PROCEDURE — 63600175 PHARM REV CODE 636 W HCPCS: Performed by: STUDENT IN AN ORGANIZED HEALTH CARE EDUCATION/TRAINING PROGRAM

## 2017-07-15 PROCEDURE — 83735 ASSAY OF MAGNESIUM: CPT

## 2017-07-15 PROCEDURE — 80053 COMPREHEN METABOLIC PANEL: CPT

## 2017-07-15 PROCEDURE — 99233 SBSQ HOSP IP/OBS HIGH 50: CPT | Mod: GC,,, | Performed by: HOSPITALIST

## 2017-07-15 PROCEDURE — 25000003 PHARM REV CODE 250: Performed by: HOSPITALIST

## 2017-07-15 PROCEDURE — 85025 COMPLETE CBC W/AUTO DIFF WBC: CPT

## 2017-07-15 PROCEDURE — 36415 COLL VENOUS BLD VENIPUNCTURE: CPT

## 2017-07-15 PROCEDURE — 84100 ASSAY OF PHOSPHORUS: CPT

## 2017-07-15 PROCEDURE — 94761 N-INVAS EAR/PLS OXIMETRY MLT: CPT

## 2017-07-15 PROCEDURE — 97802 MEDICAL NUTRITION INDIV IN: CPT

## 2017-07-15 PROCEDURE — 27000221 HC OXYGEN, UP TO 24 HOURS

## 2017-07-15 PROCEDURE — 83615 LACTATE (LD) (LDH) ENZYME: CPT

## 2017-07-15 PROCEDURE — 99222 1ST HOSP IP/OBS MODERATE 55: CPT | Mod: ,,, | Performed by: INTERNAL MEDICINE

## 2017-07-15 RX ORDER — HEPARIN SODIUM 5000 [USP'U]/ML
5000 INJECTION, SOLUTION INTRAVENOUS; SUBCUTANEOUS EVERY 8 HOURS
Status: DISCONTINUED | OUTPATIENT
Start: 2017-07-15 | End: 2017-07-15

## 2017-07-15 RX ORDER — HYDRALAZINE HYDROCHLORIDE 25 MG/1
25 TABLET, FILM COATED ORAL 3 TIMES DAILY
Status: DISCONTINUED | OUTPATIENT
Start: 2017-07-15 | End: 2017-07-17

## 2017-07-15 RX ORDER — FUROSEMIDE 10 MG/ML
80 INJECTION INTRAMUSCULAR; INTRAVENOUS ONCE
Status: COMPLETED | OUTPATIENT
Start: 2017-07-15 | End: 2017-07-15

## 2017-07-15 RX ORDER — ISOSORBIDE DINITRATE 10 MG/1
10 TABLET ORAL 3 TIMES DAILY
Status: DISCONTINUED | OUTPATIENT
Start: 2017-07-15 | End: 2017-07-26

## 2017-07-15 RX ADMIN — POLYETHYLENE GLYCOL 3350 17 G: 17 POWDER, FOR SOLUTION ORAL at 09:07

## 2017-07-15 RX ADMIN — HYDRALAZINE HYDROCHLORIDE 25 MG: 25 TABLET, FILM COATED ORAL at 09:07

## 2017-07-15 RX ADMIN — IPRATROPIUM BROMIDE AND ALBUTEROL SULFATE 3 ML: .5; 3 SOLUTION RESPIRATORY (INHALATION) at 12:07

## 2017-07-15 RX ADMIN — INSULIN ASPART 3 UNITS: 100 INJECTION, SOLUTION INTRAVENOUS; SUBCUTANEOUS at 05:07

## 2017-07-15 RX ADMIN — HYDRALAZINE HYDROCHLORIDE 25 MG: 25 TABLET, FILM COATED ORAL at 08:07

## 2017-07-15 RX ADMIN — BUPROPION HYDROCHLORIDE 150 MG: 150 TABLET, FILM COATED, EXTENDED RELEASE ORAL at 09:07

## 2017-07-15 RX ADMIN — ISOSORBIDE DINITRATE 10 MG: 10 TABLET ORAL at 02:07

## 2017-07-15 RX ADMIN — IPRATROPIUM BROMIDE AND ALBUTEROL SULFATE 3 ML: .5; 3 SOLUTION RESPIRATORY (INHALATION) at 08:07

## 2017-07-15 RX ADMIN — ESCITALOPRAM 5 MG: 5 TABLET, FILM COATED ORAL at 09:07

## 2017-07-15 RX ADMIN — PANTOPRAZOLE SODIUM 40 MG: 40 TABLET, DELAYED RELEASE ORAL at 09:07

## 2017-07-15 RX ADMIN — FUROSEMIDE 80 MG: 10 INJECTION, SOLUTION INTRAVENOUS at 06:07

## 2017-07-15 RX ADMIN — QUETIAPINE FUMARATE 100 MG: 100 TABLET, FILM COATED ORAL at 08:07

## 2017-07-15 RX ADMIN — CARVEDILOL 25 MG: 25 TABLET, FILM COATED ORAL at 09:07

## 2017-07-15 RX ADMIN — AMLODIPINE BESYLATE 5 MG: 5 TABLET ORAL at 09:07

## 2017-07-15 RX ADMIN — INSULIN ASPART 3 UNITS: 100 INJECTION, SOLUTION INTRAVENOUS; SUBCUTANEOUS at 09:07

## 2017-07-15 RX ADMIN — FUROSEMIDE 15 MG/HR: 10 INJECTION, SOLUTION INTRAVENOUS at 06:07

## 2017-07-15 RX ADMIN — ISOSORBIDE DINITRATE 10 MG: 10 TABLET ORAL at 08:07

## 2017-07-15 RX ADMIN — HYDRALAZINE HYDROCHLORIDE 25 MG: 25 TABLET, FILM COATED ORAL at 02:07

## 2017-07-15 RX ADMIN — ATORVASTATIN CALCIUM 40 MG: 20 TABLET, FILM COATED ORAL at 09:07

## 2017-07-15 RX ADMIN — APIXABAN 5 MG: 5 TABLET, FILM COATED ORAL at 08:07

## 2017-07-15 RX ADMIN — INSULIN DETEMIR 8 UNITS: 100 INJECTION, SOLUTION SUBCUTANEOUS at 08:07

## 2017-07-15 RX ADMIN — ACETAMINOPHEN 650 MG: 325 TABLET ORAL at 06:07

## 2017-07-15 RX ADMIN — GABAPENTIN 300 MG: 300 CAPSULE ORAL at 08:07

## 2017-07-15 RX ADMIN — INSULIN ASPART 6 UNITS: 100 INJECTION, SOLUTION INTRAVENOUS; SUBCUTANEOUS at 05:07

## 2017-07-15 RX ADMIN — ACETAMINOPHEN 650 MG: 325 TABLET ORAL at 12:07

## 2017-07-15 NOTE — PLAN OF CARE
Problem: Patient Care Overview  Goal: Plan of Care Review  Outcome: Ongoing (interventions implemented as appropriate)  Pt with tachypnea andsob throughout shift. Pt up in chair most of the night. Dr. Meyers to bedside to evaluate pt and order to continue to monitor. Pt on lasix gtt continuous. Pt incontinent and has pure wick in place but not all urine able to be caught in canister. Pt weight recorded this am as well. Pt other VSS at this time. Monitoring.

## 2017-07-15 NOTE — ASSESSMENT & PLAN NOTE
- On admit (7/12) CXR showed R-sided airspace opacities concerning for edema or infectious process (SIRS 2/4, HR and RR). No fevers/chills or productive cough  - ABG normal  - Previously treated PNA with Levaquin on 6/15  - Started on azithromycin and ceftriaxone for CAP, D/C'd 2/2 low suspicion for PNA

## 2017-07-15 NOTE — NURSING
Pt requested to get out of bed to sit up in chair due to difficulty breathing. Pt tachypneac this evening. Getting breathing txs scheduled. Pt using abdominal muscles to breath. VSS otherwise. Spoke with overnight cover will come to bedside to evaluate pt.

## 2017-07-15 NOTE — PLAN OF CARE
Problem: Patient Care Overview  Goal: Plan of Care Review  Recommendations  Recommendation/Intervention:   1. Continue Low Sodium 2gm diet with fluid restriction per MD.   2. Please add Diabetic Diet (2000 calorie) restriction to diet if po intake >75%   3. Order Boost Glucose Control TID if po intake <50% meals to help optimize cals/protein   4. RD to monitor and follow-up     Goals: 1. Pt will consume >50% meals 2. Patient will receive ONS if po intake 25-50% meals  Nutrition Goal Status: new  Communication of RD Recs: reviewed with RN

## 2017-07-15 NOTE — ASSESSMENT & PLAN NOTE
-patient denying palpitations, lightheadedness, CP  -admission EKG with a fib with RVR   -chadsvasc of 8  -INR 4.4, continue to monitor  -goal INR of 2-3, subtherapeutic (1.1)  -patient states she does not like warfarin and wishes to try another drug  -continue coreg 25 BID  -currently on heparin

## 2017-07-15 NOTE — SUBJECTIVE & OBJECTIVE
Interval History: No acute events overnight. Patient reports SOB improving from yesterday. No other complaints.    Review of Systems   Constitutional: Negative for chills and fever.   HENT: Negative for congestion and rhinorrhea.    Eyes: Negative for pain and visual disturbance.   Respiratory: Positive for shortness of breath. Negative for cough.    Cardiovascular: Positive for leg swelling. Negative for chest pain and palpitations.   Gastrointestinal: Positive for abdominal distention and abdominal pain. Negative for blood in stool, constipation, diarrhea, nausea and vomiting.   Genitourinary: Negative for difficulty urinating, dysuria, frequency and hematuria.   Skin: Negative for rash and wound.   Neurological: Negative for syncope, light-headedness and headaches.   Psychiatric/Behavioral: Negative for agitation and behavioral problems.     Objective:     Vital Signs (Most Recent):  Temp: 97.3 °F (36.3 °C) (07/15/17 0836)  Pulse: (!) 111 (07/15/17 0855)  Resp: 20 (07/15/17 0855)  BP: 131/79 (07/15/17 0836)  SpO2: 98 % (07/15/17 0855) Vital Signs (24h Range):  Temp:  [97.3 °F (36.3 °C)-97.9 °F (36.6 °C)] 97.3 °F (36.3 °C)  Pulse:  [] 111  Resp:  [18-32] 20  SpO2:  [93 %-99 %] 98 %  BP: (121-140)/(74-84) 131/79     Weight: 69.8 kg (153 lb 14.1 oz)  Body mass index is 26.4 kg/m².    Intake/Output Summary (Last 24 hours) at 07/15/17 0944  Last data filed at 07/15/17 0500   Gross per 24 hour   Intake                0 ml   Output              450 ml   Net             -450 ml      Physical Exam   Constitutional: She is oriented to person, place, and time. She appears well-developed and well-nourished. No distress.   HENT:   Head: Normocephalic and atraumatic.   Nose: Nose normal.   Mouth/Throat: Oropharynx is clear and moist. No oropharyngeal exudate.   Eyes: EOM are normal. Pupils are equal, round, and reactive to light. No scleral icterus.   Neck: Neck supple. No JVD present. No thyromegaly present.    Cardiovascular: Normal rate, normal heart sounds and intact distal pulses.    No murmur heard.  Irregular rhythm   Pulmonary/Chest: Effort normal. No respiratory distress. She has no wheezes. She has rales.   Coarse BS and crackles bilaterally   Abdominal: Soft. Bowel sounds are normal. She exhibits no distension. There is tenderness (mild, RLQ).   Musculoskeletal: She exhibits edema (1+ LE's). She exhibits no tenderness or deformity.   Lymphadenopathy:     She has no cervical adenopathy.   Neurological: She is alert and oriented to person, place, and time. No cranial nerve deficit.   Skin: Skin is warm and dry.   Right forehead hyperpigmentation, does not cross midline, from resolving shingles rash   Psychiatric: She has a normal mood and affect. Her behavior is normal.       Significant Labs:   CBC:   Recent Labs  Lab 07/13/17  1507 07/14/17  0440 07/15/17  0355   WBC 3.17* 4.38 4.41   HGB 8.1* 8.1* 8.0*   HCT 27.2* 26.7* 26.9*    202 202     CMP:   Recent Labs  Lab 07/14/17  0441 07/14/17  1731    138   K 4.0 4.8    106   CO2 25 24   * 156*   BUN 47* 47*   CREATININE 2.6* 2.6*   CALCIUM 7.7* 7.8*   PROT 4.8* 5.3*   ALBUMIN 1.8* 1.9*   BILITOT 0.3 0.2   ALKPHOS 56 59   AST 14 13   ALT 9* 8*   ANIONGAP 8 8   EGFRNONAA 17.6* 17.6*       Significant Imaging: I have reviewed all pertinent imaging results/findings within the past 24 hours.

## 2017-07-15 NOTE — ASSESSMENT & PLAN NOTE
Nutrition Diagnosis  Inadequate energy intake    Related to (etiology):   SOB and CHF, depression and subsequent decreased appetite     Signs and Symptoms (as evidenced by):   Po intake ~25% meals and fluid restriction per MD    Interventions/Recommendations (treatment strategy):  Add ADA 2000 calorie restriction to Low Na diet if Po intake >50-75% meals. Order Boost Glucose Control TID. For RD recs, see full RD note from 7/15/17    Nutrition Diagnosis Status:   New

## 2017-07-15 NOTE — CONSULTS
[facility]  Adult Nutrition  Consult Note    SUMMARY     Recommendations  Recommendation/Intervention:   1. Continue Low Sodium 2gm diet with fluid restriction per MD.   2. Please add Diabetic Diet (2000 calorie) restriction to diet if po intake >75%   3. Order Boost Glucose Control TID if po intake <50% meals to help optimize cals/protein   4. RD to monitor and follow-up    Goals: 1. Pt will consume >50% meals 2. Patient will receive ONS if po intake 25-50% meals  Nutrition Goal Status: new  Communication of RD Recs: reviewed with RN    Reason for Assessment  Reason for Assessment: nurse/nurse practitioner consult  Diagnosis: cardiac disease (acute on chronic CHF, SOB)  Relevent Medical History: CAD, blood clotting tendency, CKD stg III 2/2 T1DM, CVA, T1DM, diverticulosis, HTN, MI, HLD, stroke    Interdisciplinary Rounds: did not attend     General Information Comments: Pt c/o decreased appetite. A1c noted and pt typically compliant with diet and insulin to manage T1DM. No weight loss noted. Pt receiving lasix infusion. Will monitor    Nutrition Discharge Planning: Continue Low Na 2gm with fluid restriction per MD recommendations. Oral nutritional supplement 1x daily to help meet protein needs    Nutrition Prescription Ordered  Current Diet Order: Low Na 2m - 1500 mL fluid restriction     Evaluation of Received Nutrients/Fluid Intake   Energy Calories Required: not meeting needs      Protein Required: not meeting needs   Fluid Required: not meeting needs     % Intake of Estimated Energy Needs: 25 - 50 %  % Meal Intake: 25%     Nutrition Risk Screen     Nutrition Risk Screen: no indicators present    Nutrition/Diet History  Patient Reported Diet/Restrictions/Preferences: diabetic diet  Typical Food/Fluid Intake: 25%  Food Preferences: no cultural or Worship needs identified     Factors Affecting Nutritional Intake: abdominal pain (SOB)     Labs/Tests/Procedures/Meds  Pertinent Labs Reviewed:  "reviewed  Pertinent Labs Comments: BUN 47, Cr 2.6, Glu 156, POCT 181-189, A1c 5.8%, Ca 7.8  Pertinent Medications Reviewed: reviewed  Pertinent Medications Comments: statin, furosemide infusion, insulin, gabapentin, pantoprazole    Physical Findings  Overall Physical Appearance: overweight     Skin: other (see comments) (bruises)    Anthropometrics  Temp: 99.3 °F (37.4 °C)     Height: 5' 4.02" (162.6 cm)  Weight Method: Standard Scale  Weight: 69.8 kg (153 lb 14.1 oz)     Ideal Body Weight (IBW), Female: 120.1 lb     % Ideal Body Weight, Female (lb): 128.13 lb  BMI (Calculated): 26.5  BMI Grade: 25 - 29.9 - overweight     Estimated/Assessed Needs  Weight Used For Calorie Calculations: 69.8 kg (153 lb 14.1 oz)      Energy Calorie Requirements (kcal): 5272-0316 kcal   Energy Need Method: Kcal/kg     25 kcal/kg (kcal): 1745 and 30 kcal/kg (kcal): 2094   RMR (Kings-St. Jeor Equation): 1188.25     Weight Used For Protein Calculations: 69.8 kg (153 lb 14.1 oz)  Protein Requirements: 70-90 g   1.0 gm Protein (gm): 69.95 and 1.3 gm Protein (gm): 90.93     Fluid Requirements (mL): 1500 mL per MD      RDA Method (mL): 1700     CHO Requirement: 45-50% of total kcals     Assessment and Plan    SOB (shortness of breath)    Nutrition Diagnosis  Inadequate energy intake    Related to (etiology):   SOB and CHF, depression and subsequent decreased appetite     Signs and Symptoms (as evidenced by):   Po intake ~25% meals and fluid restriction per MD    Interventions/Recommendations (treatment strategy):  Add ADA 2000 calorie restriction to Low Na diet if Po intake >50-75% meals. Order Boost Glucose Control TID. For RD recs, see full RD note from 7/15/17    Nutrition Diagnosis Status:   New              Monitor and Evaluation  Food and Nutrient Intake: food and beverage intake, energy intake  Food and Nutrient Adminstration: diet order     Anthropometric Measurements: weight, weight change  Biochemical Data, Medical Tests and " Procedures: electrolyte and renal panel, glucose/endocrine profile, lipid profile  Nutrition-Focused Physical Findings: overall appearance    Nutrition Risk  Level of Risk: other (see comments) (1x/week)    Nutrition Follow-Up  RD Follow-up?: Yes

## 2017-07-15 NOTE — PROGRESS NOTES
[facility] Hospital Medicine  Progress Note    Patient Name: Michael Salgado  MRN: [id number]  Patient Class: IP- Inpatient   Admission Date: 7/12/2017  Length of Stay: 3 days  Attending Physician: Kira Ruiz MD  Primary Care Provider: Wesley Watkins MD    [facility] Medicine Team: [facility] Deuce Sexton MD    Subjective:     Principal Problem:Acute on chronic diastolic congestive heart failure    HPI:  71 year old  yo female with history of CAD s/p CABGx3 2015, ICM, HFrEF (EF 40%, PA 41 echo 6/2017), CKD stage IV, IDDM2, HTN, 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 Patient has baseline MILIAN, orthopnea, PND, LE edema, and abdo swelling that has also progressively worsened over the past few days. She is compliant with her home medications which include lasix 20 BID, coreg, hydralazine, amlodipine, and warfarin. She follows a low salt, fluid restrict diet and she has not deviated from it recently. She reports similar symptoms about a year ago that required hospitalization for CHF exacerbation. Patient recently had pneumonia and shingles in May and has not recovered full function since. She previously was able to ambulate with a walker and now uses a wheelchair. Prior to the shingles and PNA, she only required 10 mg lasix, but her lasix was increased afterwords due to increased SOB and volume overload.  She uses home O2 2.5 L. She is complaining of decreased UO, dysuria, and abdominal discomfort. She denies chest pain, palpitations, constipation (last BM today), diarrhea, cough, fever, chills, or any other complaints at this time. She lives at home with her daughter who provides with with significant assistance with her ADL's. Her cardiologist is Dr. Davis. She was last seen in cardiology clinic today and was sent to the ED from  there due to her SOB and edema.     In the ED, CXR showed increased pulmonary edema, BNP 1413 (which is above her baseline), tn 0.044, EKG with a fib with RVR but no acute ischemic changes. Patient was given lasix IV 80 mg.    Hospital Course:  7/13: Admitted to IM1. Started on Lasix 40 mg IV BID. Change in HH, 9.9->7.9. Denies changes in color/consistency of BMs and FOBT negative. Hemolytic anemia work-up negative. ABG normal. Added duoneb q6h.  7/14: Continued respiratory effort despite duoneb therapy. Vitals have remained stable. Pulmonology consult advised against thoracentesis at this moment after ultrasound showed bilateral effusions are likely not contributing to SOB. Also recommends discontinuing antibiotics as pneumonia is unlikely and it is adding fluids to her volume overload. Additional recommendation to increase diuretic therapy despite CKD. Consults to cards and pulm placed.    7/15: Patient's effort improving. INR subtherapeutic (1.1), restarted heparin    Interval History: No acute events overnight. Patient reports SOB improving from yesterday. No other complaints.    Review of Systems   Constitutional: Negative for chills and fever.   HENT: Negative for congestion and rhinorrhea.    Eyes: Negative for pain and visual disturbance.   Respiratory: Positive for shortness of breath. Negative for cough.    Cardiovascular: Positive for leg swelling. Negative for chest pain and palpitations.   Gastrointestinal: Positive for abdominal distention and abdominal pain. Negative for blood in stool, constipation, diarrhea, nausea and vomiting.   Genitourinary: Negative for difficulty urinating, dysuria, frequency and hematuria.   Skin: Negative for rash and wound.   Neurological: Negative for syncope, light-headedness and headaches.   Psychiatric/Behavioral: Negative for agitation and behavioral problems.     Objective:     Vital Signs (Most Recent):  Temp: 97.3 °F (36.3 °C) (07/15/17 0836)  Pulse: (!) 111  (07/15/17 0855)  Resp: 20 (07/15/17 0855)  BP: 131/79 (07/15/17 0836)  SpO2: 98 % (07/15/17 0855) Vital Signs (24h Range):  Temp:  [97.3 °F (36.3 °C)-97.9 °F (36.6 °C)] 97.3 °F (36.3 °C)  Pulse:  [] 111  Resp:  [18-32] 20  SpO2:  [93 %-99 %] 98 %  BP: (121-140)/(74-84) 131/79     Weight: 69.8 kg (153 lb 14.1 oz)  Body mass index is 26.4 kg/m².    Intake/Output Summary (Last 24 hours) at 07/15/17 0944  Last data filed at 07/15/17 0500   Gross per 24 hour   Intake                0 ml   Output              450 ml   Net             -450 ml      Physical Exam   Constitutional: She is oriented to person, place, and time. She appears well-developed and well-nourished. No distress.   HENT:   Head: Normocephalic and atraumatic.   Nose: Nose normal.   Mouth/Throat: Oropharynx is clear and moist. No oropharyngeal exudate.   Eyes: EOM are normal. Pupils are equal, round, and reactive to light. No scleral icterus.   Neck: Neck supple. No JVD present. No thyromegaly present.   Cardiovascular: Normal rate, normal heart sounds and intact distal pulses.    No murmur heard.  Irregular rhythm   Pulmonary/Chest: Effort normal. No respiratory distress. She has no wheezes. She has rales.   Coarse BS and crackles bilaterally   Abdominal: Soft. Bowel sounds are normal. She exhibits no distension. There is tenderness (mild, RLQ).   Musculoskeletal: She exhibits edema (1+ LE's). She exhibits no tenderness or deformity.   Lymphadenopathy:     She has no cervical adenopathy.   Neurological: She is alert and oriented to person, place, and time. No cranial nerve deficit.   Skin: Skin is warm and dry.   Right forehead hyperpigmentation, does not cross midline, from resolving shingles rash   Psychiatric: She has a normal mood and affect. Her behavior is normal.       Significant Labs:   CBC:   Recent Labs  Lab 07/13/17  1507 07/14/17  0440 07/15/17  0355   WBC 3.17* 4.38 4.41   HGB 8.1* 8.1* 8.0*   HCT 27.2* 26.7* 26.9*    202 202      CMP:   Recent Labs  Lab 07/14/17  0441 07/14/17  1731    138   K 4.0 4.8    106   CO2 25 24   * 156*   BUN 47* 47*   CREATININE 2.6* 2.6*   CALCIUM 7.7* 7.8*   PROT 4.8* 5.3*   ALBUMIN 1.8* 1.9*   BILITOT 0.3 0.2   ALKPHOS 56 59   AST 14 13   ALT 9* 8*   ANIONGAP 8 8   EGFRNONAA 17.6* 17.6*       Significant Imaging: I have reviewed all pertinent imaging results/findings within the past 24 hours.    Assessment/Plan:      Pneumonia due to infectious organism    - On admit (7/12) CXR showed R-sided airspace opacities concerning for edema or infectious process (SIRS 2/4, HR and RR). No fevers/chills or productive cough  - ABG normal  - Previously treated PNA with Levaquin on 6/15  - Started on azithromycin and ceftriaxone for CAP, D/C'd 2/2 low suspicion for PNA          Depression    -continue lexapro 5 mg, Wellbutrin 150 mg  -Seroquel qhs           Dysuria    -pt reports decreased UO and dysuria for a few days, abdominal discomfort RLQ, but could be secondary to fluid overload. Denies hematuria   -no fever, no leukocytosis  -ordered UA          Chronic kidney disease (CKD) stage G4/A1, severely decreased glomerular filtration rate (GFR) between 15-29 mL/min/1.73 square meter and albuminuria creatinine ratio less than 30 mg/g    -Cr on admission 2.5, improved from 3.2 ~1 mo ago  -continue to monitor RFTs  - Cr elevated, stable at 2.6  -avoid nephrotoxic agents          Atrial fibrillation    -patient denying palpitations, lightheadedness, CP  -admission EKG with a fib with RVR   -chadsvasc of 8  -INR 4.4, continue to monitor  -goal INR of 2-3, subtherapeutic (1.1)  -patient states she does not like warfarin and wishes to try another drug  -continue coreg 25 BID  -currently on heparin          Anemia of unknown etiology    - On admit (7/12), Hgb 9.9 and dropped to 7.9 (7/13) w/ SOB  - Hemolysis work-up negative, FOBT negative with no reported changes in stool  - Stable at 7.9-8.1, maintain  low-threshold to transfuse          Insulin dependent diabetes mellitus    -daughter reports to takes LA insulin 10 units qhs and SA insulin 5 units WM  -A1c 6.1 2/2017  -LD SSI, detemir 8, aspart 3 WM, continue to monitor BG and adjust basal, prandial regimen as needed          Hyperlipidemia    -atorvastatin 40 mg          H/O: CVA (cerebrovascular accident)    -hx of stroke before 2012 per daughter  -pt with residual right sided deficit   -continue atorvastatin 50 mg  -Supratherapeutic on warfarin (INR 4.0) on admit -> now INR 2.0  - Holding heparin and asa, received 2/3 doses vit. K.          HTN (hypertension)    -Coreg 25 BID, hyralazine 25 TID, isordil 10 10 TID, lasix 10 mg/hr infusion  -on admission pt normotensive with /79, continue to monitor and hold home meds PRN for low BP          * Acute on chronic diastolic congestive heart failure    -pt presents from cardiology clinic with SOB, MILIAN, LE edema for past 5 days  -on home O2 2.5 L  -patient with increase lasix requirement since shingles and PNA in May (got abx as an out pt)  -home meds include lasix 20 mg BID, coreg 25 BID, hydralazine 25 BID, amlodipine 5 mg  -she is compliant with her home meds and a low salt, fluid restricted diet  -most recent echo 6/2017 below  -On admit (7/12), CXR shows worsening pulmonary edema, BMP 1413 (above baseline), tn 0.04, EKG shows afib with RVR without acute ischemic changes  -Cr on admission 2.5, continue to monitor RFTs  - Net -1.2L from admission  - CXR 7/14 shows interval improvement from 7/12  - Lasix 10 mg/hr infusion, per cardiology. Will consider increasing Lasix or adding Metolazone. Awaiting cardiology recommendation  - Pleural effusions not contributing to dyspnea, no thoracentesis warranted, per pulmonology    Echo 6/2017  1 - Mildly to moderately depressed left ventricular systolic function (EF 40-45%). Abnormal septal motion due to underlying LBBB. Significant drop in LV function, compared to the  prior reported, Images could not be retrieved for comparison.     2 - Normal right ventricular systolic function .     3 - Pulmonary hypertension. The estimated PA systolic pressure is 41 mmHg.     4 - Mild tricuspid regurgitation.     5 - Small pericardial effusion.     6 - Intermediate central venous pressure.     7 - Biatrial enlargement.          VTE Risk Mitigation         Ordered     heparin (porcine) injection 5,000 Units  Every 8 hours     Route:  Subcutaneous        07/15/17 0920     Medium Risk of VTE  Once      07/12/17 8300        Dispo: Dscharge to SNF following resolution of acute hypoxic respiratory failure.    Deuce Sexton MD  Department of Hospital Medicine   [facility]

## 2017-07-15 NOTE — ASSESSMENT & PLAN NOTE
-Coreg 25 BID, hyralazine 25 TID, isordil 10 10 TID, lasix 10 mg/hr infusion  -on admission pt normotensive with /79, continue to monitor and hold home meds PRN for low BP

## 2017-07-15 NOTE — PLAN OF CARE
Problem: Patient Care Overview  Goal: Plan of Care Review  Outcome: Ongoing (interventions implemented as appropriate)  Pt A&O X4. No falls/injuries this shift. Pt c/o tachypnea and sob throughout shift. Lasix continuous drip at 3ml/hr maintained throughout shift. Pure wick in place. Blood glucose monitored per orders and treated with scheduled insulin and prn sliding scale. PRN pain medications given per orders. Vital signs remained stable throughout shift. Pt monitored on telemetry throughout shift. Family at bedside, bed in lowest position, wheels locked, and call light within reach. Will continue to monitor.

## 2017-07-15 NOTE — SUBJECTIVE & OBJECTIVE
Interval History/Significant Events: Feels better, still on 2L NC. Still pretty SOB, does not feel she has made much urine.     Review of Systems       Positive findings are in BOLD. Otherwise negative ROS as below.     CONSTITUTIONAL: weight loss, fever, chills, weakness or fatigue.   HEENT: visual loss, blurred vision, double vision or yellow sclerae. Ears, Nose, Throat: No hearing loss, sneezing, congestion, runny nose or sore throat.   CARDIOVASCULAR: chest pain, chest pressure or chest discomfort. No palpitations or edema.   RESPIRATORY: shortness of breath, cough or sputum.   GASTROINTESTINAL:anorexia, nausea, vomiting or diarrhea. No abdominal pain or blood.   NEUROLOGICAL: headache, dizziness, syncope, paralysis, ataxia, numbness or tingling in the extremities. No change in bowel or bladder control.   MUSCULOSKELETAL: muscle, back pain, joint pain or stiffness.   HEMATOLOGIC: anemia, bleeding or bruising.   LYMPHATICS: enlarged nodes. No history of splenectomy.   PSYCHIATRIC: history of depression or anxiety.   ENDOCRINOLOGIC: No reports of sweating, cold or heat intolerance. No polyuria or polydipsia.     Objective:     Vital Signs (Most Recent):  Temp: 99.3 °F (37.4 °C) (07/15/17 1436)  Pulse: 107 (07/15/17 1501)  Resp: 20 (07/15/17 1436)  BP: 107/63 (07/15/17 1436)  SpO2: 98 % (07/15/17 1436) Vital Signs (24h Range):  Temp:  [97.3 °F (36.3 °C)-99.3 °F (37.4 °C)] 99.3 °F (37.4 °C)  Pulse:  [] 107  Resp:  [19-32] 20  SpO2:  [95 %-99 %] 98 %  BP: (107-134)/(63-84) 107/63   Weight: 69.8 kg (153 lb 14.1 oz)  Body mass index is 26.4 kg/m².      Intake/Output Summary (Last 24 hours) at 07/15/17 1633  Last data filed at 07/15/17 0500   Gross per 24 hour   Intake                0 ml   Output              200 ml   Net             -200 ml       Physical Exam     Gen: on NC   HEENT: oral mucosa moist, free of lesions, no dental abnormalities, neck free of lymphadenopathy. JVD negative, no eye abnormalities.  Pupils appears equal and reactive.   Chest: crackles bilaterally.   Heart: RRR, No M/G/r.   Abdomen: soft, non tender, no masses. No g/r/r  Extremities: Moderate pitting edema.       Significant Labs:    CBC/Anemia Profile:    Recent Labs  Lab 07/14/17  0440 07/15/17  0355   WBC 4.38 4.41   HGB 8.1* 8.0*   HCT 26.7* 26.9*    202   MCV 91 91   RDW 16.2* 16.4*        Chemistries:    Recent Labs  Lab 07/14/17  0441 07/14/17  1731 07/15/17  0354 07/15/17  0918 07/15/17  1426    138  --  142 142   K 4.0 4.8  --  4.8 5.1    106  --  107 108   CO2 25 24  --  28 27   BUN 47* 47*  --  48* 49*   CREATININE 2.6* 2.6*  --  2.6* 2.6*   CALCIUM 7.7* 7.8*  --  8.2* 7.6*   ALBUMIN 1.8* 1.9*  --  2.0* 1.8*   PROT 4.8* 5.3*  --  5.3* 4.9*   BILITOT 0.3 0.2  --  0.3 0.3   ALKPHOS 56 59  --  59 56   ALT 9* 8*  --  8* 7*   AST 14 13  --  13 11   MG 1.4*  --  2.1  --   --    PHOS 3.9  --  3.4  --   --

## 2017-07-15 NOTE — ASSESSMENT & PLAN NOTE
-Cr on admission 2.5, improved from 3.2 ~1 mo ago  -continue to monitor RFTs  - Cr elevated, stable at 2.6  -avoid nephrotoxic agents

## 2017-07-15 NOTE — PROGRESS NOTES
Pt bg was 62 before breakfast, pt refused glucose tablets stated she wanted it rechecked after breakfast. After breakfast bg rechecked 233; gave scheduled 3 units of novolog per MD.

## 2017-07-15 NOTE — PROGRESS NOTES
LOS: 3 days     24h events and S:  Michael Salgado is a 71 y.o. female with no CP overnight. She did sleep in a recliner because of orthopnea overnight.     O:  Vitals:  Temp: 97.3 °F (36.3 °C) (07/15/17 0836)  Pulse: (!) 111 (07/15/17 0855)  Resp: 20 (07/15/17 0855)  BP: 131/79 (07/15/17 0836)  SpO2: 98 % (07/15/17 0855)    Ins/Outs:    Intake/Output Summary (Last 24 hours) at 07/15/17 0953  Last data filed at 07/15/17 0500   Gross per 24 hour   Intake                0 ml   Output              450 ml   Net             -450 ml       Physical Exam:  General: alert and oriented, conversant  Heart: RRR, no r/g appreciated  Lungs: poor air movement bilaterally  Abdomen: soft, NT, ND, +BS  Vascular: 2+ radial pulse, 2+ edema    Medications:   amlodipine  5 mg Oral Daily    atorvastatin  40 mg Oral Daily    buPROPion  150 mg Oral Daily    carvedilol  25 mg Oral BID WM    escitalopram oxalate  5 mg Oral Daily    gabapentin  300 mg Oral QHS    heparin (porcine)  5,000 Units Subcutaneous Q8H    hydrALAZINE  25 mg Oral Q12H    insulin aspart  3 Units Subcutaneous TIDWM    insulin detemir  8 Units Subcutaneous QHS    pantoprazole  40 mg Oral Daily    polyethylene glycol  17 g Oral Daily    quetiapine  100 mg Oral QHS    sodium chloride 0.9%  3 mL Intravenous Q8H      furosemide (LASIX) 5 mg/mL infusion (non-titrating) 10 mg/hr (07/14/17 1832)     sodium chloride, acetaminophen, acetaminophen, butalbital-acetaminophen-caffeine -40 mg, dextrose 50%, dextrose 50%, diphenhydrAMINE, glucagon (human recombinant), glucose, glucose, insulin aspart, ondansetron    Review of patient's allergies indicates:   Allergen Reactions    Daypro [oxaprozin] Itching     Tolerates other NSAIDs    Vibramycin [doxycycline calcium]        Labs:  CBC with Diff:     Recent Labs  Lab 07/13/17  1507 07/14/17  0440 07/15/17  0355   WBC 3.17* 4.38 4.41   HGB 8.1* 8.1* 8.0*   HCT 27.2* 26.7* 26.9*    202 202   LYMPH 24.6   0.8* 25.6  1.1 28.6  1.3   MONO 7.9  0.3 11.6  0.5 9.8  0.4   EOSINOPHIL 1.9 1.8 1.1       COAG:    Recent Labs  Lab 07/13/17  0412 07/14/17  0441 07/15/17  0354   INR 4.3* 2.0* 1.1       CMP:   Recent Labs  Lab 07/13/17  0412 07/14/17  0441 07/14/17  1731 07/15/17  0354   * 143* 156*  --    CALCIUM 7.8* 7.7* 7.8*  --    ALBUMIN 1.7* 1.8* 1.9*  --    PROT 4.5* 4.8* 5.3*  --     142 138  --    K 3.8 4.0 4.8  --    CO2 25 25 24  --    * 109 106  --    BUN 44* 47* 47*  --    CREATININE 2.4* 2.6* 2.6*  --    ALKPHOS 54* 56 59  --    ALT 7* 9* 8*  --    AST 12 14 13  --    BILITOT 0.3 0.3 0.2  --    MG 1.4* 1.4*  --  2.1   PHOS 4.1 3.9  --  3.4     Estimated Creatinine Clearance: 18.5 mL/min (based on Cr of 2.6).    .  Recent Labs  Lab 07/12/17  1739 07/13/17  0056 07/13/17  1506   TROPONINI 0.044* 0.046* 0.037*   BNP 1,413*  --   --          Diagnostic Results:  Ejection Fractions   EF   Date Value Ref Range Status   06/23/2017 40 (A) 55 - 65    04/29/2016 60 55 - 65    04/26/2016 60 55 - 65         Infectious disease labs:  Microbiology Results (last 7 days)     ** No results found for the last 168 hours. **          Assessment and Plan:  71 y.o. 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    Acute systolic HF  - BB, hydralazine, IV Lasix gtt  - ischemic evaluation (non-invasive) once euvolemic  - give 80 IVP Lasix followed by IV lasix gtt at 15 mg/h  - add ISDN 10 mg TID    Chronic AF  - continue AC  - [id number] = 8  - caution: NOAC not studied in pt's with CrCl <25    Otoniel Ponce MD

## 2017-07-15 NOTE — ASSESSMENT & PLAN NOTE
-pt presents from cardiology clinic with SOB, MILIAN, LE edema for past 5 days  -on home O2 2.5 L  -patient with increase lasix requirement since shingles and PNA in May (got abx as an out pt)  -home meds include lasix 20 mg BID, coreg 25 BID, hydralazine 25 BID, amlodipine 5 mg  -she is compliant with her home meds and a low salt, fluid restricted diet  -most recent echo 6/2017 below  -On admit (7/12), CXR shows worsening pulmonary edema, BMP 1413 (above baseline), tn 0.04, EKG shows afib with RVR without acute ischemic changes  -Cr on admission 2.5, continue to monitor RFTs  - Net -1.2L from admission  - CXR 7/14 shows interval improvement from 7/12  - Lasix 10 mg/hr infusion, per cardiology. Will consider increasing Lasix or adding Metolazone. Awaiting cardiology recommendation  - Pleural effusions not contributing to dyspnea, no thoracentesis warranted, per pulmonology    Echo 6/2017  1 - Mildly to moderately depressed left ventricular systolic function (EF 40-45%). Abnormal septal motion due to underlying LBBB. Significant drop in LV function, compared to the prior reported, Images could not be retrieved for comparison.     2 - Normal right ventricular systolic function .     3 - Pulmonary hypertension. The estimated PA systolic pressure is 41 mmHg.     4 - Mild tricuspid regurgitation.     5 - Small pericardial effusion.     6 - Intermediate central venous pressure.     7 - Biatrial enlargement.

## 2017-07-16 LAB
ALBUMIN SERPL BCP-MCNC: 1.8 G/DL
ALBUMIN SERPL BCP-MCNC: 1.9 G/DL
ALP SERPL-CCNC: 52 U/L
ALP SERPL-CCNC: 54 U/L
ALT SERPL W/O P-5'-P-CCNC: 7 U/L
ALT SERPL W/O P-5'-P-CCNC: 9 U/L
ANION GAP SERPL CALC-SCNC: 5 MMOL/L
ANION GAP SERPL CALC-SCNC: 9 MMOL/L
AST SERPL-CCNC: 11 U/L
AST SERPL-CCNC: 12 U/L
BASOPHILS # BLD AUTO: 0.01 K/UL
BASOPHILS NFR BLD: 0.2 %
BILIRUB SERPL-MCNC: 0.3 MG/DL
BILIRUB SERPL-MCNC: 0.3 MG/DL
BUN SERPL-MCNC: 48 MG/DL
BUN SERPL-MCNC: 50 MG/DL
CALCIUM SERPL-MCNC: 7.8 MG/DL
CALCIUM SERPL-MCNC: 8.3 MG/DL
CHLORIDE SERPL-SCNC: 105 MMOL/L
CHLORIDE SERPL-SCNC: 108 MMOL/L
CO2 SERPL-SCNC: 27 MMOL/L
CO2 SERPL-SCNC: 29 MMOL/L
CREAT SERPL-MCNC: 2.5 MG/DL
CREAT SERPL-MCNC: 2.5 MG/DL
DIFFERENTIAL METHOD: ABNORMAL
EOSINOPHIL # BLD AUTO: 0.2 K/UL
EOSINOPHIL NFR BLD: 3.2 %
ERYTHROCYTE [DISTWIDTH] IN BLOOD BY AUTOMATED COUNT: 16.3 %
EST. GFR  (AFRICAN AMERICAN): 21.3 ML/MIN/1.73 M^2
EST. GFR  (AFRICAN AMERICAN): 21.3 ML/MIN/1.73 M^2
EST. GFR  (NON AFRICAN AMERICAN): 18.5 ML/MIN/1.73 M^2
EST. GFR  (NON AFRICAN AMERICAN): 18.5 ML/MIN/1.73 M^2
GLUCOSE SERPL-MCNC: 139 MG/DL
GLUCOSE SERPL-MCNC: 40 MG/DL
HCT VFR BLD AUTO: 25.3 %
HGB BLD-MCNC: 7.8 G/DL
LYMPHOCYTES # BLD AUTO: 1.4 K/UL
LYMPHOCYTES NFR BLD: 28.3 %
MAGNESIUM SERPL-MCNC: 1.8 MG/DL
MCH RBC QN AUTO: 28 PG
MCHC RBC AUTO-ENTMCNC: 30.8 %
MCV RBC AUTO: 91 FL
MONOCYTES # BLD AUTO: 0.5 K/UL
MONOCYTES NFR BLD: 10.2 %
NEUTROPHILS # BLD AUTO: 2.9 K/UL
NEUTROPHILS NFR BLD: 57.5 %
PHOSPHATE SERPL-MCNC: 3.7 MG/DL
PLATELET # BLD AUTO: 226 K/UL
PMV BLD AUTO: 9.1 FL
POCT GLUCOSE: 107 MG/DL (ref 70–110)
POCT GLUCOSE: 126 MG/DL (ref 70–110)
POCT GLUCOSE: 169 MG/DL (ref 70–110)
POCT GLUCOSE: 243 MG/DL (ref 70–110)
POCT GLUCOSE: 318 MG/DL (ref 70–110)
POTASSIUM SERPL-SCNC: 4.7 MMOL/L
POTASSIUM SERPL-SCNC: 4.7 MMOL/L
PROT SERPL-MCNC: 5 G/DL
PROT SERPL-MCNC: 5.1 G/DL
RBC # BLD AUTO: 2.79 M/UL
SODIUM SERPL-SCNC: 141 MMOL/L
SODIUM SERPL-SCNC: 142 MMOL/L
WBC # BLD AUTO: 5.02 K/UL

## 2017-07-16 PROCEDURE — 25000003 PHARM REV CODE 250: Performed by: STUDENT IN AN ORGANIZED HEALTH CARE EDUCATION/TRAINING PROGRAM

## 2017-07-16 PROCEDURE — 99233 SBSQ HOSP IP/OBS HIGH 50: CPT | Mod: GC,,, | Performed by: HOSPITALIST

## 2017-07-16 PROCEDURE — 94640 AIRWAY INHALATION TREATMENT: CPT

## 2017-07-16 PROCEDURE — 84100 ASSAY OF PHOSPHORUS: CPT

## 2017-07-16 PROCEDURE — 25000003 PHARM REV CODE 250: Performed by: HOSPITALIST

## 2017-07-16 PROCEDURE — 36415 COLL VENOUS BLD VENIPUNCTURE: CPT

## 2017-07-16 PROCEDURE — 25000242 PHARM REV CODE 250 ALT 637 W/ HCPCS: Performed by: STUDENT IN AN ORGANIZED HEALTH CARE EDUCATION/TRAINING PROGRAM

## 2017-07-16 PROCEDURE — 80053 COMPREHEN METABOLIC PANEL: CPT

## 2017-07-16 PROCEDURE — 85025 COMPLETE CBC W/AUTO DIFF WBC: CPT

## 2017-07-16 PROCEDURE — 11000001 HC ACUTE MED/SURG PRIVATE ROOM

## 2017-07-16 PROCEDURE — 83735 ASSAY OF MAGNESIUM: CPT

## 2017-07-16 PROCEDURE — 63600175 PHARM REV CODE 636 W HCPCS: Performed by: STUDENT IN AN ORGANIZED HEALTH CARE EDUCATION/TRAINING PROGRAM

## 2017-07-16 PROCEDURE — 99232 SBSQ HOSP IP/OBS MODERATE 35: CPT | Mod: ,,, | Performed by: INTERNAL MEDICINE

## 2017-07-16 RX ORDER — IPRATROPIUM BROMIDE AND ALBUTEROL SULFATE 2.5; .5 MG/3ML; MG/3ML
3 SOLUTION RESPIRATORY (INHALATION) EVERY 6 HOURS
Status: DISCONTINUED | OUTPATIENT
Start: 2017-07-16 | End: 2017-07-23

## 2017-07-16 RX ORDER — ASPIRIN 81 MG/1
81 TABLET ORAL DAILY
Status: DISCONTINUED | OUTPATIENT
Start: 2017-07-16 | End: 2017-07-29 | Stop reason: HOSPADM

## 2017-07-16 RX ADMIN — APIXABAN 5 MG: 5 TABLET, FILM COATED ORAL at 08:07

## 2017-07-16 RX ADMIN — HYDRALAZINE HYDROCHLORIDE 25 MG: 25 TABLET, FILM COATED ORAL at 05:07

## 2017-07-16 RX ADMIN — ASPIRIN 81 MG: 81 TABLET, COATED ORAL at 11:07

## 2017-07-16 RX ADMIN — INSULIN ASPART 4 UNITS: 100 INJECTION, SOLUTION INTRAVENOUS; SUBCUTANEOUS at 09:07

## 2017-07-16 RX ADMIN — INSULIN ASPART 4 UNITS: 100 INJECTION, SOLUTION INTRAVENOUS; SUBCUTANEOUS at 12:07

## 2017-07-16 RX ADMIN — ACETAMINOPHEN 650 MG: 325 TABLET ORAL at 08:07

## 2017-07-16 RX ADMIN — INSULIN ASPART 2 UNITS: 100 INJECTION, SOLUTION INTRAVENOUS; SUBCUTANEOUS at 05:07

## 2017-07-16 RX ADMIN — ISOSORBIDE DINITRATE 10 MG: 10 TABLET ORAL at 05:07

## 2017-07-16 RX ADMIN — CARVEDILOL 37.5 MG: 25 TABLET, FILM COATED ORAL at 05:07

## 2017-07-16 RX ADMIN — APIXABAN 5 MG: 5 TABLET, FILM COATED ORAL at 09:07

## 2017-07-16 RX ADMIN — FUROSEMIDE 15 MG/HR: 10 INJECTION, SOLUTION INTRAVENOUS at 05:07

## 2017-07-16 RX ADMIN — Medication 3 ML: at 10:07

## 2017-07-16 RX ADMIN — GABAPENTIN 300 MG: 300 CAPSULE ORAL at 09:07

## 2017-07-16 RX ADMIN — ISOSORBIDE DINITRATE 10 MG: 10 TABLET ORAL at 01:07

## 2017-07-16 RX ADMIN — HYDRALAZINE HYDROCHLORIDE 25 MG: 25 TABLET, FILM COATED ORAL at 09:07

## 2017-07-16 RX ADMIN — ACETAMINOPHEN 650 MG: 325 TABLET ORAL at 05:07

## 2017-07-16 RX ADMIN — ESCITALOPRAM 5 MG: 5 TABLET, FILM COATED ORAL at 08:07

## 2017-07-16 RX ADMIN — ISOSORBIDE DINITRATE 10 MG: 10 TABLET ORAL at 09:07

## 2017-07-16 RX ADMIN — HYDRALAZINE HYDROCHLORIDE 25 MG: 25 TABLET, FILM COATED ORAL at 01:07

## 2017-07-16 RX ADMIN — IPRATROPIUM BROMIDE AND ALBUTEROL SULFATE 3 ML: .5; 3 SOLUTION RESPIRATORY (INHALATION) at 07:07

## 2017-07-16 RX ADMIN — BUPROPION HYDROCHLORIDE 150 MG: 150 TABLET, FILM COATED, EXTENDED RELEASE ORAL at 08:07

## 2017-07-16 RX ADMIN — ONDANSETRON 8 MG: 8 TABLET, ORALLY DISINTEGRATING ORAL at 08:07

## 2017-07-16 RX ADMIN — ATORVASTATIN CALCIUM 40 MG: 20 TABLET, FILM COATED ORAL at 08:07

## 2017-07-16 RX ADMIN — Medication 24 G: at 05:07

## 2017-07-16 RX ADMIN — PANTOPRAZOLE SODIUM 40 MG: 40 TABLET, DELAYED RELEASE ORAL at 08:07

## 2017-07-16 RX ADMIN — QUETIAPINE FUMARATE 100 MG: 100 TABLET, FILM COATED ORAL at 09:07

## 2017-07-16 NOTE — ASSESSMENT & PLAN NOTE
-patient denying palpitations, lightheadedness, CP  -admission EKG with a fib with RVR   -chadsvasc of 8  -started on Apixaban  -continue coreg 25 BID

## 2017-07-16 NOTE — SUBJECTIVE & OBJECTIVE
Interval History: Patient had no events overnight. CMP glucose 40 this morning. Increased to 126 with glucose tabs. Patient feels considerably better than yesterday. SOB is improving with decreased respiratory effort.    Review of Systems   Constitutional: Negative for chills and fever.   HENT: Negative for congestion and rhinorrhea.    Eyes: Negative for pain and visual disturbance.   Respiratory: Positive for shortness of breath. Negative for cough.    Cardiovascular: Positive for leg swelling. Negative for chest pain and palpitations.   Gastrointestinal: Positive for abdominal distention and abdominal pain. Negative for blood in stool, constipation, diarrhea, nausea and vomiting.   Genitourinary: Negative for difficulty urinating, dysuria, frequency and hematuria.   Skin: Negative for rash and wound.   Neurological: Negative for syncope, light-headedness and headaches.   Psychiatric/Behavioral: Negative for agitation and behavioral problems.     Objective:     Vital Signs (Most Recent):  Temp: 98.1 °F (36.7 °C) (07/16/17 1100)  Pulse: (!) 132 (07/16/17 1100)  Resp: 20 (07/16/17 1100)  BP: 119/73 (07/16/17 1100)  SpO2: 97 % (07/16/17 1100) Vital Signs (24h Range):  Temp:  [97.4 °F (36.3 °C)-99.3 °F (37.4 °C)] 98.1 °F (36.7 °C)  Pulse:  [100-132] 132  Resp:  [20-28] 20  SpO2:  [96 %-99 %] 97 %  BP: (107-136)/(59-82) 119/73     Weight: 69.8 kg (153 lb 14.1 oz)  Body mass index is 26.4 kg/m².    Intake/Output Summary (Last 24 hours) at 07/16/17 1259  Last data filed at 07/16/17 0700   Gross per 24 hour   Intake             1000 ml   Output              700 ml   Net              300 ml      Physical Exam   Constitutional: She is oriented to person, place, and time. She appears well-developed and well-nourished. No distress.   HENT:   Head: Normocephalic and atraumatic.   Nose: Nose normal.   Mouth/Throat: Oropharynx is clear and moist. No oropharyngeal exudate.   Eyes: EOM are normal. Pupils are equal, round, and  reactive to light. No scleral icterus.   Neck: Neck supple. No JVD present. No thyromegaly present.   Cardiovascular: Normal rate, normal heart sounds and intact distal pulses.    No murmur heard.  Irregular rhythm   Pulmonary/Chest: Effort normal. No respiratory distress. She has no wheezes. She has rales.   Coarse BS and crackles bilaterally   Abdominal: Soft. Bowel sounds are normal. She exhibits no distension. There is tenderness (mild, RLQ).   Musculoskeletal: She exhibits edema (1+ LE's). She exhibits no tenderness or deformity.   Lymphadenopathy:     She has no cervical adenopathy.   Neurological: She is alert and oriented to person, place, and time. No cranial nerve deficit.   Skin: Skin is warm and dry.   Right forehead hyperpigmentation, does not cross midline, from resolving shingles rash   Psychiatric: She has a normal mood and affect. Her behavior is normal.       Significant Labs:   BMP:   Recent Labs  Lab 07/16/17  0348   GLU 40*      K 4.7      CO2 29   BUN 48*   CREATININE 2.5*   CALCIUM 7.8*   MG 1.8     CBC:   Recent Labs  Lab 07/15/17  0355 07/16/17  0348   WBC 4.41 5.02   HGB 8.0* 7.8*   HCT 26.9* 25.3*    226       Significant Imaging: I have reviewed all pertinent imaging results/findings within the past 24 hours.

## 2017-07-16 NOTE — PLAN OF CARE
Problem: Patient Care Overview  Goal: Plan of Care Review  Outcome: Ongoing (interventions implemented as appropriate)  Pt A&O X4. No falls/injuries this shift. Pt states tachypnea and sob is better this shift. Lasix continuous drip at 3ml/hr maintained throughout shift. Pure wick in place. Blood glucose monitored per orders and treated with scheduled insulin and prn sliding scale. PRN pain medications given per orders. Vital signs remained stable throughout shift. Pt monitored on telemetry throughout shift. Family at bedside, bed in lowest position, wheels locked, and call light within reach. Will continue to monitor.

## 2017-07-16 NOTE — PROGRESS NOTES
Cardiology Progress Note    Subjective   Doing better this am, less work of breathing. Documented . Legs reported less tense. Able to lie nearly flat.    Objective:  Vitals:  Temp: 97.8 °F (36.6 °C) (07/16/17 1500)  Pulse: (!) 125 (07/16/17 1533)  Resp: 18 (07/16/17 1500)  BP: 114/76 (07/16/17 1500)  SpO2: 98 % (07/16/17 1500)    Ins/Outs:    Intake/Output Summary (Last 24 hours) at 07/16/17 1607  Last data filed at 07/16/17 0700   Gross per 24 hour   Intake             1000 ml   Output              700 ml   Net              300 ml     Physical Exam:  General: alert and oriented, conversant  HEENT: +JVD  Heart: RRR, no m/r/g appreciated  Lungs: poor air movement bilaterally  Abdomen: soft, NT, ND, +BS  Vascular: 2+ radial pulse, 2+ edema to above knee, no longer tense below knee    Medications:   albuterol-ipratropium 2.5mg-0.5mg/3mL  3 mL Nebulization Q6H    apixaban  5 mg Oral BID    aspirin  81 mg Oral Daily    atorvastatin  40 mg Oral Daily    buPROPion  150 mg Oral Daily    carvedilol  25 mg Oral BID WM    escitalopram oxalate  5 mg Oral Daily    gabapentin  300 mg Oral QHS    hydrALAZINE  25 mg Oral TID    isosorbide dinitrate  10 mg Oral TID    pantoprazole  40 mg Oral Daily    quetiapine  100 mg Oral QHS    sodium chloride 0.9%  3 mL Intravenous Q8H      furosemide (LASIX) 5 mg/mL infusion (non-titrating) 15 mg/hr (07/15/17 1841)     sodium chloride, acetaminophen, acetaminophen, butalbital-acetaminophen-caffeine -40 mg, dextrose 50%, dextrose 50%, diphenhydrAMINE, glucagon (human recombinant), glucose, glucose, insulin aspart, ondansetron    Review of patient's allergies indicates:   Allergen Reactions    Daypro [oxaprozin] Itching     Tolerates other NSAIDs    Vibramycin [doxycycline calcium]        Labs:    Recent Labs  Lab 07/14/17  0440 07/15/17  0355 07/16/17  0348   WBC 4.38 4.41 5.02   HGB 8.1* 8.0* 7.8*   HCT 26.7* 26.9* 25.3*    202 226   LYMPH 25.6  1.1 28.6   1.3 28.3  1.4   MONO 11.6  0.5 9.8  0.4 10.2  0.5   EOSINOPHIL 1.8 1.1 3.2       Recent Labs  Lab 07/13/17  0412 07/14/17  0441 07/15/17  0354   INR 4.3* 2.0* 1.1        Recent Labs  Lab 07/14/17  0441  07/15/17  0354 07/15/17  0918 07/15/17  1426 07/16/17  0348   *  < >  --  145* 172* 40*   CALCIUM 7.7*  < >  --  8.2* 7.6* 7.8*   ALBUMIN 1.8*  < >  --  2.0* 1.8* 1.8*   PROT 4.8*  < >  --  5.3* 4.9* 5.0*     < >  --  142 142 142   K 4.0  < >  --  4.8 5.1 4.7   CO2 25  < >  --  28 27 29     < >  --  107 108 108   BUN 47*  < >  --  48* 49* 48*   CREATININE 2.6*  < >  --  2.6* 2.6* 2.5*   ALKPHOS 56  < >  --  59 56 52*   ALT 9*  < >  --  8* 7* 7*   AST 14  < >  --  13 11 12   BILITOT 0.3  < >  --  0.3 0.3 0.3   MG 1.4*  --  2.1  --   --  1.8   PHOS 3.9  --  3.4  --   --  3.7   < > = values in this interval not displayed.  Estimated Creatinine Clearance: 19.2 mL/min (based on Cr of 2.5).      Recent Labs  Lab 07/12/17  1739 07/13/17  0056 07/13/17  1506   TROPONINI 0.044* 0.046* 0.037*   BNP 1,413*  --   --      Diagnostic Results:  Ejection Fractions   EF   Date Value Ref Range Status   06/23/2017 40 (A) 55 - 65    04/29/2016 60 55 - 65    04/26/2016 60 55 - 65         Infectious disease labs:  Microbiology Results (last 7 days)     ** No results found for the last 168 hours. **        Assessment and Plan:  71 y.o. 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    Acute systolic HF  - BB, hydralazine/ISDN, IV Lasix gtt, continue at 15 mg/h  - slowly diuresing, improved subjectively and on exam today though UOP not significantly improved    Chronic AF  - continue Ac, on eliquis  - RFBUW2CFLA = 8  - caution: NOAC not studied in pt's with CrCl <25  - rate control <110; HR increasing, would increase coreg to  37.5     Seen and discussed with staff Dr. Celestino Sol MD  Cardiology Consults, PGY4  [phone]

## 2017-07-16 NOTE — ASSESSMENT & PLAN NOTE
-daughter reports to takes LA insulin 10 units qhs and SA insulin 5 units WM  -A1c 6.1 2/2017  -Discontinued basal-bolus following morning glucose of 40  -Continue LD SSI QID

## 2017-07-16 NOTE — PLAN OF CARE
Problem: Patient Care Overview  Goal: Plan of Care Review  Outcome: Ongoing (interventions implemented as appropriate)  Pt in room with family to bedside. Pt has lasix gtt at 3ml/hr. Pt with good urine output overnight. Pt had critical bg on morning labs of 40. Pt given glucose tabs. Pt alert and oriented and asymptomatic. Pt bg check 126 after glucose tabs. Physician came to bedside to assess pt and pt able to speak to physician at this time. Pt family updated on situation.

## 2017-07-16 NOTE — PROGRESS NOTES
[facility] Hospital Medicine  Progress Note    Patient Name: Michael Salgado  MRN: [id number]  Patient Class: IP- Inpatient   Admission Date: 7/12/2017  Length of Stay: 4 days  Attending Physician: Kira Ruiz MD  Primary Care Provider: Wesley Watkins MD    [facility] Medicine Team: [facility] HOSP MED 1 Deuce Sexton MD    Subjective:     Principal Problem:Acute on chronic diastolic congestive heart failure    HPI:  71 year old  yo female with history of CAD s/p CABGx3 2015, ICM, HFrEF (EF 40%, PA 41 echo 6/2017), CKD stage IV, IDDM2, HTN, 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 Patient has baseline MILIAN, orthopnea, PND, LE edema, and abdo swelling that has also progressively worsened over the past few days. She is compliant with her home medications which include lasix 20 BID, coreg, hydralazine, amlodipine, and warfarin. She follows a low salt, fluid restrict diet and she has not deviated from it recently. She reports similar symptoms about a year ago that required hospitalization for CHF exacerbation. Patient recently had pneumonia and shingles in May and has not recovered full function since. She previously was able to ambulate with a walker and now uses a wheelchair. Prior to the shingles and PNA, she only required 10 mg lasix, but her lasix was increased afterwords due to increased SOB and volume overload.  She uses home O2 2.5 L. She is complaining of decreased UO, dysuria, and abdominal discomfort. She denies chest pain, palpitations, constipation (last BM today), diarrhea, cough, fever, chills, or any other complaints at this time. She lives at home with her daughter who provides with with significant assistance with her ADL's. Her cardiologist is Dr. Davis. She was last seen in cardiology clinic today and was sent to the ED from  there due to her SOB and edema.     In the ED, CXR showed increased pulmonary edema, BNP 1413 (which is above her baseline), tn 0.044, EKG with a fib with RVR but no acute ischemic changes. Patient was given lasix IV 80 mg.    Hospital Course:  7/13: Admitted to IM1. Started on Lasix 40 mg IV BID. Change in HH, 9.9->7.9. Denies changes in color/consistency of BMs and FOBT negative. Hemolytic anemia work-up negative. ABG normal. Added duoneb q6h.  7/14: Continued respiratory effort despite duoneb therapy. Vitals have remained stable. Pulmonology consult advised against thoracentesis at this moment after ultrasound showed bilateral effusions are likely not contributing to SOB. Also recommends discontinuing antibiotics as pneumonia is unlikely and it is adding fluids to her volume overload. Additional recommendation to increase diuretic therapy despite CKD. Consults to cards and pulm placed.    7/15: Patient's respiratory effort improving. Decided to discontinue coumadin at home. Patient is now taking Apixaban and will continue at home upon discharge.  7/16: Patient markedly improved today. Restarted Aspirin now that hemoglobin has stabilized.    Interval History: Patient had no events overnight. CMP glucose 40 this morning. Increased to 126 with glucose tabs. Patient feels considerably better than yesterday. SOB is improving with decreased respiratory effort.    Review of Systems   Constitutional: Negative for chills and fever.   HENT: Negative for congestion and rhinorrhea.    Eyes: Negative for pain and visual disturbance.   Respiratory: Positive for shortness of breath. Negative for cough.    Cardiovascular: Positive for leg swelling. Negative for chest pain and palpitations.   Gastrointestinal: Positive for abdominal distention and abdominal pain. Negative for blood in stool, constipation, diarrhea, nausea and vomiting.   Genitourinary: Negative for difficulty urinating, dysuria, frequency and hematuria.   Skin:  Negative for rash and wound.   Neurological: Negative for syncope, light-headedness and headaches.   Psychiatric/Behavioral: Negative for agitation and behavioral problems.     Objective:     Vital Signs (Most Recent):  Temp: 98.1 °F (36.7 °C) (07/16/17 1100)  Pulse: (!) 132 (07/16/17 1100)  Resp: 20 (07/16/17 1100)  BP: 119/73 (07/16/17 1100)  SpO2: 97 % (07/16/17 1100) Vital Signs (24h Range):  Temp:  [97.4 °F (36.3 °C)-99.3 °F (37.4 °C)] 98.1 °F (36.7 °C)  Pulse:  [100-132] 132  Resp:  [20-28] 20  SpO2:  [96 %-99 %] 97 %  BP: (107-136)/(59-82) 119/73     Weight: 69.8 kg (153 lb 14.1 oz)  Body mass index is 26.4 kg/m².    Intake/Output Summary (Last 24 hours) at 07/16/17 1259  Last data filed at 07/16/17 0700   Gross per 24 hour   Intake             1000 ml   Output              700 ml   Net              300 ml      Physical Exam   Constitutional: She is oriented to person, place, and time. She appears well-developed and well-nourished. No distress.   HENT:   Head: Normocephalic and atraumatic.   Nose: Nose normal.   Mouth/Throat: Oropharynx is clear and moist. No oropharyngeal exudate.   Eyes: EOM are normal. Pupils are equal, round, and reactive to light. No scleral icterus.   Neck: Neck supple. No JVD present. No thyromegaly present.   Cardiovascular: Normal rate, normal heart sounds and intact distal pulses.    No murmur heard.  Irregular rhythm   Pulmonary/Chest: Effort normal. No respiratory distress. She has no wheezes. She has rales.   Coarse BS and crackles bilaterally   Abdominal: Soft. Bowel sounds are normal. She exhibits no distension. There is tenderness (mild, RLQ).   Musculoskeletal: She exhibits edema (1+ LE's). She exhibits no tenderness or deformity.   Lymphadenopathy:     She has no cervical adenopathy.   Neurological: She is alert and oriented to person, place, and time. No cranial nerve deficit.   Skin: Skin is warm and dry.   Right forehead hyperpigmentation, does not cross midline, from  resolving shingles rash   Psychiatric: She has a normal mood and affect. Her behavior is normal.       Significant Labs:   BMP:   Recent Labs  Lab 07/16/17  0348   GLU 40*      K 4.7      CO2 29   BUN 48*   CREATININE 2.5*   CALCIUM 7.8*   MG 1.8     CBC:   Recent Labs  Lab 07/15/17  0355 07/16/17  0348   WBC 4.41 5.02   HGB 8.0* 7.8*   HCT 26.9* 25.3*    226       Significant Imaging: I have reviewed all pertinent imaging results/findings within the past 24 hours.    Assessment/Plan:      Pneumonia due to infectious organism    - On admit (7/12) CXR showed R-sided airspace opacities concerning for edema or infectious process (SIRS 2/4, HR and RR). No fevers/chills or productive cough  - ABG normal  - Previously treated PNA with Levaquin on 6/15  - Started on azithromycin and ceftriaxone for CAP, D/C'd 2/2 low suspicion for PNA          Depression    -continue lexapro 5 mg, Wellbutrin 150 mg  -Seroquel qhs           Dysuria    -pt reports decreased UO and dysuria for a few days, abdominal discomfort RLQ, but could be secondary to fluid overload. Denies hematuria   -no fever, no leukocytosis  -ordered UA          Chronic kidney disease (CKD) stage G4/A1, severely decreased glomerular filtration rate (GFR) between 15-29 mL/min/1.73 square meter and albuminuria creatinine ratio less than 30 mg/g    -Cr on admission 2.5, improved from 3.2 ~1 mo ago  -continue to monitor RFTs  - Cr elevated, stable at 2.6  -avoid nephrotoxic agents          Atrial fibrillation    -patient denying palpitations, lightheadedness, CP  -admission EKG with a fib with RVR   -chadsvasc of 8  -started on Apixaban  -continue coreg 25 BID            Anemia of unknown etiology    - On admit (7/12), Hgb 9.9 and dropped to 7.9 (7/13) w/ SOB  - Hemolysis work-up negative, FOBT negative with no reported changes in stool  - Stable at 7.9-8.1, maintain low-threshold to transfuse          Insulin dependent diabetes mellitus    -daughter  reports to takes LA insulin 10 units qhs and SA insulin 5 units WM  -A1c 6.1 2/2017  -Discontinued basal-bolus following morning glucose of 40  -Continue LD SSI QID          Hyperlipidemia    -atorvastatin 40 mg          H/O: CVA (cerebrovascular accident)    -hx of stroke before 2012 per daughter  -pt with residual right sided deficit   -continue atorvastatin 50 mg  -Supratherapeutic on warfarin (INR 4.0) on admit -> now INR 2.0  - Holding heparin and asa, received 2/3 doses vit. K.          HTN (hypertension)    -Coreg 25 BID, hyralazine 25 TID, isordil 10 10 TID, lasix 10 mg/hr infusion  -on admission pt normotensive with /79, continue to monitor and hold home meds PRN for low BP          * Acute on chronic diastolic congestive heart failure    -pt presents from cardiology clinic with SOB, MILIAN, LE edema for past 5 days  -on home O2 2.5 L  -patient with increase lasix requirement since shingles and PNA in May (got abx as an out pt)  -home meds include lasix 20 mg BID, coreg 25 BID, hydralazine 25 BID, amlodipine 5 mg  -she is compliant with her home meds and a low salt, fluid restricted diet  -most recent echo 6/2017 below  -On admit (7/12), CXR shows worsening pulmonary edema, BMP 1413 (above baseline), tn 0.04, EKG shows afib with RVR without acute ischemic changes  -Cr on admission 2.5, continue to monitor RFTs  - Net -1.2L from admission  - CXR 7/14 shows interval improvement from 7/12  - Pleural effusions not contributing to dyspnea, no thoracentesis warranted, per pulmonology  - Lasix 15 mg/hr infusion, per cardiology.      Echo 6/2017  1 - Mildly to moderately depressed left ventricular systolic function (EF 40-45%). Abnormal septal motion due to underlying LBBB. Significant drop in LV function, compared to the prior reported, Images could not be retrieved for comparison.     2 - Normal right ventricular systolic function .     3 - Pulmonary hypertension. The estimated PA systolic pressure is 41 mmHg.      4 - Mild tricuspid regurgitation.     5 - Small pericardial effusion.     6 - Intermediate central venous pressure.     7 - Biatrial enlargement.          VTE Risk Mitigation         Ordered     apixaban tablet 5 mg  2 times daily     Route:  Oral        07/15/17 1102     Medium Risk of VTE  Once      07/12/17 1730        Dispo: To home following resolution of her respiratory distress 2/2 volume overload. Improving, but will follow closely for decompensation.    Deuce Sexton MD  Department of Hospital Medicine   [facility]

## 2017-07-17 LAB
ALBUMIN SERPL BCP-MCNC: 1.8 G/DL
ALBUMIN SERPL BCP-MCNC: 1.8 G/DL
ALP SERPL-CCNC: 49 U/L
ALP SERPL-CCNC: 52 U/L
ALT SERPL W/O P-5'-P-CCNC: 7 U/L
ALT SERPL W/O P-5'-P-CCNC: 8 U/L
ANION GAP SERPL CALC-SCNC: 7 MMOL/L
ANION GAP SERPL CALC-SCNC: 9 MMOL/L
AST SERPL-CCNC: 12 U/L
AST SERPL-CCNC: 9 U/L
BASOPHILS # BLD AUTO: 0.02 K/UL
BASOPHILS NFR BLD: 0.4 %
BILIRUB SERPL-MCNC: 0.2 MG/DL
BILIRUB SERPL-MCNC: 0.2 MG/DL
BUN SERPL-MCNC: 50 MG/DL
BUN SERPL-MCNC: 52 MG/DL
CALCIUM SERPL-MCNC: 7.9 MG/DL
CALCIUM SERPL-MCNC: 8 MG/DL
CHLORIDE SERPL-SCNC: 106 MMOL/L
CHLORIDE SERPL-SCNC: 106 MMOL/L
CO2 SERPL-SCNC: 26 MMOL/L
CO2 SERPL-SCNC: 29 MMOL/L
CREAT SERPL-MCNC: 2.5 MG/DL
CREAT SERPL-MCNC: 2.7 MG/DL
DIFFERENTIAL METHOD: ABNORMAL
EOSINOPHIL # BLD AUTO: 0.1 K/UL
EOSINOPHIL NFR BLD: 1.6 %
ERYTHROCYTE [DISTWIDTH] IN BLOOD BY AUTOMATED COUNT: 16.3 %
EST. GFR  (AFRICAN AMERICAN): 19.4 ML/MIN/1.73 M^2
EST. GFR  (AFRICAN AMERICAN): 21.3 ML/MIN/1.73 M^2
EST. GFR  (NON AFRICAN AMERICAN): 16.9 ML/MIN/1.73 M^2
EST. GFR  (NON AFRICAN AMERICAN): 18.5 ML/MIN/1.73 M^2
GLUCOSE SERPL-MCNC: 160 MG/DL
GLUCOSE SERPL-MCNC: 199 MG/DL
HCT VFR BLD AUTO: 25.5 %
HGB BLD-MCNC: 7.6 G/DL
LYMPHOCYTES # BLD AUTO: 1.2 K/UL
LYMPHOCYTES NFR BLD: 23.9 %
MAGNESIUM SERPL-MCNC: 2 MG/DL
MCH RBC QN AUTO: 27.4 PG
MCHC RBC AUTO-ENTMCNC: 29.8 %
MCV RBC AUTO: 92 FL
MONOCYTES # BLD AUTO: 0.6 K/UL
MONOCYTES NFR BLD: 12 %
NEUTROPHILS # BLD AUTO: 3.1 K/UL
NEUTROPHILS NFR BLD: 61.5 %
PHOSPHATE SERPL-MCNC: 4.1 MG/DL
PLATELET # BLD AUTO: 219 K/UL
PMV BLD AUTO: 9.7 FL
POCT GLUCOSE: 175 MG/DL (ref 70–110)
POCT GLUCOSE: 191 MG/DL (ref 70–110)
POCT GLUCOSE: 239 MG/DL (ref 70–110)
POCT GLUCOSE: 254 MG/DL (ref 70–110)
POTASSIUM SERPL-SCNC: 4.6 MMOL/L
POTASSIUM SERPL-SCNC: 4.7 MMOL/L
PROT SERPL-MCNC: 4.9 G/DL
PROT SERPL-MCNC: 4.9 G/DL
RBC # BLD AUTO: 2.77 M/UL
SODIUM SERPL-SCNC: 141 MMOL/L
SODIUM SERPL-SCNC: 142 MMOL/L
WBC # BLD AUTO: 5.1 K/UL

## 2017-07-17 PROCEDURE — 99233 SBSQ HOSP IP/OBS HIGH 50: CPT | Mod: GC,,, | Performed by: HOSPITALIST

## 2017-07-17 PROCEDURE — 27000221 HC OXYGEN, UP TO 24 HOURS

## 2017-07-17 PROCEDURE — 11000001 HC ACUTE MED/SURG PRIVATE ROOM

## 2017-07-17 PROCEDURE — 85025 COMPLETE CBC W/AUTO DIFF WBC: CPT

## 2017-07-17 PROCEDURE — 83735 ASSAY OF MAGNESIUM: CPT

## 2017-07-17 PROCEDURE — 63600175 PHARM REV CODE 636 W HCPCS: Performed by: STUDENT IN AN ORGANIZED HEALTH CARE EDUCATION/TRAINING PROGRAM

## 2017-07-17 PROCEDURE — 84100 ASSAY OF PHOSPHORUS: CPT

## 2017-07-17 PROCEDURE — 25000003 PHARM REV CODE 250: Performed by: STUDENT IN AN ORGANIZED HEALTH CARE EDUCATION/TRAINING PROGRAM

## 2017-07-17 PROCEDURE — 25000003 PHARM REV CODE 250: Performed by: HOSPITALIST

## 2017-07-17 PROCEDURE — 94640 AIRWAY INHALATION TREATMENT: CPT

## 2017-07-17 PROCEDURE — 36415 COLL VENOUS BLD VENIPUNCTURE: CPT

## 2017-07-17 PROCEDURE — 94761 N-INVAS EAR/PLS OXIMETRY MLT: CPT

## 2017-07-17 PROCEDURE — 80053 COMPREHEN METABOLIC PANEL: CPT

## 2017-07-17 PROCEDURE — 25000242 PHARM REV CODE 250 ALT 637 W/ HCPCS: Performed by: STUDENT IN AN ORGANIZED HEALTH CARE EDUCATION/TRAINING PROGRAM

## 2017-07-17 RX ORDER — METOPROLOL TARTRATE 50 MG/1
50 TABLET ORAL 2 TIMES DAILY
Status: DISCONTINUED | OUTPATIENT
Start: 2017-07-17 | End: 2017-07-23

## 2017-07-17 RX ORDER — HYDRALAZINE HYDROCHLORIDE 50 MG/1
50 TABLET, FILM COATED ORAL 3 TIMES DAILY
Status: DISCONTINUED | OUTPATIENT
Start: 2017-07-17 | End: 2017-07-29 | Stop reason: HOSPADM

## 2017-07-17 RX ADMIN — BUPROPION HYDROCHLORIDE 150 MG: 150 TABLET, FILM COATED, EXTENDED RELEASE ORAL at 08:07

## 2017-07-17 RX ADMIN — ACETAMINOPHEN 650 MG: 325 TABLET ORAL at 08:07

## 2017-07-17 RX ADMIN — APIXABAN 5 MG: 5 TABLET, FILM COATED ORAL at 08:07

## 2017-07-17 RX ADMIN — INSULIN ASPART 4 UNITS: 100 INJECTION, SOLUTION INTRAVENOUS; SUBCUTANEOUS at 05:07

## 2017-07-17 RX ADMIN — Medication 3 ML: at 03:07

## 2017-07-17 RX ADMIN — ESCITALOPRAM 5 MG: 5 TABLET, FILM COATED ORAL at 08:07

## 2017-07-17 RX ADMIN — ISOSORBIDE DINITRATE 10 MG: 10 TABLET ORAL at 05:07

## 2017-07-17 RX ADMIN — INSULIN ASPART 4 UNITS: 100 INJECTION, SOLUTION INTRAVENOUS; SUBCUTANEOUS at 12:07

## 2017-07-17 RX ADMIN — APIXABAN 5 MG: 5 TABLET, FILM COATED ORAL at 09:07

## 2017-07-17 RX ADMIN — IPRATROPIUM BROMIDE AND ALBUTEROL SULFATE 3 ML: .5; 3 SOLUTION RESPIRATORY (INHALATION) at 07:07

## 2017-07-17 RX ADMIN — INSULIN ASPART 1 UNITS: 100 INJECTION, SOLUTION INTRAVENOUS; SUBCUTANEOUS at 09:07

## 2017-07-17 RX ADMIN — GABAPENTIN 300 MG: 300 CAPSULE ORAL at 09:07

## 2017-07-17 RX ADMIN — IPRATROPIUM BROMIDE AND ALBUTEROL SULFATE 3 ML: .5; 3 SOLUTION RESPIRATORY (INHALATION) at 08:07

## 2017-07-17 RX ADMIN — IPRATROPIUM BROMIDE AND ALBUTEROL SULFATE 3 ML: .5; 3 SOLUTION RESPIRATORY (INHALATION) at 12:07

## 2017-07-17 RX ADMIN — FUROSEMIDE 15 MG/HR: 10 INJECTION, SOLUTION INTRAVENOUS at 09:07

## 2017-07-17 RX ADMIN — IPRATROPIUM BROMIDE AND ALBUTEROL SULFATE 3 ML: .5; 3 SOLUTION RESPIRATORY (INHALATION) at 01:07

## 2017-07-17 RX ADMIN — ASPIRIN 81 MG: 81 TABLET, COATED ORAL at 08:07

## 2017-07-17 RX ADMIN — ISOSORBIDE DINITRATE 10 MG: 10 TABLET ORAL at 09:07

## 2017-07-17 RX ADMIN — CARVEDILOL 37.5 MG: 25 TABLET, FILM COATED ORAL at 08:07

## 2017-07-17 RX ADMIN — METOPROLOL TARTRATE 50 MG: 50 TABLET, FILM COATED ORAL at 09:07

## 2017-07-17 RX ADMIN — ISOSORBIDE DINITRATE 10 MG: 10 TABLET ORAL at 03:07

## 2017-07-17 RX ADMIN — HYDRALAZINE HYDROCHLORIDE 25 MG: 25 TABLET, FILM COATED ORAL at 05:07

## 2017-07-17 RX ADMIN — Medication 3 ML: at 06:07

## 2017-07-17 RX ADMIN — ATORVASTATIN CALCIUM 40 MG: 20 TABLET, FILM COATED ORAL at 08:07

## 2017-07-17 RX ADMIN — Medication 3 ML: at 10:07

## 2017-07-17 RX ADMIN — PANTOPRAZOLE SODIUM 40 MG: 40 TABLET, DELAYED RELEASE ORAL at 08:07

## 2017-07-17 RX ADMIN — QUETIAPINE FUMARATE 100 MG: 100 TABLET, FILM COATED ORAL at 09:07

## 2017-07-17 RX ADMIN — HYDRALAZINE HYDROCHLORIDE 50 MG: 50 TABLET ORAL at 09:07

## 2017-07-17 RX ADMIN — HYDRALAZINE HYDROCHLORIDE 50 MG: 50 TABLET ORAL at 03:07

## 2017-07-17 NOTE — PLAN OF CARE
"SW met with pt and her spouse at bedside to review d/c planning recommendations of SNF placement; PHN SNF list provided. Pt informed SW that she was current with outpatient rehab on "[address]" and reports " I was doing fine until I had this stroke". At this present time pt is not agreeable to SNF placement but will talk it over with her  and daughter, Abril. SW will return in the morning to discuss pt's decision of SNF placement.   "

## 2017-07-17 NOTE — SUBJECTIVE & OBJECTIVE
Interval History: No acute events overnight. Patient is continuing to feel better. No complaints.    Review of Systems   Constitutional: Negative for chills and fever.   HENT: Negative for congestion and rhinorrhea.    Eyes: Negative for pain and visual disturbance.   Respiratory: Positive for shortness of breath. Negative for cough.    Cardiovascular: Positive for leg swelling. Negative for chest pain and palpitations.   Gastrointestinal: Negative for abdominal distention, abdominal pain, blood in stool, constipation, diarrhea, nausea and vomiting.   Genitourinary: Negative for difficulty urinating, dysuria, frequency and hematuria.   Skin: Negative for rash and wound.   Neurological: Negative for syncope, light-headedness and headaches.   Psychiatric/Behavioral: Negative for agitation and behavioral problems.     Objective:     Vital Signs (Most Recent):  Temp: 97.7 °F (36.5 °C) (07/17/17 1600)  Pulse: 102 (07/17/17 1600)  Resp: 20 (07/17/17 1600)  BP: 122/65 (07/17/17 1600)  SpO2: 99 % (07/17/17 1600) Vital Signs (24h Range):  Temp:  [97.5 °F (36.4 °C)-98 °F (36.7 °C)] 97.7 °F (36.5 °C)  Pulse:  [] 102  Resp:  [19-26] 20  SpO2:  [93 %-99 %] 99 %  BP: (101-122)/(53-75) 122/65     Weight: 69.8 kg (153 lb 14.1 oz)  Body mass index is 26.4 kg/m².    Intake/Output Summary (Last 24 hours) at 07/17/17 1856  Last data filed at 07/16/17 1940   Gross per 24 hour   Intake                0 ml   Output              200 ml   Net             -200 ml      Physical Exam   Constitutional: She is oriented to person, place, and time. She appears well-developed and well-nourished. No distress.   HENT:   Head: Normocephalic and atraumatic.   Nose: Nose normal.   Mouth/Throat: Oropharynx is clear and moist. No oropharyngeal exudate.   Eyes: EOM are normal. Pupils are equal, round, and reactive to light. No scleral icterus.   Neck: Neck supple. No JVD present. No thyromegaly present.   Cardiovascular: Normal rate, normal heart  sounds and intact distal pulses.    No murmur heard.  Irregular rhythm   Pulmonary/Chest: Effort normal. No respiratory distress. She has no wheezes. She has rales.   Coarse BS and crackles bilaterally   Abdominal: Soft. Bowel sounds are normal. She exhibits no distension. There is no tenderness.   Musculoskeletal: She exhibits edema (1+ LE's). She exhibits no tenderness or deformity.   Lymphadenopathy:     She has no cervical adenopathy.   Neurological: She is alert and oriented to person, place, and time. No cranial nerve deficit.   Skin: Skin is warm and dry.   Right forehead hyperpigmentation, does not cross midline, from resolving shingles rash   Psychiatric: She has a normal mood and affect. Her behavior is normal.       Significant Labs:   CBC:   Recent Labs  Lab 07/16/17  0348 07/17/17  0444   WBC 5.02 5.10   HGB 7.8* 7.6*   HCT 25.3* 25.5*    219     CMP:   Recent Labs  Lab 07/16/17  1558 07/17/17  0444 07/17/17  1540    141 142   K 4.7 4.7 4.6    106 106   CO2 27 26 29   * 160* 199*   BUN 50* 50* 52*   CREATININE 2.5* 2.5* 2.7*   CALCIUM 8.3* 7.9* 8.0*   PROT 5.1* 4.9* 4.9*   ALBUMIN 1.9* 1.8* 1.8*   BILITOT 0.3 0.2 0.2   ALKPHOS 54* 49* 52*   AST 11 12 9*   ALT 9* 7* 8*   ANIONGAP 9 9 7*   EGFRNONAA 18.5* 18.5* 16.9*       Significant Imaging: I have reviewed all pertinent imaging results/findings within the past 24 hours.

## 2017-07-17 NOTE — PLAN OF CARE
Problem: Patient Care Overview  Goal: Plan of Care Review  Outcome: Ongoing (interventions implemented as appropriate)  VSS and afebrile. Free from falls and safe. Maintained AOx4.    Pt C/O back pain 10/10 c/ analgesics administered per MAR. Pt stated that she will refuse TB skin test when ready for it to be administered.    To be monitored for SOB and adverse events associated c/ dx.

## 2017-07-17 NOTE — PROGRESS NOTES
[facility] Hospital Medicine  Progress Note    Patient Name: Michael Salgado  MRN: [id number]  Patient Class: IP- Inpatient   Admission Date: 7/12/2017  Length of Stay: 5 days  Attending Physician: Kira Ruiz MD  Primary Care Provider: Wesley Watkins MD    [facility] Medicine Team: [facility] 1 Deuce Sexton MD    Subjective:     Principal Problem:Acute on chronic diastolic congestive heart failure    HPI:  71 year old  yo female with history of CAD s/p CABGx3 2015, ICM, HFrEF (EF 40%, PA 41 echo 6/2017), CKD stage IV, IDDM2, HTN, 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 Patient has baseline MILIAN, orthopnea, PND, LE edema, and abdo swelling that has also progressively worsened over the past few days. She is compliant with her home medications which include lasix 20 BID, coreg, hydralazine, amlodipine, and warfarin. She follows a low salt, fluid restrict diet and she has not deviated from it recently. She reports similar symptoms about a year ago that required hospitalization for CHF exacerbation. Patient recently had pneumonia and shingles in May and has not recovered full function since. She previously was able to ambulate with a walker and now uses a wheelchair. Prior to the shingles and PNA, she only required 10 mg lasix, but her lasix was increased afterwords due to increased SOB and volume overload.  She uses home O2 2.5 L. She is complaining of decreased UO, dysuria, and abdominal discomfort. She denies chest pain, palpitations, constipation (last BM today), diarrhea, cough, fever, chills, or any other complaints at this time. She lives at home with her daughter who provides with with significant assistance with her ADL's. Her cardiologist is Dr. Davis. She was last seen in cardiology clinic today and was sent to the ED from  there due to her SOB and edema.     In the ED, CXR showed increased pulmonary edema, BNP 1413 (which is above her baseline), tn 0.044, EKG with a fib with RVR but no acute ischemic changes. Patient was given lasix IV 80 mg.    Hospital Course:  7/13: Admitted to IM1. Started on Lasix 40 mg IV BID. Change in HH, 9.9->7.9. Denies changes in color/consistency of BMs and FOBT negative. Hemolytic anemia work-up negative. ABG normal. Added duoneb q6h.  7/14: Continued respiratory effort despite duoneb therapy. Vitals have remained stable. Pulmonology consult advised against thoracentesis at this moment after ultrasound showed bilateral effusions are likely not contributing to SOB. Also recommends discontinuing antibiotics as pneumonia is unlikely and it is adding fluids to her volume overload. Additional recommendation to increase diuretic therapy despite CKD. Consults to cards and pulm placed.    7/15: Patient's respiratory effort improving. Decided to discontinue coumadin at home. Patient is now taking Apixaban and will continue at home upon discharge.  7/16: Patient markedly improved today. Restarted Aspirin now that hemoglobin has stabilized.  7/17: Continued improvement. Increased hydralazine to 50 TID and replaced coreg with metoprolol for better AFib control, per cardiology.    Interval History: No acute events overnight. Patient is continuing to feel better. No complaints.    Review of Systems   Constitutional: Negative for chills and fever.   HENT: Negative for congestion and rhinorrhea.    Eyes: Negative for pain and visual disturbance.   Respiratory: Positive for shortness of breath. Negative for cough.    Cardiovascular: Positive for leg swelling. Negative for chest pain and palpitations.   Gastrointestinal: Negative for abdominal distention, abdominal pain, blood in stool, constipation, diarrhea, nausea and vomiting.   Genitourinary: Negative for difficulty urinating, dysuria, frequency and hematuria.   Skin:  Negative for rash and wound.   Neurological: Negative for syncope, light-headedness and headaches.   Psychiatric/Behavioral: Negative for agitation and behavioral problems.     Objective:     Vital Signs (Most Recent):  Temp: 97.7 °F (36.5 °C) (07/17/17 1600)  Pulse: 102 (07/17/17 1600)  Resp: 20 (07/17/17 1600)  BP: 122/65 (07/17/17 1600)  SpO2: 99 % (07/17/17 1600) Vital Signs (24h Range):  Temp:  [97.5 °F (36.4 °C)-98 °F (36.7 °C)] 97.7 °F (36.5 °C)  Pulse:  [] 102  Resp:  [19-26] 20  SpO2:  [93 %-99 %] 99 %  BP: (101-122)/(53-75) 122/65     Weight: 69.8 kg (153 lb 14.1 oz)  Body mass index is 26.4 kg/m².    Intake/Output Summary (Last 24 hours) at 07/17/17 1856  Last data filed at 07/16/17 1940   Gross per 24 hour   Intake                0 ml   Output              200 ml   Net             -200 ml      Physical Exam   Constitutional: She is oriented to person, place, and time. She appears well-developed and well-nourished. No distress.   HENT:   Head: Normocephalic and atraumatic.   Nose: Nose normal.   Mouth/Throat: Oropharynx is clear and moist. No oropharyngeal exudate.   Eyes: EOM are normal. Pupils are equal, round, and reactive to light. No scleral icterus.   Neck: Neck supple. No JVD present. No thyromegaly present.   Cardiovascular: Normal rate, normal heart sounds and intact distal pulses.    No murmur heard.  Irregular rhythm   Pulmonary/Chest: Effort normal. No respiratory distress. She has no wheezes. She has rales.   Coarse BS and crackles bilaterally   Abdominal: Soft. Bowel sounds are normal. She exhibits no distension. There is no tenderness.   Musculoskeletal: She exhibits edema (1+ LE's). She exhibits no tenderness or deformity.   Lymphadenopathy:     She has no cervical adenopathy.   Neurological: She is alert and oriented to person, place, and time. No cranial nerve deficit.   Skin: Skin is warm and dry.   Right forehead hyperpigmentation, does not cross midline, from resolving shingles  rash   Psychiatric: She has a normal mood and affect. Her behavior is normal.       Significant Labs:   CBC:   Recent Labs  Lab 07/16/17  0348 07/17/17  0444   WBC 5.02 5.10   HGB 7.8* 7.6*   HCT 25.3* 25.5*    219     CMP:   Recent Labs  Lab 07/16/17  1558 07/17/17  0444 07/17/17  1540    141 142   K 4.7 4.7 4.6    106 106   CO2 27 26 29   * 160* 199*   BUN 50* 50* 52*   CREATININE 2.5* 2.5* 2.7*   CALCIUM 8.3* 7.9* 8.0*   PROT 5.1* 4.9* 4.9*   ALBUMIN 1.9* 1.8* 1.8*   BILITOT 0.3 0.2 0.2   ALKPHOS 54* 49* 52*   AST 11 12 9*   ALT 9* 7* 8*   ANIONGAP 9 9 7*   EGFRNONAA 18.5* 18.5* 16.9*       Significant Imaging: I have reviewed all pertinent imaging results/findings within the past 24 hours.    Assessment/Plan:      Pneumonia due to infectious organism    - On admit (7/12) CXR showed R-sided airspace opacities concerning for edema or infectious process (SIRS 2/4, HR and RR). No fevers/chills or productive cough  - ABG normal  - Previously treated PNA with Levaquin on 6/15  - Started on azithromycin and ceftriaxone for CAP, D/C'd 2/2 low suspicion for PNA          Depression    -continue lexapro 5 mg, Wellbutrin 150 mg  -Seroquel qhs           Dysuria    -pt reports decreased UO and dysuria for a few days, abdominal discomfort RLQ, but could be secondary to fluid overload. Denies hematuria   -no fever, no leukocytosis  -ordered UA          Chronic kidney disease (CKD) stage G4/A1, severely decreased glomerular filtration rate (GFR) between 15-29 mL/min/1.73 square meter and albuminuria creatinine ratio less than 30 mg/g    -Cr on admission 2.5, improved from 3.2 ~1 mo ago  -continue to monitor RFTs  - Cr elevated, stable at 2.6  -avoid nephrotoxic agents          Atrial fibrillation    -patient denying palpitations, lightheadedness, CP  -admission EKG with a fib with RVR   -chadsvasc of 8  -started on Apixaban  -coreg switched to metoprolol tartrate 50 BID            Anemia of unknown  etiology    - On admit (7/12), Hgb 9.9 and dropped to 7.9 (7/13) w/ SOB  - Hemolysis work-up negative, FOBT negative with no reported changes in stool  - Stable at 7.9-8.1, maintain low-threshold to transfuse          Insulin dependent diabetes mellitus    -daughter reports to takes LA insulin 10 units qhs and SA insulin 5 units WM  -A1c 6.1 2/2017  -Discontinued basal-bolus following morning glucose of 40  -Continue LD SSI QID          Hyperlipidemia    -atorvastatin 40 mg          H/O: CVA (cerebrovascular accident)    -hx of stroke before 2012 per daughter  -pt with residual right sided deficit   -continue atorvastatin 50 mg  -Supratherapeutic on warfarin (INR 4.0) on admit -> now INR 2.0  - Holding heparin, received 2/3 doses vit. K.          HTN (hypertension)    -Lopressor 50 BID, hyralazine 50 TID, isordil 10 10 TID, lasix 10 mg/hr infusion  -on admission pt normotensive with /79, continue to monitor and hold home meds PRN for low BP          * Acute on chronic diastolic congestive heart failure    -pt presents from cardiology clinic with SOB, MILIAN, LE edema for past 5 days  -on home O2 2.5 L  -patient with increase lasix requirement since shingles and PNA in May (got abx as an out pt)  -home meds include lasix 20 mg BID, coreg 25 BID, hydralazine 25 BID, amlodipine 5 mg  -she is compliant with her home meds and a low salt, fluid restricted diet  -most recent echo 6/2017 below  -On admit (7/12), CXR shows worsening pulmonary edema, BMP 1413 (above baseline), tn 0.04, EKG shows afib with RVR without acute ischemic changes  -Cr on admission 2.5, continue to monitor RFTs  - Net -1.2L from admission  - CXR 7/14 shows interval improvement from 7/12  - Pleural effusions not contributing to dyspnea, no thoracentesis warranted, per pulmonology  - Lasix 15 mg/hr infusion, per cardiology.  - Outpatient stress test to be scheduled    Echo 6/2017  1 - Mildly to moderately depressed left ventricular systolic function  (EF 40-45%). Abnormal septal motion due to underlying LBBB. Significant drop in LV function, compared to the prior reported, Images could not be retrieved for comparison.     2 - Normal right ventricular systolic function .     3 - Pulmonary hypertension. The estimated PA systolic pressure is 41 mmHg.     4 - Mild tricuspid regurgitation.     5 - Small pericardial effusion.     6 - Intermediate central venous pressure.     7 - Biatrial enlargement.          VTE Risk Mitigation         Ordered     apixaban tablet 5 mg  2 times daily     Route:  Oral        07/15/17 1102     Medium Risk of VTE  Once      07/12/17 8295        Dispo: To home, per patient's wishes    Deuce Sexton MD  Department of Hospital Medicine   [facility]

## 2017-07-17 NOTE — PLAN OF CARE
Problem: Patient Care Overview  Goal: Plan of Care Review  Outcome: Ongoing (interventions implemented as appropriate)  Pt in room with family at bedside. PT with multiple BM overnight. Hard to keep pure wick in place but pt with good urine output. Pt reports feeling better. VSS at this time. Monitoring.

## 2017-07-17 NOTE — PT/OT/SLP PROGRESS
Physical Therapy      Michael SMALLWOOD Naomi  MRN: [id number]    Patient not seen today secondary to pt refusal both attempts. Pt was educated on this benefits/importance with working with therapy. Will follow-up at next scheduled visit.    Felicitas George, PTA

## 2017-07-18 PROBLEM — I50.43 ACUTE ON CHRONIC COMBINED SYSTOLIC AND DIASTOLIC CONGESTIVE HEART FAILURE: Status: ACTIVE | Noted: 2017-07-12

## 2017-07-18 PROBLEM — J18.9 CAP (COMMUNITY ACQUIRED PNEUMONIA): Status: RESOLVED | Noted: 2017-07-14 | Resolved: 2017-07-18

## 2017-07-18 PROBLEM — R30.0 DYSURIA: Status: RESOLVED | Noted: 2017-07-13 | Resolved: 2017-07-18

## 2017-07-18 PROBLEM — I50.23 ACUTE ON CHRONIC SYSTOLIC CONGESTIVE HEART FAILURE: Status: ACTIVE | Noted: 2017-07-12

## 2017-07-18 PROBLEM — J96.01 ACUTE HYPOXEMIC RESPIRATORY FAILURE: Status: ACTIVE | Noted: 2017-07-18

## 2017-07-18 LAB
ALBUMIN SERPL BCP-MCNC: 1.7 G/DL
ALP SERPL-CCNC: 43 U/L
ALT SERPL W/O P-5'-P-CCNC: 7 U/L
ANION GAP SERPL CALC-SCNC: 10 MMOL/L
AST SERPL-CCNC: 10 U/L
BASOPHILS # BLD AUTO: 0.04 K/UL
BASOPHILS NFR BLD: 0.9 %
BILIRUB SERPL-MCNC: 0.2 MG/DL
BUN SERPL-MCNC: 51 MG/DL
CALCIUM SERPL-MCNC: 8.7 MG/DL
CHLORIDE SERPL-SCNC: 106 MMOL/L
CO2 SERPL-SCNC: 28 MMOL/L
CREAT SERPL-MCNC: 2.6 MG/DL
DIFFERENTIAL METHOD: ABNORMAL
EOSINOPHIL # BLD AUTO: 0.1 K/UL
EOSINOPHIL NFR BLD: 3.3 %
ERYTHROCYTE [DISTWIDTH] IN BLOOD BY AUTOMATED COUNT: 16.1 %
EST. GFR  (AFRICAN AMERICAN): 20.3 ML/MIN/1.73 M^2
EST. GFR  (NON AFRICAN AMERICAN): 17.6 ML/MIN/1.73 M^2
GLUCOSE SERPL-MCNC: 150 MG/DL
HCT VFR BLD AUTO: 24.5 %
HGB BLD-MCNC: 7.5 G/DL
LYMPHOCYTES # BLD AUTO: 1.3 K/UL
LYMPHOCYTES NFR BLD: 29.6 %
MAGNESIUM SERPL-MCNC: 1.8 MG/DL
MCH RBC QN AUTO: 28.1 PG
MCHC RBC AUTO-ENTMCNC: 30.6 %
MCV RBC AUTO: 92 FL
MONOCYTES # BLD AUTO: 0.5 K/UL
MONOCYTES NFR BLD: 12.6 %
NEUTROPHILS # BLD AUTO: 2.2 K/UL
NEUTROPHILS NFR BLD: 52.9 %
PHOSPHATE SERPL-MCNC: 4.5 MG/DL
PLATELET # BLD AUTO: 203 K/UL
PMV BLD AUTO: 9 FL
POCT GLUCOSE: 135 MG/DL (ref 70–110)
POCT GLUCOSE: 161 MG/DL (ref 70–110)
POCT GLUCOSE: 173 MG/DL (ref 70–110)
POCT GLUCOSE: 335 MG/DL (ref 70–110)
POTASSIUM SERPL-SCNC: 4.3 MMOL/L
PROT SERPL-MCNC: 4.6 G/DL
RBC # BLD AUTO: 2.67 M/UL
SODIUM SERPL-SCNC: 144 MMOL/L
WBC # BLD AUTO: 4.22 K/UL

## 2017-07-18 PROCEDURE — 25000242 PHARM REV CODE 250 ALT 637 W/ HCPCS: Performed by: STUDENT IN AN ORGANIZED HEALTH CARE EDUCATION/TRAINING PROGRAM

## 2017-07-18 PROCEDURE — 25000003 PHARM REV CODE 250: Performed by: STUDENT IN AN ORGANIZED HEALTH CARE EDUCATION/TRAINING PROGRAM

## 2017-07-18 PROCEDURE — 25000003 PHARM REV CODE 250: Performed by: HOSPITALIST

## 2017-07-18 PROCEDURE — 27000221 HC OXYGEN, UP TO 24 HOURS

## 2017-07-18 PROCEDURE — 84100 ASSAY OF PHOSPHORUS: CPT

## 2017-07-18 PROCEDURE — 11000001 HC ACUTE MED/SURG PRIVATE ROOM

## 2017-07-18 PROCEDURE — 80053 COMPREHEN METABOLIC PANEL: CPT

## 2017-07-18 PROCEDURE — 85025 COMPLETE CBC W/AUTO DIFF WBC: CPT

## 2017-07-18 PROCEDURE — 63600175 PHARM REV CODE 636 W HCPCS: Performed by: HOSPITALIST

## 2017-07-18 PROCEDURE — 94640 AIRWAY INHALATION TREATMENT: CPT

## 2017-07-18 PROCEDURE — 83735 ASSAY OF MAGNESIUM: CPT

## 2017-07-18 PROCEDURE — 99233 SBSQ HOSP IP/OBS HIGH 50: CPT | Mod: ,,, | Performed by: INTERNAL MEDICINE

## 2017-07-18 PROCEDURE — 97110 THERAPEUTIC EXERCISES: CPT

## 2017-07-18 PROCEDURE — 63600175 PHARM REV CODE 636 W HCPCS: Performed by: STUDENT IN AN ORGANIZED HEALTH CARE EDUCATION/TRAINING PROGRAM

## 2017-07-18 PROCEDURE — 36415 COLL VENOUS BLD VENIPUNCTURE: CPT

## 2017-07-18 PROCEDURE — 99233 SBSQ HOSP IP/OBS HIGH 50: CPT | Mod: GC,,, | Performed by: HOSPITALIST

## 2017-07-18 PROCEDURE — 97530 THERAPEUTIC ACTIVITIES: CPT

## 2017-07-18 RX ORDER — MAGNESIUM SULFATE HEPTAHYDRATE 40 MG/ML
2 INJECTION, SOLUTION INTRAVENOUS ONCE
Status: COMPLETED | OUTPATIENT
Start: 2017-07-18 | End: 2017-07-18

## 2017-07-18 RX ORDER — FUROSEMIDE 10 MG/ML
80 INJECTION INTRAMUSCULAR; INTRAVENOUS 2 TIMES DAILY
Status: DISCONTINUED | OUTPATIENT
Start: 2017-07-18 | End: 2017-07-19

## 2017-07-18 RX ADMIN — IPRATROPIUM BROMIDE AND ALBUTEROL SULFATE 3 ML: .5; 3 SOLUTION RESPIRATORY (INHALATION) at 01:07

## 2017-07-18 RX ADMIN — ISOSORBIDE DINITRATE 10 MG: 10 TABLET ORAL at 09:07

## 2017-07-18 RX ADMIN — FUROSEMIDE 80 MG: 10 INJECTION, SOLUTION INTRAVENOUS at 10:07

## 2017-07-18 RX ADMIN — Medication 3 ML: at 02:07

## 2017-07-18 RX ADMIN — MAGNESIUM SULFATE IN WATER 2 G: 40 INJECTION, SOLUTION INTRAVENOUS at 10:07

## 2017-07-18 RX ADMIN — HYDRALAZINE HYDROCHLORIDE 50 MG: 50 TABLET ORAL at 09:07

## 2017-07-18 RX ADMIN — APIXABAN 5 MG: 5 TABLET, FILM COATED ORAL at 09:07

## 2017-07-18 RX ADMIN — ATORVASTATIN CALCIUM 40 MG: 20 TABLET, FILM COATED ORAL at 09:07

## 2017-07-18 RX ADMIN — GABAPENTIN 300 MG: 300 CAPSULE ORAL at 09:07

## 2017-07-18 RX ADMIN — FUROSEMIDE 80 MG: 10 INJECTION, SOLUTION INTRAVENOUS at 06:07

## 2017-07-18 RX ADMIN — HYDRALAZINE HYDROCHLORIDE 50 MG: 50 TABLET ORAL at 06:07

## 2017-07-18 RX ADMIN — IPRATROPIUM BROMIDE AND ALBUTEROL SULFATE 3 ML: .5; 3 SOLUTION RESPIRATORY (INHALATION) at 07:07

## 2017-07-18 RX ADMIN — BUPROPION HYDROCHLORIDE 150 MG: 150 TABLET, FILM COATED, EXTENDED RELEASE ORAL at 09:07

## 2017-07-18 RX ADMIN — ISOSORBIDE DINITRATE 10 MG: 10 TABLET ORAL at 06:07

## 2017-07-18 RX ADMIN — INSULIN ASPART 2 UNITS: 100 INJECTION, SOLUTION INTRAVENOUS; SUBCUTANEOUS at 08:07

## 2017-07-18 RX ADMIN — METOPROLOL TARTRATE 50 MG: 50 TABLET, FILM COATED ORAL at 09:07

## 2017-07-18 RX ADMIN — Medication 3 ML: at 06:07

## 2017-07-18 RX ADMIN — INSULIN ASPART 8 UNITS: 100 INJECTION, SOLUTION INTRAVENOUS; SUBCUTANEOUS at 12:07

## 2017-07-18 RX ADMIN — ESCITALOPRAM 5 MG: 5 TABLET, FILM COATED ORAL at 09:07

## 2017-07-18 RX ADMIN — ISOSORBIDE DINITRATE 10 MG: 10 TABLET ORAL at 02:07

## 2017-07-18 RX ADMIN — QUETIAPINE FUMARATE 100 MG: 100 TABLET, FILM COATED ORAL at 09:07

## 2017-07-18 RX ADMIN — PANTOPRAZOLE SODIUM 40 MG: 40 TABLET, DELAYED RELEASE ORAL at 09:07

## 2017-07-18 RX ADMIN — ACETAMINOPHEN 650 MG: 325 TABLET ORAL at 03:07

## 2017-07-18 RX ADMIN — IPRATROPIUM BROMIDE AND ALBUTEROL SULFATE 3 ML: .5; 3 SOLUTION RESPIRATORY (INHALATION) at 10:07

## 2017-07-18 RX ADMIN — ASPIRIN 81 MG: 81 TABLET, COATED ORAL at 09:07

## 2017-07-18 RX ADMIN — HYDRALAZINE HYDROCHLORIDE 50 MG: 50 TABLET ORAL at 02:07

## 2017-07-18 NOTE — PLAN OF CARE
Problem: Physical Therapy Goal  Goal: Physical Therapy Goal  Goals to be met by: 2017    Patient will increase functional independence with mobility by performin. Supine to sit with MInimal Assistance  2. Sit to supine with MInimal Assistance  3. Rolling to Right with Minimal Assistance.  4. Sit to stand transfer with Stand-by Assistance  5. Bed to chair transfer with Stand-by Assistance using Rolling Walker  6. Gait  x 10 feet with Minimal Assistance using Rolling Walker.        Goals remain appropriate at time. Continue with PT POC as indicated.

## 2017-07-18 NOTE — PT/OT/SLP PROGRESS
"Occupational Therapy  Treatment    Michael Salgado   MRN: [id number]   Admitting Diagnosis: Acute on chronic systolic congestive heart failure    OT Date of Treatment: 07/18/17   OT Start Time: 1416  OT Stop Time: 1435  OT Total Time (min): 19 min    Billable Minutes:  Therapeutic Exercise 19    General Precautions: Standard, fall  Orthopedic Precautions:    Braces:           Subjective:  Communicated with RN prior to session.  "I have trouble catching my breath."  Pain/Comfort  Pain Rating 1: 8/10  Location - Orientation 1: lower  Location 1: back    Objective:  Patient found with: oxygen; O2 sats pre-activity = 95%     Functional Mobility:  Bed Mobility: Pt refused sitting EOB this session.       Transfers:   Sit <> Stand Assistance: Activity did not occur (Pt refused.)    Functional Ambulation: Pt refused.    Therapeutic Activities and Exercises:  Max Encouragement provided to get pt to sit EOB for improved breathing and decreased back pain but pt refused.   Agreeable to RUE PROM of all joints/planes with very little to no hypertonicity noted. Pt only demonstrates trace movements throughout RUE (nothing functional).    AM-PAC 6 CLICK ADL   How much help from another person does this patient currently need?   1 = Unable, Total/Dependent Assistance  2 = A lot, Maximum/Moderate Assistance  3 = A little, Minimum/Contact Guard/Supervision  4 = None, Modified [place]/Independent    Putting on and taking off regular lower body clothing? : 2  Bathing (including washing, rinsing, drying)?: 2  Toileting, which includes using toilet, bedpan, or urinal? : 2  Putting on and taking off regular upper body clothing?: 2  Taking care of personal grooming such as brushing teeth?: 3  Eating meals?: 3  Total Score: 14     AM-PAC Raw Score CMS "G-Code Modifier Level of Impairment Assistance   6 % Total / Unable   7 - 8 CM 80 - 100% Maximal Assist   9-13 CL 60 - 80% Moderate Assist   14 - 19 CK 40 - 60% Moderate Assist "   20 - 22 CJ 20 - 40% Minimal Assist   23 CI 1-20% SBA / CGA   24 CH 0% Independent/ Mod I       Patient left supine with all lines intact, call button in reach and  present    ASSESSMENT:  Michael Salgado is a 71 y.o. female with a medical diagnosis of Acute on chronic systolic congestive heart failure and presents with decreased (I) in multiple ADL areas, functional mobility & t/fs as well as decreased overall strength, ROM, endurance and balance. Pt would continue to benefit from skilled OT services to address these deficits and to facilitate improving (I) with daily tasks.    Rehab identified problem list/impairments: Rehab identified problem list/impairments: weakness, impaired functional mobilty, impaired balance, decreased upper extremity function, decreased safety awareness, decreased ROM, decreased lower extremity function, decreased coordination, gait instability, impaired self care skills, impaired endurance, impaired cardiopulmonary response to activity    Rehab potential is fair.    Activity tolerance: Fair    Discharge recommendations: Discharge Facility/Level Of Care Needs: nursing facility, skilled     Barriers to discharge: Barriers to Discharge: None    Equipment recommendations: none     GOALS:    Occupational Therapy Goals        Problem: Occupational Therapy Goal    Goal Priority Disciplines Outcome Interventions   Occupational Therapy Goal     OT, PT/OT Ongoing (interventions implemented as appropriate)    Description:  Goals to be met by: 07/30     Patient will increase functional independence with ADLs by performing:    UE Dressing with Stand-by Assistance.  LE Dressing with Moderate Assistance and Assistive Devices as needed.  Grooming while seated with Stand-by Assistance.  Toileting from bedside commode with Moderate Assistance and Assistive Devices as needed for hygiene and clothing management.   Stand pivot transfers with Contact Guard Assistance.  Toilet transfer to bedside  commode with Contact Guard Assistance.                      Plan:  Patient to be seen 4 x/week to address the above listed problems via self-care/home management, therapeutic exercises, neuromuscular re-education, therapeutic activities  Plan of Care expires:    Plan of Care reviewed with: patient, spouse         LURDES Nguyen  07/18/2017

## 2017-07-18 NOTE — PLAN OF CARE
Problem: Diabetes, Type 1 (Adult)  Goal: Signs and Symptoms of Listed Potential Problems Will be Absent, Minimized or Managed (Diabetes, Type 1)  Signs and symptoms of listed potential problems will be absent, minimized or managed by discharge/transition of care (reference Diabetes, Type 1 (Adult) CPG).   Outcome: Outcome(s) achieved Date Met: 07/18/17  Patient's blood glucose checked and insulin administered per order.     Problem: Fall Risk (Adult)  Goal: Absence of Falls  Patient will demonstrate the desired outcomes by discharge/transition of care.   Outcome: Ongoing (interventions implemented as appropriate)  Patient remained free of falls, trauma or injuries, low bed, side rails up X2, call light within reach, bed alarm on, fall risk band on, non skid socks on. patientwas educated to not get OObw ithout callin g for RN/PCT help, she verbalized understanding.     Problem: Patient Care Overview  Goal: Plan of Care Review  Outcome: Ongoing (interventions implemented as appropriate)  POC reviewed with pateint and her  at bedside, both verbalized understanding. No acute distres during shift time.     Problem: Pressure Ulcer Risk (Shiva Scale) (Adult,Obstetrics,Pediatric)  Goal: Skin Integrity  Patient will demonstrate the desired outcomes by discharge/transition of care.   Outcome: Ongoing (interventions implemented as appropriate)  No development of pressure ulcers during shift time, patient was turned 1 2 hrs in bed.

## 2017-07-18 NOTE — ASSESSMENT & PLAN NOTE
-hx of stroke before 2012 per daughter  -pt with residual right sided deficit   -continue atorvastatin 50 mg  -Supratherapeutic on warfarin (INR 4.0) on admit -> now INR 2.0  - Holding heparin, received 2/3 doses vit. K.

## 2017-07-18 NOTE — ASSESSMENT & PLAN NOTE
-pt presents from cardiology clinic with SOB, MILIAN, LE edema   -on home O2 2.5 L  -patient with increase lasix requirement since shingles and PNA in May (got abx as an out pt)  -most recent echo 6/2017 showed EF 40-45, pulm HTN  -On admit (7/12), CXR shows worsening pulmonary edema, BMP 1413 (above baseline), tn 0.04, EKG shows afib with RVR without acute ischemic changes  - CXR 7/14 shows interval improvement from 7/12  - on hydralazine 50 TID, isosorbide dinitirate 10 TID, and lopressor 50 BID  - Lasix gtt switched to lasix 80 IV BID  - Pts family refusing stress test  - cardiology following, appreciate recs

## 2017-07-18 NOTE — ASSESSMENT & PLAN NOTE
-hx of stroke before 2012 per daughter  -pt with residual right sided deficit   -continue atorvastatin 50 mg  -now on eliquis

## 2017-07-18 NOTE — ASSESSMENT & PLAN NOTE
-Lopressor 50 BID, hyralazine 50 TID, isordil 10 10 TID, lasix 10 mg/hr infusion  -on admission pt normotensive with /79, continue to monitor and hold home meds PRN for low BP

## 2017-07-18 NOTE — PT/OT/SLP PROGRESS
Physical Therapy  Treatment    Michael Salgado   MRN: [id number]   Admitting Diagnosis: Acute on chronic systolic congestive heart failure    PT Received On: 07/18/17  PT Start Time: 1006     PT Stop Time: 1030    PT Total Time (min): 24 min       Billable Minutes:  Therapeutic Activity 24    Treatment Type: Treatment  PT/PTA: PTA     PTA Visit Number: 1       General Precautions: Standard, fall  Orthopedic Precautions: N/A   Braces: N/A    Do you have any cultural, spiritual, Cheondoism conflicts, given your current situation?: none stated    Subjective:  Communicated with nursing prior to session.  Pt agreed to work with therapy after max encouragement was provided.     Pain/Comfort  Pain Rating 1: 0/10  Pain Rating Post-Intervention 1: 0/10    Objective:   Patient found with: oxygen    Functional Mobility:  Bed Mobility:   Supine to Sit: Moderate Assistance  Sit to Supine: Moderate Assistance    Transfers:  Sit <> Stand Assistance:  (Not performed 2/2 pt refusal despite max encouragement. )    Gait:   Gait Distance:  (Not performed 2/2 pt refusal.)    Balance:   Static Sit: FAIR: Maintains without assist, but unable to take any challenges   Dynamic Sit: FAIR+: Maintains balance through MINIMAL excursions of active trunk motion    Therapeutic Activities and Exercises:  Seated/Supine B LE therex 2x15 reps: PROM to RLE; AROM to LLE   -AP    -LAQ   -Hip Flexion   -Hip Abd/Add   -GS   -QS   -Heel Slides    Pt sat EOB ~18 min SBA.   Pt was educated on the importance/benefits of OOB.    AM-PAC 6 CLICK MOBILITY  How much help from another person does this patient currently need?   1 = Unable, Total/Dependent Assistance  2 = A lot, Maximum/Moderate Assistance  3 = A little, Minimum/Contact Guard/Supervision  4 = None, Modified [place]/Independent    Turning over in bed (including adjusting bedclothes, sheets and blankets)?: 2  Sitting down on and standing up from a chair with arms (e.g., wheelchair, bedside commode,  etc.): 3  Moving from lying on back to sitting on the side of the bed?: 2  Moving to and from a bed to a chair (including a wheelchair)?: 2  Need to walk in hospital room?: 2  Climbing 3-5 steps with a railing?: 1  Total Score: 12    AM-PAC Raw Score CMS G-Code Modifier Level of Impairment Assistance   6 % Total / Unable   7 - 9 CM 80 - 100% Maximal Assist   10 - 14 CL 60 - 80% Moderate Assist   15 - 19 CK 40 - 60% Moderate Assist   20 - 22 CJ 20 - 40% Minimal Assist   23 CI 1-20% SBA / CGA   24 CH 0% Independent/ Mod I     Patient left HOB elevated with all lines intact, call button in reach and spouse  present.    Assessment:  Michael Salgado is a 71 y.o. female with a medical diagnosis of Acute on chronic systolic congestive heart failure and presents with all deficits noted below. Pt tolerated treatment fairly well, and will continue to benefit from skilled PT intervention at this time. Continue with PT POC as indicated.    Rehab identified problem list/impairments: Rehab identified problem list/impairments: weakness, impaired endurance, impaired self care skills, impaired functional mobilty, gait instability, impaired balance, decreased lower extremity function, decreased upper extremity function, decreased safety awareness, decreased ROM    Rehab potential is good.    Activity tolerance: Fair    Discharge recommendations: Discharge Facility/Level Of Care Needs: nursing facility, skilled     Barriers to discharge: Barriers to Discharge: None    Equipment recommendations: Equipment Needed After Discharge: none     GOALS:    Physical Therapy Goals        Problem: Physical Therapy Goal    Goal Priority Disciplines Outcome Goal Variances Interventions   Physical Therapy Goal     PT/OT, PT Ongoing (interventions implemented as appropriate)     Description:  Goals to be met by: 2017    Patient will increase functional independence with mobility by performin. Supine to sit with MInimal  Assistance  2. Sit to supine with MInimal Assistance  3. Rolling to Right with Minimal Assistance.  4. Sit to stand transfer with Stand-by Assistance  5. Bed to chair transfer with Stand-by Assistance using Rolling Walker  6. Gait  x 10 feet with Minimal Assistance using Rolling Walker.                         PLAN:    Patient to be seen 4 x/week  to address the above listed problems via gait training, therapeutic activities, therapeutic exercises, wheelchair management/training, neuromuscular re-education  Plan of Care expires: 08/14/17  Plan of Care reviewed with: patient         Felicitas George, PTA  07/18/2017

## 2017-07-18 NOTE — ASSESSMENT & PLAN NOTE
- On admit (7/12), Hgb 9.9 and dropped to 7.9 (7/13) w/ SOB  - Hemolysis work-up negative, FOBT negative with no reported changes in stool  - currently stable, maintain low-threshold to transfuse

## 2017-07-18 NOTE — PLAN OF CARE
SW received call from pt's daughter Rhea, she is in agreement with with d/c plans of SNF and would like referral sent to [facility]. Rhea identified that her mother is weaker than what she has been in the past and would like for her to build some strength before going home and beginning outpatient therapy again. Rhea reports that she will be coming to the hospital this evening and would like herself to discuss further to her mother about admitting to SNF.     CHON will continue to follow

## 2017-07-18 NOTE — ASSESSMENT & PLAN NOTE
-patient denying palpitations, lightheadedness, CP  -admission EKG with a fib with RVR   -chadsvasc of 8  -started on Apixaban  -coreg switched to metoprolol tartrate 50 BID

## 2017-07-18 NOTE — PLAN OF CARE
Problem: Occupational Therapy Goal  Goal: Occupational Therapy Goal  Goals to be met by: 07/30     Patient will increase functional independence with ADLs by performing:    UE Dressing with Stand-by Assistance.  LE Dressing with Moderate Assistance and Assistive Devices as needed.  Grooming while seated with Stand-by Assistance.  Toileting from bedside commode with Moderate Assistance and Assistive Devices as needed for hygiene and clothing management.   Stand pivot transfers with Contact Guard Assistance.  Toilet transfer to bedside commode with Contact Guard Assistance.     Outcome: Ongoing (interventions implemented as appropriate)  Con't with current POC.    LURDES Nguyen

## 2017-07-18 NOTE — PROGRESS NOTES
Cardiology Consult Progress Note  Attending Physician: Kira Ruiz MD  Hospital Day: 7    Subjective:   Interval History: patient states that she feels much better.    Medications:   Continuous Infusions:   furosemide (LASIX) 5 mg/mL infusion (non-titrating) 15 mg/hr (07/17/17 2134)       Scheduled Meds:   albuterol-ipratropium 2.5mg-0.5mg/3mL  3 mL Nebulization Q6H    apixaban  5 mg Oral BID    aspirin  81 mg Oral Daily    atorvastatin  40 mg Oral Daily    buPROPion  150 mg Oral Daily    escitalopram oxalate  5 mg Oral Daily    gabapentin  300 mg Oral QHS    hydrALAZINE  50 mg Oral TID    isosorbide dinitrate  10 mg Oral TID    magnesium sulfate IVPB  2 g Intravenous Once    metoprolol tartrate  50 mg Oral BID    pantoprazole  40 mg Oral Daily    quetiapine  100 mg Oral QHS    sodium chloride 0.9%  3 mL Intravenous Q8H    tuberculin  5 Units Intradermal Once     PRN Meds:sodium chloride, acetaminophen, acetaminophen, butalbital-acetaminophen-caffeine -40 mg, dextrose 50%, dextrose 50%, diphenhydrAMINE, glucagon (human recombinant), glucose, glucose, insulin aspart, ondansetron  Objective:     Vitals:  Temp:  [97.7 °F (36.5 °C)-98.2 °F (36.8 °C)]   Pulse:  []   Resp:  [14-28]   BP: (103-122)/(53-70)   SpO2:  [97 %-99 %]  on 2L N/C I/O's:  No intake or output data in the 24 hours ending 07/18/17 0917     Constitutional: NAD, conversant  HEENT: Sclera anicteric, PERRLA, EOMI  Neck: 10-12 cm JVD, no carotid bruits  CV: Irregularly irregular, no murmur, normal S1/S2  Pulm: Bibasilar crackles  GI: Abdomen soft, NTND, +BS  Extremities: 2+ LE edema, warm and well perfused  Skin: No ecchymosis, erythema, or ulcers  Psych: AOx3, appropriate affect  Neuro: CNII-XII intact, no focal deficits    Labs:       CBC: CMP:      Recent Labs  Lab 07/16/17  0348 07/17/17  0444 07/18/17  0441   WBC 5.02 5.10 4.22   HGB 7.8* 7.6* 7.5*   HCT 25.3* 25.5* 24.5*    219 203   MCV 91 92 92   RDW  "16.3* 16.3* 16.1*      Recent Labs  Lab 07/16/17  0348  07/17/17  0444 07/17/17  1540 07/18/17  0441     < > 141 142 144   K 4.7  < > 4.7 4.6 4.3     < > 106 106 106   CO2 29  < > 26 29 28   BUN 48*  < > 50* 52* 51*   CREATININE 2.5*  < > 2.5* 2.7* 2.6*   CALCIUM 7.8*  < > 7.9* 8.0* 8.7   PROT 5.0*  < > 4.9* 4.9* 4.6*   BILITOT 0.3  < > 0.2 0.2 0.2   ALKPHOS 52*  < > 49* 52* 43*   ALT 7*  < > 7* 8* 7*   AST 12  < > 12 9* 10   MG 1.8  --  2.0  --  1.8   PHOS 3.7  --  4.1  --  4.5   < > = values in this interval not displayed.     Cholesterol: Coagulation   Lab Results   Component Value Date    CHOL 191 02/23/2017    HDL 25 (L) 02/23/2017    LDLCALC 130.6 02/23/2017    TRIG 177 (H) 02/23/2017      Recent Labs  Lab 07/15/17  0354   INR 1.1        Misc:     Recent Labs  Lab 07/13/17  1506   TROPONINI 0.037*      Lab Results   Component Value Date    HGBA1C 5.8 (H) 07/13/2017        Microbiology   Microbiology Results (last 7 days)     ** No results found for the last 168 hours. **           Imaging:     CXR (07/17/2017):   Sternal sutures are intact and aligned.  Abundant calcification noted at the level of the arch.    There is interstitial lung disease in perihilar distribution compatible with interstitial pulmonary edema similar to 7/14/2017.  Ovoid soft tissue opacity projected over the RIGHT lower lung zone at the level of the minor fissure suggests fluid retained in that fissure ("pseudotumor").  No other significant change compared to the earlier examination.    Degenerative changes are present at the acromioclavicular joints, shoulders and in the spine.      2D Echo (06/23/2017):    1 - Mildly to moderately depressed left ventricular systolic function (EF 40-45%). Abnormal septal motion due to underlying LBBB. Significant drop in LV function, compared to the prior reported, Images could not be retrieved for comparison.     2 - Normal right ventricular systolic function .     3 - Pulmonary " hypertension. The estimated PA systolic pressure is 41 mmHg.     4 - Mild tricuspid regurgitation.     5 - Small pericardial effusion.     6 - Intermediate central venous pressure.     7 - Biatrial enlargement.       Assessment:     71 y.o. woman with PMH of CAD s/p CABGx2 (LIMA-LAD and SVG-D1 2015), ICM, HFrEF (EF 40%, PA 41 echo 6/2017), CKD stage IV, IDDM2, HTN, Left sided CVA with residual right hemiparesis, Chronic Atrial fibrillation RVR (on warfarin) who presents to the hospital with acute decompensated heart failure    Plan:     #Acute Onset Decompensated Systolic Heart Failure  #New Onset Heart Failure  --can switch from lasix 15/hr to 80IV BID  --continue hydralazine 50 TID and isordil 10 TID  --strict I&O's with fluid restriction of 1.5L  --daily weights  --patient's family is refusing stress test   --CPAP/BiPAP PRN for respiratory distress     #Chronic AFib  --patient is chronically in AFib at this time  --[id number] of 8 - patient requires anticoagulation. With her prior CVA, she must be on anticoagulation to prevent future strokes  --agree with discontinuation of effient.   --continue metoprolol 50 BID.     Thank you for the consult. We will continue to follow. Staff addendum to follow        Signed:  Fabiana Mendoza MD  Cardiology Fellow, PGY-5  7/18/2017 9:17 AM

## 2017-07-18 NOTE — ASSESSMENT & PLAN NOTE
-pt presents from cardiology clinic with SOB, MILIAN, LE edema for past 5 days  -on home O2 2.5 L  -patient with increase lasix requirement since shingles and PNA in May (got abx as an out pt)  -home meds include lasix 20 mg BID, coreg 25 BID, hydralazine 25 BID, amlodipine 5 mg  -she is compliant with her home meds and a low salt, fluid restricted diet  -most recent echo 6/2017 below  -On admit (7/12), CXR shows worsening pulmonary edema, BMP 1413 (above baseline), tn 0.04, EKG shows afib with RVR without acute ischemic changes  -Cr on admission 2.5, continue to monitor RFTs  - Net -1.2L from admission  - CXR 7/14 shows interval improvement from 7/12  - Pleural effusions not contributing to dyspnea, no thoracentesis warranted, per pulmonology  - Lasix 15 mg/hr infusion, per cardiology.  - Outpatient stress test to be scheduled    Echo 6/2017  1 - Mildly to moderately depressed left ventricular systolic function (EF 40-45%). Abnormal septal motion due to underlying LBBB. Significant drop in LV function, compared to the prior reported, Images could not be retrieved for comparison.     2 - Normal right ventricular systolic function .     3 - Pulmonary hypertension. The estimated PA systolic pressure is 41 mmHg.     4 - Mild tricuspid regurgitation.     5 - Small pericardial effusion.     6 - Intermediate central venous pressure.     7 - Biatrial enlargement.

## 2017-07-18 NOTE — ASSESSMENT & PLAN NOTE
-Cr on admission 2.5, stable (improved from 3.2 ~1 mo ago)  -continue to monitor RFTs  -avoid nephrotoxic agents

## 2017-07-18 NOTE — PROGRESS NOTES
[facility] Hospital Medicine  Progress Note    Patient Name: Michael Salgado  MRN: [id number]  Patient Class: IP- Inpatient   Admission Date: 7/12/2017  Length of Stay: 6 days  Attending Physician: Kira Ruiz MD  Primary Care Provider: Wesley Watkins MD    [facility] Medicine Team: [facility] HOSP MED 1 Devin Quiros MD    Subjective:     Principal Problem:Acute on chronic systolic congestive heart failure    HPI:  71 year old  yo female with history of CAD s/p CABGx3 2015, ICM, HFrEF (EF 40%, PA 41 echo 6/2017), CKD stage IV, IDDM2, HTN, 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 Patient has baseline MILIAN, orthopnea, PND, LE edema, and abdo swelling that has also progressively worsened over the past few days. She is compliant with her home medications which include lasix 20 BID, coreg, hydralazine, amlodipine, and warfarin. She follows a low salt, fluid restrict diet and she has not deviated from it recently. She reports similar symptoms about a year ago that required hospitalization for CHF exacerbation. Patient recently had pneumonia and shingles in May and has not recovered full function since. She previously was able to ambulate with a walker and now uses a wheelchair. Prior to the shingles and PNA, she only required 10 mg lasix, but her lasix was increased afterwords due to increased SOB and volume overload.  She uses home O2 2.5 L. She is complaining of decreased UO, dysuria, and abdominal discomfort. She denies chest pain, palpitations, constipation (last BM today), diarrhea, cough, fever, chills, or any other complaints at this time. She lives at home with her daughter who provides with with significant assistance with her ADL's. Her cardiologist is Dr. Davis. She was last seen in cardiology clinic today and was sent to the ED from  there due to her SOB and edema.     In the ED, CXR showed increased pulmonary edema, BNP 1413 (which is above her baseline), tn 0.044, EKG with a fib with RVR but no acute ischemic changes. Patient was given lasix IV 80 mg.    Hospital Course:  7/13: Admitted to IM1. Started on Lasix 40 mg IV BID. Change in HH, 9.9->7.9. Denies changes in color/consistency of BMs and FOBT negative. Hemolytic anemia work-up negative. ABG normal. Added duoneb q6h.  7/14: Continued respiratory effort despite duoneb therapy. Vitals have remained stable. Pulmonology consult advised against thoracentesis at this moment after ultrasound showed bilateral effusions are likely not contributing to SOB. Also recommends discontinuing antibiotics as pneumonia is unlikely and it is adding fluids to her volume overload. Additional recommendation to increase diuretic therapy despite CKD. Consults to cards and pulm placed.    7/15: Patient's respiratory effort improving. Decided to discontinue coumadin at home. Patient is now taking Apixaban and will continue at home upon discharge.  7/16: Patient markedly improved today. Restarted Aspirin now that hemoglobin has stabilized.  7/17: Continued improvement. Increased hydralazine to 50 TID and replaced coreg with metoprolol for better AFib control, per cardiology.  7/18: Pt's family is refusing stress test. Lasix gtt transitioned to IV pushes.     Interval History: NAEON. Continues to report some SOB, but improved since admission. Also c/o back pain. No Is/Os recorded.     Review of Systems  Objective:     Vital Signs (Most Recent):  Temp: 97.8 °F (36.6 °C) (07/18/17 1138)  Pulse: 72 (07/18/17 1138)  Resp: 16 (07/18/17 1138)  BP: (!) 103/51 (07/18/17 1138)  SpO2: (!) 92 % (07/18/17 1138) Vital Signs (24h Range):  Temp:  [97.7 °F (36.5 °C)-98.2 °F (36.8 °C)] 97.8 °F (36.6 °C)  Pulse:  [] 72  Resp:  [14-28] 16  SpO2:  [92 %-100 %] 92 %  BP: (103-124)/(51-74) 103/51     Weight: 72 kg (158 lb 11.2  oz)  Body mass index is 27.23 kg/m².  No intake or output data in the 24 hours ending 07/18/17 1336   Physical Exam   Constitutional: She is oriented to person, place, and time. She appears well-developed and well-nourished. No distress.   HENT:   Head: Normocephalic and atraumatic.   Nose: Nose normal.   Mouth/Throat: Oropharynx is clear and moist. No oropharyngeal exudate.   Eyes: EOM are normal. Pupils are equal, round, and reactive to light. No scleral icterus.   Neck: Neck supple. No JVD present. No thyromegaly present.   Cardiovascular: Normal rate, normal heart sounds and intact distal pulses.    No murmur heard.  Irregular rhythm   Pulmonary/Chest: Effort normal. No respiratory distress. She has no wheezes. She has rales.   Coarse BS and crackles bilaterally   Abdominal: Soft. Bowel sounds are normal. She exhibits no distension. There is no tenderness.   Musculoskeletal: She exhibits edema (1+ LE's). She exhibits no tenderness or deformity.   Lymphadenopathy:     She has no cervical adenopathy.   Neurological: She is alert and oriented to person, place, and time. No cranial nerve deficit.   Skin: Skin is warm and dry.   Right forehead hyperpigmentation, does not cross midline, from resolving shingles rash   Psychiatric: She has a normal mood and affect. Her behavior is normal.       Significant Labs:   CBC:   Recent Labs  Lab 07/17/17  0444 07/18/17  0441   WBC 5.10 4.22   HGB 7.6* 7.5*   HCT 25.5* 24.5*    203     CMP:   Recent Labs  Lab 07/17/17  0444 07/17/17  1540 07/18/17  0441    142 144   K 4.7 4.6 4.3    106 106   CO2 26 29 28   * 199* 150*   BUN 50* 52* 51*   CREATININE 2.5* 2.7* 2.6*   CALCIUM 7.9* 8.0* 8.7   PROT 4.9* 4.9* 4.6*   ALBUMIN 1.8* 1.8* 1.7*   BILITOT 0.2 0.2 0.2   ALKPHOS 49* 52* 43*   AST 12 9* 10   ALT 7* 8* 7*   ANIONGAP 9 7* 10   EGFRNONAA 18.5* 16.9* 17.6*     POCT Glucose:   Recent Labs  Lab 07/17/17  2132 07/18/17  0726 07/18/17  1220   POCTGLUCOSE 191*  161* 335*     All pertinent labs within the past 24 hours have been reviewed.    Significant Imaging: I have reviewed all pertinent imaging results/findings within the past 24 hours.    Assessment/Plan:      * Acute on chronic systolic congestive heart failure    -pt presents from cardiology clinic with SOB, MILIAN, LE edema   -on home O2 2.5 L  -patient with increase lasix requirement since shingles and PNA in May (got abx as an out pt)  -most recent echo 6/2017 showed EF 40-45, pulm HTN  -On admit (7/12), CXR shows worsening pulmonary edema, BMP 1413 (above baseline), tn 0.04, EKG shows afib with RVR without acute ischemic changes  - CXR 7/14 shows interval improvement from 7/12  - on hydralazine 50 TID, isosorbide dinitirate 10 TID, and lopressor 50 BID  - Lasix gtt switched to lasix 80 IV BID  - Pts family refusing stress test  - cardiology following, appreciate recs        Pneumonia due to infectious organism    - On admit (7/12) CXR showed R-sided airspace opacities concerning for edema or infectious process (SIRS 2/4, HR and RR). No fevers/chills or productive cough  - ABG normal  - Previously treated PNA with Levaquin on 6/15  - Started on azithromycin and ceftriaxone for CAP, D/C'd 2/2 low suspicion for PNA          Depression    -continue lexapro 5 mg, Wellbutrin 150 mg  -Seroquel qhs         Chronic kidney disease (CKD) stage G4/A1, severely decreased glomerular filtration rate (GFR) between 15-29 mL/min/1.73 square meter and albuminuria creatinine ratio less than 30 mg/g    -Cr on admission 2.5, stable (improved from 3.2 ~1 mo ago)  -continue to monitor RFTs  -avoid nephrotoxic agents        Atrial fibrillation    -patient denying palpitations, lightheadedness, CP  -admission EKG with a fib with RVR   -chadsvasc of 8  -started on Apixaban  -coreg switched to metoprolol tartrate 50 BID        Normocytic anemia    - On admit (7/12), Hgb 9.9 and dropped to 7.9 (7/13) w/ SOB  - Hemolysis work-up negative, FOBT  negative with no reported changes in stool  - currently stable, maintain low-threshold to transfuse        Insulin dependent diabetes mellitus    -daughter reports to takes LA insulin 10 units qhs and SA insulin 5 units WM  -A1c 6.1 2/2017  -Discontinued basal-bolus following morning glucose of 40  -Continue LD SSI QID        Hyperlipidemia    -atorvastatin 40 mg        H/O: CVA (cerebrovascular accident)    -hx of stroke before 2012 per daughter  -pt with residual right sided deficit   -continue atorvastatin 50 mg  -now on eliquis        HTN (hypertension)    -Lopressor 50 BID, hyralazine 50 TID, isordil 10 10 TID          VTE Risk Mitigation         Ordered     apixaban tablet 5 mg  2 times daily     Route:  Oral        07/15/17 1102     Medium Risk of VTE  Once      07/12/17 0381          Devin Quiros MD  Department of Hospital Medicine   [facility]

## 2017-07-18 NOTE — SUBJECTIVE & OBJECTIVE
Interval History: LINDA. Continues to report some SOB, but improved since admission. Also c/o back pain. No Is/Os recorded.     Review of Systems  Objective:     Vital Signs (Most Recent):  Temp: 97.8 °F (36.6 °C) (07/18/17 1138)  Pulse: 72 (07/18/17 1138)  Resp: 16 (07/18/17 1138)  BP: (!) 103/51 (07/18/17 1138)  SpO2: (!) 92 % (07/18/17 1138) Vital Signs (24h Range):  Temp:  [97.7 °F (36.5 °C)-98.2 °F (36.8 °C)] 97.8 °F (36.6 °C)  Pulse:  [] 72  Resp:  [14-28] 16  SpO2:  [92 %-100 %] 92 %  BP: (103-124)/(51-74) 103/51     Weight: 72 kg (158 lb 11.2 oz)  Body mass index is 27.23 kg/m².  No intake or output data in the 24 hours ending 07/18/17 1336   Physical Exam   Constitutional: She is oriented to person, place, and time. She appears well-developed and well-nourished. No distress.   HENT:   Head: Normocephalic and atraumatic.   Nose: Nose normal.   Mouth/Throat: Oropharynx is clear and moist. No oropharyngeal exudate.   Eyes: EOM are normal. Pupils are equal, round, and reactive to light. No scleral icterus.   Neck: Neck supple. No JVD present. No thyromegaly present.   Cardiovascular: Normal rate, normal heart sounds and intact distal pulses.    No murmur heard.  Irregular rhythm   Pulmonary/Chest: Effort normal. No respiratory distress. She has no wheezes. She has rales.   Coarse BS and crackles bilaterally   Abdominal: Soft. Bowel sounds are normal. She exhibits no distension. There is no tenderness.   Musculoskeletal: She exhibits edema (1+ LE's). She exhibits no tenderness or deformity.   Lymphadenopathy:     She has no cervical adenopathy.   Neurological: She is alert and oriented to person, place, and time. No cranial nerve deficit.   Skin: Skin is warm and dry.   Right forehead hyperpigmentation, does not cross midline, from resolving shingles rash   Psychiatric: She has a normal mood and affect. Her behavior is normal.       Significant Labs:   CBC:   Recent Labs  Lab 07/17/17  0444 07/18/17  0441    WBC 5.10 4.22   HGB 7.6* 7.5*   HCT 25.5* 24.5*    203     CMP:   Recent Labs  Lab 07/17/17  0444 07/17/17  1540 07/18/17  0441    142 144   K 4.7 4.6 4.3    106 106   CO2 26 29 28   * 199* 150*   BUN 50* 52* 51*   CREATININE 2.5* 2.7* 2.6*   CALCIUM 7.9* 8.0* 8.7   PROT 4.9* 4.9* 4.6*   ALBUMIN 1.8* 1.8* 1.7*   BILITOT 0.2 0.2 0.2   ALKPHOS 49* 52* 43*   AST 12 9* 10   ALT 7* 8* 7*   ANIONGAP 9 7* 10   EGFRNONAA 18.5* 16.9* 17.6*     POCT Glucose:   Recent Labs  Lab 07/17/17  2132 07/18/17  0726 07/18/17  1220   POCTGLUCOSE 191* 161* 335*     All pertinent labs within the past 24 hours have been reviewed.    Significant Imaging: I have reviewed all pertinent imaging results/findings within the past 24 hours.

## 2017-07-19 LAB
ANION GAP SERPL CALC-SCNC: 11 MMOL/L
BACTERIA #/AREA URNS AUTO: ABNORMAL /HPF
BASOPHILS # BLD AUTO: 0.02 K/UL
BASOPHILS NFR BLD: 0.4 %
BILIRUB UR QL STRIP: NEGATIVE
BUN SERPL-MCNC: 51 MG/DL
CALCIUM SERPL-MCNC: 8.2 MG/DL
CHLORIDE SERPL-SCNC: 103 MMOL/L
CLARITY UR REFRACT.AUTO: ABNORMAL
CO2 SERPL-SCNC: 26 MMOL/L
COLOR UR AUTO: YELLOW
CREAT SERPL-MCNC: 2.5 MG/DL
DIFFERENTIAL METHOD: ABNORMAL
EOSINOPHIL # BLD AUTO: 0.1 K/UL
EOSINOPHIL NFR BLD: 1.8 %
ERYTHROCYTE [DISTWIDTH] IN BLOOD BY AUTOMATED COUNT: 16.1 %
EST. GFR  (AFRICAN AMERICAN): 21.3 ML/MIN/1.73 M^2
EST. GFR  (NON AFRICAN AMERICAN): 18.5 ML/MIN/1.73 M^2
GLUCOSE SERPL-MCNC: 133 MG/DL
GLUCOSE UR QL STRIP: ABNORMAL
HCT VFR BLD AUTO: 25.1 %
HGB BLD-MCNC: 7.5 G/DL
HGB UR QL STRIP: NEGATIVE
HYALINE CASTS UR QL AUTO: 0 /LPF
KETONES UR QL STRIP: NEGATIVE
LEUKOCYTE ESTERASE UR QL STRIP: ABNORMAL
LYMPHOCYTES # BLD AUTO: 1.1 K/UL
LYMPHOCYTES NFR BLD: 23.2 %
MAGNESIUM SERPL-MCNC: 2.1 MG/DL
MCH RBC QN AUTO: 27.6 PG
MCHC RBC AUTO-ENTMCNC: 29.9 %
MCV RBC AUTO: 92 FL
MICROSCOPIC COMMENT: ABNORMAL
MONOCYTES # BLD AUTO: 0.5 K/UL
MONOCYTES NFR BLD: 10.6 %
NEUTROPHILS # BLD AUTO: 2.9 K/UL
NEUTROPHILS NFR BLD: 63.3 %
NITRITE UR QL STRIP: NEGATIVE
PH UR STRIP: 5 [PH] (ref 5–8)
PHOSPHATE SERPL-MCNC: 4.3 MG/DL
PLATELET # BLD AUTO: 240 K/UL
PMV BLD AUTO: 9.3 FL
POCT GLUCOSE: 143 MG/DL (ref 70–110)
POCT GLUCOSE: 211 MG/DL (ref 70–110)
POCT GLUCOSE: 253 MG/DL (ref 70–110)
POCT GLUCOSE: 278 MG/DL (ref 70–110)
POTASSIUM SERPL-SCNC: 4.3 MMOL/L
PROT UR QL STRIP: ABNORMAL
RBC # BLD AUTO: 2.72 M/UL
RBC #/AREA URNS AUTO: 3 /HPF (ref 0–4)
SODIUM SERPL-SCNC: 140 MMOL/L
SP GR UR STRIP: 1.01 (ref 1–1.03)
URN SPEC COLLECT METH UR: ABNORMAL
UROBILINOGEN UR STRIP-ACNC: NEGATIVE EU/DL
WBC # BLD AUTO: 4.53 K/UL
WBC #/AREA URNS AUTO: 68 /HPF (ref 0–5)
WBC CLUMPS UR QL AUTO: ABNORMAL

## 2017-07-19 PROCEDURE — 94761 N-INVAS EAR/PLS OXIMETRY MLT: CPT

## 2017-07-19 PROCEDURE — 63600175 PHARM REV CODE 636 W HCPCS: Performed by: HOSPITALIST

## 2017-07-19 PROCEDURE — 36415 COLL VENOUS BLD VENIPUNCTURE: CPT

## 2017-07-19 PROCEDURE — 84100 ASSAY OF PHOSPHORUS: CPT

## 2017-07-19 PROCEDURE — 97803 MED NUTRITION INDIV SUBSEQ: CPT

## 2017-07-19 PROCEDURE — 99900035 HC TECH TIME PER 15 MIN (STAT)

## 2017-07-19 PROCEDURE — 80048 BASIC METABOLIC PNL TOTAL CA: CPT

## 2017-07-19 PROCEDURE — 25000003 PHARM REV CODE 250: Performed by: STUDENT IN AN ORGANIZED HEALTH CARE EDUCATION/TRAINING PROGRAM

## 2017-07-19 PROCEDURE — 97530 THERAPEUTIC ACTIVITIES: CPT

## 2017-07-19 PROCEDURE — 83735 ASSAY OF MAGNESIUM: CPT

## 2017-07-19 PROCEDURE — 25000242 PHARM REV CODE 250 ALT 637 W/ HCPCS: Performed by: STUDENT IN AN ORGANIZED HEALTH CARE EDUCATION/TRAINING PROGRAM

## 2017-07-19 PROCEDURE — 25000003 PHARM REV CODE 250: Performed by: HOSPITALIST

## 2017-07-19 PROCEDURE — 85025 COMPLETE CBC W/AUTO DIFF WBC: CPT

## 2017-07-19 PROCEDURE — 27000221 HC OXYGEN, UP TO 24 HOURS

## 2017-07-19 PROCEDURE — 81001 URINALYSIS AUTO W/SCOPE: CPT

## 2017-07-19 PROCEDURE — 99223 1ST HOSP IP/OBS HIGH 75: CPT | Mod: ,,, | Performed by: INTERNAL MEDICINE

## 2017-07-19 PROCEDURE — 87086 URINE CULTURE/COLONY COUNT: CPT

## 2017-07-19 PROCEDURE — 11000001 HC ACUTE MED/SURG PRIVATE ROOM

## 2017-07-19 PROCEDURE — 87205 SMEAR GRAM STAIN: CPT

## 2017-07-19 PROCEDURE — 99233 SBSQ HOSP IP/OBS HIGH 50: CPT | Mod: GC,,, | Performed by: HOSPITALIST

## 2017-07-19 PROCEDURE — 94640 AIRWAY INHALATION TREATMENT: CPT

## 2017-07-19 RX ORDER — FUROSEMIDE 10 MG/ML
80 INJECTION INTRAMUSCULAR; INTRAVENOUS ONCE
Status: COMPLETED | OUTPATIENT
Start: 2017-07-19 | End: 2017-07-19

## 2017-07-19 RX ADMIN — INSULIN ASPART 6 UNITS: 100 INJECTION, SOLUTION INTRAVENOUS; SUBCUTANEOUS at 12:07

## 2017-07-19 RX ADMIN — IPRATROPIUM BROMIDE AND ALBUTEROL SULFATE 3 ML: .5; 3 SOLUTION RESPIRATORY (INHALATION) at 09:07

## 2017-07-19 RX ADMIN — ISOSORBIDE DINITRATE 10 MG: 10 TABLET ORAL at 06:07

## 2017-07-19 RX ADMIN — INSULIN ASPART 4 UNITS: 100 INJECTION, SOLUTION INTRAVENOUS; SUBCUTANEOUS at 04:07

## 2017-07-19 RX ADMIN — ACETAMINOPHEN 650 MG: 325 TABLET ORAL at 09:07

## 2017-07-19 RX ADMIN — METOPROLOL TARTRATE 50 MG: 50 TABLET, FILM COATED ORAL at 09:07

## 2017-07-19 RX ADMIN — HYDRALAZINE HYDROCHLORIDE 50 MG: 50 TABLET ORAL at 09:07

## 2017-07-19 RX ADMIN — BUPROPION HYDROCHLORIDE 150 MG: 150 TABLET, FILM COATED, EXTENDED RELEASE ORAL at 09:07

## 2017-07-19 RX ADMIN — QUETIAPINE FUMARATE 100 MG: 100 TABLET, FILM COATED ORAL at 09:07

## 2017-07-19 RX ADMIN — INSULIN ASPART 3 UNITS: 100 INJECTION, SOLUTION INTRAVENOUS; SUBCUTANEOUS at 09:07

## 2017-07-19 RX ADMIN — ISOSORBIDE DINITRATE 10 MG: 10 TABLET ORAL at 02:07

## 2017-07-19 RX ADMIN — APIXABAN 5 MG: 5 TABLET, FILM COATED ORAL at 09:07

## 2017-07-19 RX ADMIN — Medication 3 ML: at 10:07

## 2017-07-19 RX ADMIN — FUROSEMIDE 20 MG/HR: 10 INJECTION, SOLUTION INTRAMUSCULAR; INTRAVENOUS at 11:07

## 2017-07-19 RX ADMIN — IPRATROPIUM BROMIDE AND ALBUTEROL SULFATE 3 ML: .5; 3 SOLUTION RESPIRATORY (INHALATION) at 01:07

## 2017-07-19 RX ADMIN — PANTOPRAZOLE SODIUM 40 MG: 40 TABLET, DELAYED RELEASE ORAL at 09:07

## 2017-07-19 RX ADMIN — ESCITALOPRAM 5 MG: 5 TABLET, FILM COATED ORAL at 09:07

## 2017-07-19 RX ADMIN — HYDRALAZINE HYDROCHLORIDE 50 MG: 50 TABLET ORAL at 02:07

## 2017-07-19 RX ADMIN — HYDRALAZINE HYDROCHLORIDE 50 MG: 50 TABLET ORAL at 06:07

## 2017-07-19 RX ADMIN — ATORVASTATIN CALCIUM 40 MG: 20 TABLET, FILM COATED ORAL at 09:07

## 2017-07-19 RX ADMIN — Medication 3 ML: at 02:07

## 2017-07-19 RX ADMIN — FUROSEMIDE 80 MG: 10 INJECTION, SOLUTION INTRAVENOUS at 09:07

## 2017-07-19 RX ADMIN — GABAPENTIN 300 MG: 300 CAPSULE ORAL at 09:07

## 2017-07-19 RX ADMIN — ISOSORBIDE DINITRATE 10 MG: 10 TABLET ORAL at 09:07

## 2017-07-19 RX ADMIN — ASPIRIN 81 MG: 81 TABLET, COATED ORAL at 09:07

## 2017-07-19 NOTE — PROGRESS NOTES
Cardiology Consult Progress Note  Attending Physician: Kira Ruiz MD  Hospital Day: 8    Subjective:   Interval History: patient appears to be a little bit more out of breath and with some difficulty talking.    Medications:   Continuous Infusions:   furosemide (LASIX) 5 mg/mL infusion (non-titrating)         Scheduled Meds:   albuterol-ipratropium 2.5mg-0.5mg/3mL  3 mL Nebulization Q6H    apixaban  5 mg Oral BID    aspirin  81 mg Oral Daily    atorvastatin  40 mg Oral Daily    buPROPion  150 mg Oral Daily    escitalopram oxalate  5 mg Oral Daily    gabapentin  300 mg Oral QHS    hydrALAZINE  50 mg Oral TID    isosorbide dinitrate  10 mg Oral TID    metoprolol tartrate  50 mg Oral BID    pantoprazole  40 mg Oral Daily    quetiapine  100 mg Oral QHS    sodium chloride 0.9%  3 mL Intravenous Q8H    tuberculin  5 Units Intradermal Once     PRN Meds:sodium chloride, acetaminophen, acetaminophen, butalbital-acetaminophen-caffeine -40 mg, dextrose 50%, dextrose 50%, diphenhydrAMINE, glucagon (human recombinant), glucose, glucose, insulin aspart, ondansetron  Objective:     Vitals:  Temp:  [97.6 °F (36.4 °C)-98.2 °F (36.8 °C)]   Pulse:  []   Resp:  [14-22]   BP: (108-119)/(56-67)   SpO2:  [93 %-99 %]  on 2L N/C I/O's:    Intake/Output Summary (Last 24 hours) at 07/19/17 1141  Last data filed at 07/19/17 0509   Gross per 24 hour   Intake              820 ml   Output                0 ml   Net              820 ml        Constitutional: NAD, conversant  HEENT: Sclera anicteric, PERRLA, EOMI  Neck: 10-12 cm JVD, no carotid bruits  CV: Irregularly irregular, no murmur, normal S1/S2  Pulm: Bibasilar crackles  GI: Abdomen soft, NTND, +BS  Extremities: 3+ LE edema, warm and well perfused  Skin: No ecchymosis, erythema, or ulcers  Psych: AOx3, appropriate affect  Neuro: CNII-XII intact, no focal deficits    Labs:       CBC: CMP:      Recent Labs  Lab 07/17/17  0444 07/18/17  0441 07/19/17  0435  "  WBC 5.10 4.22 4.53   HGB 7.6* 7.5* 7.5*   HCT 25.5* 24.5* 25.1*    203 240   MCV 92 92 92   RDW 16.3* 16.1* 16.1*      Recent Labs  Lab 07/17/17  0444 07/17/17  1540 07/18/17  0441 07/19/17  0438    142 144 140   K 4.7 4.6 4.3 4.3    106 106 103   CO2 26 29 28 26   BUN 50* 52* 51* 51*   CREATININE 2.5* 2.7* 2.6* 2.5*   CALCIUM 7.9* 8.0* 8.7 8.2*   PROT 4.9* 4.9* 4.6*  --    BILITOT 0.2 0.2 0.2  --    ALKPHOS 49* 52* 43*  --    ALT 7* 8* 7*  --    AST 12 9* 10  --    MG 2.0  --  1.8 2.1   PHOS 4.1  --  4.5 4.3        Cholesterol: Coagulation   Lab Results   Component Value Date    CHOL 191 02/23/2017    HDL 25 (L) 02/23/2017    LDLCALC 130.6 02/23/2017    TRIG 177 (H) 02/23/2017      Recent Labs  Lab 07/15/17  0354   INR 1.1        Misc:     Recent Labs  Lab 07/13/17  1506   TROPONINI 0.037*      Lab Results   Component Value Date    HGBA1C 5.8 (H) 07/13/2017        Microbiology   Microbiology Results (last 7 days)     ** No results found for the last 168 hours. **           Imaging:     CXR (07/17/2017):   Sternal sutures are intact and aligned.  Abundant calcification noted at the level of the arch.    There is interstitial lung disease in perihilar distribution compatible with interstitial pulmonary edema similar to 7/14/2017.  Ovoid soft tissue opacity projected over the RIGHT lower lung zone at the level of the minor fissure suggests fluid retained in that fissure ("pseudotumor").  No other significant change compared to the earlier examination.    Degenerative changes are present at the acromioclavicular joints, shoulders and in the spine.      2D Echo (06/23/2017):    1 - Mildly to moderately depressed left ventricular systolic function (EF 40-45%). Abnormal septal motion due to underlying LBBB. Significant drop in LV function, compared to the prior reported, Images could not be retrieved for comparison.     2 - Normal right ventricular systolic function .     3 - Pulmonary hypertension. " The estimated PA systolic pressure is 41 mmHg.     4 - Mild tricuspid regurgitation.     5 - Small pericardial effusion.     6 - Intermediate central venous pressure.     7 - Biatrial enlargement.       Assessment:     71 y.o. 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  --continue hydralazine 50 TID and isordil 10 TID  --strict I&O's with fluid restriction of 1.5L  --daily weights  --patient's family is refusing stress test   --CPAP/BiPAP PRN for respiratory distress     #Chronic AFib  --patient is chronically in AFib at this time  --[id number] of 8 - patient requires anticoagulation. With her prior CVA, she must be on anticoagulation to prevent future strokes  --agree with discontinuation of effient.   --continue metoprolol 50 BID.     Thank you for the consult. We will continue to follow. Staff addendum to follow        Signed:  Fabiana Mendoza MD  Cardiology Fellow, PGY-5  7/19/2017 9:17 AM

## 2017-07-19 NOTE — HPI
Ms. Salgado is a 70yo female with history of CVA, CAD s/p CABG x 3, ICM, HFrEF, CKD, anemia in CKD and afib on Aspirin and Warfarin who presents with acute hypoxic respiratory failure. She just completed a course of antibiotics for pneumonia 2 weeks prior, and reports feeling lethargic with poor recovery of baseline functional status since. This admission, she is being treated for CHF exacerbation per Cardiology given elevated BNP, CXR with congestion. She has received IV Lasix per Cardiology recommendations and has continued improvement in Cr, most recently to 2.5 from 2.7 2 days prior, and her presentation is consistent with cardiorenal syndrome. I/Os note ~500 cc initial response to Lasix and Nephrology was consulted for CKDIV renal disease on loop diuretics with possible suboptimal response in urine output.

## 2017-07-19 NOTE — SUBJECTIVE & OBJECTIVE
Interval History: Patient is short of breath this AM and can only speak with a few words at a time. Family member said this began yesterday evening. She slept well overnight, but is still SOB this morning. I/Os difficult to monitor.    Review of Systems   Constitutional: Negative for chills and fever.   HENT: Negative for congestion and rhinorrhea.    Eyes: Negative for pain and visual disturbance.   Respiratory: Positive for shortness of breath. Negative for cough.    Cardiovascular: Positive for leg swelling. Negative for chest pain and palpitations.   Gastrointestinal: Negative for abdominal distention, abdominal pain, blood in stool, constipation, diarrhea, nausea and vomiting.   Genitourinary: Negative for difficulty urinating, dysuria, frequency and hematuria.   Skin: Negative for rash and wound.   Neurological: Negative for syncope, light-headedness and headaches.   Psychiatric/Behavioral: Negative for agitation and behavioral problems.     Objective:     Vital Signs (Most Recent):  Temp: 98.9 °F (37.2 °C) (07/19/17 1222)  Pulse: 92 (07/19/17 1414)  Resp: 16 (07/19/17 1326)  BP: 115/60 (07/19/17 1222)  SpO2: 95 % (07/19/17 1326) Vital Signs (24h Range):  Temp:  [98 °F (36.7 °C)-98.9 °F (37.2 °C)] 98.9 °F (37.2 °C)  Pulse:  [] 92  Resp:  [16-22] 16  SpO2:  [93 %-99 %] 95 %  BP: (114-119)/(57-67) 115/60     Weight: 72 kg (158 lb 11.7 oz)  Body mass index is 27.23 kg/m².    Intake/Output Summary (Last 24 hours) at 07/19/17 1558  Last data filed at 07/19/17 0509   Gross per 24 hour   Intake              580 ml   Output                0 ml   Net              580 ml      Physical Exam   Constitutional: She is oriented to person, place, and time. She appears well-developed and well-nourished. No distress.   HENT:   Head: Normocephalic and atraumatic.   Nose: Nose normal.   Mouth/Throat: Oropharynx is clear and moist. No oropharyngeal exudate.   Eyes: EOM are normal. Pupils are equal, round, and reactive to  light. No scleral icterus.   Neck: Neck supple. No JVD present. No thyromegaly present.   Cardiovascular: Normal rate, normal heart sounds and intact distal pulses.    No murmur heard.  Irregular rhythm   Pulmonary/Chest: Effort normal. No respiratory distress. She has no wheezes. She has rales.   Coarse BS and crackles bilaterally   Abdominal: Soft. Bowel sounds are normal. She exhibits no distension. There is no tenderness.   Musculoskeletal: She exhibits edema (1+ LE's). She exhibits no tenderness or deformity.   Lymphadenopathy:     She has no cervical adenopathy.   Neurological: She is alert and oriented to person, place, and time. No cranial nerve deficit.   Skin: Skin is warm and dry.   Right forehead hyperpigmentation, does not cross midline, from resolving shingles rash   Psychiatric: She has a normal mood and affect. Her behavior is normal.       Significant Labs:   CBC:   Recent Labs  Lab 07/18/17 0441 07/19/17  0438   WBC 4.22 4.53   HGB 7.5* 7.5*   HCT 24.5* 25.1*    240     CMP:   Recent Labs  Lab 07/18/17 0441 07/19/17  0438    140   K 4.3 4.3    103   CO2 28 26   * 133*   BUN 51* 51*   CREATININE 2.6* 2.5*   CALCIUM 8.7 8.2*   PROT 4.6*  --    ALBUMIN 1.7*  --    BILITOT 0.2  --    ALKPHOS 43*  --    AST 10  --    ALT 7*  --    ANIONGAP 10 11   EGFRNONAA 17.6* 18.5*       Significant Imaging: I have reviewed all pertinent imaging results/findings within the past 24 hours.

## 2017-07-19 NOTE — PT/OT/SLP PROGRESS
Physical Therapy  Treatment    Michael Salgado   MRN: [id number]   Admitting Diagnosis: Acute on chronic systolic congestive heart failure    PT Received On: 07/19/17  PT Start Time: 1124; 1400    PT Stop Time: 1148; 1415  PT Total Time (min): 24 min+15min= 39 minutes total    Billable Minutes:  Therapeutic Activity 39    Treatment Type: Treatment  PT/PTA: PTA     PTA Visit Number: 2       General Precautions: Standard, fall  Orthopedic Precautions: N/A   Braces: N/A    Do you have any cultural, spiritual, Religion conflicts, given your current situation?: none stated    Subjective:  Communicated with nursing prior to session.  Pt agreed to work with therapy.     Pain/Comfort  Pain Rating 1: 0/10  Pain Rating Post-Intervention 1: 0/10    Objective:   Patient found with: telemetry, oxygen    Functional Mobility:  Bed Mobility:   Rolling/Turning to Left: Minimum assistance, With side rail  Rolling/Turning Right: Moderate assistance, With side rail  Scooting/Bridging: Moderate Assistance  Supine to Sit: Moderate Assistance, With side rail  Sit to Supine: Moderate Assistance x2 trials    Transfers:  Bed <> Chair Technique: Stand Pivot  Bed <> Chair Assistance: Mod-Max (A)  Bed <> Chair Assistive Device: No Assistive Device    Gait:   Gait Distance:  (Not performed 2/2 pt refusal. )    Therapeutic Activities and Exercises:  Seated B LE therex 2x15 reps with assistance to R LE.    -AP   -LAQ   -Hip Flexion   -GS  Pt tolerated sitting UIC ~2.5-3 hours on this date.      AM-PAC 6 CLICK MOBILITY  How much help from another person does this patient currently need?   1 = Unable, Total/Dependent Assistance  2 = A lot, Maximum/Moderate Assistance  3 = A little, Minimum/Contact Guard/Supervision  4 = None, Modified [place]/Independent    Turning over in bed (including adjusting bedclothes, sheets and blankets)?: 2  Sitting down on and standing up from a chair with arms (e.g., wheelchair, bedside commode, etc.): 3  Moving  from lying on back to sitting on the side of the bed?: 2  Moving to and from a bed to a chair (including a wheelchair)?: 2  Need to walk in hospital room?: 2  Climbing 3-5 steps with a railing?: 1  Total Score: 12    AM-PAC Raw Score CMS G-Code Modifier Level of Impairment Assistance   6 % Total / Unable   7 - 9 CM 80 - 100% Maximal Assist   10 - 14 CL 60 - 80% Moderate Assist   15 - 19 CK 40 - 60% Moderate Assist   20 - 22 CJ 20 - 40% Minimal Assist   23 CI 1-20% SBA / CGA   24 CH 0% Independent/ Mod I     Patient left up in chair with all lines intact, call button in reach and spouse present.    Assessment:  Michael Salgado is a 71 y.o. female with a medical diagnosis of Acute on chronic systolic congestive heart failure and presents with all deficits noted below. Pt tolerated treatment fairly well, and will continue to benefit from PT intervention at this time. Continue with PT POC as indicated.    Rehab identified problem list/impairments: Rehab identified problem list/impairments: weakness, impaired functional mobilty, impaired balance, decreased upper extremity function, decreased lower extremity function, decreased coordination, gait instability, decreased safety awareness, impaired endurance, impaired cardiopulmonary response to activity    Rehab potential is good.    Activity tolerance: Fair    Discharge recommendations: Discharge Facility/Level Of Care Needs: nursing facility, skilled     Barriers to discharge: Barriers to Discharge: None    Equipment recommendations: Equipment Needed After Discharge: none     GOALS:    Physical Therapy Goals        Problem: Physical Therapy Goal    Goal Priority Disciplines Outcome Goal Variances Interventions   Physical Therapy Goal     PT/OT, PT Ongoing (interventions implemented as appropriate)     Description:  Goals to be met by: 2017    Patient will increase functional independence with mobility by performin. Supine to sit with MInimal  Assistance  2. Sit to supine with MInimal Assistance  3. Rolling to Right with Minimal Assistance.  4. Sit to stand transfer with Stand-by Assistance  5. Bed to chair transfer with Stand-by Assistance using Rolling Walker  6. Gait  x 10 feet with Minimal Assistance using Rolling Walker.                         PLAN:    Patient to be seen 4 x/week  to address the above listed problems via gait training, therapeutic activities, therapeutic exercises, wheelchair management/training, neuromuscular re-education  Plan of Care expires: 08/14/17  Plan of Care reviewed with: patient, spouse         Felicitas Ariel, PTA  07/19/2017

## 2017-07-19 NOTE — ASSESSMENT & PLAN NOTE
-pt presents from cardiology clinic with SOB, MILIAN, LE edema   -on home O2 2.5 L  -patient with increase lasix requirement since shingles and PNA in May (got abx as an out pt)  -most recent echo 6/2017 showed EF 40-45, pulm HTN  -On admit (7/12), CXR shows worsening pulmonary edema, BMP 1413 (above baseline), tn 0.04, EKG shows afib with RVR without acute ischemic changes  - CXR 7/14 shows interval improvement from 7/12  - on hydralazine 50 TID, isosorbide dinitirate 10 TID, and lopressor 50 BID  - Lasix 20 mg/hr gtt  - Pts family refusing stress test  - cardiology following, appreciate recs

## 2017-07-19 NOTE — SUBJECTIVE & OBJECTIVE
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     Encounter for blood transfusion     Heart attack     History of pleural effusion     HTN (hypertension)     Hyperlipidemia     MI, old     Multiple thyroid nodules     Pulmonary HTN     Right hemiparesis     S/P coronary artery stent placement     S/P percutaneous endoscopic gastrostomy (PEG) tube placement     Screening for colon cancer     normal 2015 -10 yrs    Stroke        Past Surgical History:   Procedure Laterality Date    cardiac bypass      CORONARY ANGIOPLASTY WITH STENT PLACEMENT  7/29/15    [facility]    CORONARY ARTERY BYPASS GRAFT  6/20/15    [facility]    KNEE SURGERY      OVARY SURGERY      OVARY SURGERY      TONSILLECTOMY      TUBAL LIGATION         Review of patient's allergies indicates:   Allergen Reactions    Daypro [oxaprozin] Itching     Tolerates other NSAIDs    Vibramycin [doxycycline calcium]      Current Facility-Administered Medications   Medication Frequency    0.9%  NaCl infusion (for blood administration) Q24H PRN    acetaminophen tablet 650 mg Q6H PRN    acetaminophen tablet 650 mg PRN    albuterol-ipratropium 2.5mg-0.5mg/3mL nebulizer solution 3 mL Q6H    apixaban tablet 5 mg BID    aspirin EC tablet 81 mg Daily    atorvastatin tablet 40 mg Daily    buPROPion TB24 tablet 150 mg Daily    butalbital-acetaminophen-caffeine -40 mg per tablet 1 tablet Q8H PRN    dextrose 50% injection 12.5 g PRN    dextrose 50% injection 25 g PRN    diphenhydrAMINE capsule 25 mg PRN    escitalopram oxalate tablet 5 mg Daily    furosemide (LASIX) 500 mg in sodium chloride 0.9% 100 mL  infusion Continuous    gabapentin capsule 300 mg QHS    glucagon (human recombinant) injection 1 mg PRN    glucose chewable tablet 16 g PRN    glucose chewable tablet 24 g PRN    hydrALAZINE tablet 50 mg TID    insulin aspart pen 1-10 Units QID (AC + HS) PRN    isosorbide dinitrate tablet 10 mg TID    metoprolol tartrate (LOPRESSOR) tablet 50 mg BID    ondansetron disintegrating tablet 8 mg Q8H PRN    pantoprazole EC tablet 40 mg Daily    quetiapine tablet 100 mg QHS    sodium chloride 0.9% flush 3 mL Q8H    tuberculin injection 5 Units Once     Family History     Problem Relation (Age of Onset)    Cancer Sister    Diabetes Mother, Brother    Heart disease Sister, Brother, Son, Sister    Hyperlipidemia Mother    Hypertension Mother, Brother, Daughter    No Known Problems Daughter        Social History Main Topics    Smoking status: Never Smoker    Smokeless tobacco: Never Used    Alcohol use No    Drug use: No    Sexual activity: Not on file     Review of Systems  Objective:     Vital Signs (Most Recent):  Temp: 98.9 °F (37.2 °C) (07/19/17 1222)  Pulse: 90 (07/19/17 1326)  Resp: 16 (07/19/17 1326)  BP: 115/60 (07/19/17 1222)  SpO2: 95 % (07/19/17 1326)  O2 Device (Oxygen Therapy): nasal cannula (07/19/17 1326) Vital Signs (24h Range):  Temp:  [97.6 °F (36.4 °C)-98.9 °F (37.2 °C)] 98.9 °F (37.2 °C)  Pulse:  [] 90  Resp:  [14-22] 16  SpO2:  [93 %-99 %] 95 %  BP: (108-119)/(56-67) 115/60     Weight: 72 kg (158 lb 11.7 oz) (07/19/17 1000)  Body mass index is 27.23 kg/m².  Body surface area is 1.8 meters squared.    I/O last 3 completed shifts:  In: 1060 [P.O.:1060]  Out: -     Physical Exam   Constitutional: She is oriented to person, place, and time. She appears well-developed and well-nourished. No distress.   HENT:   Head: Normocephalic and atraumatic.   Nose: Nose normal.   Mouth/Throat: Oropharynx is clear and moist. No oropharyngeal exudate.   Eyes: EOM are normal. Pupils are equal, round,  "and reactive to light. No scleral icterus.   Neck: Neck supple. No JVD present. No thyromegaly present.   Cardiovascular: Normal rate, normal heart sounds and intact distal pulses.    No murmur heard.  Irregular rhythm   Pulmonary/Chest: Effort normal. No respiratory distress. She has no wheezes. She has rales.   Coarse BS and crackles bilaterally   Abdominal: Soft. Bowel sounds are normal. She exhibits no distension. There is no tenderness.   Musculoskeletal: She exhibits edema (1+ LE's). She exhibits no tenderness or deformity.   Lymphadenopathy:     She has no cervical adenopathy.   Neurological: She is alert and oriented to person, place, and time. No cranial nerve deficit.   Skin: Skin is warm and dry.   Right forehead hyperpigmentation, does not cross midline, from resolving shingles rash   Psychiatric: She has a normal mood and affect. Her behavior is normal.       Significant Labs:  CBC:   Recent Labs  Lab 07/19/17  0438   WBC 4.53   RBC 2.72*   HGB 7.5*   HCT 25.1*      MCV 92   MCH 27.6   MCHC 29.9*     CMP:   Recent Labs  Lab 07/18/17  0441 07/19/17  0438   * 133*   CALCIUM 8.7 8.2*   ALBUMIN 1.7*  --    PROT 4.6*  --     140   K 4.3 4.3   CO2 28 26    103   BUN 51* 51*   CREATININE 2.6* 2.5*   ALKPHOS 43*  --    ALT 7*  --    AST 10  --    BILITOT 0.2  --      All labs within the past 24 hours have been reviewed.    Significant Imaging:  X-Ray: Reviewed  Interstitial pulm edema  Ovoid soft tissue opacity projected over the RIGHT lower lung zone at the level of the minor fissure suggests fluid retained in that fissure ("pseudotumor").  No other significant change compared to the earlier examination.  "

## 2017-07-19 NOTE — ASSESSMENT & PLAN NOTE
-Cr on admission 2.5, stable (improved from 3.2 ~1 mo ago)  -continue to monitor RFTs  -avoid nephrotoxic agents  -consulted nephrology for low urine output. Appreciate recommendations

## 2017-07-19 NOTE — PLAN OF CARE
SW followed up with pt's daughter Rhea who updated SW about their family discuss on possibly SNF placement and pt is now willing to attend SNF at [facility] at discharge.     Pt is not medically stable at this present time. SW will continue to follow and provide d/c recs once stable.

## 2017-07-19 NOTE — PROGRESS NOTES
"[facility]  Adult Nutrition  Progress Note    SUMMARY     Recommendations  Recommendation/Intervention:   1. Encourage increased PO intake.   2. Recommend adding Diabetic diet restriction.   3. Recommend adding Boost Glucose Control OS to all meals.   RD to monitor.    Goals: Patient will consume > 75% of meals  Nutrition Goal Status: new  Communication of RD Recs: reviewed with RN    Reason for Assessment  Reason for Assessment: RD follow-up  Diagnosis:  (acute on chronic CHF)  Relevent Medical History: CAD, CKD stage IV, type 2 DM, HTN, CVA, HLD   Interdisciplinary Rounds: did not attend  General Information Comments: Patient with fair appetite, consuming ~50% of meals.   Nutrition Discharge Planning: Adequate nutrition via PO intake.    Nutrition Prescription Ordered  Current Diet Order: Low Na 2m - 1500 mL fluid restriction    Evaluation of Received Nutrients/Fluid Intake  Comments: LBM 7/18  % Intake of Estimated Energy Needs: 50 - 75 %  % Meal Intake: 50%     Nutrition Risk Screen   Nutrition Risk Screen: no indicators present    Nutrition/Diet History  Patient Reported Diet/Restrictions/Preferences: diabetic diet, low salt  Food Preferences: no cultural or [facility] needs identified  Factors Affecting Nutritional Intake: decreased appetite    Labs/Tests/Procedures/Meds  Pertinent Labs Reviewed: reviewed  Pertinent Labs Comments: BUN 51, Creat 2.5, Glu 133, POCT Glu 135-335, HgbA1c 5.8, Ca 8.2  Pertinent Medications Reviewed: reviewed  Pertinent Medications Comments: furosemide, pantoprazole    Physical Findings  Overall Physical Appearance: on oxygen therapy  Tubes:  (none)  Oral/Mouth Cavity: tooth/teeth missing  Skin: intact    Anthropometrics  Height: 5' 4.02" (162.6 cm)  Weight Method: Bed Scale  Weight: 72 kg (158 lb 11.7 oz)  Ideal Body Weight (IBW), Female: 120.1 lb  % Ideal Body Weight, Female (lb): 132.16 lb  BMI (Calculated): 27.3  BMI Grade: 25 - 29.9 - " overweight    Estimated/Assessed Needs  Weight Used For Calorie Calculations: 72 kg (158 lb 11.7 oz)   Energy Calorie Requirements (kcal): 1513 kcal/day  Energy Need Method: [place]-St Jeor (x 1.25)  RMR ([place]-St. Jeor Equation): 1210.32  Weight Used For Protein Calculations: 72 kg (158 lb 11.7 oz)  Protein Requirements: 72-87 g/day (1.0-1.2 g/kg)  Fluid Requirements (mL): per MD    Assessment and Plan  Nutrition Problem  Inadequate energy intake    Related to (etiology):   Decreased ability to consume sufficient energy    Signs and Symptoms (as evidenced by):   Decreased appetite and reports of consuming < 75% of meals    Interventions/Recommendations (treatment strategy):  See RD recs above.    Nutrition Diagnosis Status:   New    Monitor and Evaluation  Food and Nutrient Intake: food and beverage intake, energy intake  Food and Nutrient Adminstration: diet order  Anthropometric Measurements: weight, weight change  Biochemical Data, Medical Tests and Procedures: electrolyte and renal panel, gastrointestinal profile, glucose/endocrine profile, inflammatory profile  Nutrition-Focused Physical Findings: overall appearance    Nutrition Risk  Level of Risk:  (1x/week)    Nutrition Follow-Up  RD Follow-up?: Yes

## 2017-07-19 NOTE — ASSESSMENT & PLAN NOTE
-patient's presentation is c/w CRS; continue diuresis as Cr has improved. Consider augmenting Lasix with Zaroxolyn if continued concern for suboptimal response.   -strict I/Os, avoid nephrotoxins  -if worsening of Cr, hold loop diuretics  -consider Fe supplementation for H/h, repeat Vit D studies and supplementation daily given advanced CKD.

## 2017-07-19 NOTE — CONSULTS
[facility]  Nephrology  Consult Note    Patient Name: Michael Salgado  MRN: [id number]  Admission Date: 7/12/2017  Hospital Length of Stay: 7 days  Attending Provider: Kira Ruiz MD   Primary Care Physician: Wesley Watkins MD  Principal Problem:Acute on chronic systolic congestive heart failure    Consults  Subjective:     HPI: Ms. Salgado is a 70yo female with history of CVA, CAD s/p CABG x 3, ICM, HFrEF, CKD, anemia in CKD and afib on Aspirin and Warfarin who presents with acute hypoxic respiratory failure. She just completed a course of antibiotics for pneumonia 2 weeks prior, and reports feeling lethargic with poor recovery of baseline functional status since. This admission, she is being treated for CHF exacerbation per Cardiology given elevated BNP, CXR with congestion. She has received IV Lasix gtt per Cardiology recommendations and has continued improvement in Cr, most recently to 2.5 from 2.7 in the few days prior. I/Os note ~500 cc initial response to Lasix and Nephrology was consulted for CKDIV renal disease on loop diuretics with possible suboptimal response in urine output, and nephrotic range proteinuria was noted on labs.            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     Encounter for blood transfusion     Heart attack     History of pleural effusion     HTN (hypertension)     Hyperlipidemia     MI, old     Multiple thyroid nodules     Pulmonary HTN     Right hemiparesis     S/P coronary artery stent placement     S/P percutaneous endoscopic gastrostomy (PEG) tube placement     Screening for colon cancer      normal 2015 -10 yrs    Stroke        Past Surgical History:   Procedure Laterality Date    cardiac bypass      CORONARY ANGIOPLASTY WITH STENT PLACEMENT  7/29/15    [facility]    CORONARY ARTERY BYPASS GRAFT  6/20/15    [facility]    KNEE SURGERY      OVARY SURGERY      OVARY SURGERY      TONSILLECTOMY      TUBAL LIGATION         Review of patient's allergies indicates:   Allergen Reactions    Daypro [oxaprozin] Itching     Tolerates other NSAIDs    Vibramycin [doxycycline calcium]      Current Facility-Administered Medications   Medication Frequency    0.9%  NaCl infusion (for blood administration) Q24H PRN    acetaminophen tablet 650 mg Q6H PRN    acetaminophen tablet 650 mg PRN    albuterol-ipratropium 2.5mg-0.5mg/3mL nebulizer solution 3 mL Q6H    apixaban tablet 5 mg BID    aspirin EC tablet 81 mg Daily    atorvastatin tablet 40 mg Daily    buPROPion TB24 tablet 150 mg Daily    butalbital-acetaminophen-caffeine -40 mg per tablet 1 tablet Q8H PRN    dextrose 50% injection 12.5 g PRN    dextrose 50% injection 25 g PRN    diphenhydrAMINE capsule 25 mg PRN    escitalopram oxalate tablet 5 mg Daily    furosemide (LASIX) 500 mg in sodium chloride 0.9% 100 mL infusion Continuous    gabapentin capsule 300 mg QHS    glucagon (human recombinant) injection 1 mg PRN    glucose chewable tablet 16 g PRN    glucose chewable tablet 24 g PRN    hydrALAZINE tablet 50 mg TID    insulin aspart pen 1-10 Units QID (AC + HS) PRN    isosorbide dinitrate tablet 10 mg TID    metoprolol tartrate (LOPRESSOR) tablet 50 mg BID    ondansetron disintegrating tablet 8 mg Q8H PRN    pantoprazole EC tablet 40 mg Daily    quetiapine tablet 100 mg QHS    sodium chloride 0.9% flush 3 mL Q8H    tuberculin injection 5 Units Once     Family History     Problem Relation (Age of Onset)    Cancer Sister    Diabetes Mother, Brother    Heart disease Sister, Brother, Son, Sister    Hyperlipidemia Mother    Hypertension  Mother, Brother, Daughter    No Known Problems Daughter        Social History Main Topics    Smoking status: Never Smoker    Smokeless tobacco: Never Used    Alcohol use No    Drug use: No    Sexual activity: Not on file     Review of Systems  Objective:     Vital Signs (Most Recent):  Temp: 98.9 °F (37.2 °C) (07/19/17 1222)  Pulse: 90 (07/19/17 1326)  Resp: 16 (07/19/17 1326)  BP: 115/60 (07/19/17 1222)  SpO2: 95 % (07/19/17 1326)  O2 Device (Oxygen Therapy): nasal cannula (07/19/17 1326) Vital Signs (24h Range):  Temp:  [97.6 °F (36.4 °C)-98.9 °F (37.2 °C)] 98.9 °F (37.2 °C)  Pulse:  [] 90  Resp:  [14-22] 16  SpO2:  [93 %-99 %] 95 %  BP: (108-119)/(56-67) 115/60     Weight: 72 kg (158 lb 11.7 oz) (07/19/17 1000)  Body mass index is 27.23 kg/m².  Body surface area is 1.8 meters squared.    I/O last 3 completed shifts:  In: 1060 [P.O.:1060]  Out: -     Physical Exam   Constitutional: She is oriented to person, place, and time. She appears well-developed and well-nourished. No distress.   HENT:   Head: Normocephalic and atraumatic.   Nose: Nose normal.   Mouth/Throat: Oropharynx is clear and moist. No oropharyngeal exudate.   Eyes: EOM are normal. Pupils are equal, round, and reactive to light. No scleral icterus.   Neck: Neck supple. No JVD present. No thyromegaly present.   Cardiovascular: Normal rate, normal heart sounds and intact distal pulses.    No murmur heard.  Irregular rhythm   Pulmonary/Chest: Effort normal. No respiratory distress. She has no wheezes. She has rales.   Coarse BS and crackles bilaterally, increased effort with SOB  Abdominal: Soft. Bowel sounds are normal. She exhibits no distension. There is no tenderness.   Musculoskeletal: She exhibits edema (1+ LE's). She exhibits no tenderness or deformity.   Lymphadenopathy:     She has no cervical adenopathy.   Neurological: She is alert and oriented to person, place, and time. No cranial nerve deficit.   Skin: Skin is warm and dry.   "  Psychiatric: She has a normal mood and affect. Her behavior is normal.       Significant Labs:  CBC:   Recent Labs  Lab 07/19/17  0438   WBC 4.53   RBC 2.72*   HGB 7.5*   HCT 25.1*      MCV 92   MCH 27.6   MCHC 29.9*     CMP:   Recent Labs  Lab 07/18/17  0441 07/19/17  0438   * 133*   CALCIUM 8.7 8.2*   ALBUMIN 1.7*  --    PROT 4.6*  --     140   K 4.3 4.3   CO2 28 26    103   BUN 51* 51*   CREATININE 2.6* 2.5*   ALKPHOS 43*  --    ALT 7*  --    AST 10  --    BILITOT 0.2  --      All labs within the past 24 hours have been reviewed.    Significant Imaging:  X-Ray: Reviewed  Interstitial pulm edema  Ovoid soft tissue opacity projected over the RIGHT lower lung zone at the level of the minor fissure suggests fluid retained in that fissure ("pseudotumor").  No other significant change compared to the earlier examination.    Assessment/Plan:     Chronic kidney disease (CKD) stage G4/A1, severely decreased glomerular filtration rate (GFR) between 15-29 mL/min/1.73 square meter and albuminuria creatinine ratio less than 30 mg/g    -patient presents with Nephrotic range proteinuria- will obtain P/Cr and 24 hr protein, urine sediment w/ microscopy. Etiology likely long-term DM/ HTN  -patient could be in CRS; continue diuresis as Cr has improved. Consider augmenting Lasix with Zaroxolyn if continued concern for suboptimal response and recommend Lasix 80mg BID preferred to continuous.  -strict I/Os, avoid nephrotoxins  -if worsening of Cr, hold loop diuretics  -consider Fe supplementation for H/h, repeat Vit D studies and supplementation daily given advanced CKD.             Thank you for your consult. I will follow-up with patient. Please contact us if you have any additional questions.    Bulmaro Worley MD  Nephrology  [facility]  "

## 2017-07-19 NOTE — PROGRESS NOTES
[facility] Medicine  Progress Note    Patient Name: Michael Salgado  MRN: [id number]  Patient Class: IP- Inpatient   Admission Date: 7/12/2017  Length of Stay: 7 days  Attending Physician: Kira Ruiz MD  Primary Care Provider: Wesley Watkins MD    [facility] Medicine Team: [facility] MED 1 Deuce Sexton MD    Subjective:     Principal Problem:Acute on chronic systolic congestive heart failure    HPI:  71 year old  yo female with history of CAD s/p CABGx3 2015, ICM, HFrEF (EF 40%, PA 41 echo 6/2017), CKD stage IV, IDDM2, HTN, 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 Patient has baseline MILIAN, orthopnea, PND, LE edema, and abdo swelling that has also progressively worsened over the past few days. She is compliant with her home medications which include lasix 20 BID, coreg, hydralazine, amlodipine, and warfarin. She follows a low salt, fluid restrict diet and she has not deviated from it recently. She reports similar symptoms about a year ago that required hospitalization for CHF exacerbation. Patient recently had pneumonia and shingles in May and has not recovered full function since. She previously was able to ambulate with a walker and now uses a wheelchair. Prior to the shingles and PNA, she only required 10 mg lasix, but her lasix was increased afterwords due to increased SOB and volume overload.  She uses home O2 2.5 L. She is complaining of decreased UO, dysuria, and abdominal discomfort. She denies chest pain, palpitations, constipation (last BM today), diarrhea, cough, fever, chills, or any other complaints at this time. She lives at home with her daughter who provides with with significant assistance with her ADL's. Her cardiologist is Dr. Davis. She was last seen in cardiology clinic today and was sent to the ED from  there due to her SOB and edema.     In the ED, CXR showed increased pulmonary edema, BNP 1413 (which is above her baseline), tn 0.044, EKG with a fib with RVR but no acute ischemic changes. Patient was given lasix IV 80 mg.    Hospital Course:  7/13: Admitted to IM1. Started on Lasix 40 mg IV BID. Change in HH, 9.9->7.9. Denies changes in color/consistency of BMs and FOBT negative. Hemolytic anemia work-up negative. ABG normal. Added duoneb q6h.  7/14: Continued respiratory effort despite duoneb therapy. Vitals have remained stable. Pulmonology consult advised against thoracentesis at this moment after ultrasound showed bilateral effusions are likely not contributing to SOB. Also recommends discontinuing antibiotics as pneumonia is unlikely and it is adding fluids to her volume overload. Additional recommendation to increase diuretic therapy despite CKD. Consults to cards and pulm placed.    7/15: Patient's respiratory effort improving. Decided to discontinue coumadin at home. Patient is now taking Apixaban and will continue at home upon discharge.  7/16: Patient markedly improved today. Restarted Aspirin now that hemoglobin has stabilized.  7/17: Continued improvement. Increased hydralazine to 50 TID and replaced coreg with metoprolol for better AFib control, per cardiology.  7/18: Pt's family is refusing stress test. Lasix gtt transitioned to IV pushes.  7/19: Patient regressed overnight per family. Patient is winded when speaking and can only make a few words/sounds. Lasix IV pushes transitioned back to Lasix infusion with increase to 20 mg/hr and an initial Lasix 80 mg IV push. Nephrology consulted for low urine output. Appreciate recommendations when available.    Interval History: Patient is short of breath this AM and can only speak with a few words at a time. Family member said this began yesterday evening. She slept well overnight, but is still SOB this morning. I/Os difficult to monitor.    Review of  Systems   Constitutional: Negative for chills and fever.   HENT: Negative for congestion and rhinorrhea.    Eyes: Negative for pain and visual disturbance.   Respiratory: Positive for shortness of breath. Negative for cough.    Cardiovascular: Positive for leg swelling. Negative for chest pain and palpitations.   Gastrointestinal: Negative for abdominal distention, abdominal pain, blood in stool, constipation, diarrhea, nausea and vomiting.   Genitourinary: Negative for difficulty urinating, dysuria, frequency and hematuria.   Skin: Negative for rash and wound.   Neurological: Negative for syncope, light-headedness and headaches.   Psychiatric/Behavioral: Negative for agitation and behavioral problems.     Objective:     Vital Signs (Most Recent):  Temp: 98.9 °F (37.2 °C) (07/19/17 1222)  Pulse: 92 (07/19/17 1414)  Resp: 16 (07/19/17 1326)  BP: 115/60 (07/19/17 1222)  SpO2: 95 % (07/19/17 1326) Vital Signs (24h Range):  Temp:  [98 °F (36.7 °C)-98.9 °F (37.2 °C)] 98.9 °F (37.2 °C)  Pulse:  [] 92  Resp:  [16-22] 16  SpO2:  [93 %-99 %] 95 %  BP: (114-119)/(57-67) 115/60     Weight: 72 kg (158 lb 11.7 oz)  Body mass index is 27.23 kg/m².    Intake/Output Summary (Last 24 hours) at 07/19/17 1558  Last data filed at 07/19/17 0509   Gross per 24 hour   Intake              580 ml   Output                0 ml   Net              580 ml      Physical Exam   Constitutional: She is oriented to person, place, and time. She appears well-developed and well-nourished. No distress.   HENT:   Head: Normocephalic and atraumatic.   Nose: Nose normal.   Mouth/Throat: Oropharynx is clear and moist. No oropharyngeal exudate.   Eyes: EOM are normal. Pupils are equal, round, and reactive to light. No scleral icterus.   Neck: Neck supple. No JVD present. No thyromegaly present.   Cardiovascular: Normal rate, normal heart sounds and intact distal pulses.    No murmur heard.  Irregular rhythm   Pulmonary/Chest: Effort normal. No  respiratory distress. She has no wheezes. She has rales.   Coarse BS and crackles bilaterally   Abdominal: Soft. Bowel sounds are normal. She exhibits no distension. There is no tenderness.   Musculoskeletal: She exhibits edema (1+ LE's). She exhibits no tenderness or deformity.   Lymphadenopathy:     She has no cervical adenopathy.   Neurological: She is alert and oriented to person, place, and time. No cranial nerve deficit.   Skin: Skin is warm and dry.   Right forehead hyperpigmentation, does not cross midline, from resolving shingles rash   Psychiatric: She has a normal mood and affect. Her behavior is normal.       Significant Labs:   CBC:   Recent Labs  Lab 07/18/17 0441 07/19/17  0438   WBC 4.22 4.53   HGB 7.5* 7.5*   HCT 24.5* 25.1*    240     CMP:   Recent Labs  Lab 07/18/17 0441 07/19/17  0438    140   K 4.3 4.3    103   CO2 28 26   * 133*   BUN 51* 51*   CREATININE 2.6* 2.5*   CALCIUM 8.7 8.2*   PROT 4.6*  --    ALBUMIN 1.7*  --    BILITOT 0.2  --    ALKPHOS 43*  --    AST 10  --    ALT 7*  --    ANIONGAP 10 11   EGFRNONAA 17.6* 18.5*       Significant Imaging: I have reviewed all pertinent imaging results/findings within the past 24 hours.    Assessment/Plan:      * Acute on chronic systolic congestive heart failure    -pt presents from cardiology clinic with SOB, MILIAN, LE edema   -on home O2 2.5 L  -patient with increase lasix requirement since shingles and PNA in May (got abx as an out pt)  -most recent echo 6/2017 showed EF 40-45, pulm HTN  -On admit (7/12), CXR shows worsening pulmonary edema, BMP 1413 (above baseline), tn 0.04, EKG shows afib with RVR without acute ischemic changes  - CXR 7/14 shows interval improvement from 7/12  - on hydralazine 50 TID, isosorbide dinitirate 10 TID, and lopressor 50 BID  - Lasix 20 mg/hr gtt  - Pts family refusing stress test  - cardiology following, appreciate recs        Chronic kidney disease (CKD) stage G4/A1, severely decreased  glomerular filtration rate (GFR) between 15-29 mL/min/1.73 square meter and albuminuria creatinine ratio less than 30 mg/g    -Cr on admission 2.5, stable (improved from 3.2 ~1 mo ago)  -continue to monitor RFTs  -avoid nephrotoxic agents  -consulted nephrology for low urine output. Appreciate recommendations        Insulin dependent diabetes mellitus    -daughter reports to takes LA insulin 10 units qhs and SA insulin 5 units WM  -A1c 6.1 2/2017  -Discontinued basal-bolus following morning glucose of 40  -Continue LD SSI QID        HTN (hypertension)    -Lopressor 50 BID, hyralazine 50 TID, isordil 10 10 TID        H/O: CVA (cerebrovascular accident)    -hx of stroke before 2012 per daughter  -pt with residual right sided deficit   -continue atorvastatin 50 mg  -now on eliquis        Normocytic anemia    - On admit (7/12), Hgb 9.9 and dropped to 7.9 (7/13) w/ SOB  - Hemolysis work-up negative, FOBT negative with no reported changes in stool  - currently stable, maintain low-threshold to transfuse        Atrial fibrillation    -patient denying palpitations, lightheadedness, CP  -admission EKG with a fib with RVR   -chadsvasc of 8  -started on Apixaban  -coreg switched to metoprolol tartrate 50 BID        Pneumonia due to infectious organism    - On admit (7/12) CXR showed R-sided airspace opacities concerning for edema or infectious process (SIRS 2/4, HR and RR). No fevers/chills or productive cough  - ABG normal  - Previously treated PNA with Levaquin on 6/15  - Started on azithromycin and ceftriaxone for CAP, D/C'd 2/2 low suspicion for PNA          Depression    -continue lexapro 5 mg, Wellbutrin 150 mg  -Seroquel qhs         Hyperlipidemia    -atorvastatin 40 mg          VTE Risk Mitigation         Ordered     apixaban tablet 5 mg  2 times daily     Route:  Oral        07/15/17 1102     Medium Risk of VTE  Once      07/12/17 9279        Dispo: To SNF following improvement of current CHF decompensation.      Deuce Sexton MD  Department of Hospital Medicine   [facility]

## 2017-07-19 NOTE — PLAN OF CARE
Problem: Diabetes, Type 2 (Adult)  Goal: Signs and Symptoms of Listed Potential Problems Will be Absent, Minimized or Managed (Diabetes, Type 2)  Signs and symptoms of listed potential problems will be absent, minimized or managed by discharge/transition of care (reference Diabetes, Type 2 (Adult) CPG).   Patient's blood glucose checked and insulin administered as ordered.     Problem: Fall Risk (Adult)  Goal: Absence of Falls  Patient will demonstrate the desired outcomes by discharge/transition of care.   Outcome: Ongoing (interventions implemented as appropriate)  Patient remained free of falls, trauma or injuries, low bed, side rails up X2, call light within reach. Bed alarm on, non skid socks on.     Problem: Patient Care Overview  Goal: Plan of Care Review  POC reviewed with patient and  at bedside, Furosemide drip was started. Britt catheter was placed. Patient awaiting to be transferred to Cardiology floor. VSS, no acute distress noted.     Problem: Pressure Ulcer Risk (Shiva Scale) (Adult,Obstetrics,Pediatric)  Goal: Skin Integrity  Patient will demonstrate the desired outcomes by discharge/transition of care.   Outcome: Ongoing (interventions implemented as appropriate)  No development of pressure ulcers during shift time, patient was turned q 2 hours.

## 2017-07-20 PROBLEM — R80.1 PERSISTENT PROTEINURIA: Status: ACTIVE | Noted: 2017-07-20

## 2017-07-20 LAB
ALLENS TEST: ABNORMAL
ANION GAP SERPL CALC-SCNC: 8 MMOL/L
BASOPHILS # BLD AUTO: 0.03 K/UL
BASOPHILS NFR BLD: 0.6 %
BUN SERPL-MCNC: 55 MG/DL
CALCIUM SERPL-MCNC: 8 MG/DL
CHLORIDE SERPL-SCNC: 104 MMOL/L
CO2 SERPL-SCNC: 29 MMOL/L
CREAT SERPL-MCNC: 2.6 MG/DL
CREAT UR-MCNC: 36 MG/DL
DIFFERENTIAL METHOD: ABNORMAL
EOSINOPHIL # BLD AUTO: 0.1 K/UL
EOSINOPHIL NFR BLD: 2.4 %
ERYTHROCYTE [DISTWIDTH] IN BLOOD BY AUTOMATED COUNT: 16 %
EST. GFR  (AFRICAN AMERICAN): 20.3 ML/MIN/1.73 M^2
EST. GFR  (NON AFRICAN AMERICAN): 17.6 ML/MIN/1.73 M^2
ESTIMATED AVG GLUCOSE: 117 MG/DL
GLUCOSE SERPL-MCNC: 180 MG/DL
GRAM STN SPEC: NORMAL
GRAM STN SPEC: NORMAL
HBA1C MFR BLD HPLC: 5.7 %
HCO3 UR-SCNC: 31.4 MMOL/L (ref 24–28)
HCT VFR BLD AUTO: 24.5 %
HGB BLD-MCNC: 7.3 G/DL
LYMPHOCYTES # BLD AUTO: 1.2 K/UL
LYMPHOCYTES NFR BLD: 23.7 %
MAGNESIUM SERPL-MCNC: 2.1 MG/DL
MCH RBC QN AUTO: 27.8 PG
MCHC RBC AUTO-ENTMCNC: 29.8 G/DL
MCV RBC AUTO: 93 FL
MONOCYTES # BLD AUTO: 0.6 K/UL
MONOCYTES NFR BLD: 12.8 %
NEUTROPHILS # BLD AUTO: 3 K/UL
NEUTROPHILS NFR BLD: 59.9 %
PCO2 BLDA: 54 MMHG (ref 35–45)
PH SMN: 7.37 [PH] (ref 7.35–7.45)
PHOSPHATE SERPL-MCNC: 4.6 MG/DL
PLATELET # BLD AUTO: 214 K/UL
PMV BLD AUTO: 9.4 FL
PO2 BLDA: 68 MMHG (ref 80–100)
POC BE: 6 MMOL/L
POC PCO2 TEMP: 54 MMHG
POC PH TEMP: 7.37
POC PO2 TEMP: 68 MMHG
POC SATURATED O2: 92 % (ref 95–100)
POC TCO2: 33 MMOL/L (ref 23–27)
POC TEMPERATURE: ABNORMAL
POCT GLUCOSE: 186 MG/DL (ref 70–110)
POCT GLUCOSE: 198 MG/DL (ref 70–110)
POCT GLUCOSE: 235 MG/DL (ref 70–110)
POTASSIUM SERPL-SCNC: 4.2 MMOL/L
PROT UR-MCNC: 104 MG/DL
PROT UR-MCNC: 104 MG/DL
PROT/CREAT RATIO, UR: 2.89
RBC # BLD AUTO: 2.63 M/UL
SAMPLE: ABNORMAL
SITE: ABNORMAL
SODIUM SERPL-SCNC: 141 MMOL/L
SODIUM UR-SCNC: 82 MMOL/L
UUN UR-MCNC: 206 MG/DL
WBC # BLD AUTO: 4.93 K/UL

## 2017-07-20 PROCEDURE — 25000003 PHARM REV CODE 250: Performed by: STUDENT IN AN ORGANIZED HEALTH CARE EDUCATION/TRAINING PROGRAM

## 2017-07-20 PROCEDURE — 36415 COLL VENOUS BLD VENIPUNCTURE: CPT

## 2017-07-20 PROCEDURE — 82570 ASSAY OF URINE CREATININE: CPT

## 2017-07-20 PROCEDURE — 25000242 PHARM REV CODE 250 ALT 637 W/ HCPCS: Performed by: STUDENT IN AN ORGANIZED HEALTH CARE EDUCATION/TRAINING PROGRAM

## 2017-07-20 PROCEDURE — 63600175 PHARM REV CODE 636 W HCPCS: Performed by: HOSPITALIST

## 2017-07-20 PROCEDURE — 83036 HEMOGLOBIN GLYCOSYLATED A1C: CPT

## 2017-07-20 PROCEDURE — 25000003 PHARM REV CODE 250: Performed by: HOSPITALIST

## 2017-07-20 PROCEDURE — 99233 SBSQ HOSP IP/OBS HIGH 50: CPT | Mod: GC,,, | Performed by: HOSPITALIST

## 2017-07-20 PROCEDURE — 84100 ASSAY OF PHOSPHORUS: CPT

## 2017-07-20 PROCEDURE — 99232 SBSQ HOSP IP/OBS MODERATE 35: CPT | Mod: ,,, | Performed by: INTERNAL MEDICINE

## 2017-07-20 PROCEDURE — 27000221 HC OXYGEN, UP TO 24 HOURS

## 2017-07-20 PROCEDURE — 84540 ASSAY OF URINE/UREA-N: CPT

## 2017-07-20 PROCEDURE — 83735 ASSAY OF MAGNESIUM: CPT

## 2017-07-20 PROCEDURE — 94640 AIRWAY INHALATION TREATMENT: CPT

## 2017-07-20 PROCEDURE — 20600001 HC STEP DOWN PRIVATE ROOM

## 2017-07-20 PROCEDURE — 85025 COMPLETE CBC W/AUTO DIFF WBC: CPT

## 2017-07-20 PROCEDURE — 80048 BASIC METABOLIC PNL TOTAL CA: CPT

## 2017-07-20 PROCEDURE — 86580 TB INTRADERMAL TEST: CPT | Performed by: HOSPITALIST

## 2017-07-20 PROCEDURE — 99233 SBSQ HOSP IP/OBS HIGH 50: CPT | Mod: ,,, | Performed by: INTERNAL MEDICINE

## 2017-07-20 PROCEDURE — 84300 ASSAY OF URINE SODIUM: CPT

## 2017-07-20 RX ORDER — METOLAZONE 5 MG/1
5 TABLET ORAL ONCE
Status: COMPLETED | OUTPATIENT
Start: 2017-07-20 | End: 2017-07-20

## 2017-07-20 RX ORDER — FUROSEMIDE 10 MG/ML
80 INJECTION INTRAMUSCULAR; INTRAVENOUS ONCE
Status: COMPLETED | OUTPATIENT
Start: 2017-07-20 | End: 2017-07-20

## 2017-07-20 RX ADMIN — APIXABAN 5 MG: 5 TABLET, FILM COATED ORAL at 09:07

## 2017-07-20 RX ADMIN — ATORVASTATIN CALCIUM 40 MG: 20 TABLET, FILM COATED ORAL at 08:07

## 2017-07-20 RX ADMIN — Medication 3 ML: at 09:07

## 2017-07-20 RX ADMIN — METOPROLOL TARTRATE 50 MG: 50 TABLET, FILM COATED ORAL at 08:07

## 2017-07-20 RX ADMIN — ASPIRIN 81 MG: 81 TABLET, COATED ORAL at 08:07

## 2017-07-20 RX ADMIN — HYDRALAZINE HYDROCHLORIDE 50 MG: 50 TABLET ORAL at 05:07

## 2017-07-20 RX ADMIN — ISOSORBIDE DINITRATE 10 MG: 10 TABLET ORAL at 05:07

## 2017-07-20 RX ADMIN — ESCITALOPRAM 5 MG: 5 TABLET, FILM COATED ORAL at 08:07

## 2017-07-20 RX ADMIN — BUPROPION HYDROCHLORIDE 150 MG: 150 TABLET, FILM COATED, EXTENDED RELEASE ORAL at 08:07

## 2017-07-20 RX ADMIN — Medication 5 UNITS: at 01:07

## 2017-07-20 RX ADMIN — PANTOPRAZOLE SODIUM 40 MG: 40 TABLET, DELAYED RELEASE ORAL at 08:07

## 2017-07-20 RX ADMIN — GABAPENTIN 300 MG: 300 CAPSULE ORAL at 09:07

## 2017-07-20 RX ADMIN — INSULIN ASPART 2 UNITS: 100 INJECTION, SOLUTION INTRAVENOUS; SUBCUTANEOUS at 07:07

## 2017-07-20 RX ADMIN — Medication 3 ML: at 06:07

## 2017-07-20 RX ADMIN — FUROSEMIDE 20 MG/HR: 10 INJECTION, SOLUTION INTRAMUSCULAR; INTRAVENOUS at 08:07

## 2017-07-20 RX ADMIN — METOPROLOL TARTRATE 50 MG: 50 TABLET, FILM COATED ORAL at 09:07

## 2017-07-20 RX ADMIN — IPRATROPIUM BROMIDE AND ALBUTEROL SULFATE 3 ML: .5; 3 SOLUTION RESPIRATORY (INHALATION) at 07:07

## 2017-07-20 RX ADMIN — IPRATROPIUM BROMIDE AND ALBUTEROL SULFATE 3 ML: .5; 3 SOLUTION RESPIRATORY (INHALATION) at 11:07

## 2017-07-20 RX ADMIN — HYDRALAZINE HYDROCHLORIDE 50 MG: 50 TABLET ORAL at 01:07

## 2017-07-20 RX ADMIN — IPRATROPIUM BROMIDE AND ALBUTEROL SULFATE 3 ML: .5; 3 SOLUTION RESPIRATORY (INHALATION) at 12:07

## 2017-07-20 RX ADMIN — ISOSORBIDE DINITRATE 10 MG: 10 TABLET ORAL at 09:07

## 2017-07-20 RX ADMIN — INSULIN ASPART 2 UNITS: 100 INJECTION, SOLUTION INTRAVENOUS; SUBCUTANEOUS at 10:07

## 2017-07-20 RX ADMIN — QUETIAPINE FUMARATE 100 MG: 100 TABLET, FILM COATED ORAL at 09:07

## 2017-07-20 RX ADMIN — METOLAZONE 5 MG: 5 TABLET ORAL at 11:07

## 2017-07-20 RX ADMIN — FUROSEMIDE 80 MG: 10 INJECTION, SOLUTION INTRAVENOUS at 09:07

## 2017-07-20 RX ADMIN — APIXABAN 5 MG: 5 TABLET, FILM COATED ORAL at 08:07

## 2017-07-20 RX ADMIN — HYDRALAZINE HYDROCHLORIDE 50 MG: 50 TABLET ORAL at 09:07

## 2017-07-20 RX ADMIN — ISOSORBIDE DINITRATE 10 MG: 10 TABLET ORAL at 01:07

## 2017-07-20 NOTE — ASSESSMENT & PLAN NOTE
-patient's presentation is c/w CRS; continue diuresis as Cr has improved. Consider augmenting Lasix with Zaroxolyn if continued concern for suboptimal response.   -strict I/Os, avoid nephrotoxins  -if worsening of Cr, hold loop diuretics

## 2017-07-20 NOTE — PLAN OF CARE
Problem: Patient Care Overview  Goal: Plan of Care Review  Outcome: Ongoing (interventions implemented as appropriate)  VS and assessment performed per orders. Pain medication given as ordered, moderate relief. Britt care performed.  Plan of care reviewed with pt, all concerns addressed. Pt free from injury or any falls.

## 2017-07-20 NOTE — PT/OT/SLP PROGRESS
Physical Therapy      Michael SMALLWOOD Naomi  MRN: [id number]    Patient not seen today secondary to pt out of room during first attempt. Was unable to return for second attempt. Will follow-up at next scheduled visit per PT POC.    Felicitas George PTA

## 2017-07-20 NOTE — PLAN OF CARE
Pt consistently refuses recommendations for SNF or HH. Has adequate support at home.      07/20/17 1256   Discharge Reassessment   Assessment Type Discharge Planning Reassessment   Can the patient answer the patient profile reliably? Yes, cognitively intact   How does the patient rate their overall health at the present time? Good   Describe the patient's ability to walk at the present time. Does not walk or unable to take any steps at all   How often would a person be available to care for the patient? Often   Number of comorbid conditions (as recorded on the chart) Three   During the past month, has the patient often been bothered by feeling down, depressed or hopeless? Yes   During the past month, has the patient often been bothered by little interest or pleasure in doing things? Yes   Discharge plan remains the same: Yes   Provided patient/caregiver education on the expected discharge date and the discharge plan Yes   Discharge Plan A Home with family   Discharge Plan B Home with family;Home Health   Change in patient condition or support system No   Patient choice form signed by patient/caregiver N/A

## 2017-07-20 NOTE — PLAN OF CARE
Problem: Patient Care Overview  Goal: Plan of Care Review  Outcome: Revised  Plan of care discussed with patient. Patient is free of fall/trauma/injury. Denies CP, SOB, or pain/discomfort. All questions addressed. Lasix gtt infusing at prescribed rate.  Will continue to monitor

## 2017-07-20 NOTE — SUBJECTIVE & OBJECTIVE
Interval History:     Review of patient's allergies indicates:   Allergen Reactions    Daypro [oxaprozin] Itching     Tolerates other NSAIDs    Vibramycin [doxycycline calcium]      Current Facility-Administered Medications   Medication Frequency    0.9%  NaCl infusion (for blood administration) Q24H PRN    acetaminophen tablet 650 mg Q6H PRN    acetaminophen tablet 650 mg PRN    albuterol-ipratropium 2.5mg-0.5mg/3mL nebulizer solution 3 mL Q6H    apixaban tablet 5 mg BID    aspirin EC tablet 81 mg Daily    atorvastatin tablet 40 mg Daily    buPROPion TB24 tablet 150 mg Daily    butalbital-acetaminophen-caffeine -40 mg per tablet 1 tablet Q8H PRN    dextrose 50% injection 12.5 g PRN    dextrose 50% injection 25 g PRN    diphenhydrAMINE capsule 25 mg PRN    escitalopram oxalate tablet 5 mg Daily    furosemide (LASIX) 500 mg in sodium chloride 0.9% 100 mL infusion Continuous    gabapentin capsule 300 mg QHS    glucagon (human recombinant) injection 1 mg PRN    glucose chewable tablet 16 g PRN    glucose chewable tablet 24 g PRN    hydrALAZINE tablet 50 mg TID    insulin aspart pen 1-10 Units QID (AC + HS) PRN    isosorbide dinitrate tablet 10 mg TID    metoprolol tartrate (LOPRESSOR) tablet 50 mg BID    ondansetron disintegrating tablet 8 mg Q8H PRN    pantoprazole EC tablet 40 mg Daily    quetiapine tablet 100 mg QHS    sodium chloride 0.9% flush 3 mL Q8H    tuberculin injection 5 Units Once       Objective:     Vital Signs (Most Recent):  Temp: 97.7 °F (36.5 °C) (07/20/17 0416)  Pulse: 82 (07/20/17 0656)  Resp: 18 (07/20/17 0416)  BP: 118/64 (07/20/17 0535)  SpO2: 96 % (07/20/17 0416)  O2 Device (Oxygen Therapy): nasal cannula (07/20/17 0001) Vital Signs (24h Range):  Temp:  [97.7 °F (36.5 °C)-98.9 °F (37.2 °C)] 97.7 °F (36.5 °C)  Pulse:  [78-98] 82  Resp:  [16-22] 18  SpO2:  [93 %-99 %] 96 %  BP: ()/(52-64) 118/64     Weight: 72.1 kg (159 lb) (07/20/17 0400)  Body mass index  is 27.28 kg/m².  Body surface area is 1.8 meters squared.    I/O last 3 completed shifts:  In: 320 [P.O.:320]  Out: 500 [Urine:500]    Physical Exam   Constitutional: She is oriented to person, place, and time. She appears well-developed and well-nourished. No distress.   HENT:   Head: Normocephalic and atraumatic.   Nose: Nose normal.   Mouth/Throat: Oropharynx is clear and moist. No oropharyngeal exudate.   Eyes: EOM are normal. Pupils are equal, round, and reactive to light. No scleral icterus.   Neck: Neck supple. No JVD present. No thyromegaly present.   Cardiovascular: Normal rate, normal heart sounds and intact distal pulses.    No murmur heard.  Irregular rhythm   Pulmonary/Chest: She has no wheezes. She has rales.   Coarse BS and crackles bilaterally  Increased dyspnea when talking   Abdominal: Soft. Bowel sounds are normal. She exhibits no distension. There is no tenderness.   Musculoskeletal: She exhibits edema (1+ LE's). She exhibits no tenderness or deformity.   Lymphadenopathy:     She has no cervical adenopathy.   Neurological: She is alert and oriented to person, place, and time. No cranial nerve deficit.   Skin: Skin is warm and dry.   Psychiatric: She has a normal mood and affect. Her behavior is normal.       Significant Labs:  CBC:   Recent Labs  Lab 07/20/17  0427   WBC 4.93   RBC 2.63*   HGB 7.3*   HCT 24.5*      MCV 93   MCH 27.8   MCHC 29.8*     CMP:   Recent Labs  Lab 07/18/17  0441  07/20/17  0427   *  < > 180*   CALCIUM 8.7  < > 8.0*   ALBUMIN 1.7*  --   --    PROT 4.6*  --   --      < > 141   K 4.3  < > 4.2   CO2 28  < > 29     < > 104   BUN 51*  < > 55*   CREATININE 2.6*  < > 2.6*   ALKPHOS 43*  --   --    ALT 7*  --   --    AST 10  --   --    BILITOT 0.2  --   --    < > = values in this interval not displayed.    Recent Labs  Lab 07/19/17  1442   COLORU Yellow   SPECGRAV 1.010   PHUR 5.0   PROTEINUA 2+*   BACTERIA None   NITRITE Negative   LEUKOCYTESUR 3+*    UROBILINOGEN Negative   HYALINECASTS 0     All labs within the past 24 hours have been reviewed.     Significant Imaging:  Labs: Reviewed

## 2017-07-20 NOTE — PROGRESS NOTES
Cardiology Consult Progress Note  Attending Physician: Kira Ruiz MD  Hospital Day: 9    Subjective:   Interval History: during assessment, patient was not connected to lasix drip. Was noted that she has been off the drip for about 2 hours now waiting for a new bag. Britt was placed, with only 500cc of urine recorded, but not accurate since she has not been on the drip.    Medications:   Continuous Infusions:   furosemide (LASIX) 5 mg/mL infusion (non-titrating) 20 mg/hr (07/20/17 0859)       Scheduled Meds:   albuterol-ipratropium 2.5mg-0.5mg/3mL  3 mL Nebulization Q6H    apixaban  5 mg Oral BID    aspirin  81 mg Oral Daily    atorvastatin  40 mg Oral Daily    buPROPion  150 mg Oral Daily    escitalopram oxalate  5 mg Oral Daily    furosemide  80 mg Intravenous Once    gabapentin  300 mg Oral QHS    hydrALAZINE  50 mg Oral TID    isosorbide dinitrate  10 mg Oral TID    metoprolol tartrate  50 mg Oral BID    pantoprazole  40 mg Oral Daily    quetiapine  100 mg Oral QHS    sodium chloride 0.9%  3 mL Intravenous Q8H    tuberculin  5 Units Intradermal Once     PRN Meds:sodium chloride, acetaminophen, acetaminophen, butalbital-acetaminophen-caffeine -40 mg, dextrose 50%, dextrose 50%, diphenhydrAMINE, glucagon (human recombinant), glucose, glucose, insulin aspart, ondansetron  Objective:     Vitals:  Temp:  [97.6 °F (36.4 °C)-98.9 °F (37.2 °C)]   Pulse:  [78-98]   Resp:  [16-20]   BP: ()/(52-64)   SpO2:  [93 %-99 %]  on 2L N/C I/O's:    Intake/Output Summary (Last 24 hours) at 07/20/17 0923  Last data filed at 07/20/17 0500   Gross per 24 hour   Intake                0 ml   Output              500 ml   Net             -500 ml        Constitutional: NAD, conversant  HEENT: Sclera anicteric, PERRLA, EOMI  Neck: 10-12 cm JVD, no carotid bruits  CV: Irregularly irregular, no murmur, normal S1/S2  Pulm: Bibasilar crackles  GI: Abdomen soft, NTND, +BS  Extremities: 3+ LE edema, warm and  "well perfused  Skin: No ecchymosis, erythema, or ulcers  Psych: AOx3, appropriate affect  Neuro: CNII-XII intact, no focal deficits    Labs:       CBC: CMP:      Recent Labs  Lab 07/18/17  0441 07/19/17  0438 07/20/17  0427   WBC 4.22 4.53 4.93   HGB 7.5* 7.5* 7.3*   HCT 24.5* 25.1* 24.5*    240 214   MCV 92 92 93   RDW 16.1* 16.1* 16.0*      Recent Labs  Lab 07/17/17  0444 07/17/17  1540 07/18/17  0441 07/19/17  0438 07/20/17  0427    142 144 140 141   K 4.7 4.6 4.3 4.3 4.2    106 106 103 104   CO2 26 29 28 26 29   BUN 50* 52* 51* 51* 55*   CREATININE 2.5* 2.7* 2.6* 2.5* 2.6*   CALCIUM 7.9* 8.0* 8.7 8.2* 8.0*   PROT 4.9* 4.9* 4.6*  --   --    BILITOT 0.2 0.2 0.2  --   --    ALKPHOS 49* 52* 43*  --   --    ALT 7* 8* 7*  --   --    AST 12 9* 10  --   --    MG 2.0  --  1.8 2.1 2.1   PHOS 4.1  --  4.5 4.3 4.6*        Cholesterol: Coagulation   Lab Results   Component Value Date    CHOL 191 02/23/2017    HDL 25 (L) 02/23/2017    LDLCALC 130.6 02/23/2017    TRIG 177 (H) 02/23/2017      Recent Labs  Lab 07/15/17  0354   INR 1.1        Misc:     Recent Labs  Lab 07/13/17  1506   TROPONINI 0.037*      Lab Results   Component Value Date    HGBA1C 5.8 (H) 07/13/2017        Microbiology   Microbiology Results (last 7 days)     Procedure Component Value Units Date/Time    Urine culture [[id number]]     Order Status:  Completed Specimen:  Urine     Gram stain [[id number]]     Order Status:  Completed Specimen:  Urine            Imaging:     CXR (07/17/2017):   Sternal sutures are intact and aligned.  Abundant calcification noted at the level of the arch.    There is interstitial lung disease in perihilar distribution compatible with interstitial pulmonary edema similar to 7/14/2017.  Ovoid soft tissue opacity projected over the RIGHT lower lung zone at the level of the minor fissure suggests fluid retained in that fissure ("pseudotumor").  No other significant change compared to the earlier " examination.    Degenerative changes are present at the acromioclavicular joints, shoulders and in the spine.      2D Echo (06/23/2017):    1 - Mildly to moderately depressed left ventricular systolic function (EF 40-45%). Abnormal septal motion due to underlying LBBB. Significant drop in LV function, compared to the prior reported, Images could not be retrieved for comparison.     2 - Normal right ventricular systolic function .     3 - Pulmonary hypertension. The estimated PA systolic pressure is 41 mmHg.     4 - Mild tricuspid regurgitation.     5 - Small pericardial effusion.     6 - Intermediate central venous pressure.     7 - Biatrial enlargement.       Assessment:     71 y.o. 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 DO NOT LET THE DRIP RUN OUT! Once restarted, we will determine UOP over the following 2-3 hours, if not effective, will add diuril.  --continue hydralazine 50 TID and isordil 10 TID  --strict I&O's with fluid restriction of 1.5L  --daily weights  --patient's family is refusing stress test   --CPAP/BiPAP PRN for respiratory distress     #Chronic AFib  --patient is chronically in AFib at this time  --[id number] of 8 - patient requires anticoagulation. With her prior CVA, she must be on anticoagulation to prevent future strokes  --agree with discontinuation of effient.   --continue metoprolol 50 BID.     Thank you for the consult. We will continue to follow. Staff addendum to follow        Signed:  Fabiana Mendoza MD  Cardiology Fellow, PGY-5  7/20/2017 9:17 AM    ADDENDUM:  On assessment, patient has had roughly 100 cc/hr for  the prior 2 hours. Would recommend metolazone 5

## 2017-07-20 NOTE — PROGRESS NOTES
Barraza catheter removed per nursing protocol.   Patient tolerated well, and was educated on the importance of urinating within 6 hours post barraza removal.  Patient verbalized understanding.  Purewick applied for accurate output. Will continue to monitor.

## 2017-07-20 NOTE — PROGRESS NOTES
Nursing Transfer Note      7/20/2017     Transfer From: 1128     Transfer via stretcher    Transfer with cardiac monitoring    Transported by RN     Medicines sent: yes     Chart send with patient: Yes    Notified:  with patient     Patient reassessed at: 1215 07/20/2017 (date, time)    Upon arrival to floor: cardiac monitor applied, patient oriented to room, call bell in reach and bed in lowest position

## 2017-07-20 NOTE — PROGRESS NOTES
[facility]  Nephrology  Progress Note    Patient Name: Michael Salgado  MRN: [id number]  Admission Date: 7/12/2017  Hospital Length of Stay: 8 days  Attending Provider: Kira Ruiz MD   Primary Care Physician: Wesley Watkins MD  Principal Problem:Acute on chronic systolic congestive heart failure    Subjective:     HPI: Ms. Salgado is a 70yo female with history of CVA, CAD s/p CABG x 3, ICM, HFrEF, CKD, anemia in CKD and afib on Aspirin and Warfarin who presents with acute hypoxic respiratory failure. She just completed a course of antibiotics for pneumonia 2 weeks prior, and reports feeling lethargic with poor recovery of baseline functional status since. This admission, she is being treated for CHF exacerbation per Cardiology given elevated BNP, CXR with congestion. She has received IV Lasix per Cardiology recommendations and has continued improvement in Cr, most recently to 2.5 from 2.7 2 days prior, and her presentation is consistent with cardiorenal syndrome. I/Os note ~500 cc initial response to Lasix and Nephrology was consulted for CKDIV renal disease on loop diuretics with possible suboptimal response in urine output.          Interval History:  Patient is still dyspneic sitting up in bed, talking but reports good subjective UOP on Lasix gtt with improvement in pulmonary symptoms. Complains of RLE > LLE swelling today.      Review of patient's allergies indicates:   Allergen Reactions    Daypro [oxaprozin] Itching     Tolerates other NSAIDs    Vibramycin [doxycycline calcium]      Current Facility-Administered Medications   Medication Frequency    0.9%  NaCl infusion (for blood administration) Q24H PRN    acetaminophen tablet 650 mg Q6H PRN    acetaminophen tablet 650 mg PRN    albuterol-ipratropium 2.5mg-0.5mg/3mL nebulizer solution 3 mL Q6H    apixaban tablet 5 mg BID    aspirin EC tablet 81 mg Daily    atorvastatin tablet 40 mg Daily    buPROPion TB24 tablet 150  mg Daily    butalbital-acetaminophen-caffeine -40 mg per tablet 1 tablet Q8H PRN    dextrose 50% injection 12.5 g PRN    dextrose 50% injection 25 g PRN    diphenhydrAMINE capsule 25 mg PRN    escitalopram oxalate tablet 5 mg Daily    furosemide (LASIX) 500 mg in sodium chloride 0.9% 100 mL infusion Continuous    gabapentin capsule 300 mg QHS    glucagon (human recombinant) injection 1 mg PRN    glucose chewable tablet 16 g PRN    glucose chewable tablet 24 g PRN    hydrALAZINE tablet 50 mg TID    insulin aspart pen 1-10 Units QID (AC + HS) PRN    isosorbide dinitrate tablet 10 mg TID    metoprolol tartrate (LOPRESSOR) tablet 50 mg BID    ondansetron disintegrating tablet 8 mg Q8H PRN    pantoprazole EC tablet 40 mg Daily    quetiapine tablet 100 mg QHS    sodium chloride 0.9% flush 3 mL Q8H    tuberculin injection 5 Units Once       Objective:     Vital Signs (Most Recent):  Temp: 97.7 °F (36.5 °C) (07/20/17 0416)  Pulse: 82 (07/20/17 0656)  Resp: 18 (07/20/17 0416)  BP: 118/64 (07/20/17 0535)  SpO2: 96 % (07/20/17 0416)  O2 Device (Oxygen Therapy): nasal cannula (07/20/17 0001) Vital Signs (24h Range):  Temp:  [97.7 °F (36.5 °C)-98.9 °F (37.2 °C)] 97.7 °F (36.5 °C)  Pulse:  [78-98] 82  Resp:  [16-22] 18  SpO2:  [93 %-99 %] 96 %  BP: ()/(52-64) 118/64     Weight: 72.1 kg (159 lb) (07/20/17 0400)  Body mass index is 27.28 kg/m².  Body surface area is 1.8 meters squared.    I/O last 3 completed shifts:  In: 320 [P.O.:320]  Out: 500 [Urine:500]    Physical Exam   Constitutional: She is oriented to person, place, and time. She appears well-developed and well-nourished. No distress.   HENT:   Head: Normocephalic and atraumatic.   Nose: Nose normal.   Mouth/Throat: Oropharynx is clear and moist. No oropharyngeal exudate.   Eyes: EOM are normal. Pupils are equal, round, and reactive to light. No scleral icterus.   Neck: Neck supple. No JVD present. No thyromegaly present.   Cardiovascular:  Normal rate, normal heart sounds and intact distal pulses.    No murmur heard.  Irregular rhythm   Pulmonary/Chest: She has no wheezes. She has rales.   Coarse BS and crackles bilaterally  Increased dyspnea when talking   Abdominal: Soft. Bowel sounds are normal. She exhibits no distension. There is no tenderness.   Musculoskeletal: She exhibits edema (1+ LE's). She exhibits no tenderness or deformity.   Lymphadenopathy:     She has no cervical adenopathy.   Neurological: She is alert and oriented to person, place, and time. No cranial nerve deficit.   Skin: Skin is warm and dry.   Psychiatric: She has a normal mood and affect. Her behavior is normal.       Significant Labs:  CBC:   Recent Labs  Lab 07/20/17  0427   WBC 4.93   RBC 2.63*   HGB 7.3*   HCT 24.5*      MCV 93   MCH 27.8   MCHC 29.8*     CMP:   Recent Labs  Lab 07/18/17  0441  07/20/17  0427   *  < > 180*   CALCIUM 8.7  < > 8.0*   ALBUMIN 1.7*  --   --    PROT 4.6*  --   --      < > 141   K 4.3  < > 4.2   CO2 28  < > 29     < > 104   BUN 51*  < > 55*   CREATININE 2.6*  < > 2.6*   ALKPHOS 43*  --   --    ALT 7*  --   --    AST 10  --   --    BILITOT 0.2  --   --    < > = values in this interval not displayed.    Recent Labs  Lab 07/19/17  1442   COLORU Yellow   SPECGRAV 1.010   PHUR 5.0   PROTEINUA 2+*   BACTERIA None   NITRITE Negative   LEUKOCYTESUR 3+*   UROBILINOGEN Negative   HYALINECASTS 0     All labs within the past 24 hours have been reviewed.     Significant Imaging:  Labs: Reviewed    UA with 2+ protein, 1+ glucose, 3+ LE, WBCs on microscopy  Assessment/Plan:     Chronic kidney disease (CKD) stage G4/A1, severely decreased glomerular filtration rate (GFR) between 15-29 mL/min/1.73 square meter and albuminuria creatinine ratio less than 30 mg/g    creatinine ratio less than 30 mg/g     -patient presents with Nephrotic range proteinuria- will obtain P/Cr and urine protein, urine sediment w/ microscopy. Etiology likely  long-term DM/ HTN, sodium studies while on diuretics  -patient could be in CRS; continue diuresis as Cr has improved. Consider augmenting Lasix with Zaroxolyn if continued concern for suboptimal response and recommend Lasix 80mg BID preferred to continuous.  -strict I/Os, avoid nephrotoxins  -if worsening of Cr, hold loop diuretics.      --will follow results of DM nephropathy workup. Last A1C on file was ~6%              Thank you for your consult. I will follow-up with patient. Please contact us if you have any additional questions.    Bulmaro Worley MD  Nephrology  [facility]

## 2017-07-21 LAB
ALBUMIN SERPL BCP-MCNC: 2 G/DL
ALP SERPL-CCNC: 55 U/L
ALT SERPL W/O P-5'-P-CCNC: 6 U/L
ANION GAP SERPL CALC-SCNC: 11 MMOL/L
ANION GAP SERPL CALC-SCNC: 7 MMOL/L
AST SERPL-CCNC: 18 U/L
BACTERIA UR CULT: NO GROWTH
BASOPHILS # BLD AUTO: 0.03 K/UL
BASOPHILS NFR BLD: 0.5 %
BILIRUB SERPL-MCNC: 0.2 MG/DL
BUN SERPL-MCNC: 58 MG/DL
BUN SERPL-MCNC: 58 MG/DL
BUN SERPL-MCNC: 60 MG/DL
BUN SERPL-MCNC: 60 MG/DL
C3 SERPL-MCNC: 121 MG/DL
C4 SERPL-MCNC: 41 MG/DL
CALCIUM SERPL-MCNC: 8.4 MG/DL
CALCIUM SERPL-MCNC: 8.6 MG/DL
CHLORIDE SERPL-SCNC: 102 MMOL/L
CHLORIDE SERPL-SCNC: 99 MMOL/L
CO2 SERPL-SCNC: 28 MMOL/L
CO2 SERPL-SCNC: 28 MMOL/L
CO2 SERPL-SCNC: 30 MMOL/L
CO2 SERPL-SCNC: 32 MMOL/L
CREAT SERPL-MCNC: 2.3 MG/DL
CREAT SERPL-MCNC: 2.4 MG/DL
CREAT SERPL-MCNC: 2.4 MG/DL
CREAT SERPL-MCNC: 2.5 MG/DL
DIFFERENTIAL METHOD: ABNORMAL
EOSINOPHIL # BLD AUTO: 0.2 K/UL
EOSINOPHIL NFR BLD: 2.7 %
ERYTHROCYTE [DISTWIDTH] IN BLOOD BY AUTOMATED COUNT: 15.8 %
EST. GFR  (AFRICAN AMERICAN): 21.3 ML/MIN/1.73 M^2
EST. GFR  (AFRICAN AMERICAN): 22.4 ML/MIN/1.73 M^2
EST. GFR  (AFRICAN AMERICAN): 22.4 ML/MIN/1.73 M^2
EST. GFR  (AFRICAN AMERICAN): 23.6 ML/MIN/1.73 M^2
EST. GFR  (NON AFRICAN AMERICAN): 18.5 ML/MIN/1.73 M^2
EST. GFR  (NON AFRICAN AMERICAN): 19.4 ML/MIN/1.73 M^2
EST. GFR  (NON AFRICAN AMERICAN): 19.4 ML/MIN/1.73 M^2
EST. GFR  (NON AFRICAN AMERICAN): 20.5 ML/MIN/1.73 M^2
GLUCOSE SERPL-MCNC: 147 MG/DL
GLUCOSE SERPL-MCNC: 191 MG/DL
GLUCOSE SERPL-MCNC: 211 MG/DL
GLUCOSE SERPL-MCNC: 211 MG/DL
HAV IGM SERPL QL IA: NEGATIVE
HBV CORE IGM SERPL QL IA: NEGATIVE
HBV SURFACE AG SERPL QL IA: NEGATIVE
HCT VFR BLD AUTO: 27.1 %
HCV AB SERPL QL IA: NEGATIVE
HGB BLD-MCNC: 7.9 G/DL
HIV 1+2 AB+HIV1 P24 AG SERPL QL IA: NEGATIVE
LYMPHOCYTES # BLD AUTO: 1.2 K/UL
LYMPHOCYTES NFR BLD: 20.8 %
MAGNESIUM SERPL-MCNC: 1.9 MG/DL
MAGNESIUM SERPL-MCNC: 2 MG/DL
MCH RBC QN AUTO: 27.2 PG
MCHC RBC AUTO-ENTMCNC: 29.2 G/DL
MCV RBC AUTO: 93 FL
MONOCYTES # BLD AUTO: 0.6 K/UL
MONOCYTES NFR BLD: 10.6 %
NEUTROPHILS # BLD AUTO: 3.8 K/UL
NEUTROPHILS NFR BLD: 64.7 %
PHOSPHATE SERPL-MCNC: 4.6 MG/DL
PLATELET # BLD AUTO: 238 K/UL
PMV BLD AUTO: 9.3 FL
POCT GLUCOSE: 175 MG/DL (ref 70–110)
POCT GLUCOSE: 216 MG/DL (ref 70–110)
POCT GLUCOSE: 226 MG/DL (ref 70–110)
POCT GLUCOSE: 282 MG/DL (ref 70–110)
POTASSIUM SERPL-SCNC: 4 MMOL/L
POTASSIUM SERPL-SCNC: 4 MMOL/L
POTASSIUM SERPL-SCNC: 4.4 MMOL/L
POTASSIUM SERPL-SCNC: 4.4 MMOL/L
PROT SERPL-MCNC: 5.5 G/DL
RBC # BLD AUTO: 2.9 M/UL
SODIUM SERPL-SCNC: 140 MMOL/L
SODIUM SERPL-SCNC: 141 MMOL/L
WBC # BLD AUTO: 5.83 K/UL

## 2017-07-21 PROCEDURE — 25000003 PHARM REV CODE 250: Performed by: HOSPITALIST

## 2017-07-21 PROCEDURE — 97535 SELF CARE MNGMENT TRAINING: CPT

## 2017-07-21 PROCEDURE — 84165 PROTEIN E-PHORESIS SERUM: CPT

## 2017-07-21 PROCEDURE — 25000003 PHARM REV CODE 250: Performed by: STUDENT IN AN ORGANIZED HEALTH CARE EDUCATION/TRAINING PROGRAM

## 2017-07-21 PROCEDURE — 20600001 HC STEP DOWN PRIVATE ROOM

## 2017-07-21 PROCEDURE — 80053 COMPREHEN METABOLIC PANEL: CPT

## 2017-07-21 PROCEDURE — 86703 HIV-1/HIV-2 1 RESULT ANTBDY: CPT

## 2017-07-21 PROCEDURE — 84100 ASSAY OF PHOSPHORUS: CPT

## 2017-07-21 PROCEDURE — 86334 IMMUNOFIX E-PHORESIS SERUM: CPT | Mod: 26,,, | Performed by: PATHOLOGY

## 2017-07-21 PROCEDURE — 94640 AIRWAY INHALATION TREATMENT: CPT

## 2017-07-21 PROCEDURE — 63600175 PHARM REV CODE 636 W HCPCS: Performed by: HOSPITALIST

## 2017-07-21 PROCEDURE — 83735 ASSAY OF MAGNESIUM: CPT | Mod: 91

## 2017-07-21 PROCEDURE — 25000242 PHARM REV CODE 250 ALT 637 W/ HCPCS: Performed by: STUDENT IN AN ORGANIZED HEALTH CARE EDUCATION/TRAINING PROGRAM

## 2017-07-21 PROCEDURE — 86160 COMPLEMENT ANTIGEN: CPT

## 2017-07-21 PROCEDURE — 80048 BASIC METABOLIC PNL TOTAL CA: CPT | Mod: 91

## 2017-07-21 PROCEDURE — 83735 ASSAY OF MAGNESIUM: CPT

## 2017-07-21 PROCEDURE — 97530 THERAPEUTIC ACTIVITIES: CPT

## 2017-07-21 PROCEDURE — 86592 SYPHILIS TEST NON-TREP QUAL: CPT

## 2017-07-21 PROCEDURE — 99233 SBSQ HOSP IP/OBS HIGH 50: CPT | Mod: GC,,, | Performed by: HOSPITALIST

## 2017-07-21 PROCEDURE — 86334 IMMUNOFIX E-PHORESIS SERUM: CPT

## 2017-07-21 PROCEDURE — 86160 COMPLEMENT ANTIGEN: CPT | Mod: 59

## 2017-07-21 PROCEDURE — 36415 COLL VENOUS BLD VENIPUNCTURE: CPT

## 2017-07-21 PROCEDURE — 86225 DNA ANTIBODY NATIVE: CPT

## 2017-07-21 PROCEDURE — 84165 PROTEIN E-PHORESIS SERUM: CPT | Mod: 26,,, | Performed by: PATHOLOGY

## 2017-07-21 PROCEDURE — 99232 SBSQ HOSP IP/OBS MODERATE 35: CPT | Mod: ,,, | Performed by: INTERNAL MEDICINE

## 2017-07-21 PROCEDURE — 27000221 HC OXYGEN, UP TO 24 HOURS

## 2017-07-21 PROCEDURE — 85025 COMPLETE CBC W/AUTO DIFF WBC: CPT

## 2017-07-21 PROCEDURE — 80074 ACUTE HEPATITIS PANEL: CPT

## 2017-07-21 PROCEDURE — 86038 ANTINUCLEAR ANTIBODIES: CPT

## 2017-07-21 PROCEDURE — 80048 BASIC METABOLIC PNL TOTAL CA: CPT

## 2017-07-21 RX ADMIN — METOPROLOL TARTRATE 50 MG: 50 TABLET, FILM COATED ORAL at 08:07

## 2017-07-21 RX ADMIN — IPRATROPIUM BROMIDE AND ALBUTEROL SULFATE 3 ML: .5; 3 SOLUTION RESPIRATORY (INHALATION) at 12:07

## 2017-07-21 RX ADMIN — FUROSEMIDE 20 MG/HR: 10 INJECTION, SOLUTION INTRAMUSCULAR; INTRAVENOUS at 07:07

## 2017-07-21 RX ADMIN — HYDRALAZINE HYDROCHLORIDE 50 MG: 50 TABLET ORAL at 01:07

## 2017-07-21 RX ADMIN — HYDRALAZINE HYDROCHLORIDE 50 MG: 50 TABLET ORAL at 05:07

## 2017-07-21 RX ADMIN — ASPIRIN 81 MG: 81 TABLET, COATED ORAL at 08:07

## 2017-07-21 RX ADMIN — METOPROLOL TARTRATE 50 MG: 50 TABLET, FILM COATED ORAL at 09:07

## 2017-07-21 RX ADMIN — Medication 3 ML: at 05:07

## 2017-07-21 RX ADMIN — BUPROPION HYDROCHLORIDE 150 MG: 150 TABLET, FILM COATED, EXTENDED RELEASE ORAL at 08:07

## 2017-07-21 RX ADMIN — ISOSORBIDE DINITRATE 10 MG: 10 TABLET ORAL at 01:07

## 2017-07-21 RX ADMIN — APIXABAN 5 MG: 5 TABLET, FILM COATED ORAL at 08:07

## 2017-07-21 RX ADMIN — IPRATROPIUM BROMIDE AND ALBUTEROL SULFATE 3 ML: .5; 3 SOLUTION RESPIRATORY (INHALATION) at 11:07

## 2017-07-21 RX ADMIN — GABAPENTIN 300 MG: 300 CAPSULE ORAL at 09:07

## 2017-07-21 RX ADMIN — APIXABAN 5 MG: 5 TABLET, FILM COATED ORAL at 09:07

## 2017-07-21 RX ADMIN — Medication 3 ML: at 10:07

## 2017-07-21 RX ADMIN — INSULIN ASPART 2 UNITS: 100 INJECTION, SOLUTION INTRAVENOUS; SUBCUTANEOUS at 09:07

## 2017-07-21 RX ADMIN — IPRATROPIUM BROMIDE AND ALBUTEROL SULFATE 3 ML: .5; 3 SOLUTION RESPIRATORY (INHALATION) at 08:07

## 2017-07-21 RX ADMIN — PANTOPRAZOLE SODIUM 40 MG: 40 TABLET, DELAYED RELEASE ORAL at 08:07

## 2017-07-21 RX ADMIN — INSULIN ASPART 4 UNITS: 100 INJECTION, SOLUTION INTRAVENOUS; SUBCUTANEOUS at 06:07

## 2017-07-21 RX ADMIN — QUETIAPINE FUMARATE 100 MG: 100 TABLET, FILM COATED ORAL at 09:07

## 2017-07-21 RX ADMIN — ISOSORBIDE DINITRATE 10 MG: 10 TABLET ORAL at 09:07

## 2017-07-21 RX ADMIN — INSULIN ASPART 2 UNITS: 100 INJECTION, SOLUTION INTRAVENOUS; SUBCUTANEOUS at 08:07

## 2017-07-21 RX ADMIN — ISOSORBIDE DINITRATE 10 MG: 10 TABLET ORAL at 05:07

## 2017-07-21 RX ADMIN — ESCITALOPRAM 5 MG: 5 TABLET, FILM COATED ORAL at 08:07

## 2017-07-21 RX ADMIN — ATORVASTATIN CALCIUM 40 MG: 20 TABLET, FILM COATED ORAL at 08:07

## 2017-07-21 RX ADMIN — HYDRALAZINE HYDROCHLORIDE 50 MG: 50 TABLET ORAL at 09:07

## 2017-07-21 RX ADMIN — INSULIN ASPART 6 UNITS: 100 INJECTION, SOLUTION INTRAVENOUS; SUBCUTANEOUS at 01:07

## 2017-07-21 NOTE — PLAN OF CARE
Clinical updates sent to [facility] for SNF placement for a possible Monday or Tuesday discharge.     SW will continue to follow.

## 2017-07-21 NOTE — ASSESSMENT & PLAN NOTE
-daughter reports pt takes LA insulin 10 units qhs and SA insulin 5 units WM  -A1c 5.7 7/20  -Discontinued basal-bolus following morning glucose of 40  -Continue LD SSI QID

## 2017-07-21 NOTE — PT/OT/SLP PROGRESS
"Occupational Therapy  Treatment    Michael Salgado   MRN: [id number]   Admitting Diagnosis: Acute on chronic systolic congestive heart failure    OT Date of Treatment: 07/21/17   OT Start Time: 1108  OT Stop Time: 1137  OT Total Time (min): 29 min    Billable Minutes:  Self Care/Home Management 15 and Therapeutic Activity 14    General Precautions: Standard, fall  Orthopedic Precautions: N/A  Braces: N/A    Subjective:  Communicated with RN prior to session.  "I'm cold!"    Pain/Comfort  Pain Rating 1:  (Did not rate)  Location - Orientation 1: lower  Location 1: back    Objective:  Patient found with: telemetry, oxygen, peripheral IV (PureWick)     Functional Mobility:  Bed Mobility:  Rolling/Turning Right: Maximum assistance, With side rail  Scooting/Bridging: Maximum Assistance to EOB  Supine to Sit: Maximum Assistance and increased time/cues    Transfers:  Sit <> Stand Assistance: Maximum Assistance x 1 trial from EOB  Sit <> Stand Assistive Device: No Assistive Device  Bed <> Chair Technique: Stand Pivot  Bed <> Chair Transfer Assistance: Total Assistance (Max x 2) with R knee block  Bed <> Chair Assistive Device: No Assistive Device    Functional Ambulation: Unable    Activities of Daily Living:  LE Dressing Level of Assistance: Total assistance to don socks  Grooming Position: Seated, bedside chair  Grooming Level of Assistance: Moderate assistance; pt with soiled Pure Wick; removed and required assist to wipe B legs and hands in bedside chair    Balance:   Static Sit: FAIR-: Maintains without assist but inconsistent   Dynamic Sit: POOR: N/A  Static Stand: 0: Needs MAXIMAL assist to maintain   Dynamic stand: 0: N/A    Therapeutic Activities and Exercises:  Pt supine with HOB elevated, labored breathing at rest and difficulty speaking due to SOB; educated on pursed lip breathing technique to which pt responded "I can't" and continued to mouth breathe; required Max A for supine to sit; able to sit EOB ~8min " "with CGA; required Max A x 2 for stand pivot to bedside chair; assisted with cleaning soiled areas due to Pure Wick; positioned with R UE elevated on pillow    AM-PAC 6 CLICK ADL   How much help from another person does this patient currently need?   1 = Unable, Total/Dependent Assistance  2 = A lot, Maximum/Moderate Assistance  3 = A little, Minimum/Contact Guard/Supervision  4 = None, Modified [place]/Independent    Putting on and taking off regular lower body clothing? : 1  Bathing (including washing, rinsing, drying)?: 2  Toileting, which includes using toilet, bedpan, or urinal? : 2  Putting on and taking off regular upper body clothing?: 2  Taking care of personal grooming such as brushing teeth?: 3  Eating meals?: 3  Total Score: 13     AM-PAC Raw Score CMS "G-Code Modifier Level of Impairment Assistance   6 % Total / Unable   7 - 8 CM 80 - 100% Maximal Assist   9-13 CL 60 - 80% Moderate Assist   14 - 19 CK 40 - 60% Moderate Assist   20 - 22 CJ 20 - 40% Minimal Assist   23 CI 1-20% SBA / CGA   24 CH 0% Independent/ Mod I       Patient left up in chair with all lines intact, call button in reach and  present    ASSESSMENT:  Michael Salgado is a 71 y.o. female with a medical diagnosis of Acute on chronic systolic congestive heart failure and presents with deficits listed below. Pt limited by R hemiparesis and SOB at rest and with activity. Required Max A for bed mobility and Total A for T/F to bedside chair. At baseline, pt requires assist but less assist than today. Pt would continue to benefit from OT to increase independence. Recommend SNF upon D/C.    Rehab identified problem list/impairments: Rehab identified problem list/impairments: weakness, impaired endurance, impaired self care skills, impaired functional mobilty, impaired balance, decreased upper extremity function, decreased lower extremity function, decreased ROM, impaired cardiopulmonary response to activity    Rehab " potential is fair.    Activity tolerance: Fair    Discharge recommendations: Discharge Facility/Level Of Care Needs: nursing facility, skilled     Barriers to discharge: Barriers to Discharge: None    Equipment recommendations: none     GOALS:    Occupational Therapy Goals        Problem: Occupational Therapy Goal    Goal Priority Disciplines Outcome Interventions   Occupational Therapy Goal     OT, PT/OT Ongoing (interventions implemented as appropriate)    Description:  Goals to be met by: 07/30     Patient will increase functional independence with ADLs by performing:    UE Dressing with Stand-by Assistance.  LE Dressing with Moderate Assistance and Assistive Devices as needed.  Grooming while seated with Stand-by Assistance.  Toileting from bedside commode with Moderate Assistance and Assistive Devices as needed for hygiene and clothing management.   Stand pivot transfers with Contact Guard Assistance.  Toilet transfer to bedside commode with Contact Guard Assistance.                      Plan:  Patient to be seen 4 x/week to address the above listed problems via self-care/home management, therapeutic activities, therapeutic exercises, neuromuscular re-education  Plan of Care reviewed with: patient, spouse         LURDES Lizama  07/21/2017

## 2017-07-21 NOTE — PROGRESS NOTES
Pt complaining of her right eye itching and blurry vision.  On assessment, no signs of any foreign bodies observed.  Notified Dr. Iverson.  No new orders received at this time.  Will continue to monitor.

## 2017-07-21 NOTE — ASSESSMENT & PLAN NOTE
-pt presents from cardiology clinic with SOB, MILIAN, LE edema   -on home O2 2.5 L  -patient with increase lasix requirement since shingles and PNA in May (got abx as an out pt)  -most recent echo 6/2017 showed EF 40-45, pulm HTN  -On admit (7/12), CXR shows worsening pulmonary edema, BMP 1413 (above baseline), tn 0.04, EKG shows afib with RVR without acute ischemic changes  - CXR 7/14 shows interval improvement from 7/12  - on hydralazine 50 TID, isosorbide dinitirate 10 TID, and lopressor 50 BID  - Lasix 20 mg/hr gtt with Metolazone 5 mg x1  - Pts family refusing stress test  - cardiology following, appreciate recs

## 2017-07-21 NOTE — SUBJECTIVE & OBJECTIVE
Interval History: Patient improved back on Lasix gtt. Can speak in multiple words with some sentences. Would like her Britt catheter removed.    Review of Systems   Constitutional: Negative for chills and fever.   HENT: Negative for congestion and rhinorrhea.    Eyes: Negative for pain and visual disturbance.   Respiratory: Positive for shortness of breath. Negative for cough.    Cardiovascular: Positive for leg swelling. Negative for chest pain and palpitations.   Gastrointestinal: Negative for abdominal distention, abdominal pain, blood in stool, constipation, diarrhea, nausea and vomiting.   Genitourinary: Negative for difficulty urinating, dysuria, frequency and hematuria.   Skin: Negative for rash and wound.   Neurological: Negative for syncope, light-headedness and headaches.   Psychiatric/Behavioral: Negative for agitation and behavioral problems.     Objective:     Vital Signs (Most Recent):  Temp: 97.6 °F (36.4 °C) (07/20/17 1700)  Pulse: 94 (07/20/17 1800)  Resp: 18 (07/20/17 1700)  BP: 131/63 (07/20/17 1700)  SpO2: 98 % (07/20/17 1700) Vital Signs (24h Range):  Temp:  [97.6 °F (36.4 °C)-98.6 °F (37 °C)] 97.6 °F (36.4 °C)  Pulse:  [] 94  Resp:  [16-20] 18  SpO2:  [95 %-99 %] 98 %  BP: ()/(52-71) 131/63     Weight: 72.1 kg (159 lb)  Body mass index is 27.28 kg/m².    Intake/Output Summary (Last 24 hours) at 07/20/17 1934  Last data filed at 07/20/17 1700   Gross per 24 hour   Intake              400 ml   Output             1700 ml   Net            -1300 ml      Physical Exam   Constitutional: She is oriented to person, place, and time. She appears well-developed and well-nourished. No distress.   HENT:   Head: Normocephalic and atraumatic.   Nose: Nose normal.   Mouth/Throat: Oropharynx is clear and moist. No oropharyngeal exudate.   Eyes: EOM are normal. Pupils are equal, round, and reactive to light. No scleral icterus.   Neck: Neck supple. No JVD present. No thyromegaly present.    Cardiovascular: Normal rate, normal heart sounds and intact distal pulses.    No murmur heard.  Irregular rhythm   Pulmonary/Chest: Effort normal. No respiratory distress. She has no wheezes. She has rales.   Coarse BS and crackles bilaterally   Abdominal: Soft. Bowel sounds are normal. She exhibits no distension. There is no tenderness.   Musculoskeletal: She exhibits edema (1+ LE's). She exhibits no tenderness or deformity.   Lymphadenopathy:     She has no cervical adenopathy.   Neurological: She is alert and oriented to person, place, and time. No cranial nerve deficit.   Skin: Skin is warm and dry.   Right forehead hyperpigmentation, does not cross midline, from resolving shingles rash   Psychiatric: She has a normal mood and affect. Her behavior is normal.       Significant Labs:   BMP:   Recent Labs  Lab 07/20/17  0427   *      K 4.2      CO2 29   BUN 55*   CREATININE 2.6*   CALCIUM 8.0*   MG 2.1     CBC:   Recent Labs  Lab 07/19/17  0438 07/20/17  0427   WBC 4.53 4.93   HGB 7.5* 7.3*   HCT 25.1* 24.5*    214       Significant Imaging: I have reviewed all pertinent imaging results/findings within the past 24 hours.

## 2017-07-21 NOTE — ASSESSMENT & PLAN NOTE
-pt presents from cardiology clinic with SOB, MILIAN, LE edema   -on home O2 2.5 L  -patient with increase lasix requirement since shingles and PNA in May (got abx as an out pt)  -most recent echo 6/2017 showed EF 40-45, pulm HTN  -On admit (7/12), CXR shows worsening pulmonary edema, BMP 1413 (above baseline), tn 0.04, EKG shows afib with RVR without acute ischemic changes  - CXR 7/14 shows interval improvement from 7/12  - on hydralazine 50 TID, isosorbide dinitirate 10 TID, and Lopressor 50 BID  - Lasix 20 mg/hr gtt with Metolazone 5 mg x1. Consider Lasix IV push, per nephrology  - cardiology following, appreciate recs

## 2017-07-21 NOTE — SUBJECTIVE & OBJECTIVE
Interval History: Patient moved to Cardiology floor; no acute events overnight. Recorded UOP with Balwinder 2L out.     Review of patient's allergies indicates:   Allergen Reactions    Daypro [oxaprozin] Itching     Tolerates other NSAIDs    Vibramycin [doxycycline calcium]      Current Facility-Administered Medications   Medication Frequency    0.9%  NaCl infusion (for blood administration) Q24H PRN    acetaminophen tablet 650 mg Q6H PRN    acetaminophen tablet 650 mg PRN    albuterol-ipratropium 2.5mg-0.5mg/3mL nebulizer solution 3 mL Q6H    apixaban tablet 5 mg BID    aspirin EC tablet 81 mg Daily    atorvastatin tablet 40 mg Daily    buPROPion TB24 tablet 150 mg Daily    butalbital-acetaminophen-caffeine -40 mg per tablet 1 tablet Q8H PRN    dextrose 50% injection 12.5 g PRN    dextrose 50% injection 25 g PRN    diphenhydrAMINE capsule 25 mg PRN    escitalopram oxalate tablet 5 mg Daily    furosemide (LASIX) 500 mg in sodium chloride 0.9% 100 mL infusion Continuous    gabapentin capsule 300 mg QHS    glucagon (human recombinant) injection 1 mg PRN    glucose chewable tablet 16 g PRN    glucose chewable tablet 24 g PRN    hydrALAZINE tablet 50 mg TID    insulin aspart pen 1-10 Units QID (AC + HS) PRN    isosorbide dinitrate tablet 10 mg TID    metoprolol tartrate (LOPRESSOR) tablet 50 mg BID    ondansetron disintegrating tablet 8 mg Q8H PRN    pantoprazole EC tablet 40 mg Daily    quetiapine tablet 100 mg QHS    sodium chloride 0.9% flush 3 mL Q8H       Objective:     Vital Signs (Most Recent):  Temp: 97.2 °F (36.2 °C) (07/21/17 0512)  Pulse: 87 (07/21/17 0700)  Resp: 18 (07/21/17 0512)  BP: 135/69 (07/21/17 0512)  SpO2: 98 % (07/21/17 0512)  O2 Device (Oxygen Therapy): nasal cannula (07/21/17 0028) Vital Signs (24h Range):  Temp:  [97 °F (36.1 °C)-98 °F (36.7 °C)] 97.2 °F (36.2 °C)  Pulse:  [] 87  Resp:  [16-20] 18  SpO2:  [95 %-98 %] 98 %  BP: (100-137)/(56-71) 135/69      Weight: 69.3 kg (152 lb 12.8 oz) (07/21/17 0513)  Body mass index is 26.21 kg/m².  Body surface area is 1.77 meters squared.    I/O last 3 completed shifts:  In: 630 [P.O.:630]  Out: 2500 [Urine:2500]    Physical Exam   Constitutional: She is oriented to person, place, and time. She appears well-developed and well-nourished. No distress.   HENT:   Head: Normocephalic and atraumatic.   Nose: Nose normal.   Mouth/Throat: Oropharynx is clear and moist. No oropharyngeal exudate.   Eyes: EOM are normal. Pupils are equal, round, and reactive to light. No scleral icterus.   Neck: Neck supple. No JVD present. No thyromegaly present.   Cardiovascular: Normal rate, normal heart sounds and intact distal pulses.    No murmur heard.  Irregular rhythm   Pulmonary/Chest: She has no wheezes. She has rales.   Coarse BS and crackles bilaterally  Increased dyspnea when talking   Abdominal: Soft. Bowel sounds are normal. She exhibits no distension. There is no tenderness.   Musculoskeletal: She exhibits edema (1+ LE's). She exhibits no tenderness or deformity.   Lymphadenopathy:     She has no cervical adenopathy.   Neurological: She is alert and oriented to person, place, and time. No cranial nerve deficit.   Skin: Skin is warm and dry.   Psychiatric: She has a normal mood and affect. Her behavior is normal.       Significant Labs:  CBC:     Recent Labs  Lab 07/21/17  0351   WBC 5.83   RBC 2.90*   HGB 7.9*   HCT 27.1*      MCV 93   MCH 27.2   MCHC 29.2*     CMP:   Recent Labs  Lab 07/18/17  0441  07/21/17  0351   *  < > 147*   CALCIUM 8.7  < > 8.4*   ALBUMIN 1.7*  --   --    PROT 4.6*  --   --      < > 141   K 4.3  < > 4.0   CO2 28  < > 32*     < > 102   BUN 51*  < > 58*   CREATININE 2.6*  < > 2.5*   ALKPHOS 43*  --   --    ALT 7*  --   --    AST 10  --   --    BILITOT 0.2  --   --    < > = values in this interval not displayed.    Recent Labs  Lab 07/19/17  1442   COLORU Yellow   SPECGRAV 1.010   PHUR  5.0   PROTEINUA 2+*   BACTERIA None   NITRITE Negative   LEUKOCYTESUR 3+*   UROBILINOGEN Negative   HYALINECASTS 0     All labs within the past 24 hours have been reviewed.     Significant Imaging:  Labs: Reviewed  Cr improving

## 2017-07-21 NOTE — PROGRESS NOTES
[facility] Hospital Medicine  Progress Note    Patient Name: Michael Salgado  MRN: [id number]  Patient Class: IP- Inpatient   Admission Date: 7/12/2017  Length of Stay: 9 days  Attending Physician: Kira Ruiz MD  Primary Care Provider: Wesley Watkins MD    [facility] Medicine Team: [facility] 1 Deuce Sexton MD    Subjective:     Principal Problem:Acute on chronic systolic congestive heart failure    HPI:  71 year old  yo female with history of CAD s/p CABGx3 2015, ICM, HFrEF (EF 40%, PA 41 echo 6/2017), CKD stage IV, IDDM2, HTN, 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 Patient has baseline MILIAN, orthopnea, PND, LE edema, and abdo swelling that has also progressively worsened over the past few days. She is compliant with her home medications which include lasix 20 BID, coreg, hydralazine, amlodipine, and warfarin. She follows a low salt, fluid restrict diet and she has not deviated from it recently. She reports similar symptoms about a year ago that required hospitalization for CHF exacerbation. Patient recently had pneumonia and shingles in May and has not recovered full function since. She previously was able to ambulate with a walker and now uses a wheelchair. Prior to the shingles and PNA, she only required 10 mg lasix, but her lasix was increased afterwords due to increased SOB and volume overload.  She uses home O2 2.5 L. She is complaining of decreased UO, dysuria, and abdominal discomfort. She denies chest pain, palpitations, constipation (last BM today), diarrhea, cough, fever, chills, or any other complaints at this time. She lives at home with her daughter who provides with with significant assistance with her ADL's. Her cardiologist is Dr. Davis. She was last seen in cardiology clinic today and was sent to the ED from  there due to her SOB and edema.     In the ED, CXR showed increased pulmonary edema, BNP 1413 (which is above her baseline), tn 0.044, EKG with a fib with RVR but no acute ischemic changes. Patient was given lasix IV 80 mg.    Hospital Course:  7/13: Admitted to IM1. Started on Lasix 40 mg IV BID. Change in HH, 9.9->7.9. Denies changes in color/consistency of BMs and FOBT negative. Hemolytic anemia work-up negative. ABG normal. Added duoneb q6h.  7/14: Continued respiratory effort despite duoneb therapy. Vitals have remained stable. Pulmonology consult advised against thoracentesis at this moment after ultrasound showed bilateral effusions are likely not contributing to SOB. Also recommends discontinuing antibiotics as pneumonia is unlikely and it is adding fluids to her volume overload. Additional recommendation to increase diuretic therapy despite CKD. Consults to cards and pulm placed.    7/15: Patient's respiratory effort improving. Decided to discontinue coumadin at home. Patient is now taking Apixaban and will continue at home upon discharge.  7/16: Patient markedly improved today. Restarted Aspirin now that hemoglobin has stabilized.  7/17: Continued improvement. Increased hydralazine to 50 TID and replaced coreg with metoprolol for better AFib control, per cardiology.  7/18: Pt's family is refusing stress test. Lasix gtt transitioned to IV pushes.  7/19: Patient regressed overnight per family. Patient is winded when speaking and can only make a few words/sounds. Lasix IV pushes transitioned back to Lasix infusion with increase to 20 mg/hr and an initial Lasix 80 mg IV push. Britt placed for better monitoring of I/Os. Nephrology consulted for low urine output. Appreciate recommendations when available.  7/20: Patient improved on Lasix gtt. Metolazone to enhance diuresis. Britt removed secondary to patient wishes.  7/21: Continued improvement. Patient responded well to metolazone with higher urine  output.    Interval History: No acute events overnight. Patient speaking in phrases. No complaints this morning.    Review of Systems   Constitutional: Negative for chills and fever.   HENT: Negative for congestion and rhinorrhea.    Eyes: Negative for pain and visual disturbance.   Respiratory: Positive for shortness of breath. Negative for cough.    Cardiovascular: Positive for leg swelling. Negative for chest pain and palpitations.   Gastrointestinal: Negative for abdominal distention, abdominal pain, blood in stool, constipation, diarrhea, nausea and vomiting.   Genitourinary: Negative for difficulty urinating, dysuria, frequency and hematuria.   Skin: Negative for rash and wound.   Neurological: Negative for syncope, light-headedness and headaches.   Psychiatric/Behavioral: Negative for agitation and behavioral problems.     Objective:     Vital Signs (Most Recent):  Temp: 98 °F (36.7 °C) (07/21/17 0745)  Pulse: 92 (07/21/17 1000)  Resp: 14 (07/21/17 0844)  BP: 122/63 (07/21/17 0745)  SpO2: 99 % (07/21/17 0844) Vital Signs (24h Range):  Temp:  [97 °F (36.1 °C)-98 °F (36.7 °C)] 98 °F (36.7 °C)  Pulse:  [] 92  Resp:  [14-20] 14  SpO2:  [95 %-99 %] 99 %  BP: (100-137)/(56-71) 122/63     Weight: 69.3 kg (152 lb 12.8 oz)  Body mass index is 26.21 kg/m².    Intake/Output Summary (Last 24 hours) at 07/21/17 1048  Last data filed at 07/21/17 0513   Gross per 24 hour   Intake              430 ml   Output             1675 ml   Net            -1245 ml      Physical Exam   Constitutional: She is oriented to person, place, and time. She appears well-developed and well-nourished. No distress.   HENT:   Head: Normocephalic and atraumatic.   Nose: Nose normal.   Mouth/Throat: Oropharynx is clear and moist. No oropharyngeal exudate.   Eyes: EOM are normal. Pupils are equal, round, and reactive to light. No scleral icterus.   Neck: Neck supple. No JVD present. No thyromegaly present.   Cardiovascular: Normal rate, normal  heart sounds and intact distal pulses.    No murmur heard.  Irregular rhythm   Pulmonary/Chest: Effort normal. No respiratory distress. She has no wheezes. She has rales.   Coarse BS and crackles bilaterally   Abdominal: Soft. Bowel sounds are normal. She exhibits no distension. There is no tenderness.   Musculoskeletal: She exhibits edema (1+ LE's). She exhibits no tenderness or deformity.   Lymphadenopathy:     She has no cervical adenopathy.   Neurological: She is alert and oriented to person, place, and time. No cranial nerve deficit.   Skin: Skin is warm and dry.   Right forehead hyperpigmentation, does not cross midline, from resolving shingles rash   Psychiatric: She has a normal mood and affect. Her behavior is normal.       Significant Labs:   BMP:   Recent Labs  Lab 07/21/17  0351   *      K 4.0      CO2 32*   BUN 58*   CREATININE 2.5*   CALCIUM 8.4*   MG 1.9     CBC:   Recent Labs  Lab 07/20/17  0427 07/21/17  0351   WBC 4.93 5.83   HGB 7.3* 7.9*   HCT 24.5* 27.1*    238       Significant Imaging: I have reviewed all pertinent imaging results/findings within the past 24 hours.    Assessment/Plan:      * Acute on chronic systolic congestive heart failure    -pt presents from cardiology clinic with SOB, MILIAN, LE edema   -on home O2 2.5 L  -patient with increase lasix requirement since shingles and PNA in May (got abx as an out pt)  -most recent echo 6/2017 showed EF 40-45, pulm HTN  -On admit (7/12), CXR shows worsening pulmonary edema, BMP 1413 (above baseline), tn 0.04, EKG shows afib with RVR without acute ischemic changes  - CXR 7/14 shows interval improvement from 7/12  - on hydralazine 50 TID, isosorbide dinitirate 10 TID, and Lopressor 50 BID  - Lasix 20 mg/hr gtt with Metolazone 5 mg x1. Consider Lasix IV push, per nephrology  - cardiology following, appreciate recs        Chronic kidney disease (CKD) stage G4/A1, severely decreased glomerular filtration rate (GFR) between  15-29 mL/min/1.73 square meter and albuminuria creatinine ratio less than 30 mg/g    -Cr on admission 2.5, stable (improved from 3.2 ~1 mo ago)  -continue to monitor RFTs  -avoid nephrotoxic agents  -nephrology following. Appreciate recommendations        Insulin dependent diabetes mellitus    -daughter reports pt takes LA insulin 10 units qhs and SA insulin 5 units WM  -A1c 5.7 7/20  -Discontinued basal-bolus following morning glucose of 40  -Continue LD SSI QID        HTN (hypertension)    -Lopressor 50 BID, hyralazine 50 TID, isordil 10 TID        H/O: CVA (cerebrovascular accident)    -hx of stroke before 2012 per daughter  -pt with residual right sided deficit   -continue atorvastatin 50 mg  -now on eliquis        Normocytic anemia    - On admit (7/12), Hgb 9.9 and dropped to 7.9 (7/13) w/ SOB  - Hemolysis work-up negative, FOBT negative with no reported changes in stool  - currently stable, maintain low-threshold to transfuse        Atrial fibrillation    -patient denying palpitations, lightheadedness, CP  -admission EKG with a fib with RVR   -chadsvasc of 8  -started on Apixaban  -coreg switched to metoprolol tartrate 50 BID        Pneumonia due to infectious organism    - On admit (7/12) CXR showed R-sided airspace opacities concerning for edema or infectious process (SIRS 2/4, HR and RR). No fevers/chills or productive cough  - ABG normal  - Previously treated PNA with Levaquin on 6/15  - Started on azithromycin and ceftriaxone for CAP, D/C'd 2/2 low suspicion for PNA          Depression    -continue lexapro 5 mg, Wellbutrin 150 mg  -Seroquel qhs         Hyperlipidemia    -atorvastatin 40 mg          VTE Risk Mitigation         Ordered     apixaban tablet 5 mg  2 times daily     Route:  Oral        07/15/17 1102     Medium Risk of VTE  Once      07/12/17 1912        Dispo: To SNF following improvement of current CHF decompensation    Deuce Sexton MD  Department of Hospital Medicine   [facility]  [place]

## 2017-07-21 NOTE — PLAN OF CARE
Problem: Patient Care Overview  Goal: Plan of Care Review  7/20 - Improve breathing.  Patient on Lasix gtt.   Outcome: Revised  Plan of care discussed with patient. Patient is free of fall/trauma/injury. Denies CP, or pain/discomfort. Lasix gtt infusing at prescribed rate.  All questions addressed. Will continue to monitor

## 2017-07-21 NOTE — PROGRESS NOTES
Cardiology Consult Progress Note  Attending Physician: Kira Ruiz MD  Hospital Day: 10    Subjective:   Interval History: SOB unchanged but she is net negative 1.5L and weight down 7#    Medications:   Continuous Infusions:   furosemide (LASIX) 5 mg/mL infusion (non-titrating) 20 mg/hr (07/21/17 0707)       Scheduled Meds:   albuterol-ipratropium 2.5mg-0.5mg/3mL  3 mL Nebulization Q6H    apixaban  5 mg Oral BID    aspirin  81 mg Oral Daily    atorvastatin  40 mg Oral Daily    buPROPion  150 mg Oral Daily    escitalopram oxalate  5 mg Oral Daily    gabapentin  300 mg Oral QHS    hydrALAZINE  50 mg Oral TID    isosorbide dinitrate  10 mg Oral TID    metoprolol tartrate  50 mg Oral BID    pantoprazole  40 mg Oral Daily    quetiapine  100 mg Oral QHS    sodium chloride 0.9%  3 mL Intravenous Q8H     PRN Meds:sodium chloride, acetaminophen, acetaminophen, butalbital-acetaminophen-caffeine -40 mg, dextrose 50%, dextrose 50%, diphenhydrAMINE, glucagon (human recombinant), glucose, glucose, insulin aspart, ondansetron  Objective:     Vitals:  Temp:  [97 °F (36.1 °C)-98 °F (36.7 °C)]   Pulse:  []   Resp:  [14-20]   BP: (100-137)/(56-69)   SpO2:  [94 %-99 %]  on 2L N/C I/O's:    Intake/Output Summary (Last 24 hours) at 07/21/17 1330  Last data filed at 07/21/17 1151   Gross per 24 hour   Intake              430 ml   Output             1975 ml   Net            -1545 ml        Constitutional: NAD, conversant  HEENT: Sclera anicteric, PERRLA, EOMI  Neck: 10-12 cm JVD, no carotid bruits  CV: Irregularly irregular, no murmur, normal S1/S2  Pulm: Bibasilar crackles  GI: Abdomen soft, NTND, +BS  Extremities: 3+ LE edema, warm and well perfused  Skin: No ecchymosis, erythema, or ulcers  Psych: AOx3, appropriate affect  Neuro: CNII-XII intact, no focal deficits    Labs:       CBC: CMP:      Recent Labs  Lab 07/19/17  0438 07/20/17  0427 07/21/17  0351   WBC 4.53 4.93 5.83   HGB 7.5* 7.3* 7.9*   HCT  25.1* 24.5* 27.1*    214 238   MCV 92 93 93   RDW 16.1* 16.0* 15.8*      Recent Labs  Lab 07/17/17  0444 07/17/17  1540 07/18/17  0441 07/19/17  0438 07/20/17  0427 07/21/17  0351    142 144 140 141 141   K 4.7 4.6 4.3 4.3 4.2 4.0    106 106 103 104 102   CO2 26 29 28 26 29 32*   BUN 50* 52* 51* 51* 55* 58*   CREATININE 2.5* 2.7* 2.6* 2.5* 2.6* 2.5*   CALCIUM 7.9* 8.0* 8.7 8.2* 8.0* 8.4*   PROT 4.9* 4.9* 4.6*  --   --   --    BILITOT 0.2 0.2 0.2  --   --   --    ALKPHOS 49* 52* 43*  --   --   --    ALT 7* 8* 7*  --   --   --    AST 12 9* 10  --   --   --    MG 2.0  --  1.8 2.1 2.1 1.9   PHOS 4.1  --  4.5 4.3 4.6* 4.6*        Cholesterol: Coagulation   Lab Results   Component Value Date    CHOL 191 02/23/2017    HDL 25 (L) 02/23/2017    LDLCALC 130.6 02/23/2017    TRIG 177 (H) 02/23/2017      Recent Labs  Lab 07/15/17  0354   INR 1.1        Misc:   No results for input(s): CPK, CPKMB, TROPONINI, MB in the last 168 hours.   Lab Results   Component Value Date    HGBA1C 5.7 (H) 07/20/2017        Microbiology   Microbiology Results (last 7 days)     Procedure Component Value Units Date/Time    Urine culture [[id number]] Collected:  07/19/17 1442    Order Status:  Completed Specimen:  Urine from Random void Updated:  07/21/17 1240     Urine Culture, Routine No growth    Narrative:       urine culture add on per elieser ortega md @ 10:16 on 07/20/2017;   order# [id number]    Gram stain [[id number]] Collected:  07/19/17 1442    Order Status:  Completed Specimen:  Urine from Random void Updated:  07/20/17 1308     Gram Stain Result Rare WBC's      No organisms seen    Narrative:       urine gram stain add on per elieser ortega md @ 10:19 on   07/20/2017; order# [id number]    Urine culture [[id number]]     Order Status:  Completed Specimen:  Urine     Gram stain [[id number]]     Order Status:  Completed Specimen:  Urine            Imaging:     CXR (07/17/2017):   Sternal sutures are intact and aligned.  " Abundant calcification noted at the level of the arch.    There is interstitial lung disease in perihilar distribution compatible with interstitial pulmonary edema similar to 7/14/2017.  Ovoid soft tissue opacity projected over the RIGHT lower lung zone at the level of the minor fissure suggests fluid retained in that fissure ("pseudotumor").  No other significant change compared to the earlier examination.    Degenerative changes are present at the acromioclavicular joints, shoulders and in the spine.      2D Echo (06/23/2017):    1 - Mildly to moderately depressed left ventricular systolic function (EF 40-45%). Abnormal septal motion due to underlying LBBB. Significant drop in LV function, compared to the prior reported, Images could not be retrieved for comparison.     2 - Normal right ventricular systolic function .     3 - Pulmonary hypertension. The estimated PA systolic pressure is 41 mmHg.     4 - Mild tricuspid regurgitation.     5 - Small pericardial effusion.     6 - Intermediate central venous pressure.     7 - Biatrial enlargement.       Assessment:     71 y.o. 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 Would give another dose of metolazone 5mg PO today  --continue hydralazine 50 TID and isordil 10 TID  --it seems her SOB should be improving at a faster rate now the she is adequately getting fluid off as evidence by improved weight and JVP, but she stil lcannot speak in complete sentences.   - If she does not improve more by pierce with continued diuresis would repeat CT chest to look for worsening effusions or some interstitial lung process. "      #Chronic AFib  --patient is chronically in AFib at this time  --[id number] of 8 - patient requires anticoagulation. With her prior CVA, she must be on anticoagulation to prevent future strokes  --agree with discontinuation of effient.   --continue metoprolol 50 BID.    Jin Tran MD  Cardiology Fellow  [phone]

## 2017-07-21 NOTE — PROGRESS NOTES
[facility] Hospital Medicine  Progress Note    Patient Name: Michael Salgado  MRN: [id number]  Patient Class: IP- Inpatient   Admission Date: 7/12/2017  Length of Stay: 8 days  Attending Physician: Kira Ruiz MD  Primary Care Provider: Wesley Watkins MD    [facility] Medicine Team: [facility] 1 Deuce Sexton MD    Subjective:     Principal Problem:Acute on chronic systolic congestive heart failure    HPI:  71 year old  yo female with history of CAD s/p CABGx3 2015, ICM, HFrEF (EF 40%, PA 41 echo 6/2017), CKD stage IV, IDDM2, HTN, 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 Patient has baseline MILIAN, orthopnea, PND, LE edema, and abdo swelling that has also progressively worsened over the past few days. She is compliant with her home medications which include lasix 20 BID, coreg, hydralazine, amlodipine, and warfarin. She follows a low salt, fluid restrict diet and she has not deviated from it recently. She reports similar symptoms about a year ago that required hospitalization for CHF exacerbation. Patient recently had pneumonia and shingles in May and has not recovered full function since. She previously was able to ambulate with a walker and now uses a wheelchair. Prior to the shingles and PNA, she only required 10 mg lasix, but her lasix was increased afterwords due to increased SOB and volume overload.  She uses home O2 2.5 L. She is complaining of decreased UO, dysuria, and abdominal discomfort. She denies chest pain, palpitations, constipation (last BM today), diarrhea, cough, fever, chills, or any other complaints at this time. She lives at home with her daughter who provides with with significant assistance with her ADL's. Her cardiologist is Dr. Davis. She was last seen in cardiology clinic today and was sent to the ED from  there due to her SOB and edema.     In the ED, CXR showed increased pulmonary edema, BNP 1413 (which is above her baseline), tn 0.044, EKG with a fib with RVR but no acute ischemic changes. Patient was given lasix IV 80 mg.    Hospital Course:  7/13: Admitted to IM1. Started on Lasix 40 mg IV BID. Change in HH, 9.9->7.9. Denies changes in color/consistency of BMs and FOBT negative. Hemolytic anemia work-up negative. ABG normal. Added duoneb q6h.  7/14: Continued respiratory effort despite duoneb therapy. Vitals have remained stable. Pulmonology consult advised against thoracentesis at this moment after ultrasound showed bilateral effusions are likely not contributing to SOB. Also recommends discontinuing antibiotics as pneumonia is unlikely and it is adding fluids to her volume overload. Additional recommendation to increase diuretic therapy despite CKD. Consults to cards and pulm placed.    7/15: Patient's respiratory effort improving. Decided to discontinue coumadin at home. Patient is now taking Apixaban and will continue at home upon discharge.  7/16: Patient markedly improved today. Restarted Aspirin now that hemoglobin has stabilized.  7/17: Continued improvement. Increased hydralazine to 50 TID and replaced coreg with metoprolol for better AFib control, per cardiology.  7/18: Pt's family is refusing stress test. Lasix gtt transitioned to IV pushes.  7/19: Patient regressed overnight per family. Patient is winded when speaking and can only make a few words/sounds. Lasix IV pushes transitioned back to Lasix infusion with increase to 20 mg/hr and an initial Lasix 80 mg IV push. Britt placed for better monitoring of I/Os. Nephrology consulted for low urine output. Appreciate recommendations when available.  7/20: Patient improved on Lasix gtt. Metolazone to enhance diuresis. Britt removed secondary to patient wishes.     Interval History: Patient improved back on Lasix gtt. Can speak in multiple words with some  sentences. Would like her Britt catheter removed.    Review of Systems   Constitutional: Negative for chills and fever.   HENT: Negative for congestion and rhinorrhea.    Eyes: Negative for pain and visual disturbance.   Respiratory: Positive for shortness of breath. Negative for cough.    Cardiovascular: Positive for leg swelling. Negative for chest pain and palpitations.   Gastrointestinal: Negative for abdominal distention, abdominal pain, blood in stool, constipation, diarrhea, nausea and vomiting.   Genitourinary: Negative for difficulty urinating, dysuria, frequency and hematuria.   Skin: Negative for rash and wound.   Neurological: Negative for syncope, light-headedness and headaches.   Psychiatric/Behavioral: Negative for agitation and behavioral problems.     Objective:     Vital Signs (Most Recent):  Temp: 97.6 °F (36.4 °C) (07/20/17 1700)  Pulse: 94 (07/20/17 1800)  Resp: 18 (07/20/17 1700)  BP: 131/63 (07/20/17 1700)  SpO2: 98 % (07/20/17 1700) Vital Signs (24h Range):  Temp:  [97.6 °F (36.4 °C)-98.6 °F (37 °C)] 97.6 °F (36.4 °C)  Pulse:  [] 94  Resp:  [16-20] 18  SpO2:  [95 %-99 %] 98 %  BP: ()/(52-71) 131/63     Weight: 72.1 kg (159 lb)  Body mass index is 27.28 kg/m².    Intake/Output Summary (Last 24 hours) at 07/20/17 1934  Last data filed at 07/20/17 1700   Gross per 24 hour   Intake              400 ml   Output             1700 ml   Net            -1300 ml      Physical Exam   Constitutional: She is oriented to person, place, and time. She appears well-developed and well-nourished. No distress.   HENT:   Head: Normocephalic and atraumatic.   Nose: Nose normal.   Mouth/Throat: Oropharynx is clear and moist. No oropharyngeal exudate.   Eyes: EOM are normal. Pupils are equal, round, and reactive to light. No scleral icterus.   Neck: Neck supple. No JVD present. No thyromegaly present.   Cardiovascular: Normal rate, normal heart sounds and intact distal pulses.    No murmur  heard.  Irregular rhythm   Pulmonary/Chest: Effort normal. No respiratory distress. She has no wheezes. She has rales.   Coarse BS and crackles bilaterally   Abdominal: Soft. Bowel sounds are normal. She exhibits no distension. There is no tenderness.   Musculoskeletal: She exhibits edema (1+ LE's). She exhibits no tenderness or deformity.   Lymphadenopathy:     She has no cervical adenopathy.   Neurological: She is alert and oriented to person, place, and time. No cranial nerve deficit.   Skin: Skin is warm and dry.   Right forehead hyperpigmentation, does not cross midline, from resolving shingles rash   Psychiatric: She has a normal mood and affect. Her behavior is normal.       Significant Labs:   BMP:   Recent Labs  Lab 07/20/17  0427   *      K 4.2      CO2 29   BUN 55*   CREATININE 2.6*   CALCIUM 8.0*   MG 2.1     CBC:   Recent Labs  Lab 07/19/17  0438 07/20/17 0427   WBC 4.53 4.93   HGB 7.5* 7.3*   HCT 25.1* 24.5*    214       Significant Imaging: I have reviewed all pertinent imaging results/findings within the past 24 hours.    Assessment/Plan:      * Acute on chronic systolic congestive heart failure    -pt presents from cardiology clinic with SOB, MILIAN, LE edema   -on home O2 2.5 L  -patient with increase lasix requirement since shingles and PNA in May (got abx as an out pt)  -most recent echo 6/2017 showed EF 40-45, pulm HTN  -On admit (7/12), CXR shows worsening pulmonary edema, BMP 1413 (above baseline), tn 0.04, EKG shows afib with RVR without acute ischemic changes  - CXR 7/14 shows interval improvement from 7/12  - on hydralazine 50 TID, isosorbide dinitirate 10 TID, and lopressor 50 BID  - Lasix 20 mg/hr gtt with Metolazone 5 mg x1  - Pts family refusing stress test  - cardiology following, appreciate recs        Chronic kidney disease (CKD) stage G4/A1, severely decreased glomerular filtration rate (GFR) between 15-29 mL/min/1.73 square meter and albuminuria creatinine  ratio less than 30 mg/g    -Cr on admission 2.5, stable (improved from 3.2 ~1 mo ago)  -continue to monitor RFTs  -avoid nephrotoxic agents  -consulted nephrology for low urine output. Appreciate recommendations        Insulin dependent diabetes mellitus    -daughter reports to takes LA insulin 10 units qhs and SA insulin 5 units WM  -A1c 6.1 2/2017  -Discontinued basal-bolus following morning glucose of 40  -Continue LD SSI QID        HTN (hypertension)    -Lopressor 50 BID, hyralazine 50 TID, isordil 10 10 TID        H/O: CVA (cerebrovascular accident)    -hx of stroke before 2012 per daughter  -pt with residual right sided deficit   -continue atorvastatin 50 mg  -now on eliquis        Normocytic anemia    - On admit (7/12), Hgb 9.9 and dropped to 7.9 (7/13) w/ SOB  - Hemolysis work-up negative, FOBT negative with no reported changes in stool  - currently stable, maintain low-threshold to transfuse        Atrial fibrillation    -patient denying palpitations, lightheadedness, CP  -admission EKG with a fib with RVR   -chadsvasc of 8  -started on Apixaban  -coreg switched to metoprolol tartrate 50 BID        Pneumonia due to infectious organism    - On admit (7/12) CXR showed R-sided airspace opacities concerning for edema or infectious process (SIRS 2/4, HR and RR). No fevers/chills or productive cough  - ABG normal  - Previously treated PNA with Levaquin on 6/15  - Started on azithromycin and ceftriaxone for CAP, D/C'd 2/2 low suspicion for PNA          Depression    -continue lexapro 5 mg, Wellbutrin 150 mg  -Seroquel qhs         Hyperlipidemia    -atorvastatin 40 mg          VTE Risk Mitigation         Ordered     apixaban tablet 5 mg  2 times daily     Route:  Oral        07/15/17 1102     Medium Risk of VTE  Once      07/12/17 2356        Dispo: To home with support of family upon resolution of CHF decompensation.    Deuce Sexton MD  Department of Hospital Medicine   [facility]

## 2017-07-21 NOTE — PLAN OF CARE
Problem: Occupational Therapy Goal  Goal: Occupational Therapy Goal  Goals to be met by: 07/30     Patient will increase functional independence with ADLs by performing:    UE Dressing with Stand-by Assistance.  LE Dressing with Moderate Assistance and Assistive Devices as needed.  Grooming while seated with Stand-by Assistance.  Toileting from bedside commode with Moderate Assistance and Assistive Devices as needed for hygiene and clothing management.   Stand pivot transfers with Contact Guard Assistance.  Toilet transfer to bedside commode with Contact Guard Assistance.     Outcome: Ongoing (interventions implemented as appropriate)  Continue OT plan of care.

## 2017-07-21 NOTE — SUBJECTIVE & OBJECTIVE
Interval History: No acute events overnight. Patient speaking in phrases. No complaints this morning.    Review of Systems   Constitutional: Negative for chills and fever.   HENT: Negative for congestion and rhinorrhea.    Eyes: Negative for pain and visual disturbance.   Respiratory: Positive for shortness of breath. Negative for cough.    Cardiovascular: Positive for leg swelling. Negative for chest pain and palpitations.   Gastrointestinal: Negative for abdominal distention, abdominal pain, blood in stool, constipation, diarrhea, nausea and vomiting.   Genitourinary: Negative for difficulty urinating, dysuria, frequency and hematuria.   Skin: Negative for rash and wound.   Neurological: Negative for syncope, light-headedness and headaches.   Psychiatric/Behavioral: Negative for agitation and behavioral problems.     Objective:     Vital Signs (Most Recent):  Temp: 98 °F (36.7 °C) (07/21/17 0745)  Pulse: 92 (07/21/17 1000)  Resp: 14 (07/21/17 0844)  BP: 122/63 (07/21/17 0745)  SpO2: 99 % (07/21/17 0844) Vital Signs (24h Range):  Temp:  [97 °F (36.1 °C)-98 °F (36.7 °C)] 98 °F (36.7 °C)  Pulse:  [] 92  Resp:  [14-20] 14  SpO2:  [95 %-99 %] 99 %  BP: (100-137)/(56-71) 122/63     Weight: 69.3 kg (152 lb 12.8 oz)  Body mass index is 26.21 kg/m².    Intake/Output Summary (Last 24 hours) at 07/21/17 1048  Last data filed at 07/21/17 0513   Gross per 24 hour   Intake              430 ml   Output             1675 ml   Net            -1245 ml      Physical Exam   Constitutional: She is oriented to person, place, and time. She appears well-developed and well-nourished. No distress.   HENT:   Head: Normocephalic and atraumatic.   Nose: Nose normal.   Mouth/Throat: Oropharynx is clear and moist. No oropharyngeal exudate.   Eyes: EOM are normal. Pupils are equal, round, and reactive to light. No scleral icterus.   Neck: Neck supple. No JVD present. No thyromegaly present.   Cardiovascular: Normal rate, normal heart sounds  and intact distal pulses.    No murmur heard.  Irregular rhythm   Pulmonary/Chest: Effort normal. No respiratory distress. She has no wheezes. She has rales.   Coarse BS and crackles bilaterally   Abdominal: Soft. Bowel sounds are normal. She exhibits no distension. There is no tenderness.   Musculoskeletal: She exhibits edema (1+ LE's). She exhibits no tenderness or deformity.   Lymphadenopathy:     She has no cervical adenopathy.   Neurological: She is alert and oriented to person, place, and time. No cranial nerve deficit.   Skin: Skin is warm and dry.   Right forehead hyperpigmentation, does not cross midline, from resolving shingles rash   Psychiatric: She has a normal mood and affect. Her behavior is normal.       Significant Labs:   BMP:   Recent Labs  Lab 07/21/17  0351   *      K 4.0      CO2 32*   BUN 58*   CREATININE 2.5*   CALCIUM 8.4*   MG 1.9     CBC:   Recent Labs  Lab 07/20/17  0427 07/21/17  0351   WBC 4.93 5.83   HGB 7.3* 7.9*   HCT 24.5* 27.1*    238       Significant Imaging: I have reviewed all pertinent imaging results/findings within the past 24 hours.

## 2017-07-21 NOTE — PLAN OF CARE
Problem: Patient Care Overview  Goal: Plan of Care Review  7/20 - Improve breathing.  Patient on Lasix gtt.   Outcome: Ongoing (interventions implemented as appropriate)  No significant events noted throughout shift. Free of falls/trauma/inury. VSS. Denies any CP, SOB, palpitations, or dizziness. General skin remains intact with no new breakdown. Turning/repositioning independently in bed. Lasix infusing per MD orders, tolerating well. Diabetes managed with SSI, coverage required at bedtime. No c/o pain or any other discomforts this shift. Daughter at the bedside. Plan of care reviewed and updated, verbalizes understanding. No acute distress noted. Will continue to monitor.

## 2017-07-21 NOTE — ASSESSMENT & PLAN NOTE
-Cr on admission 2.5, stable (improved from 3.2 ~1 mo ago)  -continue to monitor RFTs  -avoid nephrotoxic agents  -nephrology following. Appreciate recommendations

## 2017-07-22 LAB
ANION GAP SERPL CALC-SCNC: 10 MMOL/L
ANION GAP SERPL CALC-SCNC: 11 MMOL/L
BASOPHILS # BLD AUTO: 0.05 K/UL
BASOPHILS NFR BLD: 0.9 %
BUN SERPL-MCNC: 59 MG/DL
BUN SERPL-MCNC: 64 MG/DL
CALCIUM SERPL-MCNC: 8.5 MG/DL
CALCIUM SERPL-MCNC: 8.6 MG/DL
CHLORIDE SERPL-SCNC: 100 MMOL/L
CHLORIDE SERPL-SCNC: 96 MMOL/L
CO2 SERPL-SCNC: 32 MMOL/L
CO2 SERPL-SCNC: 35 MMOL/L
CREAT SERPL-MCNC: 2.2 MG/DL
CREAT SERPL-MCNC: 2.4 MG/DL
DIFFERENTIAL METHOD: ABNORMAL
EOSINOPHIL # BLD AUTO: 0.2 K/UL
EOSINOPHIL NFR BLD: 3.8 %
ERYTHROCYTE [DISTWIDTH] IN BLOOD BY AUTOMATED COUNT: 15.6 %
EST. GFR  (AFRICAN AMERICAN): 22.4 ML/MIN/1.73 M^2
EST. GFR  (AFRICAN AMERICAN): 24.9 ML/MIN/1.73 M^2
EST. GFR  (NON AFRICAN AMERICAN): 19.4 ML/MIN/1.73 M^2
EST. GFR  (NON AFRICAN AMERICAN): 21.6 ML/MIN/1.73 M^2
GLUCOSE SERPL-MCNC: 179 MG/DL
GLUCOSE SERPL-MCNC: 244 MG/DL
HCT VFR BLD AUTO: 24.5 %
HGB BLD-MCNC: 7.5 G/DL
LYMPHOCYTES # BLD AUTO: 1.1 K/UL
LYMPHOCYTES NFR BLD: 18.9 %
MAGNESIUM SERPL-MCNC: 1.8 MG/DL
MCH RBC QN AUTO: 28.2 PG
MCHC RBC AUTO-ENTMCNC: 30.6 G/DL
MCV RBC AUTO: 92 FL
MONOCYTES # BLD AUTO: 0.7 K/UL
MONOCYTES NFR BLD: 12.2 %
NEUTROPHILS # BLD AUTO: 3.6 K/UL
NEUTROPHILS NFR BLD: 63.5 %
PHOSPHATE SERPL-MCNC: 4.3 MG/DL
PLATELET # BLD AUTO: 242 K/UL
PMV BLD AUTO: 8.9 FL
POCT GLUCOSE: 169 MG/DL (ref 70–110)
POCT GLUCOSE: 177 MG/DL (ref 70–110)
POCT GLUCOSE: 222 MG/DL (ref 70–110)
POCT GLUCOSE: 366 MG/DL (ref 70–110)
POTASSIUM SERPL-SCNC: 3.5 MMOL/L
POTASSIUM SERPL-SCNC: 3.8 MMOL/L
RBC # BLD AUTO: 2.66 M/UL
RPR SER QL: NORMAL
SODIUM SERPL-SCNC: 142 MMOL/L
SODIUM SERPL-SCNC: 142 MMOL/L
TB INDURATION 48 - 72 HR READ: 0 MM
WBC # BLD AUTO: 5.72 K/UL

## 2017-07-22 PROCEDURE — 25000003 PHARM REV CODE 250: Performed by: HOSPITALIST

## 2017-07-22 PROCEDURE — 25000003 PHARM REV CODE 250: Performed by: STUDENT IN AN ORGANIZED HEALTH CARE EDUCATION/TRAINING PROGRAM

## 2017-07-22 PROCEDURE — 27000221 HC OXYGEN, UP TO 24 HOURS

## 2017-07-22 PROCEDURE — 99233 SBSQ HOSP IP/OBS HIGH 50: CPT | Mod: GC,,, | Performed by: HOSPITALIST

## 2017-07-22 PROCEDURE — 97530 THERAPEUTIC ACTIVITIES: CPT

## 2017-07-22 PROCEDURE — 94640 AIRWAY INHALATION TREATMENT: CPT

## 2017-07-22 PROCEDURE — 84100 ASSAY OF PHOSPHORUS: CPT

## 2017-07-22 PROCEDURE — 36415 COLL VENOUS BLD VENIPUNCTURE: CPT

## 2017-07-22 PROCEDURE — 83735 ASSAY OF MAGNESIUM: CPT

## 2017-07-22 PROCEDURE — 97110 THERAPEUTIC EXERCISES: CPT

## 2017-07-22 PROCEDURE — 85025 COMPLETE CBC W/AUTO DIFF WBC: CPT

## 2017-07-22 PROCEDURE — 99232 SBSQ HOSP IP/OBS MODERATE 35: CPT | Mod: ,,, | Performed by: INTERNAL MEDICINE

## 2017-07-22 PROCEDURE — 25000242 PHARM REV CODE 250 ALT 637 W/ HCPCS: Performed by: STUDENT IN AN ORGANIZED HEALTH CARE EDUCATION/TRAINING PROGRAM

## 2017-07-22 PROCEDURE — 80048 BASIC METABOLIC PNL TOTAL CA: CPT | Mod: 91

## 2017-07-22 PROCEDURE — 20600001 HC STEP DOWN PRIVATE ROOM

## 2017-07-22 RX ORDER — METOLAZONE 2.5 MG/1
5 TABLET ORAL DAILY
Status: COMPLETED | OUTPATIENT
Start: 2017-07-22 | End: 2017-07-22

## 2017-07-22 RX ORDER — METOLAZONE 2.5 MG/1
5 TABLET ORAL DAILY
Status: DISCONTINUED | OUTPATIENT
Start: 2017-07-22 | End: 2017-07-22

## 2017-07-22 RX ADMIN — METOLAZONE 5 MG: 2.5 TABLET ORAL at 05:07

## 2017-07-22 RX ADMIN — GABAPENTIN 300 MG: 300 CAPSULE ORAL at 09:07

## 2017-07-22 RX ADMIN — IPRATROPIUM BROMIDE AND ALBUTEROL SULFATE 3 ML: .5; 3 SOLUTION RESPIRATORY (INHALATION) at 08:07

## 2017-07-22 RX ADMIN — BUPROPION HYDROCHLORIDE 150 MG: 150 TABLET, FILM COATED, EXTENDED RELEASE ORAL at 08:07

## 2017-07-22 RX ADMIN — QUETIAPINE FUMARATE 100 MG: 100 TABLET, FILM COATED ORAL at 09:07

## 2017-07-22 RX ADMIN — METOPROLOL TARTRATE 50 MG: 50 TABLET, FILM COATED ORAL at 09:07

## 2017-07-22 RX ADMIN — HYDRALAZINE HYDROCHLORIDE 50 MG: 50 TABLET ORAL at 01:07

## 2017-07-22 RX ADMIN — INSULIN ASPART 4 UNITS: 100 INJECTION, SOLUTION INTRAVENOUS; SUBCUTANEOUS at 01:07

## 2017-07-22 RX ADMIN — INSULIN ASPART 1 UNITS: 100 INJECTION, SOLUTION INTRAVENOUS; SUBCUTANEOUS at 10:07

## 2017-07-22 RX ADMIN — PANTOPRAZOLE SODIUM 40 MG: 40 TABLET, DELAYED RELEASE ORAL at 08:07

## 2017-07-22 RX ADMIN — ASPIRIN 81 MG: 81 TABLET, COATED ORAL at 08:07

## 2017-07-22 RX ADMIN — IPRATROPIUM BROMIDE AND ALBUTEROL SULFATE 3 ML: .5; 3 SOLUTION RESPIRATORY (INHALATION) at 02:07

## 2017-07-22 RX ADMIN — INSULIN ASPART 10 UNITS: 100 INJECTION, SOLUTION INTRAVENOUS; SUBCUTANEOUS at 05:07

## 2017-07-22 RX ADMIN — ESCITALOPRAM 5 MG: 5 TABLET, FILM COATED ORAL at 08:07

## 2017-07-22 RX ADMIN — HYDRALAZINE HYDROCHLORIDE 50 MG: 50 TABLET ORAL at 09:07

## 2017-07-22 RX ADMIN — INSULIN ASPART 2 UNITS: 100 INJECTION, SOLUTION INTRAVENOUS; SUBCUTANEOUS at 08:07

## 2017-07-22 RX ADMIN — ISOSORBIDE DINITRATE 10 MG: 10 TABLET ORAL at 06:07

## 2017-07-22 RX ADMIN — ATORVASTATIN CALCIUM 40 MG: 20 TABLET, FILM COATED ORAL at 08:07

## 2017-07-22 RX ADMIN — ISOSORBIDE DINITRATE 10 MG: 10 TABLET ORAL at 09:07

## 2017-07-22 RX ADMIN — METOPROLOL TARTRATE 50 MG: 50 TABLET, FILM COATED ORAL at 08:07

## 2017-07-22 RX ADMIN — APIXABAN 5 MG: 5 TABLET, FILM COATED ORAL at 08:07

## 2017-07-22 RX ADMIN — ISOSORBIDE DINITRATE 10 MG: 10 TABLET ORAL at 01:07

## 2017-07-22 RX ADMIN — HYDRALAZINE HYDROCHLORIDE 50 MG: 50 TABLET ORAL at 06:07

## 2017-07-22 NOTE — PT/OT/SLP PROGRESS
Physical Therapy  Treatment    Michael Salgado   MRN: [id number]   Admitting Diagnosis: Acute on chronic systolic congestive heart failure    PT Received On: 07/22/17  PT Start Time: 0917     PT Stop Time: 0940    PT Total Time (min): 23 min       Billable Minutes:  Therapeutic Activity 15 and Therapeutic Exercise 8    Treatment Type: Treatment  PT/PTA: PTA     PTA Visit Number: 3       General Precautions: Standard, fall  Orthopedic Precautions: N/A   Braces: N/A    Do you have any cultural, spiritual, Restoration conflicts, given your current situation?: none stated    Subjective:  Communicated with nursing prior to session.  Pt was willing to participate with therapy.     Pain/Comfort  Pain Rating 1: 0/10  Pain Rating Post-Intervention 1: 0/10    Objective:   Patient found with:  (all lines intact)    Functional Mobility:  Bed Mobility:   Rolling/Turning to Left: Total assistance, With side rail x1 trial   Rolling/Turning Right: Moderate assistance, With side rail x2 trials  Scooting/Bridging: Moderate Assistance  Supine to Sit: Moderate Assistance, With side rail  Sit to Supine:  (Not performed pt left UIC.)    Transfers:  Bed <> Chair Technique: Stand Pivot  Bed <> Chair Assistance: Maximum Assistance  Bed <> Chair Assistive Device: No Assistive Device    Gait:    Unable to perform at this time.     Therapeutic Activities and Exercises:  B LE therex 2x15 reps w/ assistance to R LE:   -AP   -LAQ   -Hip Flexion    Pt sat EOB fluctuating b/w CGA-SBA ~10 min prior to transfer to bedside chair.      AM-PAC 6 CLICK MOBILITY  How much help from another person does this patient currently need?   1 = Unable, Total/Dependent Assistance  2 = A lot, Maximum/Moderate Assistance  3 = A little, Minimum/Contact Guard/Supervision  4 = None, Modified [place]/Independent    Turning over in bed (including adjusting bedclothes, sheets and blankets)?: 2  Sitting down on and standing up from a chair with arms (e.g., wheelchair,  bedside commode, etc.): 3  Moving from lying on back to sitting on the side of the bed?: 2  Moving to and from a bed to a chair (including a wheelchair)?: 2  Need to walk in hospital room?: 2  Climbing 3-5 steps with a railing?: 1  Total Score: 12    AM-PAC Raw Score CMS G-Code Modifier Level of Impairment Assistance   6 % Total / Unable   7 - 9 CM 80 - 100% Maximal Assist   10 - 14 CL 60 - 80% Moderate Assist   15 - 19 CK 40 - 60% Moderate Assist   20 - 22 CJ 20 - 40% Minimal Assist   23 CI 1-20% SBA / CGA   24 CH 0% Independent/ Mod I     Patient left up in chair with all lines intact, call button in reach and spouse present.    Assessment:  Michael Salgado is a 71 y.o. female with a medical diagnosis of Acute on chronic systolic congestive heart failure and presents with all deficits noted below. Pt fair to treatment session, and will continue to benefit from skilled PT services at this time. Continue with PT POC as indicated.    Rehab identified problem list/impairments: Rehab identified problem list/impairments: weakness, impaired endurance, impaired self care skills, impaired functional mobilty, impaired balance, decreased upper extremity function, decreased lower extremity function, decreased ROM, impaired cardiopulmonary response to activity    Rehab potential is good.    Activity tolerance: Fair    Discharge recommendations: Discharge Facility/Level Of Care Needs: nursing facility, skilled     Barriers to discharge: Barriers to Discharge: None    Equipment recommendations: Equipment Needed After Discharge: none     GOALS:    Physical Therapy Goals        Problem: Physical Therapy Goal    Goal Priority Disciplines Outcome Goal Variances Interventions   Physical Therapy Goal     PT/OT, PT Ongoing (interventions implemented as appropriate)     Description:  Goals to be met by: 2017    Patient will increase functional independence with mobility by performin. Supine to sit with MInimal  Assistance  2. Sit to supine with MInimal Assistance  3. Rolling to Right with Minimal Assistance.  4. Sit to stand transfer with Stand-by Assistance  5. Bed to chair transfer with Stand-by Assistance using Rolling Walker  6. Gait  x 10 feet with Minimal Assistance using Rolling Walker.                         PLAN:    Patient to be seen 4 x/week  to address the above listed problems via gait training, therapeutic activities, therapeutic exercises, wheelchair management/training, neuromuscular re-education  Plan of Care expires: 08/14/17  Plan of Care reviewed with: patient, spouse         Felicitas Ariel, PTA  07/22/2017

## 2017-07-22 NOTE — PROGRESS NOTES
[facility] Hospital Medicine  Progress Note    Patient Name: Michael Salgado  MRN: [id number]  Patient Class: IP- Inpatient   Admission Date: 7/12/2017  Length of Stay: 10 days  Attending Physician: Kira Ruiz MD  Primary Care Provider: Wesley Watkins MD    [facility] Medicine Team: [facility] 1 Deuce Sexton MD    Subjective:     Principal Problem:Acute on chronic systolic congestive heart failure    HPI:  71 year old  yo female with history of CAD s/p CABGx3 2015, ICM, HFrEF (EF 40%, PA 41 echo 6/2017), CKD stage IV, IDDM2, HTN, 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 Patient has baseline MILIAN, orthopnea, PND, LE edema, and abdo swelling that has also progressively worsened over the past few days. She is compliant with her home medications which include lasix 20 BID, coreg, hydralazine, amlodipine, and warfarin. She follows a low salt, fluid restrict diet and she has not deviated from it recently. She reports similar symptoms about a year ago that required hospitalization for CHF exacerbation. Patient recently had pneumonia and shingles in May and has not recovered full function since. She previously was able to ambulate with a walker and now uses a wheelchair. Prior to the shingles and PNA, she only required 10 mg lasix, but her lasix was increased afterwords due to increased SOB and volume overload.  She uses home O2 2.5 L. She is complaining of decreased UO, dysuria, and abdominal discomfort. She denies chest pain, palpitations, constipation (last BM today), diarrhea, cough, fever, chills, or any other complaints at this time. She lives at home with her daughter who provides with with significant assistance with her ADL's. Her cardiologist is Dr. Davis. She was last seen in cardiology clinic today and was sent to the ED from  there due to her SOB and edema.     In the ED, CXR showed increased pulmonary edema, BNP 1413 (which is above her baseline), tn 0.044, EKG with a fib with RVR but no acute ischemic changes. Patient was given lasix IV 80 mg.    Hospital Course:  7/13: Admitted to IM1. Started on Lasix 40 mg IV BID. Change in HH, 9.9->7.9. Denies changes in color/consistency of BMs and FOBT negative. Hemolytic anemia work-up negative. ABG normal. Added duoneb q6h.  7/14: Continued respiratory effort despite duoneb therapy. Vitals have remained stable. Pulmonology consult advised against thoracentesis at this moment after ultrasound showed bilateral effusions are likely not contributing to SOB. Also recommends discontinuing antibiotics as pneumonia is unlikely and it is adding fluids to her volume overload. Additional recommendation to increase diuretic therapy despite CKD. Consults to cards and pulm placed.    7/15: Patient's respiratory effort improving. Decided to discontinue coumadin at home. Patient is now taking Apixaban and will continue at home upon discharge.  7/16: Patient markedly improved today. Restarted Aspirin now that hemoglobin has stabilized.  7/17: Continued improvement. Increased hydralazine to 50 TID and replaced coreg with metoprolol for better AFib control, per cardiology.  7/18: Pt's family is refusing stress test. Lasix gtt transitioned to IV pushes.  7/19: Patient regressed overnight per family. Patient is winded when speaking and can only make a few words/sounds. Lasix IV pushes transitioned back to Lasix infusion with increase to 20 mg/hr and an initial Lasix 80 mg IV push. Britt placed for better monitoring of I/Os. Nephrology consulted for low urine output. Appreciate recommendations when available.  7/20: Patient improved on Lasix gtt. Metolazone to enhance diuresis. Britt removed secondary to patient wishes.  7/21: Continued improvement. Patient responded well to metolazone with higher urine output. CT  chest showed moderate pleural effusions  7/22: Pulmonology consulted for pleural effusions. Effusions may be contributing to SOB more so than pulmonary edema as diuresis and urine output has been adequate    Interval History: No acute events overnight. Low urine output (-100cc) with purewick. Nursing staff reports saturated diapers. Patient resting comfortably in bed. Family says her breathing has improved.     Review of Systems   Constitutional: Negative for chills and fever.   HENT: Negative for congestion and rhinorrhea.    Eyes: Negative for pain and visual disturbance.   Respiratory: Positive for shortness of breath. Negative for cough.    Cardiovascular: Positive for leg swelling. Negative for chest pain and palpitations.   Gastrointestinal: Negative for abdominal distention, abdominal pain, blood in stool, constipation, diarrhea, nausea and vomiting.   Genitourinary: Negative for difficulty urinating, dysuria, frequency and hematuria.   Skin: Negative for rash and wound.   Neurological: Negative for syncope, light-headedness and headaches.   Psychiatric/Behavioral: Negative for agitation and behavioral problems.     Objective:     Vital Signs (Most Recent):  Temp: 98.3 °F (36.8 °C) (07/22/17 0757)  Pulse: 89 (07/22/17 0757)  Resp: 16 (07/22/17 0757)  BP: 123/65 (07/22/17 0757)  SpO2: 98 % (07/22/17 0757) Vital Signs (24h Range):  Temp:  [97.4 °F (36.3 °C)-98.3 °F (36.8 °C)] 98.3 °F (36.8 °C)  Pulse:  [] 89  Resp:  [16-20] 16  SpO2:  [91 %-99 %] 98 %  BP: (123-141)/(64-73) 123/65     Weight: 69.3 kg (152 lb 12.8 oz)  Body mass index is 26.21 kg/m².    Intake/Output Summary (Last 24 hours) at 07/22/17 0924  Last data filed at 07/22/17 0600   Gross per 24 hour   Intake              650 ml   Output              750 ml   Net             -100 ml      Physical Exam   Constitutional: She is oriented to person, place, and time. She appears well-developed and well-nourished. No distress.   HENT:   Head:  Normocephalic and atraumatic.   Nose: Nose normal.   Mouth/Throat: Oropharynx is clear and moist. No oropharyngeal exudate.   Eyes: EOM are normal. Pupils are equal, round, and reactive to light. No scleral icterus.   Neck: Neck supple. No JVD present. No thyromegaly present.   Cardiovascular: Normal rate, normal heart sounds and intact distal pulses.    No murmur heard.  Irregular rhythm   Pulmonary/Chest: Effort normal. No respiratory distress. She has no wheezes. She has rales.   Coarse BS and crackles bilaterally   Abdominal: Soft. Bowel sounds are normal. She exhibits no distension. There is no tenderness.   Musculoskeletal: She exhibits edema (1+ LE's). She exhibits no tenderness or deformity.   Lymphadenopathy:     She has no cervical adenopathy.   Neurological: She is alert and oriented to person, place, and time. No cranial nerve deficit.   Skin: Skin is warm and dry.   Right forehead hyperpigmentation, does not cross midline, from resolving shingles rash   Psychiatric: She has a normal mood and affect. Her behavior is normal.       Significant Labs:   BMP:   Recent Labs  Lab 07/21/17  1750 07/22/17  0730   *  --      --    K 4.0  --    CL 99  --    CO2 30*  --    BUN 58*  --    CREATININE 2.3*  --    CALCIUM 8.6*  --    MG  --  1.8     CBC:   Recent Labs  Lab 07/21/17  0351 07/22/17  0730   WBC 5.83 5.72   HGB 7.9* 7.5*   HCT 27.1* 24.5*    242       Significant Imaging: CT: I have reviewed all pertinent results/findings within the past 24 hours and my personal findings are:  Moderate plueral effusions bilaterally.    Assessment/Plan:      * Acute on chronic systolic congestive heart failure    -pt presents from cardiology clinic with SOB, MILIAN, LE edema   -on home O2 2.5 L  -patient with increase lasix requirement since shingles and PNA in May (got abx as an out pt)  -most recent echo 6/2017 showed EF 40-45, pulm HTN  -On admit (7/12), CXR shows worsening pulmonary edema, BMP 1413  (above baseline), tn 0.04, EKG shows afib with RVR without acute ischemic changes  - CXR 7/14 shows interval improvement from 7/12  -CT chest 7/21 shows moderate bilateral pleural effusions  - on hydralazine 50 TID, isosorbide dinitirate 10 TID, and Lopressor 50 BID  - Lasix 20 mg/hr gtt. Consider Lasix IV push, per nephrology  - cardiology following, appreciate recs  - Pulmonology consulted regarding pleural effusion. Appreciate recommendations.        Chronic kidney disease (CKD) stage G4/A1, severely decreased glomerular filtration rate (GFR) between 15-29 mL/min/1.73 square meter and albuminuria creatinine ratio less than 30 mg/g    -Cr on admission 2.5, stable (improved from 3.2 ~1 mo ago)  -continue to monitor RFTs  -avoid nephrotoxic agents  -nephrology following. Appreciate recommendations  -negative workup for nephrotic syndromes at this time        Insulin dependent diabetes mellitus    -daughter reports pt takes LA insulin 10 units qhs and SA insulin 5 units WM  -A1c 5.7 7/20  -Discontinued basal-bolus following morning glucose of 40  -Continue LD SSI QID        HTN (hypertension)    -Lopressor 50 BID, hyralazine 50 TID, isordil 10 TID        H/O: CVA (cerebrovascular accident)    -hx of stroke before 2012 per daughter  -pt with residual right sided deficit   -continue atorvastatin 50 mg  -now on eliquis        Normocytic anemia    - On admit (7/12), Hgb 9.9 and dropped to 7.9 (7/13) w/ SOB  - Hemolysis work-up negative, FOBT negative with no reported changes in stool  - currently stable, maintain low-threshold to transfuse        Atrial fibrillation    -patient denying palpitations, lightheadedness, CP  -admission EKG with a fib with RVR   -chadsvasc of 8  -started on Apixaban  -coreg switched to metoprolol tartrate 50 BID        Pneumonia due to infectious organism    - On admit (7/12) CXR showed R-sided airspace opacities concerning for edema or infectious process (SIRS 2/4, HR and RR). No  fevers/chills or productive cough  - ABG normal  - Previously treated PNA with Levaquin on 6/15  - Started on azithromycin and ceftriaxone for CAP, D/C'd 2/2 low suspicion for PNA          Depression    -continue lexapro 5 mg, Wellbutrin 150 mg  -Seroquel qhs         Hyperlipidemia    -atorvastatin 40 mg          VTE Risk Mitigation         Ordered     apixaban tablet 5 mg  2 times daily     Route:  Oral        07/15/17 1102     Medium Risk of VTE  Once      07/12/17 2356        Dispo: To SNF pending resolution of dyspnea. Pulmonary effusion vs pulmonary edema.    Deuce Sexton MD  Department of Hospital Medicine   [facility]

## 2017-07-22 NOTE — ASSESSMENT & PLAN NOTE
71yoF with HFrEF (LKEF 40%) with persistent dyspnea despite diuresis.  Her oxygen requirements have largely been stable since she has been here but she has notably increased WOB though this is similar to how she appeared a week ago.  Her Cr is downtrending with diuresis suggesting a cardiorenal picture and is consistent with further diuresis being a viable strategy.     - Continue diuresis, she has still not substantially contracted (bicarb stable from 3 days prior, Cr downtrending) - suspect she still has substantial volume on board  - Re: her pleural effusions - suspect they are transudative as they are bilateral with R > L and she has no sx suggestive of infection; considering their size it isn't unreasonable to tap the R side  - Please hold Apixaban, plan to tap her on Monday  - Would repeat her ECHO to assess LVEF and PASP (RHC per Cards may be helpful here as well)

## 2017-07-22 NOTE — PROGRESS NOTES
[facility]  Pulmonology  Progress Note    Patient Name: Michael Salgado  MRN: [id number]  Admission Date: 7/12/2017  Hospital Length of Stay: 10 days  Code Status: Full Code  Attending Provider: Kira Ruiz MD  Primary Care Provider: Wesley Watkins MD   Principal Problem: Acute on chronic systolic congestive heart failure    Subjective:     Interval History: Reconsulted for persistent dyspnea despite diuresis.  Patient reports still feeling notably SOB.  Coughing but with limited sputum production.  No fevers, chills.    Objective:     Vital Signs (Most Recent):  Temp: 98.9 °F (37.2 °C) (07/22/17 1244)  Pulse: 85 (07/22/17 1500)  Resp: 20 (07/22/17 1425)  BP: 135/64 (07/22/17 1244)  SpO2: 97 % (07/22/17 1425) Vital Signs (24h Range):  Temp:  [97.4 °F (36.3 °C)-98.9 °F (37.2 °C)] 98.9 °F (37.2 °C)  Pulse:  [80-96] 85  Resp:  [16-20] 20  SpO2:  [96 %-99 %] 97 %  BP: (123-138)/(64-73) 135/64     Weight: 69.3 kg (152 lb 12.8 oz)  Body mass index is 26.21 kg/m².      Intake/Output Summary (Last 24 hours) at 07/22/17 1611  Last data filed at 07/22/17 0600   Gross per 24 hour   Intake              230 ml   Output              150 ml   Net               80 ml       Physical Exam     Gen: Awake, conversant but only able to speak in 1-2 word sentences with prolonged conversation  CV: Irregular, regular rate  Respiratory: Increased WOB particularly with conversation, crackles R>L  Abd: Soft  Ext: 2+ pitting edema of the lower extremities    Vents:       Lines/Drains/Airways     Drain                 Gastrostomy/Enterostomy 01/25/16 0835 Percutaneous endoscopic gastrostomy (PEG) midline feeding 544 days          Peripheral Intravenous Line                 Peripheral IV - Single Lumen 07/21/17 0919 Right Forearm 1 day                Significant Labs:    CBC/Anemia Profile:    Recent Labs  Lab 07/21/17  0351 07/22/17  0730   WBC 5.83 5.72   HGB 7.9* 7.5*   HCT 27.1* 24.5*    242   MCV 93 92    RDW 15.8* 15.6*        Chemistries:    Recent Labs  Lab 07/21/17  0351 07/21/17  1634 07/21/17  1750 07/22/17  0730 07/22/17  0849    141  141 140  --  142   K 4.0 4.4  4.4 4.0  --  3.8    102  102 99  --  100   CO2 32* 28  28 30*  --  32*   BUN 58* 60*  60* 58*  --  59*   CREATININE 2.5* 2.4*  2.4* 2.3*  --  2.2*   CALCIUM 8.4* 8.6*  8.6* 8.6*  --  8.6*   ALBUMIN  --  2.0*  --   --   --    PROT  --  5.5*  --   --   --    BILITOT  --  0.2  --   --   --    ALKPHOS  --  55  --   --   --    ALT  --  6*  --   --   --    AST  --  18  --   --   --    MG 1.9 2.0  --  1.8  --    PHOS 4.6*  --   --  4.3  --        ABGs: No results for input(s): PH, PCO2, HCO3, POCSATURATED, BE in the last 48 hours.  CMP:   Recent Labs  Lab 07/21/17  1634 07/21/17  1750 07/22/17  0849     141 140 142   K 4.4  4.4 4.0 3.8     102 99 100   CO2 28  28 30* 32*   *  211* 191* 179*   BUN 60*  60* 58* 59*   CREATININE 2.4*  2.4* 2.3* 2.2*   CALCIUM 8.6*  8.6* 8.6* 8.6*   PROT 5.5*  --   --    ALBUMIN 2.0*  --   --    BILITOT 0.2  --   --    ALKPHOS 55  --   --    AST 18  --   --    ALT 6*  --   --    ANIONGAP 11  11 11 10   EGFRNONAA 19.4*  19.4* 20.5* 21.6*       Significant Imaging:  I have reviewed all pertinent imaging results/findings within the past 24 hours.    Assessment/Plan:     Acute on chronic respiratory failure with hypoxemia    71yoF with HFrEF (LKEF 40%) with persistent dyspnea despite diuresis.  Her oxygen requirements have largely been stable since she has been here but she has notably increased WOB though this is similar to how she appeared a week ago.  Her Cr is downtrending with diuresis suggesting a cardiorenal picture and is consistent with further diuresis being a viable strategy.     - Continue diuresis, she has still not substantially contracted (bicarb stable from 3 days prior, Cr downtrending) - suspect she still has substantial volume on board  - Re: her pleural effusions  - suspect they are transudative as they are bilateral with R > L and she has no sx suggestive of infection; considering their size it isn't unreasonable to tap the R side  - Please hold Apixaban, plan to tap her on Monday  - Would repeat her ECHO to assess LVEF and PASP (RHC per Cards may be helpful here as well)          * Acute on chronic systolic congestive heart failure    Please see plan for acute on chronic respiratory failure for details             Rocky Cr MD  Pulmonology  [facility]

## 2017-07-22 NOTE — PLAN OF CARE
Problem: Patient Care Overview  Goal: Plan of Care Review  7/20 - Improve breathing.  Patient on Lasix gtt.   Outcome: Ongoing (interventions implemented as appropriate)  VSS. O2 sats stable on 2L NC. Pt denies CP, SOB, palpitations, lightheadedness and dizziness. Fall precautions maintained this shift, pt remains free from falls, trauma and injury. Purewick in place; draining urine. BG elevated this shift; pt received SSI @ bedtime. Pt's niece @ bedside. POC reviewed with pt & niece, verbalized understanding. NADN. Will continue to monitor.

## 2017-07-22 NOTE — ASSESSMENT & PLAN NOTE
-Cr on admission 2.5, stable (improved from 3.2 ~1 mo ago)  -continue to monitor RFTs  -avoid nephrotoxic agents  -nephrology following. Appreciate recommendations  -negative workup for nephrotic syndromes at this time

## 2017-07-22 NOTE — SUBJECTIVE & OBJECTIVE
Interval History: No acute events overnight. Low urine output (-100cc) with purewick. Nursing staff reports saturated diapers. Patient resting comfortably in bed. Family says her breathing has improved.     Review of Systems   Constitutional: Negative for chills and fever.   HENT: Negative for congestion and rhinorrhea.    Eyes: Negative for pain and visual disturbance.   Respiratory: Positive for shortness of breath. Negative for cough.    Cardiovascular: Positive for leg swelling. Negative for chest pain and palpitations.   Gastrointestinal: Negative for abdominal distention, abdominal pain, blood in stool, constipation, diarrhea, nausea and vomiting.   Genitourinary: Negative for difficulty urinating, dysuria, frequency and hematuria.   Skin: Negative for rash and wound.   Neurological: Negative for syncope, light-headedness and headaches.   Psychiatric/Behavioral: Negative for agitation and behavioral problems.     Objective:     Vital Signs (Most Recent):  Temp: 98.3 °F (36.8 °C) (07/22/17 0757)  Pulse: 89 (07/22/17 0757)  Resp: 16 (07/22/17 0757)  BP: 123/65 (07/22/17 0757)  SpO2: 98 % (07/22/17 0757) Vital Signs (24h Range):  Temp:  [97.4 °F (36.3 °C)-98.3 °F (36.8 °C)] 98.3 °F (36.8 °C)  Pulse:  [] 89  Resp:  [16-20] 16  SpO2:  [91 %-99 %] 98 %  BP: (123-141)/(64-73) 123/65     Weight: 69.3 kg (152 lb 12.8 oz)  Body mass index is 26.21 kg/m².    Intake/Output Summary (Last 24 hours) at 07/22/17 0924  Last data filed at 07/22/17 0600   Gross per 24 hour   Intake              650 ml   Output              750 ml   Net             -100 ml      Physical Exam   Constitutional: She is oriented to person, place, and time. She appears well-developed and well-nourished. No distress.   HENT:   Head: Normocephalic and atraumatic.   Nose: Nose normal.   Mouth/Throat: Oropharynx is clear and moist. No oropharyngeal exudate.   Eyes: EOM are normal. Pupils are equal, round, and reactive to light. No scleral icterus.    Neck: Neck supple. No JVD present. No thyromegaly present.   Cardiovascular: Normal rate, normal heart sounds and intact distal pulses.    No murmur heard.  Irregular rhythm   Pulmonary/Chest: Effort normal. No respiratory distress. She has no wheezes. She has rales.   Coarse BS and crackles bilaterally   Abdominal: Soft. Bowel sounds are normal. She exhibits no distension. There is no tenderness.   Musculoskeletal: She exhibits edema (1+ LE's). She exhibits no tenderness or deformity.   Lymphadenopathy:     She has no cervical adenopathy.   Neurological: She is alert and oriented to person, place, and time. No cranial nerve deficit.   Skin: Skin is warm and dry.   Right forehead hyperpigmentation, does not cross midline, from resolving shingles rash   Psychiatric: She has a normal mood and affect. Her behavior is normal.       Significant Labs:   BMP:   Recent Labs  Lab 07/21/17  1750 07/22/17  0730   *  --      --    K 4.0  --    CL 99  --    CO2 30*  --    BUN 58*  --    CREATININE 2.3*  --    CALCIUM 8.6*  --    MG  --  1.8     CBC:   Recent Labs  Lab 07/21/17  0351 07/22/17  0730   WBC 5.83 5.72   HGB 7.9* 7.5*   HCT 27.1* 24.5*    242       Significant Imaging: CT: I have reviewed all pertinent results/findings within the past 24 hours and my personal findings are:  Moderate plueral effusions bilaterally.

## 2017-07-22 NOTE — ASSESSMENT & PLAN NOTE
-pt presents from cardiology clinic with SOB, MILIAN, LE edema   -on home O2 2.5 L  -patient with increase lasix requirement since shingles and PNA in May (got abx as an out pt)  -most recent echo 6/2017 showed EF 40-45, pulm HTN  -On admit (7/12), CXR shows worsening pulmonary edema, BMP 1413 (above baseline), tn 0.04, EKG shows afib with RVR without acute ischemic changes  - CXR 7/14 shows interval improvement from 7/12  -CT chest 7/21 shows moderate bilateral pleural effusions  - on hydralazine 50 TID, isosorbide dinitirate 10 TID, and Lopressor 50 BID  - Lasix 20 mg/hr gtt. Consider Lasix IV push, per nephrology  - cardiology following, appreciate recs  - Pulmonology consulted regarding pleural effusion. Appreciate recommendations.

## 2017-07-22 NOTE — PROGRESS NOTES
Cardiology Progress Note      Subjective:   Interval History: NAEON, Patient still not feeling well and still short of breath.     Medications:   Continuous Infusions:   furosemide (LASIX) 5 mg/mL infusion (non-titrating) 20 mg/hr (07/21/17 0707)       Scheduled Meds:   albuterol-ipratropium 2.5mg-0.5mg/3mL  3 mL Nebulization Q6H    aspirin  81 mg Oral Daily    atorvastatin  40 mg Oral Daily    buPROPion  150 mg Oral Daily    escitalopram oxalate  5 mg Oral Daily    gabapentin  300 mg Oral QHS    hydrALAZINE  50 mg Oral TID    isosorbide dinitrate  10 mg Oral TID    metOLazone  5 mg Oral Daily    metoprolol tartrate  50 mg Oral BID    pantoprazole  40 mg Oral Daily    quetiapine  100 mg Oral QHS    sodium chloride 0.9%  3 mL Intravenous Q8H     PRN Meds:sodium chloride, acetaminophen, acetaminophen, butalbital-acetaminophen-caffeine -40 mg, dextrose 50%, dextrose 50%, diphenhydrAMINE, glucagon (human recombinant), glucose, glucose, insulin aspart, ondansetron  Objective:     Vitals:  Temp:  [97.4 °F (36.3 °C)-98.9 °F (37.2 °C)]   Pulse:  [80-96]   Resp:  [16-20]   BP: (123-138)/(64-73)   SpO2:  [90 %-99 %]  on 3L I/O's:    Intake/Output Summary (Last 24 hours) at 07/22/17 1726  Last data filed at 07/22/17 1700   Gross per 24 hour   Intake              634 ml   Output              150 ml   Net              484 ml        GEN: Alert and oriented, very short of breath unable to speak in full sentences  NECK: + JVD   CVS: iRRegular Rhythm, s1/s2, no MRG  PULM: Crackles noted bilaterally but with decreased sound on the right.   ABD: NT/ND BS +  Extremities: warm and dry, palpable pulses, mild edema  NEURO: Alert and oriented x 3  PSYCH: appropriate affect.           Labs:       Recent Labs  Lab 07/21/17  1634 07/21/17  1750 07/22/17  0849     141 140 142   K 4.4  4.4 4.0 3.8     102 99 100   CO2 28  28 30* 32*   BUN 60*  60* 58* 59*   CREATININE 2.4*  2.4* 2.3* 2.2*   *   211* 191* 179*   ANIONGAP 11  11 11 10       Recent Labs  Lab 07/22/17  0730   MG 1.8   PHOS 4.3       Recent Labs  Lab 07/17/17  1540 07/18/17  0441 07/21/17  1634   AST 9* 10 18   ALT 8* 7* 6*   ALKPHOS 52* 43* 55   BILITOT 0.2 0.2 0.2   ALBUMIN 1.8* 1.7* 2.0*        Recent Labs  Lab 07/20/17  0427 07/21/17  0351 07/22/17  0730   WBC 4.93 5.83 5.72   HGB 7.3* 7.9* 7.5*   HCT 24.5* 27.1* 24.5*    238 242   GRAN 59.9  3.0 64.7  3.8 63.5  3.6     No results for input(s): INR in the last 168 hours.  No results for input(s): TROPONINI, BNP in the last 168 hours.   Micro:   Blood Cultures  Lab Results   Component Value Date    LABBLOO No growth after 5 days. 02/09/2017    LABBLOO No growth after 5 days. 02/09/2017    LABBLOO No growth after 5 days. 07/06/2016    LABBLOO No growth after 5 days. 07/06/2016    LABBLOO No growth after 5 days. 04/25/2016     Urine Cultures  Lab Results   Component Value Date    LABURIN No growth 07/19/2017    LABURIN ESCHERICHIA COLI  >100,000 cfu/ml   11/25/2016    LABURIN KLEBSIELLA PNEUMONIAE  > 100,000 cfu/ml   11/25/2016    LABURIN KLEBSIELLA PNEUMONIAE  >100,000 cfu/ml   11/03/2016    LABURIN ESCHERICHIA COLI  > 100,000 cfu/ml   11/03/2016       Imaging:     EF   Date Value Ref Range Status   06/23/2017 40 (A) 55 - 65    04/29/2016 60 55 - 65    04/26/2016 60 55 - 65    12/14/2015 60 55 - 65    08/05/2015 60 55 - 65          Assessment/Plan   71 y.o. 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    Chronic Atrial Fibrillation   [id number]   - Continue with eliquis   - Continue with metoprolol    Bilateral pleural effusions   - Will continue with diuresis   - If worsening consider thoracentesis       Signed:  Ag Medina MD  Cardiology Fellow  Pager [phone]

## 2017-07-22 NOTE — SUBJECTIVE & OBJECTIVE
Interval History: Reconsulted for persistent dyspnea despite diuresis.  Patient reports still feeling notably SOB.  Coughing but with limited sputum production.  No fevers, chills.    Objective:     Vital Signs (Most Recent):  Temp: 98.9 °F (37.2 °C) (07/22/17 1244)  Pulse: 85 (07/22/17 1500)  Resp: 20 (07/22/17 1425)  BP: 135/64 (07/22/17 1244)  SpO2: 97 % (07/22/17 1425) Vital Signs (24h Range):  Temp:  [97.4 °F (36.3 °C)-98.9 °F (37.2 °C)] 98.9 °F (37.2 °C)  Pulse:  [80-96] 85  Resp:  [16-20] 20  SpO2:  [96 %-99 %] 97 %  BP: (123-138)/(64-73) 135/64     Weight: 69.3 kg (152 lb 12.8 oz)  Body mass index is 26.21 kg/m².      Intake/Output Summary (Last 24 hours) at 07/22/17 1611  Last data filed at 07/22/17 0600   Gross per 24 hour   Intake              230 ml   Output              150 ml   Net               80 ml       Physical Exam     Gen: Awake, conversant but only able to speak in 1-2 word sentences with prolonged conversation  CV: Irregular, regular rate  Respiratory: Increased WOB particularly with conversation, crackles R>L  Abd: Soft  Ext: 2+ pitting edema of the lower extremities    Vents:       Lines/Drains/Airways     Drain                 Gastrostomy/Enterostomy 01/25/16 0835 Percutaneous endoscopic gastrostomy (PEG) midline feeding 544 days          Peripheral Intravenous Line                 Peripheral IV - Single Lumen 07/21/17 0919 Right Forearm 1 day                Significant Labs:    CBC/Anemia Profile:    Recent Labs  Lab 07/21/17  0351 07/22/17  0730   WBC 5.83 5.72   HGB 7.9* 7.5*   HCT 27.1* 24.5*    242   MCV 93 92   RDW 15.8* 15.6*        Chemistries:    Recent Labs  Lab 07/21/17  0351 07/21/17  1634 07/21/17  1750 07/22/17  0730 07/22/17  0849    141  141 140  --  142   K 4.0 4.4  4.4 4.0  --  3.8    102  102 99  --  100   CO2 32* 28  28 30*  --  32*   BUN 58* 60*  60* 58*  --  59*   CREATININE 2.5* 2.4*  2.4* 2.3*  --  2.2*   CALCIUM 8.4* 8.6*  8.6* 8.6*  --   8.6*   ALBUMIN  --  2.0*  --   --   --    PROT  --  5.5*  --   --   --    BILITOT  --  0.2  --   --   --    ALKPHOS  --  55  --   --   --    ALT  --  6*  --   --   --    AST  --  18  --   --   --    MG 1.9 2.0  --  1.8  --    PHOS 4.6*  --   --  4.3  --        ABGs: No results for input(s): PH, PCO2, HCO3, POCSATURATED, BE in the last 48 hours.  CMP:   Recent Labs  Lab 07/21/17  1634 07/21/17  1750 07/22/17  0849     141 140 142   K 4.4  4.4 4.0 3.8     102 99 100   CO2 28  28 30* 32*   *  211* 191* 179*   BUN 60*  60* 58* 59*   CREATININE 2.4*  2.4* 2.3* 2.2*   CALCIUM 8.6*  8.6* 8.6* 8.6*   PROT 5.5*  --   --    ALBUMIN 2.0*  --   --    BILITOT 0.2  --   --    ALKPHOS 55  --   --    AST 18  --   --    ALT 6*  --   --    ANIONGAP 11  11 11 10   EGFRNONAA 19.4*  19.4* 20.5* 21.6*       Significant Imaging:  I have reviewed all pertinent imaging results/findings within the past 24 hours.

## 2017-07-23 PROBLEM — J96.21 ACUTE ON CHRONIC RESPIRATORY FAILURE WITH HYPOXIA: Status: ACTIVE | Noted: 2017-07-18

## 2017-07-23 PROBLEM — J18.9 PNEUMONIA DUE TO INFECTIOUS ORGANISM: Status: RESOLVED | Noted: 2017-07-14 | Resolved: 2017-07-23

## 2017-07-23 LAB
ANION GAP SERPL CALC-SCNC: 10 MMOL/L
ANION GAP SERPL CALC-SCNC: 11 MMOL/L
BASOPHILS # BLD AUTO: 0.04 K/UL
BASOPHILS NFR BLD: 0.7 %
BNP SERPL-MCNC: 1653 PG/ML
BUN SERPL-MCNC: 63 MG/DL
BUN SERPL-MCNC: 69 MG/DL
CALCIUM SERPL-MCNC: 8.5 MG/DL
CALCIUM SERPL-MCNC: 8.6 MG/DL
CHLORIDE SERPL-SCNC: 97 MMOL/L
CHLORIDE SERPL-SCNC: 97 MMOL/L
CO2 SERPL-SCNC: 35 MMOL/L
CO2 SERPL-SCNC: 36 MMOL/L
CREAT SERPL-MCNC: 2.3 MG/DL
CREAT SERPL-MCNC: 2.4 MG/DL
DIFFERENTIAL METHOD: ABNORMAL
EOSINOPHIL # BLD AUTO: 0.2 K/UL
EOSINOPHIL NFR BLD: 2.5 %
ERYTHROCYTE [DISTWIDTH] IN BLOOD BY AUTOMATED COUNT: 15.5 %
EST. GFR  (AFRICAN AMERICAN): 22.4 ML/MIN/1.73 M^2
EST. GFR  (AFRICAN AMERICAN): 23.6 ML/MIN/1.73 M^2
EST. GFR  (NON AFRICAN AMERICAN): 19.4 ML/MIN/1.73 M^2
EST. GFR  (NON AFRICAN AMERICAN): 20.5 ML/MIN/1.73 M^2
GLUCOSE SERPL-MCNC: 168 MG/DL
GLUCOSE SERPL-MCNC: 180 MG/DL
HCT VFR BLD AUTO: 23.5 %
HGB BLD-MCNC: 7.1 G/DL
LYMPHOCYTES # BLD AUTO: 1.2 K/UL
LYMPHOCYTES NFR BLD: 20.5 %
MAGNESIUM SERPL-MCNC: 1.8 MG/DL
MCH RBC QN AUTO: 27.8 PG
MCHC RBC AUTO-ENTMCNC: 30.2 G/DL
MCV RBC AUTO: 92 FL
MONOCYTES # BLD AUTO: 0.8 K/UL
MONOCYTES NFR BLD: 13 %
NEUTROPHILS # BLD AUTO: 3.8 K/UL
NEUTROPHILS NFR BLD: 63.1 %
PHOSPHATE SERPL-MCNC: 3.9 MG/DL
PLATELET # BLD AUTO: 221 K/UL
PMV BLD AUTO: 9.4 FL
POCT GLUCOSE: 206 MG/DL (ref 70–110)
POCT GLUCOSE: 208 MG/DL (ref 70–110)
POCT GLUCOSE: 215 MG/DL (ref 70–110)
POCT GLUCOSE: 280 MG/DL (ref 70–110)
POTASSIUM SERPL-SCNC: 3.4 MMOL/L
POTASSIUM SERPL-SCNC: 4.2 MMOL/L
RBC # BLD AUTO: 2.55 M/UL
SODIUM SERPL-SCNC: 143 MMOL/L
SODIUM SERPL-SCNC: 143 MMOL/L
WBC # BLD AUTO: 5.94 K/UL

## 2017-07-23 PROCEDURE — 99232 SBSQ HOSP IP/OBS MODERATE 35: CPT | Mod: GC,,, | Performed by: INTERNAL MEDICINE

## 2017-07-23 PROCEDURE — 25000242 PHARM REV CODE 250 ALT 637 W/ HCPCS: Performed by: STUDENT IN AN ORGANIZED HEALTH CARE EDUCATION/TRAINING PROGRAM

## 2017-07-23 PROCEDURE — 25000003 PHARM REV CODE 250: Performed by: STUDENT IN AN ORGANIZED HEALTH CARE EDUCATION/TRAINING PROGRAM

## 2017-07-23 PROCEDURE — 94761 N-INVAS EAR/PLS OXIMETRY MLT: CPT

## 2017-07-23 PROCEDURE — 63600175 PHARM REV CODE 636 W HCPCS: Performed by: HOSPITALIST

## 2017-07-23 PROCEDURE — 36415 COLL VENOUS BLD VENIPUNCTURE: CPT

## 2017-07-23 PROCEDURE — 94640 AIRWAY INHALATION TREATMENT: CPT

## 2017-07-23 PROCEDURE — 20600001 HC STEP DOWN PRIVATE ROOM

## 2017-07-23 PROCEDURE — 83735 ASSAY OF MAGNESIUM: CPT

## 2017-07-23 PROCEDURE — 99233 SBSQ HOSP IP/OBS HIGH 50: CPT | Mod: GC,,, | Performed by: HOSPITALIST

## 2017-07-23 PROCEDURE — 25000003 PHARM REV CODE 250: Performed by: HOSPITALIST

## 2017-07-23 PROCEDURE — 80048 BASIC METABOLIC PNL TOTAL CA: CPT | Mod: 91

## 2017-07-23 PROCEDURE — 84100 ASSAY OF PHOSPHORUS: CPT

## 2017-07-23 PROCEDURE — 83880 ASSAY OF NATRIURETIC PEPTIDE: CPT

## 2017-07-23 PROCEDURE — 85025 COMPLETE CBC W/AUTO DIFF WBC: CPT

## 2017-07-23 RX ORDER — IPRATROPIUM BROMIDE AND ALBUTEROL SULFATE 2.5; .5 MG/3ML; MG/3ML
3 SOLUTION RESPIRATORY (INHALATION) EVERY 4 HOURS PRN
Status: DISCONTINUED | OUTPATIENT
Start: 2017-07-23 | End: 2017-07-24

## 2017-07-23 RX ORDER — POTASSIUM CHLORIDE 20 MEQ/1
40 TABLET, EXTENDED RELEASE ORAL ONCE
Status: COMPLETED | OUTPATIENT
Start: 2017-07-23 | End: 2017-07-23

## 2017-07-23 RX ORDER — INSULIN ASPART 100 [IU]/ML
3 INJECTION, SOLUTION INTRAVENOUS; SUBCUTANEOUS
Status: DISCONTINUED | OUTPATIENT
Start: 2017-07-23 | End: 2017-07-23

## 2017-07-23 RX ORDER — METOPROLOL TARTRATE 25 MG/1
25 TABLET, FILM COATED ORAL ONCE
Status: COMPLETED | OUTPATIENT
Start: 2017-07-23 | End: 2017-07-23

## 2017-07-23 RX ORDER — MAGNESIUM SULFATE HEPTAHYDRATE 40 MG/ML
2 INJECTION, SOLUTION INTRAVENOUS ONCE
Status: DISCONTINUED | OUTPATIENT
Start: 2017-07-23 | End: 2017-07-23

## 2017-07-23 RX ORDER — LANOLIN ALCOHOL/MO/W.PET/CERES
400 CREAM (GRAM) TOPICAL 2 TIMES DAILY
Status: COMPLETED | OUTPATIENT
Start: 2017-07-23 | End: 2017-07-23

## 2017-07-23 RX ADMIN — Medication 3 ML: at 05:07

## 2017-07-23 RX ADMIN — INSULIN DETEMIR 5 UNITS: 100 INJECTION, SOLUTION SUBCUTANEOUS at 09:07

## 2017-07-23 RX ADMIN — INSULIN ASPART 4 UNITS: 100 INJECTION, SOLUTION INTRAVENOUS; SUBCUTANEOUS at 05:07

## 2017-07-23 RX ADMIN — METOPROLOL TARTRATE 50 MG: 50 TABLET, FILM COATED ORAL at 08:07

## 2017-07-23 RX ADMIN — HYDRALAZINE HYDROCHLORIDE 50 MG: 50 TABLET ORAL at 05:07

## 2017-07-23 RX ADMIN — FUROSEMIDE 20 MG/HR: 10 INJECTION, SOLUTION INTRAMUSCULAR; INTRAVENOUS at 05:07

## 2017-07-23 RX ADMIN — MAGNESIUM OXIDE TAB 400 MG (241.3 MG ELEMENTAL MG) 400 MG: 400 (241.3 MG) TAB at 10:07

## 2017-07-23 RX ADMIN — ASPIRIN 81 MG: 81 TABLET, COATED ORAL at 08:07

## 2017-07-23 RX ADMIN — ISOSORBIDE DINITRATE 10 MG: 10 TABLET ORAL at 09:07

## 2017-07-23 RX ADMIN — IPRATROPIUM BROMIDE AND ALBUTEROL SULFATE 3 ML: .5; 3 SOLUTION RESPIRATORY (INHALATION) at 03:07

## 2017-07-23 RX ADMIN — INSULIN ASPART 4 UNITS: 100 INJECTION, SOLUTION INTRAVENOUS; SUBCUTANEOUS at 12:07

## 2017-07-23 RX ADMIN — BUPROPION HYDROCHLORIDE 150 MG: 150 TABLET, FILM COATED, EXTENDED RELEASE ORAL at 08:07

## 2017-07-23 RX ADMIN — ISOSORBIDE DINITRATE 10 MG: 10 TABLET ORAL at 05:07

## 2017-07-23 RX ADMIN — QUETIAPINE FUMARATE 100 MG: 100 TABLET, FILM COATED ORAL at 09:07

## 2017-07-23 RX ADMIN — PANTOPRAZOLE SODIUM 40 MG: 40 TABLET, DELAYED RELEASE ORAL at 08:07

## 2017-07-23 RX ADMIN — MAGNESIUM OXIDE TAB 400 MG (241.3 MG ELEMENTAL MG) 400 MG: 400 (241.3 MG) TAB at 09:07

## 2017-07-23 RX ADMIN — ESCITALOPRAM 5 MG: 5 TABLET, FILM COATED ORAL at 08:07

## 2017-07-23 RX ADMIN — METOPROLOL TARTRATE 25 MG: 25 TABLET ORAL at 10:07

## 2017-07-23 RX ADMIN — IPRATROPIUM BROMIDE AND ALBUTEROL SULFATE 3 ML: .5; 3 SOLUTION RESPIRATORY (INHALATION) at 08:07

## 2017-07-23 RX ADMIN — ATORVASTATIN CALCIUM 40 MG: 20 TABLET, FILM COATED ORAL at 08:07

## 2017-07-23 RX ADMIN — HYDRALAZINE HYDROCHLORIDE 50 MG: 50 TABLET ORAL at 09:07

## 2017-07-23 RX ADMIN — GABAPENTIN 300 MG: 300 CAPSULE ORAL at 09:07

## 2017-07-23 RX ADMIN — INSULIN ASPART 4 UNITS: 100 INJECTION, SOLUTION INTRAVENOUS; SUBCUTANEOUS at 08:07

## 2017-07-23 RX ADMIN — METOPROLOL TARTRATE 75 MG: 50 TABLET, FILM COATED ORAL at 09:07

## 2017-07-23 RX ADMIN — HYDRALAZINE HYDROCHLORIDE 50 MG: 50 TABLET ORAL at 01:07

## 2017-07-23 RX ADMIN — INSULIN DETEMIR 5 UNITS: 100 INJECTION, SOLUTION SUBCUTANEOUS at 10:07

## 2017-07-23 RX ADMIN — POTASSIUM CHLORIDE 40 MEQ: 1500 TABLET, EXTENDED RELEASE ORAL at 10:07

## 2017-07-23 RX ADMIN — CHLOROTHIAZIDE SODIUM 250 MG: 500 INJECTION, POWDER, LYOPHILIZED, FOR SOLUTION INTRAVENOUS at 07:07

## 2017-07-23 RX ADMIN — INSULIN ASPART 3 UNITS: 100 INJECTION, SOLUTION INTRAVENOUS; SUBCUTANEOUS at 09:07

## 2017-07-23 RX ADMIN — ISOSORBIDE DINITRATE 10 MG: 10 TABLET ORAL at 01:07

## 2017-07-23 RX ADMIN — Medication 3 ML: at 02:07

## 2017-07-23 NOTE — ASSESSMENT & PLAN NOTE
-On admit (7/12), CXR shows worsening pulmonary edema, BMP 1413 (above baseline), tn 0.04, EKG shows afib with RVR without acute ischemic changes. most recent echo 6/2017 showed EF 40-45, pulm HTN. -CT chest 7/21 shows moderate bilateral pleural effusions  - cardiology following, appreciate recs  - cont with lasix 20 /hr for diuresis. However, no significant improvement thus far given clinical status and BNP of 1600 on 7/23 (from 1700 on admit)  - received metolazone 5 mg X1, however, I/Os are still inaccurate as pt declined barraza  - Pulmonology consu lted regarding pleural effusion - apixaban held and thoracentesis is planned for tomorrow  - cont on hydralazine 50 TID, isosorbide dinitirate 10 TID  - increase Lopressor to 75mg BID  - chlorothiazide 250mg IV once per cards recs  - cardiac PET stress is pending

## 2017-07-23 NOTE — ASSESSMENT & PLAN NOTE
- On admit (7/12), Hgb 9.9 and dropped to 7.9 (7/13) w/ SOB  - Hemolysis work-up negative  - type and screen for the AM given hbg of 7.1 today

## 2017-07-23 NOTE — SUBJECTIVE & OBJECTIVE
Interval History:     continuous to have SOB and intermittent trouble breathing. However, satting well on 1-2 L NC. I/O inaccurate as pt declined barraza. VSS with HR in the 90s. Cr improved to 2.3. Remains on lasix gtt 20/hr. Cardiology is following    Review of Systems   Constitutional: Negative for chills and fever.   HENT: Negative for congestion and rhinorrhea.    Eyes: Negative for pain and visual disturbance.   Respiratory: Positive for shortness of breath. Negative for cough.    Cardiovascular: Positive for leg swelling. Negative for chest pain and palpitations.   Gastrointestinal: Negative for abdominal distention, abdominal pain, blood in stool, constipation, diarrhea, nausea and vomiting.   Genitourinary: Negative for difficulty urinating, dysuria, frequency and hematuria.   Musculoskeletal: Negative for arthralgias and back pain.   Skin: Negative for rash and wound.   Neurological: Negative for syncope, light-headedness and headaches.   Psychiatric/Behavioral: Negative for agitation and behavioral problems.     Objective:     Vital Signs (Most Recent):  Temp: 97.8 °F (36.6 °C) (07/23/17 1225)  Pulse: 90 (07/23/17 1225)  Resp: 18 (07/23/17 1225)  BP: (!) 105/56 (07/23/17 1225)  SpO2: 96 % (07/23/17 1225) Vital Signs (24h Range):  Temp:  [97.5 °F (36.4 °C)-98.7 °F (37.1 °C)] 97.8 °F (36.6 °C)  Pulse:  [] 90  Resp:  [16-20] 18  SpO2:  [90 %-99 %] 96 %  BP: (105-139)/(56-84) 105/56     Weight: 69.3 kg (152 lb 12.8 oz)  Body mass index is 26.21 kg/m².    Intake/Output Summary (Last 24 hours) at 07/23/17 1608  Last data filed at 07/23/17 0600   Gross per 24 hour   Intake              282 ml   Output                0 ml   Net              282 ml      Physical Exam   Constitutional: She is oriented to person, place, and time. She appears well-developed and well-nourished. She appears distressed.   HENT:   Head: Normocephalic and atraumatic.   Right Ear: External ear normal.   Left Ear: External ear normal.    Eyes: EOM are normal. Pupils are equal, round, and reactive to light. No scleral icterus.   Neck: Neck supple. No thyromegaly present.   Cardiovascular: Normal rate, normal heart sounds and intact distal pulses.    No murmur heard.  Irregular rhythm   Pulmonary/Chest: She is in respiratory distress. She has no wheezes. She has rales.   Coarse BS and crackles bilaterally   Abdominal: Soft. Bowel sounds are normal. She exhibits no distension. There is no tenderness.   Musculoskeletal: Normal range of motion. She exhibits edema. She exhibits no tenderness or deformity.   Neurological: She is alert and oriented to person, place, and time. No cranial nerve deficit.   Skin: Skin is warm and dry.   Right forehead hyperpigmentation, does not cross midline, from resolving shingles rash   Psychiatric: She has a normal mood and affect. Her behavior is normal.       Significant Labs:   CBC:   Recent Labs  Lab 07/22/17  0730 07/23/17  0340   WBC 5.72 5.94   HGB 7.5* 7.1*   HCT 24.5* 23.5*    221     CMP:   Recent Labs  Lab 07/21/17  1634  07/22/17  0849 07/22/17  1823 07/23/17  0746     141  < > 142 142 143   K 4.4  4.4  < > 3.8 3.5 3.4*     102  < > 100 96 97   CO2 28  28  < > 32* 35* 36*   *  211*  < > 179* 244* 168*   BUN 60*  60*  < > 59* 64* 63*   CREATININE 2.4*  2.4*  < > 2.2* 2.4* 2.3*   CALCIUM 8.6*  8.6*  < > 8.6* 8.5* 8.6*   PROT 5.5*  --   --   --   --    ALBUMIN 2.0*  --   --   --   --    BILITOT 0.2  --   --   --   --    ALKPHOS 55  --   --   --   --    AST 18  --   --   --   --    ALT 6*  --   --   --   --    ANIONGAP 11  11  < > 10 11 10   EGFRNONAA 19.4*  19.4*  < > 21.6* 19.4* 20.5*   < > = values in this interval not displayed.  POCT Glucose:   Recent Labs  Lab 07/22/17  2228 07/23/17  0752 07/23/17  1246   POCTGLUCOSE 169* 208* 206*        Ref. Range 6/22/2017 13:15 7/12/2017 17:39 7/23/2017 03:40   BNP Latest Ref Range: 0 - 99 pg/mL 1,766 (H) 1,413 (H) 1,653 (H)

## 2017-07-23 NOTE — ASSESSMENT & PLAN NOTE
-daughter reports pt takes LA insulin 10 units qhs and SA insulin 5 units WM  -A1c 5.7 7/20  -restrated on detemir 5 u BID with SSI

## 2017-07-23 NOTE — PLAN OF CARE
Problem: Fall Risk (Adult)  Goal: Identify Related Risk Factors and Signs and Symptoms  Related risk factors and signs and symptoms are identified upon initiation of Human Response Clinical Practice Guideline (CPG)   Outcome: Ongoing (interventions implemented as appropriate)  Lasix gtt infusing @ MD ordered rate. VSS. O2 sats stable on 1L NC. Pt denies CP, SOB, palpitations, lightheadedness and dizziness. Fall precautions maintained this shift, pt remains free from falls, trauma and injury. Purewick in place; draining urine. BG elevated this shift; pt received SSI @ bedtime. Pt's daughter @ bedside. POC reviewed with pt & niece, verbalized understanding. NADN. Will continue to monitor.

## 2017-07-23 NOTE — PROGRESS NOTES
[facility] Hospital Medicine  Progress Note    Patient Name: Michael Salgado  MRN: [id number]  Patient Class: IP- Inpatient   Admission Date: 7/12/2017  Length of Stay: 11 days  Attending Physician: Kira Ruiz MD  Primary Care Provider: Wesley Watkins MD    [facility] Medicine Team: [facility] Ying Bell MD    Subjective:     Principal Problem:Acute on chronic systolic congestive heart failure    HPI:  71 year old  yo female with history of CAD s/p CABGx3 2015, ICM, HFrEF (EF 40%, PA 41 echo 6/2017), CKD stage IV, IDDM2, HTN, 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 Patient has baseline MILIAN, orthopnea, PND, LE edema, and abdo swelling that has also progressively worsened over the past few days. She is compliant with her home medications which include lasix 20 BID, coreg, hydralazine, amlodipine, and warfarin. She follows a low salt, fluid restrict diet and she has not deviated from it recently. She reports similar symptoms about a year ago that required hospitalization for CHF exacerbation. Patient recently had pneumonia and shingles in May and has not recovered full function since. She previously was able to ambulate with a walker and now uses a wheelchair. Prior to the shingles and PNA, she only required 10 mg lasix, but her lasix was increased afterwords due to increased SOB and volume overload.  She uses home O2 2.5 L. She is complaining of decreased UO, dysuria, and abdominal discomfort. She denies chest pain, palpitations, constipation (last BM today), diarrhea, cough, fever, chills, or any other complaints at this time. She lives at home with her daughter who provides with with significant assistance with her ADL's. Her cardiologist is Dr. Davis. She was last seen in cardiology clinic today and was sent to the ED from  there due to her SOB and edema.     In the ED, CXR showed increased pulmonary edema, BNP 1413 (which is above her baseline), tn 0.044, EKG with a fib with RVR but no acute ischemic changes. Patient was given lasix IV 80 mg.    Hospital Course:  7/13: Admitted to IM1. Started on Lasix 40 mg IV BID. Change in HH, 9.9->7.9. Denies changes in color/consistency of BMs and FOBT negative. Hemolytic anemia work-up negative. ABG normal. Added duoneb q6h.  7/14: Continued respiratory effort despite duoneb therapy. Vitals have remained stable. Pulmonology consult advised against thoracentesis at this moment after ultrasound showed bilateral effusions are likely not contributing to SOB. Also recommends discontinuing antibiotics as pneumonia is unlikely and it is adding fluids to her volume overload. Additional recommendation to increase diuretic therapy despite CKD. Consults to cards and pulm placed.    7/15: Patient's respiratory effort improving. Decided to discontinue coumadin at home. Patient is now taking Apixaban and will continue at home upon discharge.  7/16: Patient markedly improved today. Restarted Aspirin now that hemoglobin has stabilized.  7/17: Continued improvement. Increased hydralazine to 50 TID and replaced coreg with metoprolol for better AFib control, per cardiology.  7/18: Pt's family is refusing stress test. Lasix gtt transitioned to IV pushes.  7/19: Patient regressed overnight per family. Patient is winded when speaking and can only make a few words/sounds. Lasix IV pushes transitioned back to Lasix infusion with increase to 20 mg/hr and an initial Lasix 80 mg IV push. Britt placed for better monitoring of I/Os. Nephrology consulted for low urine output. Appreciate recommendations when available.  7/20: Patient improved on Lasix gtt. Metolazone to enhance diuresis. Britt removed secondary to patient wishes.  7/21: Continued improvement. Patient responded well to metolazone with higher urine output. CT  chest showed moderate pleural effusions  7/22: Pulmonology consulted for pleural effusions. Effusions may be contributing to SOB more so than pulmonary edema as diuresis and urine output has been adequate. thora planned for 7/24, apixaban is held. Cont to refuse barraza and with unclear I/O, tenuous resp status with inadequate diuresis    Interval History:     continuous to have SOB and intermittent trouble breathing. However, satting well on 1-2 L NC. I/O inaccurate as pt declined barraza. VSS with HR in the 90s. Cr improved to 2.3. Remains on lasix gtt 20/hr. Cardiology is following    Review of Systems   Constitutional: Negative for chills and fever.   HENT: Negative for congestion and rhinorrhea.    Eyes: Negative for pain and visual disturbance.   Respiratory: Positive for shortness of breath. Negative for cough.    Cardiovascular: Positive for leg swelling. Negative for chest pain and palpitations.   Gastrointestinal: Negative for abdominal distention, abdominal pain, blood in stool, constipation, diarrhea, nausea and vomiting.   Genitourinary: Negative for difficulty urinating, dysuria, frequency and hematuria.   Musculoskeletal: Negative for arthralgias and back pain.   Skin: Negative for rash and wound.   Neurological: Negative for syncope, light-headedness and headaches.   Psychiatric/Behavioral: Negative for agitation and behavioral problems.     Objective:     Vital Signs (Most Recent):  Temp: 97.8 °F (36.6 °C) (07/23/17 1225)  Pulse: 90 (07/23/17 1225)  Resp: 18 (07/23/17 1225)  BP: (!) 105/56 (07/23/17 1225)  SpO2: 96 % (07/23/17 1225) Vital Signs (24h Range):  Temp:  [97.5 °F (36.4 °C)-98.7 °F (37.1 °C)] 97.8 °F (36.6 °C)  Pulse:  [] 90  Resp:  [16-20] 18  SpO2:  [90 %-99 %] 96 %  BP: (105-139)/(56-84) 105/56     Weight: 69.3 kg (152 lb 12.8 oz)  Body mass index is 26.21 kg/m².    Intake/Output Summary (Last 24 hours) at 07/23/17 5268  Last data filed at 07/23/17 0600   Gross per 24 hour   Intake               282 ml   Output                0 ml   Net              282 ml      Physical Exam   Constitutional: She is oriented to person, place, and time. She appears well-developed and well-nourished. She appears distressed.   HENT:   Head: Normocephalic and atraumatic.   Right Ear: External ear normal.   Left Ear: External ear normal.   Eyes: EOM are normal. Pupils are equal, round, and reactive to light. No scleral icterus.   Neck: Neck supple. No thyromegaly present.   Cardiovascular: Normal rate, normal heart sounds and intact distal pulses.    No murmur heard.  Irregular rhythm   Pulmonary/Chest: She is in respiratory distress. She has no wheezes. She has rales.   Coarse BS and crackles bilaterally   Abdominal: Soft. Bowel sounds are normal. She exhibits no distension. There is no tenderness.   Musculoskeletal: Normal range of motion. She exhibits edema. She exhibits no tenderness or deformity.   Neurological: She is alert and oriented to person, place, and time. No cranial nerve deficit.   Skin: Skin is warm and dry.   Right forehead hyperpigmentation, does not cross midline, from resolving shingles rash   Psychiatric: She has a normal mood and affect. Her behavior is normal.       Significant Labs:   CBC:   Recent Labs  Lab 07/22/17  0730 07/23/17  0340   WBC 5.72 5.94   HGB 7.5* 7.1*   HCT 24.5* 23.5*    221     CMP:   Recent Labs  Lab 07/21/17  1634  07/22/17  0849 07/22/17  1823 07/23/17  0746     141  < > 142 142 143   K 4.4  4.4  < > 3.8 3.5 3.4*     102  < > 100 96 97   CO2 28  28  < > 32* 35* 36*   *  211*  < > 179* 244* 168*   BUN 60*  60*  < > 59* 64* 63*   CREATININE 2.4*  2.4*  < > 2.2* 2.4* 2.3*   CALCIUM 8.6*  8.6*  < > 8.6* 8.5* 8.6*   PROT 5.5*  --   --   --   --    ALBUMIN 2.0*  --   --   --   --    BILITOT 0.2  --   --   --   --    ALKPHOS 55  --   --   --   --    AST 18  --   --   --   --    ALT 6*  --   --   --   --    ANIONGAP 11  11  < > 10 11 10    EGFRNONAA 19.4*  19.4*  < > 21.6* 19.4* 20.5*   < > = values in this interval not displayed.  POCT Glucose:   Recent Labs  Lab 07/22/17  2228 07/23/17  0752 07/23/17  1246   POCTGLUCOSE 169* 208* 206*        Ref. Range 6/22/2017 13:15 7/12/2017 17:39 7/23/2017 03:40   BNP Latest Ref Range: 0 - 99 pg/mL 1,766 (H) 1,413 (H) 1,653 (H)     Assessment/Plan:      * Acute on chronic systolic congestive heart failure    -On admit (7/12), CXR shows worsening pulmonary edema, BMP 1413 (above baseline), tn 0.04, EKG shows afib with RVR without acute ischemic changes. most recent echo 6/2017 showed EF 40-45, pulm HTN. -CT chest 7/21 shows moderate bilateral pleural effusions  - cardiology following, appreciate recs  - cont with lasix 20 /hr for diuresis. However, no significant improvement thus far given clinical status and BNP of 1600 on 7/23 (from 1700 on admit)  - received metolazone 5 mg X1, however, I/Os are still inaccurate as pt declined barraza  - Pulmonology consu lted regarding pleural effusion - apixaban held and thoracentesis is planned for tomorrow  - cont on hydralazine 50 TID, isosorbide dinitirate 10 TID  - increase Lopressor to 75mg BID  - chlorothiazide 250mg IV once per cards recs  - cardiac PET stress is pending            Acute on chronic respiratory failure with hypoxia    -on home O2 2.5 L  - acute resp failure 2/2 ADHF along with pleural effusions          Atrial fibrillation    -chadsvasc of 8  - Apixaban held for thoracentesis   -coreg switched to metoprolol tartrate 75 BID        Normocytic anemia    - On admit (7/12), Hgb 9.9 and dropped to 7.9 (7/13) w/ SOB  - Hemolysis work-up negative  - type and screen for the AM given hbg of 7.1 today        HTN (hypertension)    -Lopressor 75 BID, hyralazine 50 TID, isordil 10 TID        Chronic kidney disease (CKD) stage G4/A1, severely decreased glomerular filtration rate (GFR) between 15-29 mL/min/1.73 square meter and albuminuria creatinine ratio less than  30 mg/g    -Cr on admission 2.5, stable (improved from 3.2 ~1 mo ago)  -nephrology following. Appreciate recommendations  -negative workup for nephrotic syndromes at this time  - monitor Cr daily- now 2.3        H/O: CVA (cerebrovascular accident)    -hx of stroke before 2012 per daughter  -pt with residual right sided deficit   -continue atorvastatin 40 mg  -apizaban held for thora        Insulin dependent diabetes mellitus    -daughter reports pt takes LA insulin 10 units qhs and SA insulin 5 units WM  -A1c 5.7 7/20  -restrated on detemir 5 u BID with SSI        Hyperlipidemia    -atorvastatin 40 mg        Depression    -continue lexapro 5 mg, Wellbutrin 150 mg  -Seroquel qhs           VTE Risk Mitigation         Ordered     Medium Risk of VTE  Once      07/12/17 8945        Pending clinical improvement     Ying Bell MD  Department of Hospital Medicine   [facility]

## 2017-07-23 NOTE — PLAN OF CARE
Problem: Patient Care Overview  Goal: Plan of Care Review  7/20 - Improve breathing.  Patient on Lasix gtt.   Outcome: Ongoing (interventions implemented as appropriate)  Patient is free of fall/trauma/injury. Denies CP, SOB, or pain/discomfort. Lasix drip ongoing. Pt continues good output. Unmeasured due to incontinence/inability to use purewick. Plan of care discussed with pt. Pt verbalizes understanding. All questions addressed. Will continue to monitor

## 2017-07-23 NOTE — ASSESSMENT & PLAN NOTE
-hx of stroke before 2012 per daughter  -pt with residual right sided deficit   -continue atorvastatin 40 mg  -apizaban held for thora

## 2017-07-23 NOTE — ASSESSMENT & PLAN NOTE
-Cr on admission 2.5, stable (improved from 3.2 ~1 mo ago)  -nephrology following. Appreciate recommendations  -negative workup for nephrotic syndromes at this time  - monitor Cr daily- now 2.3

## 2017-07-23 NOTE — PROGRESS NOTES
LOS: 11 days     24h events and S:  Michael Salgado is a 71 y.o. female who denies CP and palpitations. She does endorse SOB.     O:  Vitals:  Temp: 97.6 °F (36.4 °C) (07/23/17 0730)  Pulse: 100 (07/23/17 0700)  Resp: 16 (07/23/17 0530)  BP: 120/84 (07/23/17 0530)  SpO2: 99 % (07/23/17 0530)    Ins/Outs:    Intake/Output Summary (Last 24 hours) at 07/23/17 0845  Last data filed at 07/23/17 0600   Gross per 24 hour   Intake              494 ml   Output                0 ml   Net              494 ml       Physical Exam:  General: alert and oriented, conversant  Heart: irregularly, irregular, no r/g appreciated  Lungs: CTAB  Abdomen: soft, NT, ND, +BS  Vascular: 2+ radial pulse, 2+ edema R>L    Medications:   albuterol-ipratropium 2.5mg-0.5mg/3mL  3 mL Nebulization Q6H    aspirin  81 mg Oral Daily    atorvastatin  40 mg Oral Daily    buPROPion  150 mg Oral Daily    escitalopram oxalate  5 mg Oral Daily    gabapentin  300 mg Oral QHS    hydrALAZINE  50 mg Oral TID    isosorbide dinitrate  10 mg Oral TID    metoprolol tartrate  50 mg Oral BID    pantoprazole  40 mg Oral Daily    quetiapine  100 mg Oral QHS    sodium chloride 0.9%  3 mL Intravenous Q8H      furosemide (LASIX) 5 mg/mL infusion (non-titrating) 20 mg/hr (07/23/17 0552)     sodium chloride, acetaminophen, acetaminophen, butalbital-acetaminophen-caffeine -40 mg, dextrose 50%, dextrose 50%, diphenhydrAMINE, glucagon (human recombinant), glucose, glucose, insulin aspart, ondansetron    Review of patient's allergies indicates:   Allergen Reactions    Daypro [oxaprozin] Itching     Tolerates other NSAIDs    Vibramycin [doxycycline calcium]        Labs:  CBC with Diff:     Recent Labs  Lab 07/21/17  0351 07/22/17  0730 07/23/17  0340   WBC 5.83 5.72 5.94   HGB 7.9* 7.5* 7.1*   HCT 27.1* 24.5* 23.5*    242 221   LYMPH 20.8  1.2 18.9  1.1 20.5  1.2   MONO 10.6  0.6 12.2  0.7 13.0  0.8   EOSINOPHIL 2.7 3.8 2.5       COAG:  No  results for input(s): APTT, INR, PTT in the last 168 hours.    CMP:   Recent Labs  Lab 07/17/17  1540 07/18/17  0441  07/21/17  0351 07/21/17  1634  07/22/17  0730 07/22/17  0849 07/22/17  1823 07/23/17  0340 07/23/17  0746   * 150*  < > 147* 211*  211*  < >  --  179* 244*  --  168*   CALCIUM 8.0* 8.7  < > 8.4* 8.6*  8.6*  < >  --  8.6* 8.5*  --  8.6*   ALBUMIN 1.8* 1.7*  --   --  2.0*  --   --   --   --   --   --    PROT 4.9* 4.6*  --   --  5.5*  --   --   --   --   --   --     144  < > 141 141  141  < >  --  142 142  --  143   K 4.6 4.3  < > 4.0 4.4  4.4  < >  --  3.8 3.5  --  3.4*   CO2 29 28  < > 32* 28  28  < >  --  32* 35*  --  36*    106  < > 102 102  102  < >  --  100 96  --  97   BUN 52* 51*  < > 58* 60*  60*  < >  --  59* 64*  --  63*   CREATININE 2.7* 2.6*  < > 2.5* 2.4*  2.4*  < >  --  2.2* 2.4*  --  2.3*   ALKPHOS 52* 43*  --   --  55  --   --   --   --   --   --    ALT 8* 7*  --   --  6*  --   --   --   --   --   --    AST 9* 10  --   --  18  --   --   --   --   --   --    BILITOT 0.2 0.2  --   --  0.2  --   --   --   --   --   --    MG  --  1.8  < > 1.9 2.0  --  1.8  --   --  1.8  --    PHOS  --  4.5  < > 4.6*  --   --  4.3  --   --  3.9  --    < > = values in this interval not displayed.  Estimated Creatinine Clearance: 20.8 mL/min (based on Cr of 2.3).    .No results for input(s): CPK, TROPONINI, MB, BNP in the last 168 hours.      Diagnostic Results:  Ejection Fractions   EF   Date Value Ref Range Status   06/23/2017 40 (A) 55 - 65    04/29/2016 60 55 - 65    04/26/2016 60 55 - 65         Infectious disease labs:  Microbiology Results (last 7 days)     Procedure Component Value Units Date/Time    Urine culture [[id number]] Collected:  07/19/17 1442    Order Status:  Completed Specimen:  Urine from Random void Updated:  07/21/17 1240     Urine Culture, Routine No growth    Narrative:       urine culture add on per elieser ortega md @ 10:16 on 07/20/2017;   order#  [id number]    Gram stain [[id number]] Collected:  07/19/17 1442    Order Status:  Completed Specimen:  Urine from Random void Updated:  07/20/17 1308     Gram Stain Result Rare WBC's      No organisms seen    Narrative:       urine gram stain add on per elieser ortega md @ 10:19 on   07/20/2017; order# [id number]    Urine culture [[id number]]     Order Status:  Completed Specimen:  Urine     Gram stain [[id number]]     Order Status:  Completed Specimen:  Urine           Assessment and Plan:  71 y.o. woman with PMH of CAD s/p CABGx2 (LIMA-LAD and SVG-D1 2015), ICM, HFrEF (EF 40-45%, LBBB giving abnormal septal wall, small pericardial effusion), CKD stage IV, DM, HTN, left-sided CVA with residual right hemiparesis, chronic AF (on warfarin) is in the hospital for acute decompensated heart failure.     New-onset acute systolic HF  - continue Lasix at 20mg/hr  - given 5 mg metolazone yesterday  - continue metoprolol, hydralazine, ISDN  - patient refusing RHC  - PULM to consider thoracentesis as below  - recommend Diuril 250 mg x1  - strict I/Os and daily weights     Chronic AF  - [id number]   - NOAC on hold for possible thoracentesis (PULM on-board)  - PULM would like 2d off NOAC before pursuing thoracentesis  - continue metoprolol    Otoniel Ponce MD

## 2017-07-23 NOTE — ASSESSMENT & PLAN NOTE
-chadsvasc of 8  - Apixaban held for thoracentesis   -coreg switched to metoprolol tartrate 75 BID

## 2017-07-24 PROBLEM — J90 PLEURAL EFFUSION: Status: ACTIVE | Noted: 2017-07-24

## 2017-07-24 PROBLEM — J81.0 ACUTE PULMONARY EDEMA: Status: ACTIVE | Noted: 2017-07-24

## 2017-07-24 LAB
ABO + RH BLD: NORMAL
ALBUMIN SERPL ELPH-MCNC: 2.38 G/DL
ALPHA1 GLOB SERPL ELPH-MCNC: 0.36 G/DL
ALPHA2 GLOB SERPL ELPH-MCNC: 1.09 G/DL
ANA SER QL IF: NORMAL
ANION GAP SERPL CALC-SCNC: 7 MMOL/L
AORTIC VALVE REGURGITATION: ABNORMAL
B-GLOBULIN SERPL ELPH-MCNC: 0.66 G/DL
BASOPHILS # BLD AUTO: 0.03 K/UL
BASOPHILS NFR BLD: 0.5 %
BLD GP AB SCN CELLS X3 SERPL QL: NORMAL
BLD PROD TYP BPU: NORMAL
BLOOD UNIT EXPIRATION DATE: NORMAL
BLOOD UNIT TYPE CODE: 8400
BLOOD UNIT TYPE: NORMAL
BUN SERPL-MCNC: 70 MG/DL
CALCIUM SERPL-MCNC: 8.6 MG/DL
CHLORIDE SERPL-SCNC: 96 MMOL/L
CO2 SERPL-SCNC: 41 MMOL/L
CODING SYSTEM: NORMAL
CREAT SERPL-MCNC: 2.4 MG/DL
DIFFERENTIAL METHOD: ABNORMAL
DISPENSE STATUS: NORMAL
DSDNA AB SER-ACNC: NORMAL [IU]/ML
EOSINOPHIL # BLD AUTO: 0.2 K/UL
EOSINOPHIL NFR BLD: 2.9 %
ERYTHROCYTE [DISTWIDTH] IN BLOOD BY AUTOMATED COUNT: 15.5 %
EST. GFR  (AFRICAN AMERICAN): 22.4 ML/MIN/1.73 M^2
EST. GFR  (NON AFRICAN AMERICAN): 19.4 ML/MIN/1.73 M^2
ESTIMATED PA SYSTOLIC PRESSURE: 41.18
FERRITIN SERPL-MCNC: 61 NG/ML
GAMMA GLOB SERPL ELPH-MCNC: 0.71 G/DL
GLOBAL PERICARDIAL EFFUSION: ABNORMAL
GLUCOSE SERPL-MCNC: 138 MG/DL
HCT VFR BLD AUTO: 23.2 %
HGB BLD-MCNC: 6.8 G/DL
IRON SERPL-MCNC: 26 UG/DL
LYMPHOCYTES # BLD AUTO: 1.3 K/UL
LYMPHOCYTES NFR BLD: 22.3 %
MAGNESIUM SERPL-MCNC: 1.9 MG/DL
MCH RBC QN AUTO: 26.9 PG
MCHC RBC AUTO-ENTMCNC: 29.3 G/DL
MCV RBC AUTO: 92 FL
MITRAL VALVE REGURGITATION: ABNORMAL
MONOCYTES # BLD AUTO: 0.7 K/UL
MONOCYTES NFR BLD: 11.8 %
NEUTROPHILS # BLD AUTO: 3.5 K/UL
NEUTROPHILS NFR BLD: 62.1 %
PATHOLOGIST INTERPRETATION SPE: NORMAL
PHOSPHATE SERPL-MCNC: 4 MG/DL
PLATELET # BLD AUTO: 212 K/UL
PMV BLD AUTO: 9.3 FL
POCT GLUCOSE: 174 MG/DL (ref 70–110)
POCT GLUCOSE: 276 MG/DL (ref 70–110)
POCT GLUCOSE: 290 MG/DL (ref 70–110)
POCT GLUCOSE: 292 MG/DL (ref 70–110)
POTASSIUM SERPL-SCNC: 3.6 MMOL/L
PROT SERPL-MCNC: 5.2 G/DL
RBC # BLD AUTO: 2.53 M/UL
RETIRED EF AND QEF - SEE NOTES: 43 (ref 55–65)
SATURATED IRON: 14 %
SODIUM SERPL-SCNC: 144 MMOL/L
TOTAL IRON BINDING CAPACITY: 189 UG/DL
TRANS ERYTHROCYTES VOL PATIENT: NORMAL ML
TRANSFERRIN SERPL-MCNC: 128 MG/DL
TRICUSPID VALVE REGURGITATION: ABNORMAL
WBC # BLD AUTO: 5.6 K/UL

## 2017-07-24 PROCEDURE — 36430 TRANSFUSION BLD/BLD COMPNT: CPT

## 2017-07-24 PROCEDURE — 25000003 PHARM REV CODE 250: Performed by: STUDENT IN AN ORGANIZED HEALTH CARE EDUCATION/TRAINING PROGRAM

## 2017-07-24 PROCEDURE — 99232 SBSQ HOSP IP/OBS MODERATE 35: CPT | Mod: ,,, | Performed by: INTERNAL MEDICINE

## 2017-07-24 PROCEDURE — P9021 RED BLOOD CELLS UNIT: HCPCS

## 2017-07-24 PROCEDURE — 84100 ASSAY OF PHOSPHORUS: CPT

## 2017-07-24 PROCEDURE — 83735 ASSAY OF MAGNESIUM: CPT

## 2017-07-24 PROCEDURE — 97530 THERAPEUTIC ACTIVITIES: CPT

## 2017-07-24 PROCEDURE — 20600001 HC STEP DOWN PRIVATE ROOM

## 2017-07-24 PROCEDURE — 99232 SBSQ HOSP IP/OBS MODERATE 35: CPT | Mod: GC,,, | Performed by: INTERNAL MEDICINE

## 2017-07-24 PROCEDURE — 94640 AIRWAY INHALATION TREATMENT: CPT

## 2017-07-24 PROCEDURE — 93306 TTE W/DOPPLER COMPLETE: CPT

## 2017-07-24 PROCEDURE — 82728 ASSAY OF FERRITIN: CPT

## 2017-07-24 PROCEDURE — 86920 COMPATIBILITY TEST SPIN: CPT

## 2017-07-24 PROCEDURE — 36415 COLL VENOUS BLD VENIPUNCTURE: CPT

## 2017-07-24 PROCEDURE — 93306 TTE W/DOPPLER COMPLETE: CPT | Mod: 26,,, | Performed by: INTERNAL MEDICINE

## 2017-07-24 PROCEDURE — 85025 COMPLETE CBC W/AUTO DIFF WBC: CPT

## 2017-07-24 PROCEDURE — 80048 BASIC METABOLIC PNL TOTAL CA: CPT

## 2017-07-24 PROCEDURE — A4216 STERILE WATER/SALINE, 10 ML: HCPCS | Performed by: STUDENT IN AN ORGANIZED HEALTH CARE EDUCATION/TRAINING PROGRAM

## 2017-07-24 PROCEDURE — 86900 BLOOD TYPING SEROLOGIC ABO: CPT

## 2017-07-24 PROCEDURE — 63600175 PHARM REV CODE 636 W HCPCS: Performed by: HOSPITALIST

## 2017-07-24 PROCEDURE — 86850 RBC ANTIBODY SCREEN: CPT

## 2017-07-24 PROCEDURE — 94760 N-INVAS EAR/PLS OXIMETRY 1: CPT

## 2017-07-24 PROCEDURE — 99232 SBSQ HOSP IP/OBS MODERATE 35: CPT | Mod: ,,, | Performed by: HOSPITALIST

## 2017-07-24 PROCEDURE — 25000003 PHARM REV CODE 250: Performed by: HOSPITALIST

## 2017-07-24 PROCEDURE — 83540 ASSAY OF IRON: CPT

## 2017-07-24 PROCEDURE — 27000221 HC OXYGEN, UP TO 24 HOURS

## 2017-07-24 PROCEDURE — 25000242 PHARM REV CODE 250 ALT 637 W/ HCPCS: Performed by: HOSPITALIST

## 2017-07-24 RX ORDER — FUROSEMIDE 10 MG/ML
80 INJECTION INTRAMUSCULAR; INTRAVENOUS 2 TIMES DAILY
Status: DISCONTINUED | OUTPATIENT
Start: 2017-07-25 | End: 2017-07-25

## 2017-07-24 RX ORDER — LIDOCAINE HYDROCHLORIDE 10 MG/ML
10 INJECTION INFILTRATION; PERINEURAL ONCE
Status: DISCONTINUED | OUTPATIENT
Start: 2017-07-24 | End: 2017-07-24

## 2017-07-24 RX ORDER — FUROSEMIDE 10 MG/ML
80 INJECTION INTRAMUSCULAR; INTRAVENOUS ONCE
Status: COMPLETED | OUTPATIENT
Start: 2017-07-24 | End: 2017-07-24

## 2017-07-24 RX ORDER — HYDROCODONE BITARTRATE AND ACETAMINOPHEN 500; 5 MG/1; MG/1
TABLET ORAL
Status: DISCONTINUED | OUTPATIENT
Start: 2017-07-24 | End: 2017-07-25

## 2017-07-24 RX ORDER — IPRATROPIUM BROMIDE AND ALBUTEROL SULFATE 2.5; .5 MG/3ML; MG/3ML
3 SOLUTION RESPIRATORY (INHALATION)
Status: DISCONTINUED | OUTPATIENT
Start: 2017-07-24 | End: 2017-07-26

## 2017-07-24 RX ADMIN — FUROSEMIDE 80 MG: 10 INJECTION, SOLUTION INTRAVENOUS at 05:07

## 2017-07-24 RX ADMIN — FUROSEMIDE 20 MG/HR: 10 INJECTION, SOLUTION INTRAMUSCULAR; INTRAVENOUS at 05:07

## 2017-07-24 RX ADMIN — HYDRALAZINE HYDROCHLORIDE 50 MG: 50 TABLET ORAL at 05:07

## 2017-07-24 RX ADMIN — Medication 3 ML: at 02:07

## 2017-07-24 RX ADMIN — BUPROPION HYDROCHLORIDE 150 MG: 150 TABLET, FILM COATED, EXTENDED RELEASE ORAL at 08:07

## 2017-07-24 RX ADMIN — INSULIN DETEMIR 5 UNITS: 100 INJECTION, SOLUTION SUBCUTANEOUS at 08:07

## 2017-07-24 RX ADMIN — PANTOPRAZOLE SODIUM 40 MG: 40 TABLET, DELAYED RELEASE ORAL at 08:07

## 2017-07-24 RX ADMIN — ASPIRIN 81 MG: 81 TABLET, COATED ORAL at 08:07

## 2017-07-24 RX ADMIN — INSULIN ASPART 6 UNITS: 100 INJECTION, SOLUTION INTRAVENOUS; SUBCUTANEOUS at 05:07

## 2017-07-24 RX ADMIN — METOPROLOL TARTRATE 75 MG: 50 TABLET, FILM COATED ORAL at 09:07

## 2017-07-24 RX ADMIN — ESCITALOPRAM 5 MG: 5 TABLET, FILM COATED ORAL at 08:07

## 2017-07-24 RX ADMIN — ISOSORBIDE DINITRATE 10 MG: 10 TABLET ORAL at 01:07

## 2017-07-24 RX ADMIN — HYDRALAZINE HYDROCHLORIDE 50 MG: 50 TABLET ORAL at 09:07

## 2017-07-24 RX ADMIN — INSULIN DETEMIR 5 UNITS: 100 INJECTION, SOLUTION SUBCUTANEOUS at 09:07

## 2017-07-24 RX ADMIN — INSULIN ASPART 6 UNITS: 100 INJECTION, SOLUTION INTRAVENOUS; SUBCUTANEOUS at 01:07

## 2017-07-24 RX ADMIN — ISOSORBIDE DINITRATE 10 MG: 10 TABLET ORAL at 09:07

## 2017-07-24 RX ADMIN — HYDRALAZINE HYDROCHLORIDE 50 MG: 50 TABLET ORAL at 01:07

## 2017-07-24 RX ADMIN — METOPROLOL TARTRATE 75 MG: 50 TABLET, FILM COATED ORAL at 08:07

## 2017-07-24 RX ADMIN — INSULIN ASPART 2 UNITS: 100 INJECTION, SOLUTION INTRAVENOUS; SUBCUTANEOUS at 08:07

## 2017-07-24 RX ADMIN — INSULIN ASPART 3 UNITS: 100 INJECTION, SOLUTION INTRAVENOUS; SUBCUTANEOUS at 09:07

## 2017-07-24 RX ADMIN — ISOSORBIDE DINITRATE 10 MG: 10 TABLET ORAL at 05:07

## 2017-07-24 RX ADMIN — QUETIAPINE FUMARATE 100 MG: 100 TABLET, FILM COATED ORAL at 09:07

## 2017-07-24 RX ADMIN — IPRATROPIUM BROMIDE AND ALBUTEROL SULFATE 3 ML: .5; 3 SOLUTION RESPIRATORY (INHALATION) at 04:07

## 2017-07-24 RX ADMIN — IPRATROPIUM BROMIDE AND ALBUTEROL SULFATE 3 ML: .5; 3 SOLUTION RESPIRATORY (INHALATION) at 07:07

## 2017-07-24 RX ADMIN — GABAPENTIN 300 MG: 300 CAPSULE ORAL at 09:07

## 2017-07-24 RX ADMIN — ATORVASTATIN CALCIUM 40 MG: 20 TABLET, FILM COATED ORAL at 08:07

## 2017-07-24 RX ADMIN — Medication 3 ML: at 10:07

## 2017-07-24 NOTE — HOSPITAL COURSE
7/24: Patient seen by pulmonary team who reported that the patient is a high risk and unable to sit up for pleurocentesis and will not be doing the procedure. Continue diuresis

## 2017-07-24 NOTE — PROGRESS NOTES
[facility]  Pulmonology  Progress Note    Patient Name: Michael Salgado  MRN: [id number]  Admission Date: 7/12/2017  Hospital Length of Stay: 12 days  Code Status: Full Code  Attending Provider: Louann Apple MD  Primary Care Provider: Wesley Watkins MD   Principal Problem: Acute on chronic systolic congestive heart failure    Subjective:     Interval History: Dyspnea unchanged.  Remains on same level of O2 (2L).    Objective:     Vital Signs (Most Recent):  Temp: 98.6 °F (37 °C) (07/24/17 0448)  Pulse: 86 (07/24/17 0600)  Resp: 16 (07/24/17 0448)  BP: 124/60 (07/24/17 0448)  SpO2: 99 % (07/24/17 0448) Vital Signs (24h Range):  Temp:  [97.8 °F (36.6 °C)-98.6 °F (37 °C)] 98.6 °F (37 °C)  Pulse:  [] 86  Resp:  [16-20] 16  SpO2:  [94 %-99 %] 99 %  BP: (105-140)/(56-69) 124/60     Weight: 65.4 kg (144 lb 3.2 oz)  Body mass index is 24.74 kg/m².      Intake/Output Summary (Last 24 hours) at 07/24/17 0755  Last data filed at 07/24/17 0600   Gross per 24 hour   Intake              690 ml   Output              950 ml   Net             -260 ml       Physical Exam   Pulmonary/Chest: She has wheezes.     Gen: Awake, conversant, not using accessory muscles of respiration  CV: Irregular, regular rate  Respiratory: Crackles R>L, wheeze bilaterally  Abd: Soft  Ext: 2+ pitting edema of the lower extremities    Vents:       Lines/Drains/Airways     Drain                 Gastrostomy/Enterostomy 01/25/16 0835 Percutaneous endoscopic gastrostomy (PEG) midline feeding 545 days          Peripheral Intravenous Line                 Peripheral IV - Single Lumen 07/21/17 0919 Right Forearm 2 days                Significant Labs:    CBC/Anemia Profile:    Recent Labs  Lab 07/23/17  0340 07/24/17  0345   WBC 5.94 5.60   HGB 7.1* 6.8*   HCT 23.5* 23.2*    212   MCV 92 92   RDW 15.5* 15.5*        Chemistries:    Recent Labs  Lab 07/22/17  1823 07/23/17  0340 07/23/17  0746 07/23/17  1814 07/24/17  0345      --  143 143  --    K 3.5  --  3.4* 4.2  --    CL 96  --  97 97  --    CO2 35*  --  36* 35*  --    BUN 64*  --  63* 69*  --    CREATININE 2.4*  --  2.3* 2.4*  --    CALCIUM 8.5*  --  8.6* 8.5*  --    MG  --  1.8  --   --  1.9   PHOS  --  3.9  --   --  4.0       ABGs: No results for input(s): PH, PCO2, HCO3, POCSATURATED, BE in the last 48 hours.  CMP:   Recent Labs  Lab 07/22/17  1823 07/23/17  0746 07/23/17  1814    143 143   K 3.5 3.4* 4.2   CL 96 97 97   CO2 35* 36* 35*   * 168* 180*   BUN 64* 63* 69*   CREATININE 2.4* 2.3* 2.4*   CALCIUM 8.5* 8.6* 8.5*   ANIONGAP 11 10 11   EGFRNONAA 19.4* 20.5* 19.4*       Significant Imaging:  I have reviewed all pertinent imaging results/findings within the past 24 hours.    Assessment/Plan:     Acute on chronic respiratory failure with hypoxemia    71yoF with HFrEF (LKEF 40%) with persistent dyspnea despite diuresis.  Her oxygen requirements have largely been stable since she has been here but she has notably increased WOB though this is similar to how she appeared a week ago.  Her Cr is stable with diuresis which is consistent with further diuresis being a viable strategy.     - Continue diuresis, inaccurate I/Os make titrating this difficult but her Cr is stable for now  - Re: her pleural effusions - suspect they are transudative as they are bilateral with R > L and she has no sx suggestive of infection; believe risk of thoracentesis outweighs benefits at this juncture, she will be difficult to position as well making the procedure higher risk  - Schedule her nebulizer treatments, she is wheezing today  - Would repeat her ECHO to assess LVEF and PASP (she refuses RHC)          * Acute on chronic systolic congestive heart failure    Please see plan for acute on chronic respiratory failure for details            Rocky Cr MD  Pulmonology  [facility]

## 2017-07-24 NOTE — SUBJECTIVE & OBJECTIVE
Interval History: Patient still reporting SOB and Pulmonology now following with flori planned after holding NOAC for 48hrs. Continuing diuresis with improvement in UOP w/ metolazone to -1L. Cr stable.     Review of patient's allergies indicates:   Allergen Reactions    Daypro [oxaprozin] Itching     Tolerates other NSAIDs    Vibramycin [doxycycline calcium]      Current Facility-Administered Medications   Medication Frequency    0.9%  NaCl infusion (for blood administration) Q24H PRN    acetaminophen tablet 650 mg Q6H PRN    acetaminophen tablet 650 mg PRN    albuterol-ipratropium 2.5mg-0.5mg/3mL nebulizer solution 3 mL Q4H PRN    aspirin EC tablet 81 mg Daily    atorvastatin tablet 40 mg Daily    buPROPion TB24 tablet 150 mg Daily    dextrose 50% injection 12.5 g PRN    dextrose 50% injection 25 g PRN    diphenhydrAMINE capsule 25 mg PRN    escitalopram oxalate tablet 5 mg Daily    furosemide (LASIX) 500 mg in sodium chloride 0.9% 100 mL infusion Continuous    gabapentin capsule 300 mg QHS    glucagon (human recombinant) injection 1 mg PRN    glucose chewable tablet 16 g PRN    glucose chewable tablet 24 g PRN    hydrALAZINE tablet 50 mg TID    insulin aspart pen 1-10 Units QID (AC + HS) PRN    insulin detemir pen 5 Units BID    isosorbide dinitrate tablet 10 mg TID    lidocaine HCL 10 mg/ml (1%) injection 10 mL Once    metoprolol tartrate tablet 75 mg BID    ondansetron disintegrating tablet 8 mg Q8H PRN    pantoprazole EC tablet 40 mg Daily    quetiapine tablet 100 mg QHS    sodium chloride 0.9% flush 3 mL Q8H       Objective:     Vital Signs (Most Recent):  Temp: 98.6 °F (37 °C) (07/24/17 0448)  Pulse: 91 (07/24/17 0800)  Resp: 16 (07/24/17 0448)  BP: 124/60 (07/24/17 0448)  SpO2: 99 % (07/24/17 0448)  O2 Device (Oxygen Therapy): nasal cannula (07/24/17 0448) Vital Signs (24h Range):  Temp:  [97.8 °F (36.6 °C)-98.6 °F (37 °C)] 98.6 °F (37 °C)  Pulse:  [] 91  Resp:  [16-20]  16  SpO2:  [94 %-99 %] 99 %  BP: (105-140)/(56-69) 124/60     Weight: 65.4 kg (144 lb 3.2 oz) (07/24/17 0600)  Body mass index is 24.74 kg/m².  Body surface area is 1.72 meters squared.    I/O last 3 completed shifts:  In: 960 [P.O.:960]  Out: 950 [Urine:950]    Physical Exam   Constitutional: She is oriented to person, place, and time. She appears well-developed and well-nourished. She appears distressed.   HENT:   Head: Normocephalic and atraumatic.   Right Ear: External ear normal.   Left Ear: External ear normal.   Eyes: EOM are normal. Pupils are equal, round, and reactive to light. No scleral icterus.   Neck: Neck supple. No thyromegaly present.   Cardiovascular: Normal rate, normal heart sounds and intact distal pulses.    No murmur heard.  Irregular rhythm   Pulmonary/Chest: She is in respiratory distress. She has no wheezes. She has rales.   Coarse BS and crackles bilaterally   Abdominal: Soft. Bowel sounds are normal. She exhibits no distension. There is no tenderness.   Musculoskeletal: Normal range of motion. She exhibits edema. She exhibits no tenderness or deformity.   Neurological: She is alert and oriented to person, place, and time. No cranial nerve deficit.   Skin: Skin is warm and dry.   Right forehead hyperpigmentation, does not cross midline, from resolving shingles rash   Psychiatric: She has a normal mood and affect. Her behavior is normal.       Significant Labs:  ABGs:   Recent Labs  Lab 07/20/17  1146   PH 7.373   PCO2 54.0*   HCO3 31.4*   POCSATURATED 92*   BE 6     Cardiac Markers: No results for input(s): CKMB, TROPONINT, MYOGLOBIN in the last 168 hours.  CBC:   Recent Labs  Lab 07/24/17  0345   WBC 5.60   RBC 2.53*   HGB 6.8*   HCT 23.2*      MCV 92   MCH 26.9*   MCHC 29.3*     CMP:   Recent Labs  Lab 07/21/17  1634  07/23/17  1814   *  211*  < > 180*   CALCIUM 8.6*  8.6*  < > 8.5*   ALBUMIN 2.0*  --   --    PROT 5.5*  --   --      141  < > 143   K 4.4  4.4  < >  4.2   CO2 28  28  < > 35*     102  < > 97   BUN 60*  60*  < > 69*   CREATININE 2.4*  2.4*  < > 2.4*   ALKPHOS 55  --   --    ALT 6*  --   --    AST 18  --   --    BILITOT 0.2  --   --    < > = values in this interval not displayed.    Recent Labs  Lab 07/19/17  1442   COLORU Yellow   SPECGRAV 1.010   PHUR 5.0   PROTEINUA 2+*   BACTERIA None   NITRITE Negative   LEUKOCYTESUR 3+*   UROBILINOGEN Negative   HYALINECASTS 0     All labs within the past 24 hours have been reviewed.     Significant Imaging:  X-Ray: Reviewed  as below Median sternotomy wires intact. There is moderate perihilar and bi-basilar lung opacification with indistinct pulmonary vasculature, similar to the prior exam. Enlarged cardiac silhouette, unchanged. No gross pneumothorax. Probable small effusions.

## 2017-07-24 NOTE — ASSESSMENT & PLAN NOTE
- On admit (7/12), Hgb 9.9 and dropped to 7.9 (7/13) w/ SOB  - Hemolysis work-up negative  - type and screen for the AM given hbg of 7.1 today  - 1 U PRBC transfused

## 2017-07-24 NOTE — ASSESSMENT & PLAN NOTE
-daughter reports pt takes LA insulin 10 units qhs and SA insulin 5 units WM  -A1c 5.7 7/20  -restarted on detemir 5 u BID with SSI

## 2017-07-24 NOTE — PROGRESS NOTES
Dr. Bell ordered to restart blood at lower rate. Blood bank contacted and states blood can be run for up to 6 hours. Dr. Bell states diuril to be administered after completion of blood. No further orders given. Will continue to monitor.

## 2017-07-24 NOTE — PLAN OF CARE
Problem: Physical Therapy Goal  Goal: Physical Therapy Goal  Goals to be met by: 2017    Patient will increase functional independence with mobility by performin. Supine to sit with MInimal Assistance  2. Sit to supine with MInimal Assistance  3. Rolling to Right with Minimal Assistance.  4. Sit to stand transfer with minimal assistance   5. Bed to chair transfer with minimal assisatnce using Rolling Walker  6. Gait  x 10 feet with Minimal Assistance using Rolling Walker.          Outcome: Ongoing (interventions implemented as appropriate)  No goals met this visit; continue current POC.     Irma Cantu PT, DPT   2017  Pager: [phone]

## 2017-07-24 NOTE — ASSESSMENT & PLAN NOTE
-convert Lasix IV to lasix 80mg PO BID  -continue hydralazine 50mg PO BID  -Still short of breath, monitor for adequate diuresis with I/O and volume status exams

## 2017-07-24 NOTE — PLAN OF CARE
Problem: Patient Care Overview  Goal: Plan of Care Review  7/20 - Improve breathing.  Patient on Lasix gtt.   Outcome: Ongoing (interventions implemented as appropriate)  Lasix gtt infusing @ MD ordered rate. Pt recieved Diuril IVPB x once this shift. VSS. O2 sats stable on 1L NC. Pt denies CP, SOB, palpitations, lightheadedness and dizziness. Fall precautions maintained this shift, pt remains free from falls, trauma and injury. Purewick in place; draining urine. BG elevated this shift; pt received SSI & scheduled long acting insulin @ bedtime. Pt's niece @ bedside. POC reviewed with pt & niece, verbalized understanding. NADN. Will continue to monitor.

## 2017-07-24 NOTE — PROGRESS NOTES
[facility]  Nephrology  Progress Note    Patient Name: Michael Salgado  MRN: [id number]  Admission Date: 7/12/2017  Hospital Length of Stay: 12 days  Attending Provider: Louann Apple MD   Primary Care Physician: Wesley Watkins MD  Principal Problem:Acute on chronic systolic congestive heart failure    Subjective:     HPI: Ms. Salgado is a 72yo female with history of CVA, CAD s/p CABG x 3, ICM, HFrEF, CKD, anemia in CKD and afib on Aspirin and Warfarin who presents with acute hypoxic respiratory failure. She just completed a course of antibiotics for pneumonia 2 weeks prior, and reports feeling lethargic with poor recovery of baseline functional status since. This admission, she is being treated for CHF exacerbation per Cardiology given elevated BNP, CXR with congestion. She has received IV Lasix per Cardiology recommendations and has continued improvement in Cr, most recently to 2.5 from 2.7 2 days prior, and her presentation is consistent with cardiorenal syndrome. I/Os note ~500 cc initial response to Lasix and Nephrology was consulted for CKDIV renal disease on loop diuretics with possible suboptimal response in urine output.            Interval History: Patient still reporting SOB and Pulmonology now following- state thora risks outweigh benefit, low suspicion for infection, and obtain 2dECHO. Continuing diuresis with improvement in UOP w/ 2nd diuretic to -1L. Cr stable.     Review of patient's allergies indicates:   Allergen Reactions    Daypro [oxaprozin] Itching     Tolerates other NSAIDs    Vibramycin [doxycycline calcium]      Current Facility-Administered Medications   Medication Frequency    0.9%  NaCl infusion (for blood administration) Q24H PRN    acetaminophen tablet 650 mg Q6H PRN    acetaminophen tablet 650 mg PRN    albuterol-ipratropium 2.5mg-0.5mg/3mL nebulizer solution 3 mL Q4H PRN    aspirin EC tablet 81 mg Daily    atorvastatin tablet 40 mg Daily     buPROPion TB24 tablet 150 mg Daily    dextrose 50% injection 12.5 g PRN    dextrose 50% injection 25 g PRN    diphenhydrAMINE capsule 25 mg PRN    escitalopram oxalate tablet 5 mg Daily    furosemide (LASIX) 500 mg in sodium chloride 0.9% 100 mL infusion Continuous    gabapentin capsule 300 mg QHS    glucagon (human recombinant) injection 1 mg PRN    glucose chewable tablet 16 g PRN    glucose chewable tablet 24 g PRN    hydrALAZINE tablet 50 mg TID    insulin aspart pen 1-10 Units QID (AC + HS) PRN    insulin detemir pen 5 Units BID    isosorbide dinitrate tablet 10 mg TID    lidocaine HCL 10 mg/ml (1%) injection 10 mL Once    metoprolol tartrate tablet 75 mg BID    ondansetron disintegrating tablet 8 mg Q8H PRN    pantoprazole EC tablet 40 mg Daily    quetiapine tablet 100 mg QHS    sodium chloride 0.9% flush 3 mL Q8H       Objective:     Vital Signs (Most Recent):  Temp: 98.6 °F (37 °C) (07/24/17 0448)  Pulse: 91 (07/24/17 0800)  Resp: 16 (07/24/17 0448)  BP: 124/60 (07/24/17 0448)  SpO2: 99 % (07/24/17 0448)  O2 Device (Oxygen Therapy): nasal cannula (07/24/17 0448) Vital Signs (24h Range):  Temp:  [97.8 °F (36.6 °C)-98.6 °F (37 °C)] 98.6 °F (37 °C)  Pulse:  [] 91  Resp:  [16-20] 16  SpO2:  [94 %-99 %] 99 %  BP: (105-140)/(56-69) 124/60     Weight: 65.4 kg (144 lb 3.2 oz) (07/24/17 0600)  Body mass index is 24.74 kg/m².  Body surface area is 1.72 meters squared.    I/O last 3 completed shifts:  In: 960 [P.O.:960]  Out: 950 [Urine:950]    Physical Exam   Constitutional: She is oriented to person, place, and time. She appears well-developed and well-nourished. She appears distressed.   HENT:   Head: Normocephalic and atraumatic.   Right Ear: External ear normal.   Left Ear: External ear normal.   Eyes: EOM are normal. Pupils are equal, round, and reactive to light. No scleral icterus.   Neck: Neck supple. No thyromegaly present.   Cardiovascular: Normal rate, normal heart sounds and intact  distal pulses.    No murmur heard.  Irregular rhythm   Pulmonary/Chest: She is in respiratory distress. She has no wheezes. She has rales.   Coarse BS and crackles bilaterally   Abdominal: Soft. Bowel sounds are normal. She exhibits no distension. There is no tenderness.   Musculoskeletal: Normal range of motion. She exhibits edema. She exhibits no tenderness or deformity.   Neurological: She is alert and oriented to person, place, and time. No cranial nerve deficit.   Skin: Skin is warm and dry.   Right forehead hyperpigmentation, does not cross midline, from resolving shingles rash   Psychiatric: She has a normal mood and affect. Her behavior is normal.       Significant Labs:  ABGs:   Recent Labs  Lab 07/20/17  1146   PH 7.373   PCO2 54.0*   HCO3 31.4*   POCSATURATED 92*   BE 6     Cardiac Markers: No results for input(s): CKMB, TROPONINT, MYOGLOBIN in the last 168 hours.  CBC:   Recent Labs  Lab 07/24/17  0345   WBC 5.60   RBC 2.53*   HGB 6.8*   HCT 23.2*      MCV 92   MCH 26.9*   MCHC 29.3*     CMP:   Recent Labs  Lab 07/21/17  1634  07/23/17  1814   *  211*  < > 180*   CALCIUM 8.6*  8.6*  < > 8.5*   ALBUMIN 2.0*  --   --    PROT 5.5*  --   --      141  < > 143   K 4.4  4.4  < > 4.2   CO2 28  28  < > 35*     102  < > 97   BUN 60*  60*  < > 69*   CREATININE 2.4*  2.4*  < > 2.4*   ALKPHOS 55  --   --    ALT 6*  --   --    AST 18  --   --    BILITOT 0.2  --   --    < > = values in this interval not displayed.    Recent Labs  Lab 07/19/17  1442   COLORU Yellow   SPECGRAV 1.010   PHUR 5.0   PROTEINUA 2+*   BACTERIA None   NITRITE Negative   LEUKOCYTESUR 3+*   UROBILINOGEN Negative   HYALINECASTS 0     All labs within the past 24 hours have been reviewed.     Significant Imaging:  X-Ray: Reviewed  as below Median sternotomy wires intact. There is moderate perihilar and bi-basilar lung opacification with indistinct pulmonary vasculature, similar to the prior exam. Enlarged cardiac  silhouette, unchanged. No gross pneumothorax. Probable small effusions.    Assessment/Plan:     Chronic kidney disease (CKD) stage G4/A1, severely decreased glomerular filtration rate (GFR) between 15-29 mL/min/1.73 square meter and albuminuria creatinine ratio less than 30 mg/g    -continue diuresis per Cardiology and thoracentesis planning for pulmonary effusions that have been difficult to diurese. Recommend Lasix push to gtts, add Diuril if refractory to Lasix  -Cr stable at 2.4    -patient with Nephrotic range proteinuria- KASANDRA/dsDNA ab pending, extensive nephrotic workup unrevealing to date.  -etiology of CKD likely uncontrolled HTN/ DM w/o other end-organ involvement at this time.   -follow up 2Decho results regarding LVEF/ PAP            Thank you for your consult. I will follow-up with patient. Please contact us if you have any additional questions.    Bulmaro Worley MD  Nephrology  [facility]

## 2017-07-24 NOTE — SUBJECTIVE & OBJECTIVE
Interval History: No acute events overnight. Patient continues to be SOB and have intermittent trouble breathing. Continuing to make urine on diuretic therapy, however it is unmeasured as she refuses a catheter. She is a poor candidate for thoracentesis, per pulmonology.    Review of Systems   Constitutional: Negative for chills and fever.   HENT: Negative for congestion and rhinorrhea.    Eyes: Negative for pain and visual disturbance.   Respiratory: Positive for chest tightness and shortness of breath. Negative for cough.    Cardiovascular: Positive for leg swelling. Negative for chest pain and palpitations.   Gastrointestinal: Negative for abdominal distention, abdominal pain, blood in stool, constipation, diarrhea, nausea and vomiting.   Genitourinary: Negative for difficulty urinating, dysuria, frequency and hematuria.   Musculoskeletal: Negative for arthralgias and back pain.   Skin: Negative for rash and wound.   Neurological: Negative for syncope, light-headedness and headaches.   Psychiatric/Behavioral: Negative for agitation and behavioral problems.     Objective:     Vital Signs (Most Recent):  Temp: 97.4 °F (36.3 °C) (07/24/17 1200)  Pulse: 85 (07/24/17 1200)  Resp: 17 (07/24/17 1200)  BP: (!) 127/59 (07/24/17 1200)  SpO2: 96 % (07/24/17 1200) Vital Signs (24h Range):  Temp:  [97.4 °F (36.3 °C)-98.6 °F (37 °C)] 97.4 °F (36.3 °C)  Pulse:  [] 85  Resp:  [16-18] 17  SpO2:  [94 %-99 %] 96 %  BP: (109-140)/(57-69) 127/59     Weight: 65.4 kg (144 lb 3.2 oz)  Body mass index is 24.74 kg/m².    Intake/Output Summary (Last 24 hours) at 07/24/17 1421  Last data filed at 07/24/17 0600   Gross per 24 hour   Intake              270 ml   Output              950 ml   Net             -680 ml      Physical Exam   Constitutional: She is oriented to person, place, and time. She appears well-developed and well-nourished. She appears distressed.   HENT:   Head: Normocephalic and atraumatic.   Right Ear: External ear  normal.   Left Ear: External ear normal.   Eyes: EOM are normal. Pupils are equal, round, and reactive to light. No scleral icterus.   Neck: Neck supple. No thyromegaly present.   Cardiovascular: Normal rate, normal heart sounds and intact distal pulses.    No murmur heard.  Irregular rhythm   Pulmonary/Chest: She is in respiratory distress. She has wheezes. She has rales.   Coarse BS and crackles bilaterally   Abdominal: Soft. Bowel sounds are normal. She exhibits no distension. There is no tenderness.   Musculoskeletal: Normal range of motion. She exhibits edema (which has improved from admit). She exhibits no tenderness or deformity.   Neurological: She is alert and oriented to person, place, and time. No cranial nerve deficit.   Skin: Skin is warm and dry.   Right forehead hyperpigmentation, does not cross midline, from resolving shingles rash   Psychiatric: She has a normal mood and affect. Her behavior is normal.       Significant Labs:   CBC:   Recent Labs  Lab 07/23/17  0340 07/24/17  0345   WBC 5.94 5.60   HGB 7.1* 6.8*   HCT 23.5* 23.2*    212     CMP:   Recent Labs  Lab 07/23/17  0746 07/23/17  1814 07/24/17  0745    143 144   K 3.4* 4.2 3.6   CL 97 97 96   CO2 36* 35* 41*   * 180* 138*   BUN 63* 69* 70*   CREATININE 2.3* 2.4* 2.4*   CALCIUM 8.6* 8.5* 8.6*   ANIONGAP 10 11 7*   EGFRNONAA 20.5* 19.4* 19.4*       Significant Imaging: I have reviewed all pertinent imaging results/findings within the past 24 hours.

## 2017-07-24 NOTE — PLAN OF CARE
PT REMAINS ON IV MEDICATIONS AND IS NOT READY FOR DISCHARGE     07/24/17 1003   Discharge Reassessment   Assessment Type Discharge Planning Reassessment   Can the patient answer the patient profile reliably? Yes, cognitively intact

## 2017-07-24 NOTE — SUBJECTIVE & OBJECTIVE
Interval History: Dyspnea unchanged.  Remains on same level of O2 (2L).    Objective:     Vital Signs (Most Recent):  Temp: 98.6 °F (37 °C) (07/24/17 0448)  Pulse: 86 (07/24/17 0600)  Resp: 16 (07/24/17 0448)  BP: 124/60 (07/24/17 0448)  SpO2: 99 % (07/24/17 0448) Vital Signs (24h Range):  Temp:  [97.8 °F (36.6 °C)-98.6 °F (37 °C)] 98.6 °F (37 °C)  Pulse:  [] 86  Resp:  [16-20] 16  SpO2:  [94 %-99 %] 99 %  BP: (105-140)/(56-69) 124/60     Weight: 65.4 kg (144 lb 3.2 oz)  Body mass index is 24.74 kg/m².      Intake/Output Summary (Last 24 hours) at 07/24/17 0755  Last data filed at 07/24/17 0600   Gross per 24 hour   Intake              690 ml   Output              950 ml   Net             -260 ml       Physical Exam   Pulmonary/Chest: She has wheezes.     Gen: Awake, conversant, not using accessory muscles of respiration  CV: Irregular, regular rate  Respiratory: Crackles R>L, wheeze bilaterally  Abd: Soft  Ext: 2+ pitting edema of the lower extremities    Vents:       Lines/Drains/Airways     Drain                 Gastrostomy/Enterostomy 01/25/16 0835 Percutaneous endoscopic gastrostomy (PEG) midline feeding 545 days          Peripheral Intravenous Line                 Peripheral IV - Single Lumen 07/21/17 0919 Right Forearm 2 days                Significant Labs:    CBC/Anemia Profile:    Recent Labs  Lab 07/23/17  0340 07/24/17  0345   WBC 5.94 5.60   HGB 7.1* 6.8*   HCT 23.5* 23.2*    212   MCV 92 92   RDW 15.5* 15.5*        Chemistries:    Recent Labs  Lab 07/22/17  1823 07/23/17  0340 07/23/17  0746 07/23/17  1814 07/24/17  0345     --  143 143  --    K 3.5  --  3.4* 4.2  --    CL 96  --  97 97  --    CO2 35*  --  36* 35*  --    BUN 64*  --  63* 69*  --    CREATININE 2.4*  --  2.3* 2.4*  --    CALCIUM 8.5*  --  8.6* 8.5*  --    MG  --  1.8  --   --  1.9   PHOS  --  3.9  --   --  4.0       ABGs: No results for input(s): PH, PCO2, HCO3, POCSATURATED, BE in the last 48 hours.  CMP:   Recent  Labs  Lab 07/22/17  1823 07/23/17  0746 07/23/17  1814    143 143   K 3.5 3.4* 4.2   CL 96 97 97   CO2 35* 36* 35*   * 168* 180*   BUN 64* 63* 69*   CREATININE 2.4* 2.3* 2.4*   CALCIUM 8.5* 8.6* 8.5*   ANIONGAP 11 10 11   EGFRNONAA 19.4* 20.5* 19.4*       Significant Imaging:  I have reviewed all pertinent imaging results/findings within the past 24 hours.

## 2017-07-24 NOTE — PROGRESS NOTES
Pt's H/H 6.8/23.2; notified MD. No new orders @ this time. MD stated he was aware & that pt may be transfused today. NATA.

## 2017-07-24 NOTE — ASSESSMENT & PLAN NOTE
-On admit (7/12), CXR shows worsening pulmonary edema, BMP 1413 (above baseline), tn 0.04, EKG shows afib with RVR without acute ischemic changes. most recent echo 6/2017 showed EF 40-45, pulm HTN.   -CT chest 7/21 shows moderate bilateral pleural effusions  - Cardiology following, appreciate recs  - No significant improvement thus far given clinical status and BNP of 1600 on 7/23 (from 1700 on admit)  - Poor candidate for thoracentesis, per pulmonology  - Received intermittent doses of metolazone and chlorothiazide to supplement diuresis  - cont on hydralazine 50 TID, isosorbide dinitirate 10 TID, Lopressor 75 BID  - cont with lasix 20 /hr for diuresis  - Repeat 2D echo today for revaluation of volume status

## 2017-07-24 NOTE — SUBJECTIVE & OBJECTIVE
Interval History: patient currently not a candidate for pleuracentesis, will continue to diurese and monitor volume status.    Review of Systems   HENT: Negative for stridor.    Eyes: Negative for pain and visual disturbance.   Cardiovascular: Positive for dyspnea on exertion. Negative for chest pain and irregular heartbeat.   Respiratory: Positive for shortness of breath. Negative for cough and hemoptysis.    Skin: Negative for color change.   Gastrointestinal: Negative for abdominal pain, constipation, diarrhea, dysphagia, hematemesis and nausea.   Genitourinary: Negative for flank pain, hematuria and urgency.   Neurological: Negative for numbness and sensory change.     Objective:     Vital Signs (Most Recent):  Temp: 97.4 °F (36.3 °C) (07/24/17 1200)  Pulse: 85 (07/24/17 1200)  Resp: 17 (07/24/17 1200)  BP: (!) 127/59 (07/24/17 1200)  SpO2: 96 % (07/24/17 1200) Vital Signs (24h Range):  Temp:  [97.4 °F (36.3 °C)-98.6 °F (37 °C)] 97.4 °F (36.3 °C)  Pulse:  [] 85  Resp:  [16-18] 17  SpO2:  [94 %-99 %] 96 %  BP: (109-140)/(57-69) 127/59     Weight: 65.4 kg (144 lb 3.2 oz)  Body mass index is 24.74 kg/m².     SpO2: 96 %  O2 Device (Oxygen Therapy): nasal cannula      Intake/Output Summary (Last 24 hours) at 07/24/17 1409  Last data filed at 07/24/17 0600   Gross per 24 hour   Intake              270 ml   Output              950 ml   Net             -680 ml       Lines/Drains/Airways     Drain                 Gastrostomy/Enterostomy 01/25/16 0835 Percutaneous endoscopic gastrostomy (PEG) midline feeding 546 days          Peripheral Intravenous Line                 Peripheral IV - Single Lumen 07/21/17 0919 Right Forearm 3 days         Peripheral IV - Single Lumen 07/24/17 0900 Right Forearm less than 1 day                Physical Exam   Constitutional: She is oriented to person, place, and time. No distress.   HENT:   Head: Normocephalic and atraumatic.   Eyes: Conjunctivae and EOM are normal. Pupils are equal,  round, and reactive to light.   Neck: Neck supple.   Cardiovascular: Normal rate, regular rhythm and normal heart sounds.  Exam reveals no friction rub.    No murmur heard.  Pulmonary/Chest: She has no wheezes.   Diminished breath sounds in lower lung fields b/l     Abdominal: Soft. Bowel sounds are normal.   Musculoskeletal:   Patient very weak with intentional muscle movements   Neurological: She is alert and oriented to person, place, and time.   Skin: Skin is warm. She is not diaphoretic. No erythema.       Significant Labs:   Recent Results (from the past 24 hour(s))   POCT glucose    Collection Time: 07/23/17  5:54 PM   Result Value Ref Range    POCT Glucose 215 (H) 70 - 110 mg/dL   Basic metabolic panel    Collection Time: 07/23/17  6:14 PM   Result Value Ref Range    Sodium 143 136 - 145 mmol/L    Potassium 4.2 3.5 - 5.1 mmol/L    Chloride 97 95 - 110 mmol/L    CO2 35 (H) 23 - 29 mmol/L    Glucose 180 (H) 70 - 110 mg/dL    BUN, Bld 69 (H) 8 - 23 mg/dL    Creatinine 2.4 (H) 0.5 - 1.4 mg/dL    Calcium 8.5 (L) 8.7 - 10.5 mg/dL    Anion Gap 11 8 - 16 mmol/L    eGFR if African American 22.4 (A) >60 mL/min/1.73 m^2    eGFR if non African American 19.4 (A) >60 mL/min/1.73 m^2   POCT glucose    Collection Time: 07/23/17  9:17 PM   Result Value Ref Range    POCT Glucose 280 (H) 70 - 110 mg/dL   Phosphorus - if not done in ED    Collection Time: 07/24/17  3:45 AM   Result Value Ref Range    Phosphorus 4.0 2.7 - 4.5 mg/dL   CBC auto differential    Collection Time: 07/24/17  3:45 AM   Result Value Ref Range    WBC 5.60 3.90 - 12.70 K/uL    RBC 2.53 (L) 4.00 - 5.40 M/uL    Hemoglobin 6.8 (L) 12.0 - 16.0 g/dL    Hematocrit 23.2 (L) 37.0 - 48.5 %    MCV 92 82 - 98 fL    MCH 26.9 (L) 27.0 - 31.0 pg    MCHC 29.3 (L) 32.0 - 36.0 g/dL    RDW 15.5 (H) 11.5 - 14.5 %    Platelets 212 150 - 350 K/uL    MPV 9.3 9.2 - 12.9 fL    Gran # 3.5 1.8 - 7.7 K/uL    Lymph # 1.3 1.0 - 4.8 K/uL    Mono # 0.7 0.3 - 1.0 K/uL    Eos # 0.2 0.0 -  0.5 K/uL    Baso # 0.03 0.00 - 0.20 K/uL    Gran% 62.1 38.0 - 73.0 %    Lymph% 22.3 18.0 - 48.0 %    Mono% 11.8 4.0 - 15.0 %    Eosinophil% 2.9 0.0 - 8.0 %    Basophil% 0.5 0.0 - 1.9 %    Differential Method Automated    Magnesium - if not done in ED    Collection Time: 07/24/17  3:45 AM   Result Value Ref Range    Magnesium 1.9 1.6 - 2.6 mg/dL   Type & Screen    Collection Time: 07/24/17  3:45 AM   Result Value Ref Range    Group & Rh AB POS     Indirect Av NEG    Prepare RBC 1 Unit    Collection Time: 07/24/17  3:45 AM   Result Value Ref Range    UNIT NUMBER [id number]     PRODUCT CODE [id number]     DISPENSE STATUS ISSUED     CODING SYSTEM DYRD362     Unit Blood Type Code 8400     Unit Blood Type AB POS     Unit Expiration [id number]    Basic metabolic panel    Collection Time: 07/24/17  7:45 AM   Result Value Ref Range    Sodium 144 136 - 145 mmol/L    Potassium 3.6 3.5 - 5.1 mmol/L    Chloride 96 95 - 110 mmol/L    CO2 41 (HH) 23 - 29 mmol/L    Glucose 138 (H) 70 - 110 mg/dL    BUN, Bld 70 (H) 8 - 23 mg/dL    Creatinine 2.4 (H) 0.5 - 1.4 mg/dL    Calcium 8.6 (L) 8.7 - 10.5 mg/dL    Anion Gap 7 (L) 8 - 16 mmol/L    eGFR if African American 22.4 (A) >60 mL/min/1.73 m^2    eGFR if non African American 19.4 (A) >60 mL/min/1.73 m^2   Iron and TIBC    Collection Time: 07/24/17  7:45 AM   Result Value Ref Range    Iron 26 (L) 30 - 160 ug/dL    Transferrin 128 (L) 200 - 375 mg/dL    TIBC 189 (L) 250 - 450 ug/dL    Saturated Iron 14 (L) 20 - 50 %   Ferritin    Collection Time: 07/24/17  7:45 AM   Result Value Ref Range    Ferritin 61 20.0 - 300.0 ng/mL   POCT glucose    Collection Time: 07/24/17  8:27 AM   Result Value Ref Range    POCT Glucose 174 (H) 70 - 110 mg/dL   POCT glucose    Collection Time: 07/24/17 12:48 PM   Result Value Ref Range    POCT Glucose 290 (H) 70 - 110 mg/dL       Significant Imaging: X-Ray: CXR: X-Ray Chest 1 View (CXR):   Results for orders placed or performed during the hospital  encounter of 07/12/17   X-Ray Chest 1 View    Narrative    AP Portable chest    Comparison: 7/17/17     Findings: Median sternotomy wires intact. There is moderate perihilar and bi-basilar lung opacification with indistinct pulmonary vasculature, similar to the prior exam. Enlarged cardiac silhouette, unchanged. No gross pneumothorax. Probable small effusions.    Impression     No significant change.         Electronically signed by: Regino Kraft MD  Date:     07/22/17  Time:    12:51     and KUB: X-Ray Abdomen AP 1 View (KUB): No results found for this visit on 07/12/17.

## 2017-07-24 NOTE — ASSESSMENT & PLAN NOTE
71yoF with HFrEF (LKEF 40%) with persistent dyspnea despite diuresis.  Her oxygen requirements have largely been stable since she has been here but she has notably increased WOB though this is similar to how she appeared a week ago.  Her Cr is stable with diuresis which is consistent with further diuresis being a viable strategy.     - Continue diuresis, inaccurate I/Os make titrating this difficult but her Cr is stable for now  - Re: her pleural effusions - suspect they are transudative as they are bilateral with R > L and she has no sx suggestive of infection; believe risk of thoracentesis outweighs benefits at this juncture, she will be difficult to position as well making the procedure higher risk  - Schedule her nebulizer treatments, she is wheezing today  - Would repeat her ECHO to assess LVEF and PASP (she refuses RHC)

## 2017-07-24 NOTE — ASSESSMENT & PLAN NOTE
- Cr on admission 2.5, stable (improved from 3.2 ~1 mo ago)  - nephrology following. Appreciate recommendations  - negative workup for nephrotic syndromes at this time  - monitor Cr daily- now 2.4

## 2017-07-24 NOTE — PROGRESS NOTES
[facility]  Cardiology  Progress Note    Patient Name: Michael Salgado  MRN: [id number]  Admission Date: 7/12/2017  Hospital Length of Stay: 12 days  Code Status: Full Code   Attending Physician: Louann Apple MD   Primary Care Physician: Wesley Watkins MD  Expected Discharge Date: 7/28/2017  Principal Problem:Acute on chronic systolic congestive heart failure    Subjective:     Hospital Course:   7/24: Patient seen by pulmonary team who reported that the patient is a high risk and unable to sit up for pleurocentesis and will not be doing the procedure. Continue diuresis     Interval History: patient currently not a candidate for pleuracentesis, will continue to diurese and monitor volume status.    Review of Systems   HENT: Negative for stridor.    Eyes: Negative for pain and visual disturbance.   Cardiovascular: Positive for dyspnea on exertion. Negative for chest pain and irregular heartbeat.   Respiratory: Positive for shortness of breath. Negative for cough and hemoptysis.    Skin: Negative for color change.   Gastrointestinal: Negative for abdominal pain, constipation, diarrhea, dysphagia, hematemesis and nausea.   Genitourinary: Negative for flank pain, hematuria and urgency.   Neurological: Negative for numbness and sensory change.     Objective:     Vital Signs (Most Recent):  Temp: 97.4 °F (36.3 °C) (07/24/17 1200)  Pulse: 85 (07/24/17 1200)  Resp: 17 (07/24/17 1200)  BP: (!) 127/59 (07/24/17 1200)  SpO2: 96 % (07/24/17 1200) Vital Signs (24h Range):  Temp:  [97.4 °F (36.3 °C)-98.6 °F (37 °C)] 97.4 °F (36.3 °C)  Pulse:  [] 85  Resp:  [16-18] 17  SpO2:  [94 %-99 %] 96 %  BP: (109-140)/(57-69) 127/59     Weight: 65.4 kg (144 lb 3.2 oz)  Body mass index is 24.74 kg/m².     SpO2: 96 %  O2 Device (Oxygen Therapy): nasal cannula      Intake/Output Summary (Last 24 hours) at 07/24/17 1409  Last data filed at 07/24/17 0600   Gross per 24 hour   Intake              270 ml   Output               950 ml   Net             -680 ml       Lines/Drains/Airways     Drain                 Gastrostomy/Enterostomy 01/25/16 0835 Percutaneous endoscopic gastrostomy (PEG) midline feeding 546 days          Peripheral Intravenous Line                 Peripheral IV - Single Lumen 07/21/17 0919 Right Forearm 3 days         Peripheral IV - Single Lumen 07/24/17 0900 Right Forearm less than 1 day                Physical Exam   Constitutional: She is oriented to person, place, and time. No distress.   HENT:   Head: Normocephalic and atraumatic.   Eyes: Conjunctivae and EOM are normal. Pupils are equal, round, and reactive to light.   Neck: Neck supple.   Cardiovascular: Normal rate, regular rhythm and normal heart sounds.  Exam reveals no friction rub.    No murmur heard.  Pulmonary/Chest: She has no wheezes.   Diminished breath sounds in lower lung fields b/l     Abdominal: Soft. Bowel sounds are normal.   Musculoskeletal:   Patient very weak with intentional muscle movements   Neurological: She is alert and oriented to person, place, and time.   Skin: Skin is warm. She is not diaphoretic. No erythema.       Significant Labs:   Recent Results (from the past 24 hour(s))   POCT glucose    Collection Time: 07/23/17  5:54 PM   Result Value Ref Range    POCT Glucose 215 (H) 70 - 110 mg/dL   Basic metabolic panel    Collection Time: 07/23/17  6:14 PM   Result Value Ref Range    Sodium 143 136 - 145 mmol/L    Potassium 4.2 3.5 - 5.1 mmol/L    Chloride 97 95 - 110 mmol/L    CO2 35 (H) 23 - 29 mmol/L    Glucose 180 (H) 70 - 110 mg/dL    BUN, Bld 69 (H) 8 - 23 mg/dL    Creatinine 2.4 (H) 0.5 - 1.4 mg/dL    Calcium 8.5 (L) 8.7 - 10.5 mg/dL    Anion Gap 11 8 - 16 mmol/L    eGFR if African American 22.4 (A) >60 mL/min/1.73 m^2    eGFR if non African American 19.4 (A) >60 mL/min/1.73 m^2   POCT glucose    Collection Time: 07/23/17  9:17 PM   Result Value Ref Range    POCT Glucose 280 (H) 70 - 110 mg/dL   Phosphorus - if not  done in ED    Collection Time: 07/24/17  3:45 AM   Result Value Ref Range    Phosphorus 4.0 2.7 - 4.5 mg/dL   CBC auto differential    Collection Time: 07/24/17  3:45 AM   Result Value Ref Range    WBC 5.60 3.90 - 12.70 K/uL    RBC 2.53 (L) 4.00 - 5.40 M/uL    Hemoglobin 6.8 (L) 12.0 - 16.0 g/dL    Hematocrit 23.2 (L) 37.0 - 48.5 %    MCV 92 82 - 98 fL    MCH 26.9 (L) 27.0 - 31.0 pg    MCHC 29.3 (L) 32.0 - 36.0 g/dL    RDW 15.5 (H) 11.5 - 14.5 %    Platelets 212 150 - 350 K/uL    MPV 9.3 9.2 - 12.9 fL    Gran # 3.5 1.8 - 7.7 K/uL    Lymph # 1.3 1.0 - 4.8 K/uL    Mono # 0.7 0.3 - 1.0 K/uL    Eos # 0.2 0.0 - 0.5 K/uL    Baso # 0.03 0.00 - 0.20 K/uL    Gran% 62.1 38.0 - 73.0 %    Lymph% 22.3 18.0 - 48.0 %    Mono% 11.8 4.0 - 15.0 %    Eosinophil% 2.9 0.0 - 8.0 %    Basophil% 0.5 0.0 - 1.9 %    Differential Method Automated    Magnesium - if not done in ED    Collection Time: 07/24/17  3:45 AM   Result Value Ref Range    Magnesium 1.9 1.6 - 2.6 mg/dL   Type & Screen    Collection Time: 07/24/17  3:45 AM   Result Value Ref Range    Group & Rh AB POS     Indirect Av NEG    Prepare RBC 1 Unit    Collection Time: 07/24/17  3:45 AM   Result Value Ref Range    UNIT NUMBER [id number]     PRODUCT CODE [id number]     DISPENSE STATUS ISSUED     CODING SYSTEM AAAE907     Unit Blood Type Code 8400     Unit Blood Type AB POS     Unit Expiration [id number]    Basic metabolic panel    Collection Time: 07/24/17  7:45 AM   Result Value Ref Range    Sodium 144 136 - 145 mmol/L    Potassium 3.6 3.5 - 5.1 mmol/L    Chloride 96 95 - 110 mmol/L    CO2 41 (HH) 23 - 29 mmol/L    Glucose 138 (H) 70 - 110 mg/dL    BUN, Bld 70 (H) 8 - 23 mg/dL    Creatinine 2.4 (H) 0.5 - 1.4 mg/dL    Calcium 8.6 (L) 8.7 - 10.5 mg/dL    Anion Gap 7 (L) 8 - 16 mmol/L    eGFR if African American 22.4 (A) >60 mL/min/1.73 m^2    eGFR if non African American 19.4 (A) >60 mL/min/1.73 m^2   Iron and TIBC    Collection Time: 07/24/17  7:45 AM   Result Value Ref  Range    Iron 26 (L) 30 - 160 ug/dL    Transferrin 128 (L) 200 - 375 mg/dL    TIBC 189 (L) 250 - 450 ug/dL    Saturated Iron 14 (L) 20 - 50 %   Ferritin    Collection Time: 07/24/17  7:45 AM   Result Value Ref Range    Ferritin 61 20.0 - 300.0 ng/mL   POCT glucose    Collection Time: 07/24/17  8:27 AM   Result Value Ref Range    POCT Glucose 174 (H) 70 - 110 mg/dL   POCT glucose    Collection Time: 07/24/17 12:48 PM   Result Value Ref Range    POCT Glucose 290 (H) 70 - 110 mg/dL       Significant Imaging: X-Ray: CXR: X-Ray Chest 1 View (CXR):   Results for orders placed or performed during the hospital encounter of 07/12/17   X-Ray Chest 1 View    Narrative    AP Portable chest    Comparison: 7/17/17     Findings: Median sternotomy wires intact. There is moderate perihilar and bi-basilar lung opacification with indistinct pulmonary vasculature, similar to the prior exam. Enlarged cardiac silhouette, unchanged. No gross pneumothorax. Probable small effusions.    Impression     No significant change.         Electronically signed by: Regino Kraft MD  Date:     07/22/17  Time:    12:51     and KUB: X-Ray Abdomen AP 1 View (KUB): No results found for this visit on 07/12/17.    Assessment and Plan:     Brief HPI:   71 y.o. woman with PMH of CAD s/p CABGx2 (LIMA-LAD and SVG-D1 2015), ICM, HFrEF (EF 40-45%, LBBB giving abnormal septal wall, small pericardial effusion), CKD stage IV, DM, HTN, left-sided CVA with residual right hemiparesis, chronic AF (on warfarin) is in the hospital for acute decompensated heart failure.         Atrial fibrillation    -continue with diuresis   -[id number] sore 8  -Continue metoprolol 75mg          * Acute on chronic systolic congestive heart failure    -convert Lasix IV to lasix 80mg PO BID  -continue hydralazine 50mg PO BID  -Still short of breath, monitor for adequate diuresis with I/O and volume status exams            VTE Risk Mitigation         Ordered     Medium Risk of VTE  Once       07/12/17 4500          Dimitri Sams MD  Cardiology  [facility]

## 2017-07-24 NOTE — PROGRESS NOTES
Dr. Sexton notified of pt complaining of chest heaviness rated an 8/10 during blood transfusion with blood infusing at 75ml/hr. Instructed to hold blood for now. No further orders given. Will continue to monitor.

## 2017-07-24 NOTE — PROGRESS NOTES
Discussed current medical treatment with the patient and her  at bedside. We talked about the recommendations from cardiology and pulmonology. Patient is not interested in pursuing cardiac PET stress or RHC. Pulmonology is recommending against a thora and patient is actually not interested in the procedure.     Will transition her to lasix 80 IV BID as patient is not peripherally overloaded. Concern that patient is severely deconditioned with underlying chronic resp failure.     Discussed code status. Patient does not want resuscitation in setting of cardio-pulmonary arrest. Patient made DNR. Will plan to address further goals of care, investigate wether patient is a good candidate for hospice.     Ying Bell MD  Internal Medicine PGY-3  Pager [phone]

## 2017-07-24 NOTE — PLAN OF CARE
Problem: Patient Care Overview  Goal: Plan of Care Review  7/20 - Improve breathing.  Patient on Lasix gtt.   Outcome: Ongoing (interventions implemented as appropriate)  Patient is free of fall/trauma/injury. Denies CP or pain/discomfort. 1U PRBC transfused today. Lasix IVP 80mg to be administered after completion of transfusion. Plan of care discussed with pt. Pt verbalizes understanding. All questions addressed. Will continue to monitor

## 2017-07-24 NOTE — ASSESSMENT & PLAN NOTE
-continue diuresis per Cardiology and thoracentesis planning for pulmonary effusions that have been difficult to diurese. Recommend Lasix push to gtts.   -patient with Nephrotic range proteinuria- KASANDRA/dsDNA ab pending, extensive nephrotic workup unrevealing to date.  -etiology of CKD likely uncontrolled HTN/ DM w/o other end-organ involvement at this time.

## 2017-07-24 NOTE — HPI
71 y.o. woman with PMH of CAD s/p CABGx2 (LIMA-LAD and SVG-D1 2015), ICM, HFrEF (EF 40-45%, LBBB giving abnormal septal wall, small pericardial effusion), CKD stage IV, DM, HTN, left-sided CVA with residual right hemiparesis, chronic AF (on warfarin) is in the hospital for acute decompensated heart failure.

## 2017-07-24 NOTE — PROGRESS NOTES
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 Patient has baseline MILIAN, orthopnea, PND, LE edema, and abdo swelling that has also progressively worsened over the past few days. She is compliant with her home medications which include lasix 20 BID, coreg, hydralazine, amlodipine, and warfarin. She follows a low salt, fluid restrict diet and she has not deviated from it recently. She reports similar symptoms about a year ago that required hospitalization for CHF exacerbation. Patient recently had pneumonia and shingles in May and has not recovered full function since. She previously was able to ambulate with a walker and now uses a wheelchair. Prior to the shingles and PNA, she only required 10 mg lasix, but her lasix was increased afterwords due to increased SOB and volume overload.  She uses home O2 2.5 L. She is complaining of decreased UO, dysuria, and abdominal discomfort. She denies chest pain, palpitations, constipation (last BM today), diarrhea, cough, fever, chills, or any other complaints at this time. She lives at home with her daughter who provides with with significant assistance with her ADL's. Her cardiologist is Dr. Davis. She was last seen in cardiology clinic today and was sent to the ED from  there due to her SOB and edema.     In the ED, CXR showed increased pulmonary edema, BNP 1413 (which is above her baseline), tn 0.044, EKG with a fib with RVR but no acute ischemic changes. Patient was given lasix IV 80 mg.    Hospital Course:  7/13: Admitted to IM1. Started on Lasix 40 mg IV BID. Change in HH, 9.9->7.9. Denies changes in color/consistency of BMs and FOBT negative. Hemolytic anemia work-up negative. ABG normal. Added duoneb q6h.  7/14: Continued respiratory effort despite duoneb therapy. Vitals have remained stable. Pulmonology consult advised against thoracentesis at this moment after ultrasound showed bilateral effusions are likely not contributing to SOB. Also recommends discontinuing antibiotics as pneumonia is unlikely and it is adding fluids to her volume overload. Additional recommendation to increase diuretic therapy despite CKD. Consults to cards and pulm placed.    7/15: Patient's respiratory effort improving. Decided to discontinue coumadin at home. Patient is now taking Apixaban and will continue at home upon discharge.  7/16: Patient markedly improved today. Restarted Aspirin now that hemoglobin has stabilized.  7/17: Continued improvement. Increased hydralazine to 50 TID and replaced coreg with metoprolol for better AFib control, per cardiology.  7/18: Pt's family is refusing stress test. Lasix gtt transitioned to IV pushes.  7/19: Patient regressed overnight per family. Patient is winded when speaking and can only make a few words/sounds. Lasix IV pushes transitioned back to Lasix infusion with increase to 20 mg/hr and an initial Lasix 80 mg IV push. Britt placed for better monitoring of I/Os. Nephrology consulted for low urine output. Appreciate recommendations when available.  7/20: Patient improved on Lasix gtt. Metolazone to enhance diuresis. Britt removed secondary to patient wishes.  7/21: Continued improvement. Patient responded well to metolazone with higher urine output. CT  chest showed moderate pleural effusions  7/22: Pulmonology consulted for pleural effusions. Effusions may be contributing to SOB more so than pulmonary edema as diuresis and urine output has been adequate. thora planned for 7/24, apixaban is held. Cont to refuse barraza and with unclear I/O, tenuous resp status with inadequate diuresis  7/23: Continued diuretic therapy. Patient remains SOB and has intermittent trouble breathing. Holding apixaban for thoracentesis tomorrow.  7/24: Pulmonology re-evaluated today and believes the risk of thoracentesis outweighs benefits - advises that IR may be able to perform a therapeutic thoracentesis. Patient continues to be SOB this morning. Hemoglobin at 6.8 this morning, but no more symptomatic than normal. Transfused 1 PRBC and continuing diuresis with lasix and doses of chlorothiazide. Repeat 2D echo today.      Interval History: No acute events overnight. Patient continues to be SOB and have intermittent trouble breathing. Continuing to make urine on diuretic therapy, however it is unmeasured as she refuses a catheter. She is a poor candidate for thoracentesis, per pulmonology.    Review of Systems   Constitutional: Negative for chills and fever.   HENT: Negative for congestion and rhinorrhea.    Eyes: Negative for pain and visual disturbance.   Respiratory: Positive for chest tightness and shortness of breath. Negative for cough.    Cardiovascular: Positive for leg swelling. Negative for chest pain and palpitations.   Gastrointestinal: Negative for abdominal distention, abdominal pain, blood in stool, constipation, diarrhea, nausea and vomiting.   Genitourinary: Negative for difficulty urinating, dysuria, frequency and hematuria.   Musculoskeletal: Negative for arthralgias and back pain.   Skin: Negative for rash and wound.   Neurological: Negative for syncope, light-headedness and headaches.   Psychiatric/Behavioral: Negative for agitation and behavioral problems.      Objective:     Vital Signs (Most Recent):  Temp: 97.4 °F (36.3 °C) (07/24/17 1200)  Pulse: 85 (07/24/17 1200)  Resp: 17 (07/24/17 1200)  BP: (!) 127/59 (07/24/17 1200)  SpO2: 96 % (07/24/17 1200) Vital Signs (24h Range):  Temp:  [97.4 °F (36.3 °C)-98.6 °F (37 °C)] 97.4 °F (36.3 °C)  Pulse:  [] 85  Resp:  [16-18] 17  SpO2:  [94 %-99 %] 96 %  BP: (109-140)/(57-69) 127/59     Weight: 65.4 kg (144 lb 3.2 oz)  Body mass index is 24.74 kg/m².    Intake/Output Summary (Last 24 hours) at 07/24/17 1421  Last data filed at 07/24/17 0600   Gross per 24 hour   Intake              270 ml   Output              950 ml   Net             -680 ml      Physical Exam   Constitutional: She is oriented to person, place, and time. She appears well-developed and well-nourished. She appears distressed.   HENT:   Head: Normocephalic and atraumatic.   Right Ear: External ear normal.   Left Ear: External ear normal.   Eyes: EOM are normal. Pupils are equal, round, and reactive to light. No scleral icterus.   Neck: Neck supple. No thyromegaly present.   Cardiovascular: Normal rate, normal heart sounds and intact distal pulses.    No murmur heard.  Irregular rhythm   Pulmonary/Chest: She is in respiratory distress. She has wheezes. She has rales.   Coarse BS and crackles bilaterally   Abdominal: Soft. Bowel sounds are normal. She exhibits no distension. There is no tenderness.   Musculoskeletal: Normal range of motion. She exhibits edema (which has improved from admit). She exhibits no tenderness or deformity.   Neurological: She is alert and oriented to person, place, and time. No cranial nerve deficit.   Skin: Skin is warm and dry.   Right forehead hyperpigmentation, does not cross midline, from resolving shingles rash   Psychiatric: She has a normal mood and affect. Her behavior is normal.       Significant Labs:   CBC:   Recent Labs  Lab 07/23/17  0340 07/24/17  0345   WBC 5.94 5.60   HGB 7.1* 6.8*   HCT 23.5* 23.2*    212      CMP:   Recent Labs  Lab 07/23/17  0746 07/23/17  1814 07/24/17  0745    143 144   K 3.4* 4.2 3.6   CL 97 97 96   CO2 36* 35* 41*   * 180* 138*   BUN 63* 69* 70*   CREATININE 2.3* 2.4* 2.4*   CALCIUM 8.6* 8.5* 8.6*   ANIONGAP 10 11 7*   EGFRNONAA 20.5* 19.4* 19.4*       Significant Imaging: I have reviewed all pertinent imaging results/findings within the past 24 hours.    Assessment/Plan:      * Acute on chronic systolic congestive heart failure    -On admit (7/12), CXR shows worsening pulmonary edema, BMP 1413 (above baseline), tn 0.04, EKG shows afib with RVR without acute ischemic changes. most recent echo 6/2017 showed EF 40-45, pulm HTN.   -CT chest 7/21 shows moderate bilateral pleural effusions  - Cardiology following, appreciate recs  - No significant improvement thus far given clinical status and BNP of 1600 on 7/23 (from 1700 on admit)  - Poor candidate for thoracentesis, per pulmonology  - Received intermittent doses of metolazone and chlorothiazide to supplement diuresis  - cont on hydralazine 50 TID, isosorbide dinitirate 10 TID, Lopressor 75 BID  - cont with lasix 20 /hr for diuresis  - Repeat 2D echo today for revaluation of volume status              Chronic kidney disease (CKD) stage G4/A1, severely decreased glomerular filtration rate (GFR) between 15-29 mL/min/1.73 square meter and albuminuria creatinine ratio less than 30 mg/g    - Cr on admission 2.5, stable (improved from 3.2 ~1 mo ago)  - nephrology following. Appreciate recommendations  - negative workup for nephrotic syndromes at this time  - monitor Cr daily- now 2.4        Insulin dependent diabetes mellitus    -daughter reports pt takes LA insulin 10 units qhs and SA insulin 5 units WM  -A1c 5.7 7/20  -restarted on detemir 5 u BID with SSI        HTN (hypertension)    -Lopressor 75 BID, hyralazine 50 TID, isordil 10 TID        H/O: CVA (cerebrovascular accident)    -hx of stroke before 2012 per daughter  -pt with residual  right sided deficit   -continue atorvastatin 40 mg  -continue apixaban        Normocytic anemia    - On admit (7/12), Hgb 9.9 and dropped to 7.9 (7/13) w/ SOB  - Hemolysis work-up negative  - type and screen for the AM given hbg of 7.1 today  - 1 U PRBC transfused        Atrial fibrillation    - chadsvasc of 8  - Continue Apixaban  - Coreg switched to metoprolol tartrate 75 BID        Acute on chronic respiratory failure with hypoxia    -on home O2 2.5 L  - acute resp failure 2/2 ADHF along with pleural effusions          Depression    -continue lexapro 5 mg, Wellbutrin 150 mg  -Seroquel qhs         Hyperlipidemia    -atorvastatin 40 mg          VTE Risk Mitigation         Ordered     Medium Risk of VTE  Once      07/12/17 1213        Dispo: To SNF pending resolution of volume overload and respiratory failure.    Deuce Sexton MD  Department of Hospital Medicine   [facility]

## 2017-07-24 NOTE — ASSESSMENT & PLAN NOTE
-hx of stroke before 2012 per daughter  -pt with residual right sided deficit   -continue atorvastatin 40 mg  -continue apixaban

## 2017-07-24 NOTE — PT/OT/SLP PROGRESS
"Physical Therapy  Treatment    Michael Salgado   MRN: [id number]   Admitting Diagnosis: Acute on chronic systolic congestive heart failure    PT Received On: 07/24/17  PT Start Time: 0840     PT Stop Time: 0916    PT Total Time (min): 36 min       Billable Minutes:  Therapeutic Activity 36 min    Treatment Type: Treatment  PT/PTA: PT     PTA Visit Number: 0       General Precautions: Standard, fall  Orthopedic Precautions: N/A   Braces: N/A    Do you have any cultural, spiritual, Hindu conflicts, given your current situation?: none stated    Subjective:  Communicated with RN prior to session. Pt agreeable to participate in therapy session with encouragement. Pt's daughter and  at bedside. Following introduction of plan for PT session this date, therapist attempted to initiate session with pt performing sup>sit transfer. Pt's daughter stepped in front of therapist to perform pt transfer stating, "I got this, we have our own way of doing this." Therapist faciltitated transfer with tactile cueing for recommendations, however pt's daughter not receptive to education provided by therapist. Throughout session, pt's daughter continued to insist that she help pt with mobility tasks instead of therapist, pushing therapist's hands out of the way and speaking over therapist instructions. Therapist educated pt and pt's daughter on need for therapist facilitation to improve performance of transfers, provide strength and gait training, and facilitate POC. Pt's daughter stated "you need to let her do things her way, this is how we do it at home already, she will do her exercises when she goes to the other therapy place." At next session, pt may benefit from participating in session with family members not present to better assess mobility level and provide pt education.     Pain/Comfort  Pain Rating 1: 0/10  Pain Rating Post-Intervention 1: 0/10    Objective:   Patient found with: peripheral IV, oxygen, telemetry " ([facility])    Functional Mobility:  Bed Mobility:   Supine to Sit: Moderate Assistance  Sit to Supine: Maximum Assistance    Transfers:  Sit <> Stand Assistance: Maximum Assistance (x 2 trials from EOB)  Sit <> Stand Assistive Device: Rolling Walker    *Bed mobility and transfers performed with family assistance and therapist attempting to provide CGA and therapy recommendations. See notes above in Subjective portion of note.     Gait:    N/T     Balance:   Static Sit: FAIR+: Able to take MINIMAL challenges from all directions  Dynamic Sit: FAIR+: Maintains balance through MINIMAL excursions of active trunk motion  Static Stand: POOR: Needs MODERATE to MAXIMUM assist to maintain  Dynamic stand: 0: N/A     Therapeutic Activities and Exercises:  Therapeutic activities aimed to increase pt's independence, safety, and efficiency with bed mobility and functional transfers. See above for assistance levels and subjective portion of note for family assistance.     Pt tolerated sitting EOB ~15 minutes while eating breakfast and therapist providing tactile cueing to improve postural awareness and weight bearing through RUE.     Pt and daughter educated on safety with mobility, role of PT and POC, therapy recommendations. Pt and daughter verbalized understanding, but will require reinforcement of education.      AM-PAC 6 CLICK MOBILITY  How much help from another person does this patient currently need?   1 = Unable, Total/Dependent Assistance  2 = A lot, Maximum/Moderate Assistance  3 = A little, Minimum/Contact Guard/Supervision  4 = None, Modified [place]/Independent    Turning over in bed (including adjusting bedclothes, sheets and blankets)?: 2  Sitting down on and standing up from a chair with arms (e.g., wheelchair, bedside commode, etc.): 2  Moving from lying on back to sitting on the side of the bed?: 2  Moving to and from a bed to a chair (including a wheelchair)?: 2  Need to walk in hospital room?: 2  Climbing  3-5 steps with a railing?: 1  Total Score: 11    AM-PAC Raw Score CMS G-Code Modifier Level of Impairment Assistance   6 % Total / Unable   7 - 9 CM 80 - 100% Maximal Assist   10 - 14 CL 60 - 80% Moderate Assist   15 - 19 CK 40 - 60% Moderate Assist   20 - 22 CJ 20 - 40% Minimal Assist   23 CI 1-20% SBA / CGA   24 CH 0% Independent/ Mod I     Patient left HOB elevated with all lines intact, call button in reach, RN notified and daughter and  present.    Assessment:  Michael Salgado is a 71 y.o. female with a medical diagnosis of Acute on chronic systolic congestive heart failure. Pt tolerated therapy session with no adverse effects; pt able to perform 2 trials of sit<>stand with RW and max A. Pt continues to demonstrate generalized deconditioning, weakness, and difficulty ambulating. Pt would benefit from continued PT intervention to address below listed deficits and maximize return to PLOF.       Rehab identified problem list/impairments: Rehab identified problem list/impairments: weakness, impaired endurance, impaired self care skills, impaired functional mobilty, gait instability, impaired balance, decreased safety awareness, impaired cardiopulmonary response to activity    Rehab potential is good.    Activity tolerance: Good    Discharge recommendations: Discharge Facility/Level Of Care Needs: nursing facility, skilled     Barriers to discharge: Barriers to Discharge: None    Equipment recommendations: Equipment Needed After Discharge: none     GOALS:    Physical Therapy Goals        Problem: Physical Therapy Goal    Goal Priority Disciplines Outcome Goal Variances Interventions   Physical Therapy Goal     PT/OT, PT Ongoing (interventions implemented as appropriate)     Description:  Goals to be met by: 2017    Patient will increase functional independence with mobility by performin. Supine to sit with MInimal Assistance  2. Sit to supine with MInimal Assistance  3. Rolling to  Right with Minimal Assistance.  4. Sit to stand transfer with minimal assistance   5. Bed to chair transfer with minimal assisatnce using Rolling Walker  6. Gait  x 10 feet with Minimal Assistance using Rolling Walker.                            PLAN:    Patient to be seen 4 x/week  to address the above listed problems via gait training, therapeutic activities, therapeutic exercises, neuromuscular re-education, wheelchair management/training  Plan of Care expires: 08/14/17  Plan of Care reviewed with: patient, daughter        Irma Pepe PT, DPT   7/24/2017  Pager: [phone]

## 2017-07-25 LAB
ALBUMIN SERPL BCP-MCNC: 1.9 G/DL
ALP SERPL-CCNC: 42 U/L
ALT SERPL W/O P-5'-P-CCNC: 7 U/L
ANION GAP SERPL CALC-SCNC: 9 MMOL/L
AST SERPL-CCNC: 15 U/L
BASOPHILS # BLD AUTO: 0.05 K/UL
BASOPHILS NFR BLD: 0.7 %
BILIRUB SERPL-MCNC: 0.4 MG/DL
BUN SERPL-MCNC: 74 MG/DL
CALCIUM SERPL-MCNC: 8.4 MG/DL
CHLORIDE SERPL-SCNC: 95 MMOL/L
CO2 SERPL-SCNC: 40 MMOL/L
CREAT SERPL-MCNC: 2.4 MG/DL
DIFFERENTIAL METHOD: ABNORMAL
EOSINOPHIL # BLD AUTO: 0.2 K/UL
EOSINOPHIL NFR BLD: 3.2 %
ERYTHROCYTE [DISTWIDTH] IN BLOOD BY AUTOMATED COUNT: 15.4 %
EST. GFR  (AFRICAN AMERICAN): 22.4 ML/MIN/1.73 M^2
EST. GFR  (NON AFRICAN AMERICAN): 19.4 ML/MIN/1.73 M^2
GLUCOSE SERPL-MCNC: 147 MG/DL
HCT VFR BLD AUTO: 26.7 %
HGB BLD-MCNC: 8.1 G/DL
INTERPRETATION SERPL IFE-IMP: NORMAL
LYMPHOCYTES # BLD AUTO: 1.4 K/UL
LYMPHOCYTES NFR BLD: 20.5 %
MAGNESIUM SERPL-MCNC: 1.9 MG/DL
MCH RBC QN AUTO: 27.7 PG
MCHC RBC AUTO-ENTMCNC: 30.3 G/DL
MCV RBC AUTO: 91 FL
MONOCYTES # BLD AUTO: 0.7 K/UL
MONOCYTES NFR BLD: 10.5 %
NEUTROPHILS # BLD AUTO: 4.4 K/UL
NEUTROPHILS NFR BLD: 64.8 %
PHOSPHATE SERPL-MCNC: 3.5 MG/DL
PLATELET # BLD AUTO: 239 K/UL
PMV BLD AUTO: 9.7 FL
POCT GLUCOSE: 260 MG/DL (ref 70–110)
POCT GLUCOSE: 279 MG/DL (ref 70–110)
POCT GLUCOSE: 279 MG/DL (ref 70–110)
POTASSIUM SERPL-SCNC: 3.7 MMOL/L
PROT SERPL-MCNC: 5.1 G/DL
RBC # BLD AUTO: 2.92 M/UL
SODIUM SERPL-SCNC: 144 MMOL/L
WBC # BLD AUTO: 6.77 K/UL

## 2017-07-25 PROCEDURE — 25000003 PHARM REV CODE 250: Performed by: STUDENT IN AN ORGANIZED HEALTH CARE EDUCATION/TRAINING PROGRAM

## 2017-07-25 PROCEDURE — 20600001 HC STEP DOWN PRIVATE ROOM

## 2017-07-25 PROCEDURE — 99232 SBSQ HOSP IP/OBS MODERATE 35: CPT | Mod: ,,, | Performed by: INTERNAL MEDICINE

## 2017-07-25 PROCEDURE — 99232 SBSQ HOSP IP/OBS MODERATE 35: CPT | Mod: GC,,, | Performed by: INTERNAL MEDICINE

## 2017-07-25 PROCEDURE — 85025 COMPLETE CBC W/AUTO DIFF WBC: CPT

## 2017-07-25 PROCEDURE — 36415 COLL VENOUS BLD VENIPUNCTURE: CPT

## 2017-07-25 PROCEDURE — 94640 AIRWAY INHALATION TREATMENT: CPT

## 2017-07-25 PROCEDURE — 27000221 HC OXYGEN, UP TO 24 HOURS

## 2017-07-25 PROCEDURE — 63600175 PHARM REV CODE 636 W HCPCS: Performed by: STUDENT IN AN ORGANIZED HEALTH CARE EDUCATION/TRAINING PROGRAM

## 2017-07-25 PROCEDURE — 84100 ASSAY OF PHOSPHORUS: CPT

## 2017-07-25 PROCEDURE — 63600175 PHARM REV CODE 636 W HCPCS: Performed by: HOSPITALIST

## 2017-07-25 PROCEDURE — 99232 SBSQ HOSP IP/OBS MODERATE 35: CPT | Mod: GC,,, | Performed by: HOSPITALIST

## 2017-07-25 PROCEDURE — 25000003 PHARM REV CODE 250: Performed by: HOSPITALIST

## 2017-07-25 PROCEDURE — 83735 ASSAY OF MAGNESIUM: CPT

## 2017-07-25 PROCEDURE — 80053 COMPREHEN METABOLIC PANEL: CPT

## 2017-07-25 PROCEDURE — A4216 STERILE WATER/SALINE, 10 ML: HCPCS | Performed by: STUDENT IN AN ORGANIZED HEALTH CARE EDUCATION/TRAINING PROGRAM

## 2017-07-25 PROCEDURE — 25000242 PHARM REV CODE 250 ALT 637 W/ HCPCS: Performed by: HOSPITALIST

## 2017-07-25 RX ORDER — POTASSIUM CHLORIDE 750 MG/1
50 CAPSULE, EXTENDED RELEASE ORAL ONCE
Status: COMPLETED | OUTPATIENT
Start: 2017-07-25 | End: 2017-07-25

## 2017-07-25 RX ORDER — MAGNESIUM SULFATE HEPTAHYDRATE 40 MG/ML
2 INJECTION, SOLUTION INTRAVENOUS ONCE
Status: COMPLETED | OUTPATIENT
Start: 2017-07-25 | End: 2017-07-25

## 2017-07-25 RX ORDER — FUROSEMIDE 10 MG/ML
80 INJECTION INTRAMUSCULAR; INTRAVENOUS 2 TIMES DAILY
Status: DISCONTINUED | OUTPATIENT
Start: 2017-07-25 | End: 2017-07-26

## 2017-07-25 RX ORDER — POLYETHYLENE GLYCOL 3350 17 G/17G
17 POWDER, FOR SOLUTION ORAL DAILY PRN
Status: DISCONTINUED | OUTPATIENT
Start: 2017-07-25 | End: 2017-07-29 | Stop reason: HOSPADM

## 2017-07-25 RX ORDER — FUROSEMIDE 80 MG/1
80 TABLET ORAL 2 TIMES DAILY
Status: DISCONTINUED | OUTPATIENT
Start: 2017-07-25 | End: 2017-07-25

## 2017-07-25 RX ADMIN — ESCITALOPRAM 5 MG: 5 TABLET, FILM COATED ORAL at 10:07

## 2017-07-25 RX ADMIN — ISOSORBIDE DINITRATE 10 MG: 10 TABLET ORAL at 05:07

## 2017-07-25 RX ADMIN — ISOSORBIDE DINITRATE 10 MG: 10 TABLET ORAL at 01:07

## 2017-07-25 RX ADMIN — GABAPENTIN 300 MG: 300 CAPSULE ORAL at 09:07

## 2017-07-25 RX ADMIN — BUPROPION HYDROCHLORIDE 150 MG: 150 TABLET, FILM COATED, EXTENDED RELEASE ORAL at 10:07

## 2017-07-25 RX ADMIN — METOPROLOL TARTRATE 75 MG: 50 TABLET, FILM COATED ORAL at 09:07

## 2017-07-25 RX ADMIN — HYDRALAZINE HYDROCHLORIDE 50 MG: 50 TABLET ORAL at 09:07

## 2017-07-25 RX ADMIN — METOPROLOL TARTRATE 75 MG: 50 TABLET, FILM COATED ORAL at 10:07

## 2017-07-25 RX ADMIN — Medication 3 ML: at 10:07

## 2017-07-25 RX ADMIN — HYDRALAZINE HYDROCHLORIDE 50 MG: 50 TABLET ORAL at 05:07

## 2017-07-25 RX ADMIN — ATORVASTATIN CALCIUM 40 MG: 20 TABLET, FILM COATED ORAL at 10:07

## 2017-07-25 RX ADMIN — INSULIN DETEMIR 5 UNITS: 100 INJECTION, SOLUTION SUBCUTANEOUS at 09:07

## 2017-07-25 RX ADMIN — QUETIAPINE FUMARATE 100 MG: 100 TABLET, FILM COATED ORAL at 09:07

## 2017-07-25 RX ADMIN — PANTOPRAZOLE SODIUM 40 MG: 40 TABLET, DELAYED RELEASE ORAL at 10:07

## 2017-07-25 RX ADMIN — IPRATROPIUM BROMIDE AND ALBUTEROL SULFATE 3 ML: .5; 3 SOLUTION RESPIRATORY (INHALATION) at 07:07

## 2017-07-25 RX ADMIN — Medication 3 ML: at 01:07

## 2017-07-25 RX ADMIN — ASPIRIN 81 MG: 81 TABLET, COATED ORAL at 10:07

## 2017-07-25 RX ADMIN — Medication 3 ML: at 06:07

## 2017-07-25 RX ADMIN — MAGNESIUM SULFATE IN WATER 2 G: 40 INJECTION, SOLUTION INTRAVENOUS at 05:07

## 2017-07-25 RX ADMIN — APIXABAN 5 MG: 5 TABLET, FILM COATED ORAL at 10:07

## 2017-07-25 RX ADMIN — HYDRALAZINE HYDROCHLORIDE 50 MG: 50 TABLET ORAL at 01:07

## 2017-07-25 RX ADMIN — ISOSORBIDE DINITRATE 10 MG: 10 TABLET ORAL at 09:07

## 2017-07-25 RX ADMIN — IPRATROPIUM BROMIDE AND ALBUTEROL SULFATE 3 ML: .5; 3 SOLUTION RESPIRATORY (INHALATION) at 03:07

## 2017-07-25 RX ADMIN — INSULIN ASPART 6 UNITS: 100 INJECTION, SOLUTION INTRAVENOUS; SUBCUTANEOUS at 05:07

## 2017-07-25 RX ADMIN — INSULIN DETEMIR 5 UNITS: 100 INJECTION, SOLUTION SUBCUTANEOUS at 10:07

## 2017-07-25 RX ADMIN — POTASSIUM CHLORIDE 50 MEQ: 750 CAPSULE, EXTENDED RELEASE ORAL at 05:07

## 2017-07-25 RX ADMIN — FUROSEMIDE 80 MG: 10 INJECTION, SOLUTION INTRAVENOUS at 01:07

## 2017-07-25 RX ADMIN — FUROSEMIDE 80 MG: 10 INJECTION, SOLUTION INTRAVENOUS at 05:07

## 2017-07-25 RX ADMIN — INSULIN ASPART 3 UNITS: 100 INJECTION, SOLUTION INTRAVENOUS; SUBCUTANEOUS at 09:07

## 2017-07-25 NOTE — ASSESSMENT & PLAN NOTE
-hx of stroke before 2012 per daughter  -pt with residual right sided deficit   -continue atorvastatin 40 mg  -holding apixaban for possible thoracentesis. Attempting to coordinate between IR and pulmonology

## 2017-07-25 NOTE — PROGRESS NOTES
"[facility]  Cardiology  Progress Note    Patient Name: Michael Salgado  MRN: [id number]  Admission Date: 7/12/2017  Hospital Length of Stay: 13 days  Code Status: DNR   Attending Physician: Ken Sharif MD   Primary Care Physician: Wesley Watkins MD  Expected Discharge Date: 7/28/2017  Principal Problem:Acute on chronic systolic congestive heart failure    Subjective:     Interval History: Patient reports that she is still having SOB and feels "tired." Patient was made DNR and does not consent to further cardiac workup such as RHC or PET/SPECT scanning. Patient continues to improve her volume status, all peripheral edema and signs of JVD have resolved.     Review of Systems   HENT: Positive for hoarse voice.    Cardiovascular: Positive for dyspnea on exertion. Negative for chest pain, irregular heartbeat, leg swelling and syncope.   Respiratory: Positive for shortness of breath and wheezing.    Skin: Negative for suspicious lesions.   Musculoskeletal: Negative for falls.     Objective:     Vital Signs (Most Recent):  Temp: 97.9 °F (36.6 °C) (07/25/17 0530)  Pulse: 84 (07/25/17 1100)  Resp: 15 (07/25/17 0720)  BP: 133/63 (07/25/17 1011)  SpO2: 97 % (07/25/17 0720) Vital Signs (24h Range):  Temp:  [97.4 °F (36.3 °C)-98.8 °F (37.1 °C)] 97.9 °F (36.6 °C)  Pulse:  [] 84  Resp:  [15-19] 15  SpO2:  [94 %-98 %] 97 %  BP: (118-154)/(57-72) 133/63     Weight: 65.4 kg (144 lb 3.2 oz)  Body mass index is 24.74 kg/m².    SpO2: 97 %  O2 Device (Oxygen Therapy): nasal cannula      Intake/Output Summary (Last 24 hours) at 07/25/17 1155  Last data filed at 07/25/17 0400   Gross per 24 hour   Intake              345 ml   Output             1976 ml   Net            -1631 ml       Lines/Drains/Airways     Drain                 Gastrostomy/Enterostomy 01/25/16 0835 Percutaneous endoscopic gastrostomy (PEG) midline feeding 547 days          Peripheral Intravenous Line                 Peripheral IV - " Single Lumen 07/21/17 0919 Right Forearm 4 days         Peripheral IV - Single Lumen 07/24/17 0900 Right Forearm 1 day                Physical Exam   Constitutional: She is oriented to person, place, and time. No distress.   HENT:   Head: Normocephalic and atraumatic.   Eyes: Conjunctivae and EOM are normal. Pupils are equal, round, and reactive to light. No scleral icterus.   Neck: Normal range of motion. Neck supple. No tracheal deviation present.   Cardiovascular: Normal heart sounds and intact distal pulses.    Pulmonary/Chest: She has no wheezes.   Abdominal: Soft. Bowel sounds are normal. There is no tenderness. There is no guarding.   Musculoskeletal: She exhibits no edema.   Neurological: She is alert and oriented to person, place, and time. No cranial nerve deficit.   Skin: Skin is warm. No rash noted. She is not diaphoretic. No erythema.   Vitals reviewed.      Significant Labs:   Recent Results (from the past 24 hour(s))   POCT glucose    Collection Time: 07/24/17 12:48 PM   Result Value Ref Range    POCT Glucose 290 (H) 70 - 110 mg/dL   2D echo with color flow doppler    Collection Time: 07/24/17  1:37 PM   Result Value Ref Range    EF 43 (A) 55 - 65    Mitral Valve Regurgitation MILD TO MODERATE     Aortic Valve Regurgitation MILD     Est. PA Systolic Pressure 41.18 (A)     Pericardial Effusion TRIVIAL     Tricuspid Valve Regurgitation TRIVIAL    POCT glucose    Collection Time: 07/24/17  5:34 PM   Result Value Ref Range    POCT Glucose 276 (H) 70 - 110 mg/dL   POCT glucose    Collection Time: 07/24/17  9:22 PM   Result Value Ref Range    POCT Glucose 292 (H) 70 - 110 mg/dL   Phosphorus - if not done in ED    Collection Time: 07/25/17  3:41 AM   Result Value Ref Range    Phosphorus 3.5 2.7 - 4.5 mg/dL   CBC auto differential    Collection Time: 07/25/17  3:41 AM   Result Value Ref Range    WBC 6.77 3.90 - 12.70 K/uL    RBC 2.92 (L) 4.00 - 5.40 M/uL    Hemoglobin 8.1 (L) 12.0 - 16.0 g/dL    Hematocrit  26.7 (L) 37.0 - 48.5 %    MCV 91 82 - 98 fL    MCH 27.7 27.0 - 31.0 pg    MCHC 30.3 (L) 32.0 - 36.0 g/dL    RDW 15.4 (H) 11.5 - 14.5 %    Platelets 239 150 - 350 K/uL    MPV 9.7 9.2 - 12.9 fL    Gran # 4.4 1.8 - 7.7 K/uL    Lymph # 1.4 1.0 - 4.8 K/uL    Mono # 0.7 0.3 - 1.0 K/uL    Eos # 0.2 0.0 - 0.5 K/uL    Baso # 0.05 0.00 - 0.20 K/uL    Gran% 64.8 38.0 - 73.0 %    Lymph% 20.5 18.0 - 48.0 %    Mono% 10.5 4.0 - 15.0 %    Eosinophil% 3.2 0.0 - 8.0 %    Basophil% 0.7 0.0 - 1.9 %    Differential Method Automated    Magnesium - if not done in ED    Collection Time: 07/25/17  3:41 AM   Result Value Ref Range    Magnesium 1.9 1.6 - 2.6 mg/dL   Comprehensive metabolic panel    Collection Time: 07/25/17  3:41 AM   Result Value Ref Range    Sodium 144 136 - 145 mmol/L    Potassium 3.7 3.5 - 5.1 mmol/L    Chloride 95 95 - 110 mmol/L    CO2 40 (H) 23 - 29 mmol/L    Glucose 147 (H) 70 - 110 mg/dL    BUN, Bld 74 (H) 8 - 23 mg/dL    Creatinine 2.4 (H) 0.5 - 1.4 mg/dL    Calcium 8.4 (L) 8.7 - 10.5 mg/dL    Total Protein 5.1 (L) 6.0 - 8.4 g/dL    Albumin 1.9 (L) 3.5 - 5.2 g/dL    Total Bilirubin 0.4 0.1 - 1.0 mg/dL    Alkaline Phosphatase 42 (L) 55 - 135 U/L    AST 15 10 - 40 U/L    ALT 7 (L) 10 - 44 U/L    Anion Gap 9 8 - 16 mmol/L    eGFR if African American 22.4 (A) >60 mL/min/1.73 m^2    eGFR if non African American 19.4 (A) >60 mL/min/1.73 m^2   POCT glucose    Collection Time: 07/25/17 10:11 AM   Result Value Ref Range    POCT Glucose 260 (H) 70 - 110 mg/dL         Significant Imaging: Echocardiogram:   2D echo with color flow doppler:   Results for orders placed or performed during the hospital encounter of 07/12/17   2D echo with color flow doppler   Result Value Ref Range    EF 43 (A) 55 - 65    Mitral Valve Regurgitation MILD TO MODERATE     Aortic Valve Regurgitation MILD     Est. PA Systolic Pressure 41.18 (A)     Pericardial Effusion TRIVIAL     Tricuspid Valve Regurgitation TRIVIAL      Assessment and Plan:      Brief HPI: 71 y.o. woman with PMH of CAD s/p CABGx2 (LIMA-LAD and SVG-D1 2015), ICM, HFrEF (EF 40-45%, LBBB giving abnormal septal wall, small pericardial effusion), CKD stage IV, DM, HTN, left-sided CVA with residual right hemiparesis, chronic AF (on warfarin) is in the hospital for acute decompensated heart failure. Her volume status continues to improve with diuresis. Net negative of (- 1451mL) for the past 24 hrs    Active Diagnoses:    Diagnosis Date Noted POA    PRINCIPAL PROBLEM:  Acute on chronic systolic congestive heart failure [I50.23] 07/12/2017 Yes    Pleural effusion [J90] 07/24/2017 Yes    Acute pulmonary edema [J81.0] 07/24/2017 Yes    Persistent proteinuria [R80.1] 07/20/2017 Yes    Acute on chronic respiratory failure with hypoxia [J96.21] 07/18/2017 Yes    Depression [F32.9] 07/13/2017 Yes    Chronic kidney disease (CKD) stage G4/A1, severely decreased glomerular filtration rate (GFR) between 15-29 mL/min/1.73 square meter and albuminuria creatinine ratio less than 30 mg/g [N18.4] 01/26/2017 Yes    Atrial fibrillation [I48.91]  Yes    Normocytic anemia [D64.9] 07/07/2016 Yes    History of CVA (cerebrovascular accident) [Z86.73] 01/16/2016 Not Applicable    Insulin dependent diabetes mellitus [E11.9, Z79.4] 12/14/2015 Not Applicable    HTN (hypertension) [I10] 03/02/2015 Yes    H/O: CVA (cerebrovascular accident) [Z86.73] 03/02/2015 Not Applicable    Hyperlipidemia [E78.5] 03/02/2015 Yes      Problems Resolved During this Admission:    Diagnosis Date Noted Date Resolved POA    CAP (community acquired pneumonia) [J18.9] 07/14/2017 07/18/2017 Yes    Dysuria [R30.0] 07/13/2017 07/18/2017 Yes    SOB (shortness of breath) [R06.02] 08/04/2015 07/18/2017 Yes     VTE Risk Mitigation         Ordered     Medium Risk of VTE  Once      07/12/17 6666        -We suggest switching lasix to PO Lasix 80mgBID and we will re-evaluate her again tomorrow to assess her volume status and see how she  tolerated the conversion to PO medications.       Plan discussed with attending Dr. Rooney, further recommendations as per attending addendum. Please feel free to call with any questions or concerns.    Dimitri Sams MD  Cardiology  [facility]

## 2017-07-25 NOTE — PLAN OF CARE
[facility] had denied admission for skilled due to pt on Seroquel at night.   · 7/24/2017 3:02:18 PM Declined: Cannot Meet Needs: CURRENTLY NOT ACCEPTING PATIENTS WITH ANTIPSYCHOTICS      SW met with pt's daughter and spouse to discuss alternative snf placement options and they have selected [facility] and asked that SW send another referral to [facility] for reconsideration because Seroquel is not a home medication, it only given in the hospital to control anxiety at night. Pt's daughter, Rhea reports only giving pt two Tylenol at night to aid with sleep.     SW will resubmit referrals through [facility].

## 2017-07-25 NOTE — PLAN OF CARE
Problem: Patient Care Overview  Goal: Plan of Care Review  7/20 - Improve breathing.  Patient on Lasix gtt.   Outcome: Ongoing (interventions implemented as appropriate)  Lasix  IVP. VSS. O2 sats stable on 2L NC. Pt denies CP, SOB, palpitations, lightheadedness and dizziness. Fall precautions maintained this shift, pt remains free from falls, trauma and injury. Purewick in place; draining urine. BG elevated this shift; pt received SSI & scheduled long acting insulin @ bedtime. Pt's daughter @ bedside. POC reviewed with pt & daughter, verbalized understanding. NADN. Will continue to monitor.

## 2017-07-25 NOTE — ASSESSMENT & PLAN NOTE
- On admit (7/12), CXR shows worsening pulmonary edema, BMP 1413 (above baseline), tn 0.04, EKG shows afib with RVR without acute ischemic changes. most recent echo 6/2017 showed EF 40-45, pulm HTN.   - CT chest 7/21 shows moderate bilateral pleural effusions  - CXR 7/24 shows increased patchy infiltrate  - 2D Echo 7/24 shows no significant change from prior study (EF 43%, no valvular issues, PA pressure 41)  - Cardiology following, appreciate recs  - No significant improvement thus far given clinical status and BNP of 1600 on 7/23 (from 1700 on admit)  - Poor candidate for thoracentesis, per pulmonology. IR consulted (7/25) for thoracentesis. Recommended pulmonology manage this issue  - Received intermittent doses of metolazone and chlorothiazide to supplement diuresis with varying success  - cont on hydralazine 50 TID, isosorbide dinitirate 10 TID, Lopressor 75 BID  - Lasix 80 IV BID. Increase to 160 IV BID tmrw if no improvement

## 2017-07-25 NOTE — PROGRESS NOTES
[facility]  Nephrology  Progress Note    Patient Name: Michael Salgado  MRN: [id number]  Admission Date: 7/12/2017  Hospital Length of Stay: 13 days  Attending Provider: Ken Sharif MD   Primary Care Physician: Wesley Watkins MD  Principal Problem:Acute on chronic systolic congestive heart failure    Subjective:     HPI: Ms. Salgado is a 72yo female with history of CVA, CAD s/p CABG x 3, ICM, HFrEF, CKD, anemia in CKD and afib on Aspirin and Warfarin who presents with acute hypoxic respiratory failure. She just completed a course of antibiotics for pneumonia 2 weeks prior, and reports feeling lethargic with poor recovery of baseline functional status since. This admission, she is being treated for CHF exacerbation per Cardiology given elevated BNP, CXR with congestion. She has received IV Lasix per Cardiology recommendations and has continued improvement in Cr, most recently to 2.5 from 2.7 2 days prior, and her presentation is consistent with cardiorenal syndrome. I/Os note ~500 cc initial response to Lasix and Nephrology was consulted for CKDIV renal disease on loop diuretics with possible suboptimal response in urine output.        Interval History: Patient still complains of SOB; no acute events reported but no thoracentesis planned now. Continuing diuresis per Cardiology recs.     Review of patient's allergies indicates:   Allergen Reactions    Daypro [oxaprozin] Itching     Tolerates other NSAIDs    Vibramycin [doxycycline calcium]      Current Facility-Administered Medications   Medication Frequency    acetaminophen tablet 650 mg Q6H PRN    albuterol-ipratropium 2.5mg-0.5mg/3mL nebulizer solution 3 mL Q4H WAKE    aspirin EC tablet 81 mg Daily    atorvastatin tablet 40 mg Daily    buPROPion TB24 tablet 150 mg Daily    dextrose 50% injection 12.5 g PRN    dextrose 50% injection 25 g PRN    escitalopram oxalate tablet 5 mg Daily    furosemide injection 80 mg BID     gabapentin capsule 300 mg QHS    glucagon (human recombinant) injection 1 mg PRN    glucose chewable tablet 16 g PRN    glucose chewable tablet 24 g PRN    hydrALAZINE tablet 50 mg TID    insulin aspart pen 1-10 Units QID (AC + HS) PRN    insulin detemir pen 5 Units BID    isosorbide dinitrate tablet 10 mg TID    metoprolol tartrate tablet 75 mg BID    ondansetron disintegrating tablet 8 mg Q8H PRN    pantoprazole EC tablet 40 mg Daily    quetiapine tablet 100 mg QHS    sodium chloride 0.9% flush 3 mL Q8H       Objective:     Vital Signs (Most Recent):  Temp: 97.9 °F (36.6 °C) (07/25/17 0530)  Pulse: 84 (07/25/17 1100)  Resp: 15 (07/25/17 0720)  BP: 133/63 (07/25/17 1011)  SpO2: 97 % (07/25/17 0720)  O2 Device (Oxygen Therapy): nasal cannula (07/25/17 0720) Vital Signs (24h Range):  Temp:  [97.4 °F (36.3 °C)-98.8 °F (37.1 °C)] 97.9 °F (36.6 °C)  Pulse:  [] 84  Resp:  [15-19] 15  SpO2:  [94 %-98 %] 97 %  BP: (118-154)/(57-72) 133/63     Weight: 65.4 kg (144 lb 3.2 oz) (07/24/17 0600)  Body mass index is 24.74 kg/m².  Body surface area is 1.72 meters squared.    I/O last 3 completed shifts:  In: 795 [P.O.:795]  Out: 2926 [Urine:2925; Stool:1]    Physical Exam   Constitutional: She is oriented to person, place, and time. She appears well-developed and well-nourished. She appears distressed.   HENT:   Head: Normocephalic and atraumatic.   Right Ear: External ear normal.   Left Ear: External ear normal.   Eyes: EOM are normal. Pupils are equal, round, and reactive to light. No scleral icterus.   Neck: Neck supple. No thyromegaly present.   Cardiovascular: Normal rate, normal heart sounds and intact distal pulses.    No murmur heard.  Irregular rhythm   Pulmonary/Chest: No respiratory distress. She has no wheezes. She has rales.   Coarse BS and crackles bilaterally   Abdominal: Soft. Bowel sounds are normal. She exhibits no distension. There is no tenderness.   Musculoskeletal: Normal range of motion.  She exhibits edema. She exhibits no tenderness or deformity.   Neurological: She is alert and oriented to person, place, and time. No cranial nerve deficit.   Skin: Skin is warm and dry.    Psychiatric: She has a normal mood and affect. Her behavior is normal.       Significant Labs:  ABGs:   Recent Labs  Lab 07/20/17  1146   PH 7.373   PCO2 54.0*   HCO3 31.4*   POCSATURATED 92*   BE 6     CBC:   Recent Labs  Lab 07/25/17  0341   WBC 6.77   RBC 2.92*   HGB 8.1*   HCT 26.7*      MCV 91   MCH 27.7   MCHC 30.3*     CMP:   Recent Labs  Lab 07/25/17  0341   *   CALCIUM 8.4*   ALBUMIN 1.9*   PROT 5.1*      K 3.7   CO2 40*   CL 95   BUN 74*   CREATININE 2.4*   ALKPHOS 42*   ALT 7*   AST 15   BILITOT 0.4     Coagulation: No results for input(s): INR, APTT in the last 168 hours.    Invalid input(s): PT    Recent Labs  Lab 07/19/17  1442   COLORU Yellow   SPECGRAV 1.010   PHUR 5.0   PROTEINUA 2+*   BACTERIA None   NITRITE Negative   LEUKOCYTESUR 3+*   UROBILINOGEN Negative   HYALINECASTS 0     All labs within the past 24 hours have been reviewed.     Significant Imaging:  X-Ray: Reviewed  as below Postoperative changes and cardiomegaly as before.  Ill-defined patchy air space consolidation slightly more on the right side identified and similar to the previous examination    Assessment/Plan:     Chronic kidney disease (CKD) stage G4/A1, severely decreased glomerular filtration rate (GFR) between 15-29 mL/min/1.73 square meter and albuminuria creatinine ratio less than 30 mg/g    -continue diuresis per Cardiology and thoracentesis canceled for pulmonary effusions that have been difficult to diurese. Recommend Lasix push to gtts.   -patient with Nephrotic range proteinuria- extensive nephrotic workup unrevealing  -etiology of CKD likely uncontrolled HTN/ DM w/o other end-organ involvement at this time.             Thank you for your consult. I will follow-up with patient. Please contact us if you have any  additional questions.    Bulmaro Worley MD  Nephrology  [facility]

## 2017-07-25 NOTE — ASSESSMENT & PLAN NOTE
-continue diuresis per Cardiology and thoracentesis canceled for pulmonary effusions that have been difficult to diurese. Recommend Lasix push to gtts.   -patient with Nephrotic range proteinuria- extensive nephrotic workup unrevealing  -etiology of CKD likely uncontrolled HTN/ DM w/o other end-organ involvement at this time.

## 2017-07-25 NOTE — SUBJECTIVE & OBJECTIVE
Interval History: Patient still complains of SOB; no acute events reported but no thoracentesis planned now. Continuing diuresis per Cardiology recs.     Review of patient's allergies indicates:   Allergen Reactions    Daypro [oxaprozin] Itching     Tolerates other NSAIDs    Vibramycin [doxycycline calcium]      Current Facility-Administered Medications   Medication Frequency    acetaminophen tablet 650 mg Q6H PRN    albuterol-ipratropium 2.5mg-0.5mg/3mL nebulizer solution 3 mL Q4H WAKE    aspirin EC tablet 81 mg Daily    atorvastatin tablet 40 mg Daily    buPROPion TB24 tablet 150 mg Daily    dextrose 50% injection 12.5 g PRN    dextrose 50% injection 25 g PRN    escitalopram oxalate tablet 5 mg Daily    furosemide injection 80 mg BID    gabapentin capsule 300 mg QHS    glucagon (human recombinant) injection 1 mg PRN    glucose chewable tablet 16 g PRN    glucose chewable tablet 24 g PRN    hydrALAZINE tablet 50 mg TID    insulin aspart pen 1-10 Units QID (AC + HS) PRN    insulin detemir pen 5 Units BID    isosorbide dinitrate tablet 10 mg TID    metoprolol tartrate tablet 75 mg BID    ondansetron disintegrating tablet 8 mg Q8H PRN    pantoprazole EC tablet 40 mg Daily    quetiapine tablet 100 mg QHS    sodium chloride 0.9% flush 3 mL Q8H       Objective:     Vital Signs (Most Recent):  Temp: 97.9 °F (36.6 °C) (07/25/17 0530)  Pulse: 84 (07/25/17 1100)  Resp: 15 (07/25/17 0720)  BP: 133/63 (07/25/17 1011)  SpO2: 97 % (07/25/17 0720)  O2 Device (Oxygen Therapy): nasal cannula (07/25/17 0720) Vital Signs (24h Range):  Temp:  [97.4 °F (36.3 °C)-98.8 °F (37.1 °C)] 97.9 °F (36.6 °C)  Pulse:  [] 84  Resp:  [15-19] 15  SpO2:  [94 %-98 %] 97 %  BP: (118-154)/(57-72) 133/63     Weight: 65.4 kg (144 lb 3.2 oz) (07/24/17 0600)  Body mass index is 24.74 kg/m².  Body surface area is 1.72 meters squared.    I/O last 3 completed shifts:  In: 795 [P.O.:795]  Out: 2926 [Urine:2925; Stool:1]    Physical  Exam   Constitutional: She is oriented to person, place, and time. She appears well-developed and well-nourished. She appears distressed.   HENT:   Head: Normocephalic and atraumatic.   Right Ear: External ear normal.   Left Ear: External ear normal.   Eyes: EOM are normal. Pupils are equal, round, and reactive to light. No scleral icterus.   Neck: Neck supple. No thyromegaly present.   Cardiovascular: Normal rate, normal heart sounds and intact distal pulses.    No murmur heard.  Irregular rhythm   Pulmonary/Chest: No respiratory distress. She has no wheezes. She has rales.   Coarse BS and crackles bilaterally   Abdominal: Soft. Bowel sounds are normal. She exhibits no distension. There is no tenderness.   Musculoskeletal: Normal range of motion. She exhibits edema. She exhibits no tenderness or deformity.   Neurological: She is alert and oriented to person, place, and time. No cranial nerve deficit.   Skin: Skin is warm and dry.   Right forehead hyperpigmentation, does not cross midline, from resolving shingles rash   Psychiatric: She has a normal mood and affect. Her behavior is normal.       Significant Labs:  ABGs:   Recent Labs  Lab 07/20/17  1146   PH 7.373   PCO2 54.0*   HCO3 31.4*   POCSATURATED 92*   BE 6     CBC:   Recent Labs  Lab 07/25/17  0341   WBC 6.77   RBC 2.92*   HGB 8.1*   HCT 26.7*      MCV 91   MCH 27.7   MCHC 30.3*     CMP:   Recent Labs  Lab 07/25/17  0341   *   CALCIUM 8.4*   ALBUMIN 1.9*   PROT 5.1*      K 3.7   CO2 40*   CL 95   BUN 74*   CREATININE 2.4*   ALKPHOS 42*   ALT 7*   AST 15   BILITOT 0.4     Coagulation: No results for input(s): INR, APTT in the last 168 hours.    Invalid input(s): PT    Recent Labs  Lab 07/19/17  1442   COLORU Yellow   SPECGRAV 1.010   PHUR 5.0   PROTEINUA 2+*   BACTERIA None   NITRITE Negative   LEUKOCYTESUR 3+*   UROBILINOGEN Negative   HYALINECASTS 0     All labs within the past 24 hours have been reviewed.     Significant  Imaging:  X-Ray: Reviewed  as below Postoperative changes and cardiomegaly as before.  Ill-defined patchy air space consolidation slightly more on the right side identified and similar to the previous examination

## 2017-07-25 NOTE — ASSESSMENT & PLAN NOTE
- On admit (7/12), Hgb 9.9 and dropped to 7.9 (7/13) w/ SOB  - Hemolysis work-up negative  - type and screen for Hgb 6.8  - 1 U PRBC transfused, improved Hgb 8.1. No change in patient's dyspnea and fatigue

## 2017-07-25 NOTE — ASSESSMENT & PLAN NOTE
- Cr on admission 2.5, stable (improved from 3.2 ~1 mo ago)  - nephrology following. Appreciate recommendations  - negative workup for nephrotic syndromes at this time  - Per nephrology, etiology of CKD likely uncontrolled HTN/ DM w/o other end-organ involvement at this time.

## 2017-07-25 NOTE — PLAN OF CARE
Problem: Patient Care Overview  Goal: Individualization & Mutuality  Plan of care discussed with patient. Patient is free of fall/trauma/injury. Denies CP, or pain/discomfort. Pt continued on 2L NC. Pt and family educated on importance of calling before eating tray so that BS can be checked. VS stable. AAOx4. All questions addressed. Will continue to monitor

## 2017-07-25 NOTE — CONSULTS
Spoke to primary team. Please consult pulmonary team for thoracentesis. If they're unable to perform the procedure, please reconsult IR. Please hold Eliquis x 48 hours prior to procedure.

## 2017-07-25 NOTE — PROGRESS NOTES
[facility] Hospital Medicine  Progress Note    Patient Name: Michael Salgado  MRN: [id number]  Patient Class: IP- Inpatient   Admission Date: 7/12/2017  Length of Stay: 13 days  Attending Physician: Ken Sharif MD  Primary Care Provider: Wesley Watkins MD    [facility] Medicine Team: [facility] HOSP MED 1 Deuce Sexton MD    Subjective:     Principal Problem:Acute on chronic systolic congestive heart failure    HPI:  71 year old  yo female with history of CAD s/p CABGx3 2015, ICM, HFrEF (EF 40%, PA 41 echo 6/2017), CKD stage IV, IDDM2, HTN, 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 Patient has baseline MILIAN, orthopnea, PND, LE edema, and abdo swelling that has also progressively worsened over the past few days. She is compliant with her home medications which include lasix 20 BID, coreg, hydralazine, amlodipine, and warfarin. She follows a low salt, fluid restrict diet and she has not deviated from it recently. She reports similar symptoms about a year ago that required hospitalization for CHF exacerbation. Patient recently had pneumonia and shingles in May and has not recovered full function since. She previously was able to ambulate with a walker and now uses a wheelchair. Prior to the shingles and PNA, she only required 10 mg lasix, but her lasix was increased afterwords due to increased SOB and volume overload.  She uses home O2 2.5 L. She is complaining of decreased UO, dysuria, and abdominal discomfort. She denies chest pain, palpitations, constipation (last BM today), diarrhea, cough, fever, chills, or any other complaints at this time. She lives at home with her daughter who provides with with significant assistance with her ADL's. Her cardiologist is Dr. Davis. She was last seen in cardiology clinic today and was sent to the ED from  there due to her SOB and edema.     In the ED, CXR showed increased pulmonary edema, BNP 1413 (which is above her baseline), tn 0.044, EKG with a fib with RVR but no acute ischemic changes. Patient was given lasix IV 80 mg.    Hospital Course:  7/13: Admitted to IM1. Started on Lasix 40 mg IV BID. Change in HH, 9.9->7.9. Denies changes in color/consistency of BMs and FOBT negative. Hemolytic anemia work-up negative. ABG normal. Added duoneb q6h.  7/14: Continued respiratory effort despite duoneb therapy. Vitals have remained stable. Pulmonology consult advised against thoracentesis at this moment after ultrasound showed bilateral effusions are likely not contributing to SOB. Also recommends discontinuing antibiotics as pneumonia is unlikely and it is adding fluids to her volume overload. Additional recommendation to increase diuretic therapy despite CKD. Consults to cards and pulm placed.    7/15: Patient's respiratory effort improving. Decided to discontinue coumadin at home. Patient is now taking Apixaban and will continue at home upon discharge.  7/16: Patient markedly improved today. Restarted Aspirin now that hemoglobin has stabilized.  7/17: Continued improvement. Increased hydralazine to 50 TID and replaced coreg with metoprolol for better AFib control, per cardiology.  7/18: Pt's family is refusing stress test. Lasix gtt transitioned to IV pushes.  7/19: Patient regressed overnight per family. Patient is winded when speaking and can only make a few words/sounds. Lasix IV pushes transitioned back to Lasix infusion with increase to 20 mg/hr and an initial Lasix 80 mg IV push. Britt placed for better monitoring of I/Os. Nephrology consulted for low urine output. Appreciate recommendations when available.  7/20: Patient improved on Lasix gtt. Metolazone to enhance diuresis. Britt removed secondary to patient wishes.  7/21: Continued improvement. Patient responded well to metolazone with higher urine output. CT  chest showed moderate pleural effusions  7/22: Pulmonology consulted for pleural effusions. Effusions may be contributing to SOB more so than pulmonary edema as diuresis and urine output has been adequate. thora planned for 7/24, apixaban is held. Cont to refuse barraza and with unclear I/O, tenuous resp status with inadequate diuresis  7/23: Continued diuretic therapy. Patient remains SOB and has intermittent trouble breathing. Holding apixaban for thoracentesis tomorrow.  7/24: Pulmonology re-evaluated today and believes the risk of thoracentesis outweighs benefits - advises that IR may be able to perform a therapeutic thoracentesis. Patient continues to be SOB this morning. Hemoglobin at 6.8 this morning, but no more symptomatic than normal. Transfused 1 PRBC and continuing diuresis with lasix and doses of chlorothiazide. Repeat 2D echo unchanged from previous.  7/25: No change in patient's dyspnea. Planned to transition patient to Lasix 80 mg PO BID per cardiology recommendation however we will continue Lasix 80 mg IV BID to encourage further diuresis in hopes of resolving pleural effusions. Consulted IR today who believe pulmonology would be better suited for thoracentesis. Holding apixaban until this is resolved.     Interval History: No acute events overnight. Continued dyspnea that is unchanged. Patient refusing most medical interventions at this time.    Review of Systems   Constitutional: Negative for chills and fever.   HENT: Negative for congestion and rhinorrhea.    Eyes: Negative for pain and visual disturbance.   Respiratory: Positive for shortness of breath. Negative for cough and chest tightness.    Cardiovascular: Negative for chest pain, palpitations and leg swelling.   Gastrointestinal: Positive for constipation. Negative for abdominal distention, abdominal pain, blood in stool, diarrhea, nausea and vomiting.   Genitourinary: Negative for difficulty urinating, dysuria, frequency and hematuria.    Musculoskeletal: Negative for arthralgias and back pain.   Skin: Negative for rash and wound.   Neurological: Negative for syncope, light-headedness and headaches.   Psychiatric/Behavioral: Negative for agitation and behavioral problems.     Objective:     Vital Signs (Most Recent):  Temp: 98.2 °F (36.8 °C) (07/25/17 1200)  Pulse: 90 (07/25/17 1200)  Resp: 18 (07/25/17 1200)  BP: 128/61 (07/25/17 1200)  SpO2: 98 % (07/25/17 1200) Vital Signs (24h Range):  Temp:  [97.8 °F (36.6 °C)-98.8 °F (37.1 °C)] 98.2 °F (36.8 °C)  Pulse:  [] 90  Resp:  [15-19] 18  SpO2:  [94 %-98 %] 98 %  BP: (118-154)/(57-72) 128/61     Weight: 65.4 kg (144 lb 3.2 oz)  Body mass index is 24.74 kg/m².    Intake/Output Summary (Last 24 hours) at 07/25/17 1440  Last data filed at 07/25/17 1400   Gross per 24 hour   Intake              585 ml   Output             1978 ml   Net            -1393 ml      Physical Exam   Constitutional: She is oriented to person, place, and time. She appears well-developed and well-nourished. She appears distressed.   HENT:   Head: Normocephalic and atraumatic.   Right Ear: External ear normal.   Left Ear: External ear normal.   Eyes: EOM are normal. Pupils are equal, round, and reactive to light. No scleral icterus.   Neck: Neck supple. No thyromegaly present.   Cardiovascular: Normal rate, normal heart sounds and intact distal pulses.    No murmur heard.  Irregular rhythm   Pulmonary/Chest: No respiratory distress. She has no wheezes. She has rales.   Coarse BS and crackles bilaterally   Abdominal: Soft. Bowel sounds are normal. She exhibits no distension. There is no tenderness.   Musculoskeletal: Normal range of motion. She exhibits edema (abdomen > LE). She exhibits no tenderness or deformity.   Neurological: She is alert and oriented to person, place, and time. No cranial nerve deficit.   Skin: Skin is warm and dry.   Right forehead hyperpigmentation, does not cross midline, from resolving shingles rash    Psychiatric: She has a normal mood and affect. Her behavior is normal.       Significant Labs:   CBC:   Recent Labs  Lab 07/24/17  0345 07/25/17  0341   WBC 5.60 6.77   HGB 6.8* 8.1*   HCT 23.2* 26.7*    239     CMP:   Recent Labs  Lab 07/23/17  1814 07/24/17  0745 07/25/17  0341    144 144   K 4.2 3.6 3.7   CL 97 96 95   CO2 35* 41* 40*   * 138* 147*   BUN 69* 70* 74*   CREATININE 2.4* 2.4* 2.4*   CALCIUM 8.5* 8.6* 8.4*   PROT  --   --  5.1*   ALBUMIN  --   --  1.9*   BILITOT  --   --  0.4   ALKPHOS  --   --  42*   AST  --   --  15   ALT  --   --  7*   ANIONGAP 11 7* 9   EGFRNONAA 19.4* 19.4* 19.4*       Significant Imaging: CXR: I have reviewed all pertinent results/findings within the past 24 hours and my personal findings are:  patchy consolidation worse than previous (7/17)  Echo: I have reviewed all pertinent results/findings within the past 24 hours and my personal findings are:  EF 43%, no vavular issues, PA pressure 41. No significant changed from previous (6/2017)    Assessment/Plan:      * Acute on chronic systolic congestive heart failure    - On admit (7/12), CXR shows worsening pulmonary edema, BMP 1413 (above baseline), tn 0.04, EKG shows afib with RVR without acute ischemic changes. most recent echo 6/2017 showed EF 40-45, pulm HTN.   - CT chest 7/21 shows moderate bilateral pleural effusions  - CXR 7/24 shows increased patchy infiltrate  - 2D Echo 7/24 shows no significant change from prior study (EF 43%, no valvular issues, PA pressure 41)  - Cardiology following, appreciate recs  - No significant improvement thus far given clinical status and BNP of 1600 on 7/23 (from 1700 on admit)  - Poor candidate for thoracentesis, per pulmonology. IR consulted (7/25) for thoracentesis. Recommended pulmonology manage this issue  - Received intermittent doses of metolazone and chlorothiazide to supplement diuresis with varying success  - cont on hydralazine 50 TID, isosorbide dinitirate 10  TID, Lopressor 75 BID  - Lasix 80 IV BID. Increase to 160 IV BID tmrw if no improvement                  Chronic kidney disease (CKD) stage G4/A1, severely decreased glomerular filtration rate (GFR) between 15-29 mL/min/1.73 square meter and albuminuria creatinine ratio less than 30 mg/g    - Cr on admission 2.5, stable (improved from 3.2 ~1 mo ago)  - nephrology following. Appreciate recommendations  - negative workup for nephrotic syndromes at this time  - Per nephrology, etiology of CKD likely uncontrolled HTN/ DM w/o other end-organ involvement at this time.        Insulin dependent diabetes mellitus    -daughter reports pt takes LA insulin 10 units qhs and SA insulin 5 units WM  -A1c 5.7 7/20  -restarted on detemir 5 u BID with SSI        HTN (hypertension)    -Lopressor 75 BID, hyralazine 50 TID, isordil 10 TID        H/O: CVA (cerebrovascular accident)    -hx of stroke before 2012 per daughter  -pt with residual right sided deficit   -continue atorvastatin 40 mg  -holding apixaban for possible thoracentesis. Attempting to coordinate between IR and pulmonology        Normocytic anemia    - On admit (7/12), Hgb 9.9 and dropped to 7.9 (7/13) w/ SOB  - Hemolysis work-up negative  - type and screen for Hgb 6.8  - 1 U PRBC transfused, improved Hgb 8.1. No change in patient's dyspnea and fatigue        Atrial fibrillation    - chadsvasc of 8  - Continue Apixaban  - Coreg switched to metoprolol tartrate 75 BID        Acute on chronic respiratory failure with hypoxia    -on home O2 2.5 L  - acute resp failure 2/2 ADHF along with pleural effusions          Depression    -continue lexapro 5 mg, Wellbutrin 150 mg  -Seroquel qhs         Hyperlipidemia    -atorvastatin 40 mg          VTE Risk Mitigation         Ordered     Medium Risk of VTE  Once      07/12/17 9401        Dispo: To home with HH or SNF pending resolution of dyspnea and respiratory failure.    Deuce Sexton MD  Department of Hospital Medicine   [facility]    Attestation     I have reviewed the chart and discussed the patient with the academic medicine team, including overnight events, results, and assessment and plan. I have supervised the care provided by the resident team, and personally seen and evaluated the patient, and agree with the resident's documentation     A/  A/C respiratory failure likely 2/2 A/C HFrEF and CKD4   No improvement since admission on 7/12  Hx of AF w RVR noted    P/  Increase lasix to 80 bid IV  Add metolazone    Ken Sharif MD [facility] Medicine

## 2017-07-25 NOTE — SUBJECTIVE & OBJECTIVE
Interval History: No acute events overnight. Continued dyspnea that is unchanged. Patient refusing most medical interventions at this time.    Review of Systems   Constitutional: Negative for chills and fever.   HENT: Negative for congestion and rhinorrhea.    Eyes: Negative for pain and visual disturbance.   Respiratory: Positive for shortness of breath. Negative for cough and chest tightness.    Cardiovascular: Negative for chest pain, palpitations and leg swelling.   Gastrointestinal: Positive for constipation. Negative for abdominal distention, abdominal pain, blood in stool, diarrhea, nausea and vomiting.   Genitourinary: Negative for difficulty urinating, dysuria, frequency and hematuria.   Musculoskeletal: Negative for arthralgias and back pain.   Skin: Negative for rash and wound.   Neurological: Negative for syncope, light-headedness and headaches.   Psychiatric/Behavioral: Negative for agitation and behavioral problems.     Objective:     Vital Signs (Most Recent):  Temp: 98.2 °F (36.8 °C) (07/25/17 1200)  Pulse: 90 (07/25/17 1200)  Resp: 18 (07/25/17 1200)  BP: 128/61 (07/25/17 1200)  SpO2: 98 % (07/25/17 1200) Vital Signs (24h Range):  Temp:  [97.8 °F (36.6 °C)-98.8 °F (37.1 °C)] 98.2 °F (36.8 °C)  Pulse:  [] 90  Resp:  [15-19] 18  SpO2:  [94 %-98 %] 98 %  BP: (118-154)/(57-72) 128/61     Weight: 65.4 kg (144 lb 3.2 oz)  Body mass index is 24.74 kg/m².    Intake/Output Summary (Last 24 hours) at 07/25/17 1440  Last data filed at 07/25/17 1400   Gross per 24 hour   Intake              585 ml   Output             1978 ml   Net            -1393 ml      Physical Exam   Constitutional: She is oriented to person, place, and time. She appears well-developed and well-nourished. She appears distressed.   HENT:   Head: Normocephalic and atraumatic.   Right Ear: External ear normal.   Left Ear: External ear normal.   Eyes: EOM are normal. Pupils are equal, round, and reactive to light. No scleral icterus.    Neck: Neck supple. No thyromegaly present.   Cardiovascular: Normal rate, normal heart sounds and intact distal pulses.    No murmur heard.  Irregular rhythm   Pulmonary/Chest: No respiratory distress. She has no wheezes. She has rales.   Coarse BS and crackles bilaterally   Abdominal: Soft. Bowel sounds are normal. She exhibits no distension. There is no tenderness.   Musculoskeletal: Normal range of motion. She exhibits edema (abdomen > LE). She exhibits no tenderness or deformity.   Neurological: She is alert and oriented to person, place, and time. No cranial nerve deficit.   Skin: Skin is warm and dry.   Right forehead hyperpigmentation, does not cross midline, from resolving shingles rash   Psychiatric: She has a normal mood and affect. Her behavior is normal.       Significant Labs:   CBC:   Recent Labs  Lab 07/24/17  0345 07/25/17  0341   WBC 5.60 6.77   HGB 6.8* 8.1*   HCT 23.2* 26.7*    239     CMP:   Recent Labs  Lab 07/23/17  1814 07/24/17  0745 07/25/17  0341    144 144   K 4.2 3.6 3.7   CL 97 96 95   CO2 35* 41* 40*   * 138* 147*   BUN 69* 70* 74*   CREATININE 2.4* 2.4* 2.4*   CALCIUM 8.5* 8.6* 8.4*   PROT  --   --  5.1*   ALBUMIN  --   --  1.9*   BILITOT  --   --  0.4   ALKPHOS  --   --  42*   AST  --   --  15   ALT  --   --  7*   ANIONGAP 11 7* 9   EGFRNONAA 19.4* 19.4* 19.4*       Significant Imaging: CXR: I have reviewed all pertinent results/findings within the past 24 hours and my personal findings are:  patchy consolidation worse than previous (7/17)  Echo: I have reviewed all pertinent results/findings within the past 24 hours and my personal findings are:  EF 43%, no vavular issues, PA pressure 41. No significant changed from previous (6/2017)

## 2017-07-26 LAB
ALBUMIN SERPL BCP-MCNC: 2 G/DL
ALP SERPL-CCNC: 37 U/L
ALT SERPL W/O P-5'-P-CCNC: 6 U/L
ANION GAP SERPL CALC-SCNC: 11 MMOL/L
AST SERPL-CCNC: 19 U/L
BASOPHILS # BLD AUTO: 0.03 K/UL
BASOPHILS NFR BLD: 0.6 %
BILIRUB SERPL-MCNC: 0.4 MG/DL
BUN SERPL-MCNC: 74 MG/DL
CALCIUM SERPL-MCNC: 8.8 MG/DL
CHLORIDE SERPL-SCNC: 96 MMOL/L
CO2 SERPL-SCNC: 38 MMOL/L
CREAT SERPL-MCNC: 2.4 MG/DL
DIFFERENTIAL METHOD: ABNORMAL
EOSINOPHIL # BLD AUTO: 0.2 K/UL
EOSINOPHIL NFR BLD: 4 %
ERYTHROCYTE [DISTWIDTH] IN BLOOD BY AUTOMATED COUNT: 15.4 %
EST. GFR  (AFRICAN AMERICAN): 22.4 ML/MIN/1.73 M^2
EST. GFR  (NON AFRICAN AMERICAN): 19.4 ML/MIN/1.73 M^2
GLUCOSE SERPL-MCNC: 126 MG/DL
HCT VFR BLD AUTO: 28.5 %
HGB BLD-MCNC: 8.5 G/DL
LYMPHOCYTES # BLD AUTO: 1.2 K/UL
LYMPHOCYTES NFR BLD: 21.3 %
MAGNESIUM SERPL-MCNC: 2.6 MG/DL
MCH RBC QN AUTO: 27.3 PG
MCHC RBC AUTO-ENTMCNC: 29.8 G/DL
MCV RBC AUTO: 92 FL
MONOCYTES # BLD AUTO: 0.6 K/UL
MONOCYTES NFR BLD: 10.3 %
NEUTROPHILS # BLD AUTO: 3.4 K/UL
NEUTROPHILS NFR BLD: 63.2 %
PATHOLOGIST INTERPRETATION IFE: NORMAL
PLATELET # BLD AUTO: 220 K/UL
PMV BLD AUTO: 10 FL
POCT GLUCOSE: 156 MG/DL (ref 70–110)
POCT GLUCOSE: 239 MG/DL (ref 70–110)
POCT GLUCOSE: 242 MG/DL (ref 70–110)
POCT GLUCOSE: 260 MG/DL (ref 70–110)
POTASSIUM SERPL-SCNC: 4 MMOL/L
PROT SERPL-MCNC: 5.1 G/DL
RBC # BLD AUTO: 3.11 M/UL
SODIUM SERPL-SCNC: 145 MMOL/L
WBC # BLD AUTO: 5.44 K/UL

## 2017-07-26 PROCEDURE — 99232 SBSQ HOSP IP/OBS MODERATE 35: CPT | Mod: GC,,, | Performed by: HOSPITALIST

## 2017-07-26 PROCEDURE — 25000242 PHARM REV CODE 250 ALT 637 W/ HCPCS: Performed by: HOSPITALIST

## 2017-07-26 PROCEDURE — 25000003 PHARM REV CODE 250: Performed by: STUDENT IN AN ORGANIZED HEALTH CARE EDUCATION/TRAINING PROGRAM

## 2017-07-26 PROCEDURE — 63600175 PHARM REV CODE 636 W HCPCS: Performed by: STUDENT IN AN ORGANIZED HEALTH CARE EDUCATION/TRAINING PROGRAM

## 2017-07-26 PROCEDURE — 99232 SBSQ HOSP IP/OBS MODERATE 35: CPT | Mod: GC,,, | Performed by: INTERNAL MEDICINE

## 2017-07-26 PROCEDURE — 97803 MED NUTRITION INDIV SUBSEQ: CPT

## 2017-07-26 PROCEDURE — 25000003 PHARM REV CODE 250: Performed by: HOSPITALIST

## 2017-07-26 PROCEDURE — 94640 AIRWAY INHALATION TREATMENT: CPT

## 2017-07-26 PROCEDURE — 80053 COMPREHEN METABOLIC PANEL: CPT

## 2017-07-26 PROCEDURE — 83735 ASSAY OF MAGNESIUM: CPT

## 2017-07-26 PROCEDURE — A4216 STERILE WATER/SALINE, 10 ML: HCPCS | Performed by: STUDENT IN AN ORGANIZED HEALTH CARE EDUCATION/TRAINING PROGRAM

## 2017-07-26 PROCEDURE — 63600175 PHARM REV CODE 636 W HCPCS: Performed by: HOSPITALIST

## 2017-07-26 PROCEDURE — 85025 COMPLETE CBC W/AUTO DIFF WBC: CPT

## 2017-07-26 PROCEDURE — 20600001 HC STEP DOWN PRIVATE ROOM

## 2017-07-26 PROCEDURE — 27000221 HC OXYGEN, UP TO 24 HOURS

## 2017-07-26 RX ORDER — FUROSEMIDE 80 MG/1
80 TABLET ORAL 2 TIMES DAILY
Status: DISCONTINUED | OUTPATIENT
Start: 2017-07-26 | End: 2017-07-26

## 2017-07-26 RX ORDER — IPRATROPIUM BROMIDE AND ALBUTEROL SULFATE 2.5; .5 MG/3ML; MG/3ML
3 SOLUTION RESPIRATORY (INHALATION) EVERY 4 HOURS PRN
Status: DISCONTINUED | OUTPATIENT
Start: 2017-07-26 | End: 2017-07-29 | Stop reason: HOSPADM

## 2017-07-26 RX ORDER — FUROSEMIDE 10 MG/ML
80 INJECTION INTRAMUSCULAR; INTRAVENOUS 2 TIMES DAILY
Status: DISCONTINUED | OUTPATIENT
Start: 2017-07-26 | End: 2017-07-28

## 2017-07-26 RX ORDER — METOLAZONE 2.5 MG/1
5 TABLET ORAL DAILY
Status: DISCONTINUED | OUTPATIENT
Start: 2017-07-26 | End: 2017-07-28

## 2017-07-26 RX ORDER — ISOSORBIDE DINITRATE 10 MG/1
20 TABLET ORAL 3 TIMES DAILY
Status: DISCONTINUED | OUTPATIENT
Start: 2017-07-26 | End: 2017-07-29 | Stop reason: HOSPADM

## 2017-07-26 RX ADMIN — Medication 3 ML: at 10:07

## 2017-07-26 RX ADMIN — PANTOPRAZOLE SODIUM 40 MG: 40 TABLET, DELAYED RELEASE ORAL at 09:07

## 2017-07-26 RX ADMIN — INSULIN ASPART 2 UNITS: 100 INJECTION, SOLUTION INTRAVENOUS; SUBCUTANEOUS at 08:07

## 2017-07-26 RX ADMIN — ACETAMINOPHEN 650 MG: 325 TABLET ORAL at 07:07

## 2017-07-26 RX ADMIN — Medication 3 ML: at 01:07

## 2017-07-26 RX ADMIN — INSULIN DETEMIR 5 UNITS: 100 INJECTION, SOLUTION SUBCUTANEOUS at 09:07

## 2017-07-26 RX ADMIN — ATORVASTATIN CALCIUM 40 MG: 20 TABLET, FILM COATED ORAL at 09:07

## 2017-07-26 RX ADMIN — HYDRALAZINE HYDROCHLORIDE 50 MG: 50 TABLET ORAL at 09:07

## 2017-07-26 RX ADMIN — INSULIN ASPART 6 UNITS: 100 INJECTION, SOLUTION INTRAVENOUS; SUBCUTANEOUS at 05:07

## 2017-07-26 RX ADMIN — FUROSEMIDE 80 MG: 10 INJECTION, SOLUTION INTRAVENOUS at 09:07

## 2017-07-26 RX ADMIN — GABAPENTIN 300 MG: 300 CAPSULE ORAL at 08:07

## 2017-07-26 RX ADMIN — ISOSORBIDE DINITRATE 20 MG: 10 TABLET ORAL at 09:07

## 2017-07-26 RX ADMIN — ISOSORBIDE DINITRATE 10 MG: 10 TABLET ORAL at 01:07

## 2017-07-26 RX ADMIN — IPRATROPIUM BROMIDE AND ALBUTEROL SULFATE 3 ML: .5; 3 SOLUTION RESPIRATORY (INHALATION) at 07:07

## 2017-07-26 RX ADMIN — ASPIRIN 81 MG: 81 TABLET, COATED ORAL at 09:07

## 2017-07-26 RX ADMIN — INSULIN ASPART 4 UNITS: 100 INJECTION, SOLUTION INTRAVENOUS; SUBCUTANEOUS at 12:07

## 2017-07-26 RX ADMIN — HYDRALAZINE HYDROCHLORIDE 50 MG: 50 TABLET ORAL at 01:07

## 2017-07-26 RX ADMIN — ACETAMINOPHEN 650 MG: 325 TABLET ORAL at 09:07

## 2017-07-26 RX ADMIN — METOPROLOL TARTRATE 75 MG: 50 TABLET, FILM COATED ORAL at 09:07

## 2017-07-26 RX ADMIN — BUPROPION HYDROCHLORIDE 150 MG: 150 TABLET, FILM COATED, EXTENDED RELEASE ORAL at 09:07

## 2017-07-26 RX ADMIN — Medication 3 ML: at 06:07

## 2017-07-26 RX ADMIN — HYDRALAZINE HYDROCHLORIDE 50 MG: 50 TABLET ORAL at 05:07

## 2017-07-26 RX ADMIN — ESCITALOPRAM 5 MG: 5 TABLET, FILM COATED ORAL at 09:07

## 2017-07-26 RX ADMIN — INSULIN ASPART 2 UNITS: 100 INJECTION, SOLUTION INTRAVENOUS; SUBCUTANEOUS at 09:07

## 2017-07-26 RX ADMIN — METOPROLOL TARTRATE 75 MG: 50 TABLET, FILM COATED ORAL at 08:07

## 2017-07-26 RX ADMIN — FUROSEMIDE 80 MG: 10 INJECTION, SOLUTION INTRAVENOUS at 05:07

## 2017-07-26 RX ADMIN — QUETIAPINE FUMARATE 100 MG: 100 TABLET, FILM COATED ORAL at 08:07

## 2017-07-26 RX ADMIN — METOLAZONE 5 MG: 2.5 TABLET ORAL at 09:07

## 2017-07-26 RX ADMIN — INSULIN DETEMIR 5 UNITS: 100 INJECTION, SOLUTION SUBCUTANEOUS at 08:07

## 2017-07-26 RX ADMIN — ISOSORBIDE DINITRATE 10 MG: 10 TABLET ORAL at 05:07

## 2017-07-26 NOTE — PLAN OF CARE
Problem: Patient Care Overview  Goal: Plan of Care Review  7/20 - Improve breathing.  Patient on Lasix gtt.   Pt being diuresed via Lasix  IVP. Pt AAO x 4. VSS. O2 sats stable on 2L NC. Pt denies CP, SOB, palpitations, lightheadedness and dizziness. Fall precautions maintained this shift, pt remains free from falls, trauma and injury. Purewick in place; draining urine. BG elevated this shift; pt received SSI & scheduled long acting insulin @ bedtime. Pt's daughter @ bedside. POC reviewed with pt & daughter, verbalized understanding. NADN. Will continue to monitor.

## 2017-07-26 NOTE — PROGRESS NOTES
Cardiology Consult Progress Note  Attending Physician: Ken Sharif MD  Hospital Day: 15    Subjective:   Interval History: patient slept well last night. She has been moving back and forth from the bed to the commode.    Medications:   Continuous Infusions:     Scheduled Meds:   albuterol-ipratropium 2.5mg-0.5mg/3mL  3 mL Nebulization Q4H WAKE    aspirin  81 mg Oral Daily    atorvastatin  40 mg Oral Daily    buPROPion  150 mg Oral Daily    escitalopram oxalate  5 mg Oral Daily    furosemide  80 mg Intravenous BID    gabapentin  300 mg Oral QHS    hydrALAZINE  50 mg Oral TID    insulin detemir  5 Units Subcutaneous BID    isosorbide dinitrate  10 mg Oral TID    metOLazone  5 mg Oral Daily    metoprolol tartrate  75 mg Oral BID    pantoprazole  40 mg Oral Daily    quetiapine  100 mg Oral QHS    sodium chloride 0.9%  3 mL Intravenous Q8H     PRN Meds:acetaminophen, dextrose 50%, dextrose 50%, glucagon (human recombinant), glucose, glucose, insulin aspart, ondansetron, polyethylene glycol  Objective:     Vitals:  Temp:  [97.6 °F (36.4 °C)-98.4 °F (36.9 °C)]   Pulse:  [79-98]   Resp:  [15-18]   BP: (120-149)/(61-79)   SpO2:  [96 %-99 %]  on 3L N/C I/O's:    Intake/Output Summary (Last 24 hours) at 07/26/17 1102  Last data filed at 07/26/17 0548   Gross per 24 hour   Intake              580 ml   Output             1302 ml   Net             -722 ml        Constitutional: NAD, conversant  HEENT: Sclera anicteric, PERRLA, EOMI  Neck: No JVD, no carotid bruits  CV: Irregularly irregular, no murmur, normal S1/S2  Pulm: coarse breath sounds  GI: Abdomen soft, NTND, +BS  Extremities: No LE edema, warm and well perfused  Skin: No ecchymosis, erythema, or ulcers  Psych: AOx3, appropriate affect  Neuro: CNII-XII intact, no focal deficits    Labs:       CBC: CMP:      Recent Labs  Lab 07/24/17  0345 07/24/17  0745 07/25/17  0341 07/26/17  0544   WBC 5.60  --  6.77 5.44   HGB 6.8*  --  8.1* 8.5*   HCT 23.2*   --  26.7* 28.5*     --  239 220   MCV 92  --  91 92   RDW 15.5*  --  15.4* 15.4*   IRON  --  26*  --   --    FERRITIN  --  61  --   --       Recent Labs  Lab 07/21/17  1634  07/23/17  0340  07/24/17  0345 07/24/17  0745 07/25/17  0341 07/26/17  0544     141  < >  --   < >  --  144 144 145   K 4.4  4.4  < >  --   < >  --  3.6 3.7 4.0     102  < >  --   < >  --  96 95 96   CO2 28  28  < >  --   < >  --  41* 40* 38*   BUN 60*  60*  < >  --   < >  --  70* 74* 74*   CREATININE 2.4*  2.4*  < >  --   < >  --  2.4* 2.4* 2.4*   CALCIUM 8.6*  8.6*  < >  --   < >  --  8.6* 8.4* 8.8   PROT 5.5*  --   --   --   --   --  5.1* 5.1*   BILITOT 0.2  --   --   --   --   --  0.4 0.4   ALKPHOS 55  --   --   --   --   --  42* 37*   ALT 6*  --   --   --   --   --  7* 6*   AST 18  --   --   --   --   --  15 19   MG 2.0  < > 1.8  --  1.9  --  1.9 2.6   PHOS  --   < > 3.9  --  4.0  --  3.5  --    < > = values in this interval not displayed.     Cholesterol: Coagulation   Lab Results   Component Value Date    CHOL 191 02/23/2017    HDL 25 (L) 02/23/2017    LDLCALC 130.6 02/23/2017    TRIG 177 (H) 02/23/2017    No results for input(s): PTT, INR in the last 168 hours.     Misc:   No results for input(s): CPK, CPKMB, TROPONINI, MB in the last 168 hours.   Lab Results   Component Value Date    HGBA1C 5.7 (H) 07/20/2017        Microbiology   Microbiology Results (last 7 days)     Procedure Component Value Units Date/Time    Urine culture [[id number]] Collected:  07/19/17 1442    Order Status:  Completed Specimen:  Urine from Random void Updated:  07/21/17 1240     Urine Culture, Routine No growth    Narrative:       urine culture add on per elieser ortega md @ 10:16 on 07/20/2017;   order# [id number]    Gram stain [[id number]] Collected:  07/19/17 1442    Order Status:  Completed Specimen:  Urine from Random void Updated:  07/20/17 1308     Gram Stain Result Rare WBC's      No organisms seen    Narrative:       urine gram  stain add on per elieser ortega md @ 10:19 on   07/20/2017; order# [id number]    Urine culture [[id number]]     Order Status:  Completed Specimen:  Urine     Gram stain [[id number]]     Order Status:  Completed Specimen:  Urine            Imaging:     CXR (07/24/2017):   Postoperative changes and cardiomegaly as before.  Ill-defined patchy air space consolidation slightly more on the right side identified and similar to the previous examination     2D Echo (07/24/2017):     1 - Upper limit of normal left ventricular enlargement.     2 - Mildly to moderately depressed left ventricular systolic function (EF 40-45%).     3 - Eccentric hypertrophy.     4 - Wall motion abnormalities.     5 - Biatrial enlargement.     6 - Right ventricular enlargement with mildly depressed systolic function.     7 - Mild aortic regurgitation.     8 - Mild to moderate mitral regurgitation.     9 - Trivial tricuspid regurgitation.     10 - Trivial to mild pulmonic regurgitation.     11 - Trivial pericardial effusion.     12 - Intermediate central venous pressure.     13 - Pulmonary hypertension. The estimated PA systolic pressure is 41 mmHg.      Assessment:     71 y.o. woman with PMH of CAD s/p CABGx2 (LIMA-LAD and SVG-D1 2015), ICM, HFrEF (EF 40-45%, LBBB giving abnormal septal wall, small pericardial effusion), CKD stage IV, DM, HTN, left-sided CVA with residual right hemiparesis, chronic AF (on warfarin) is in the hospital for acute decompensated heart failure.    Plan:     #Acute on Chronic Systolic Heart Failure  --patient appears euvolemic on exam - the JVD + LE edema have completely resolved, but her lungs do sound coarse.   --would recommend increasing isordil to 20 TID for further afterload reduction  --there is no data to support serial BNP checks as studies have shown variability in day to day numbers that confuse management strategies.  --at this point, although her lungs are coarse, she has a significant contraction alkalosis  and is developing worsening uremia with a BUN of 74 from a baseline in the 40s. She is comfortable lying flat and she is able to move to a bedside commode. Would recommend decreasing lasix to 80 PO BID. At discharge, she should perform daily weights, and if there is an increase in weight of 3 lbs in a day or 5lbs in a week, then take metolazone 5 QD for 3 or days or less if her weight returns to her dry weight. If she is not able to weigh herself, then she may have to take metolazone at the first sign of fluid overload.    # Chronic Atrial Fibrillation  --rate controlled with metoprolol 75 BID  --would be cautious of any further increases as it is a negative ionotrope  --patient was on apixaban, but was stopped on 07/23. Would restart anticoagulation when safe to do so as she remains at a high [id number] score.    Thank you for the consult. We will sign off at this time. Staff addendum to follow     Signed:  Fabiana Mendoza MD  Cardiology Fellow, PGY-5  7/26/2017 11:02 AM

## 2017-07-26 NOTE — PROGRESS NOTES
As per SW notes regarding possible difficulty placing the patient in SNF while on anti-depressants. Patient on smallest doses of escitalopram (5mg) and bupropion (150 mg). No weaning recommended at these minimal doses. These meds were stopped.     Ying Bell MD  Internal Medicine PGY-3  Pager [phone]

## 2017-07-26 NOTE — ASSESSMENT & PLAN NOTE
-continue diuresis per Cardiology and thoracentesis canceled for pulmonary effusions that have been difficult to diurese. Recommend Lasix push to gtts.   -consider IR planning for thoracentesis  -patient with Nephrotic range proteinuria- extensive nephrotic workup unrevealing  -etiology of CKD likely uncontrolled HTN/ DM w/o other end-organ involvement at this time.

## 2017-07-26 NOTE — PROGRESS NOTES
[facility] Medicine  Progress Note    Patient Name: Michael Salgado  MRN: [id number]  Patient Class: IP- Inpatient   Admission Date: 7/12/2017  Length of Stay: 14 days  Attending Physician: Ken Sharif MD  Primary Care Provider: Wesley Watkins MD    [facility] Medicine Team: [facility] 1 Deuce Sexton MD    Subjective:     Principal Problem:Acute on chronic systolic congestive heart failure    HPI:  71 year old  yo female with history of CAD s/p CABGx3 2015, ICM, HFrEF (EF 40%, PA 41 echo 6/2017), CKD stage IV, IDDM2, HTN, 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 Patient has baseline MILIAN, orthopnea, PND, LE edema, and abdo swelling that has also progressively worsened over the past few days. She is compliant with her home medications which include lasix 20 BID, coreg, hydralazine, amlodipine, and warfarin. She follows a low salt, fluid restrict diet and she has not deviated from it recently. She reports similar symptoms about a year ago that required hospitalization for CHF exacerbation. Patient recently had pneumonia and shingles in May and has not recovered full function since. She previously was able to ambulate with a walker and now uses a wheelchair. Prior to the shingles and PNA, she only required 10 mg lasix, but her lasix was increased afterwords due to increased SOB and volume overload.  She uses home O2 2.5 L. She is complaining of decreased UO, dysuria, and abdominal discomfort. She denies chest pain, palpitations, constipation (last BM today), diarrhea, cough, fever, chills, or any other complaints at this time. She lives at home with her daughter who provides with with significant assistance with her ADL's. Her cardiologist is Dr. Davis. She was last seen in cardiology clinic today and was sent to the ED from  there due to her SOB and edema.     In the ED, CXR showed increased pulmonary edema, BNP 1413 (which is above her baseline), tn 0.044, EKG with a fib with RVR but no acute ischemic changes. Patient was given lasix IV 80 mg.    Hospital Course:  7/13: Admitted to IM1. Started on Lasix 40 mg IV BID. Change in HH, 9.9->7.9. Denies changes in color/consistency of BMs and FOBT negative. Hemolytic anemia work-up negative. ABG normal. Added duoneb q6h.  7/14: Continued respiratory effort despite duoneb therapy. Vitals have remained stable. Pulmonology consult advised against thoracentesis at this moment after ultrasound showed bilateral effusions are likely not contributing to SOB. Also recommends discontinuing antibiotics as pneumonia is unlikely and it is adding fluids to her volume overload. Additional recommendation to increase diuretic therapy despite CKD. Consults to cards and pulm placed.    7/15: Patient's respiratory effort improving. Decided to discontinue coumadin at home. Patient is now taking Apixaban and will continue at home upon discharge.  7/16: Patient markedly improved today. Restarted Aspirin now that hemoglobin has stabilized.  7/17: Continued improvement. Increased hydralazine to 50 TID and replaced coreg with metoprolol for better AFib control, per cardiology.  7/18: Pt's family is refusing stress test. Lasix gtt transitioned to IV pushes.  7/19: Patient regressed overnight per family. Patient is winded when speaking and can only make a few words/sounds. Lasix IV pushes transitioned back to Lasix infusion with increase to 20 mg/hr and an initial Lasix 80 mg IV push. Britt placed for better monitoring of I/Os. Nephrology consulted for low urine output. Appreciate recommendations when available.  7/20: Patient improved on Lasix gtt. Metolazone to enhance diuresis. Britt removed secondary to patient wishes.  7/21: Continued improvement. Patient responded well to metolazone with higher urine output. CT  chest showed moderate pleural effusions  7/22: Pulmonology consulted for pleural effusions. Effusions may be contributing to SOB more so than pulmonary edema as diuresis and urine output has been adequate. thora planned for 7/24, apixaban is held. Cont to refuse barraza and with unclear I/O, tenuous resp status with inadequate diuresis  7/23: Continued diuretic therapy. Patient remains SOB and has intermittent trouble breathing. Holding apixaban for thoracentesis tomorrow.  7/24: Pulmonology re-evaluated today and believes the risk of thoracentesis outweighs benefits - advises that IR may be able to perform a therapeutic thoracentesis. Patient continues to be SOB this morning. Hemoglobin at 6.8 this morning, but no more symptomatic than normal. Transfused 1 PRBC and continuing diuresis with lasix and doses of chlorothiazide. Repeat 2D echo unchanged from previous.  7/25: No change in patient's dyspnea. Planned to transition patient to Lasix 80 mg PO BID per cardiology recommendation however we will continue Lasix 80 mg IV BID to encourage further diuresis in hopes of resolving pleural effusions. Consulted IR today who believe pulmonology would be better suited for thoracentesis. Holding apixaban until this is resolved.   7/26: Agree with cardiology recommendation to decrease Lasix to 80 mg PO BID secondary to no signs of peripheral edema, contraction alkalosis, and rising BUN. All findings point to euvolemia. Lungs sound coarse, but pleural effusion on CT likely resolved with continued diuresis. Will not pursue thoracentesis at this time.    Interval History: NAEON. Patient's relative believes her dyspnea improved overnight. Still unable to speak more than a few words at a time. Conversation about thoracentesis vs. continued diuresis. Will continue with diuresis.    Review of Systems   Respiratory: Positive for shortness of breath. Negative for cough and chest tightness.    Cardiovascular: Negative for chest pain and  leg swelling.     Objective:     Vital Signs (Most Recent):  Temp: 97.5 °F (36.4 °C) (07/26/17 1200)  Pulse: 90 (07/26/17 1400)  Resp: 18 (07/26/17 1200)  BP: (!) 108/58 (07/26/17 1200)  SpO2: 96 % (07/26/17 1200) Vital Signs (24h Range):  Temp:  [97.5 °F (36.4 °C)-98.4 °F (36.9 °C)] 97.5 °F (36.4 °C)  Pulse:  [79-98] 90  Resp:  [15-18] 18  SpO2:  [96 %-99 %] 96 %  BP: (108-149)/(58-79) 108/58     Weight: 65.4 kg (144 lb 3.2 oz)  Body mass index is 24.74 kg/m².    Intake/Output Summary (Last 24 hours) at 07/26/17 1513  Last data filed at 07/26/17 0548   Gross per 24 hour   Intake              340 ml   Output             1300 ml   Net             -960 ml      Physical Exam   Neck: No JVD present.   Cardiovascular: Normal heart sounds.  An irregularly irregular rhythm present.   Pulmonary/Chest: She is in respiratory distress. She has rales.   Abdominal: Bowel sounds are normal. She exhibits distension.   Musculoskeletal: She exhibits no edema.   Skin: Skin is warm and dry.       Significant Labs:   BMP:   Recent Labs  Lab 07/26/17  0544   *      K 4.0   CL 96   CO2 38*   BUN 74*   CREATININE 2.4*   CALCIUM 8.8   MG 2.6       Significant Imaging: I have reviewed all pertinent imaging results/findings within the past 24 hours.    Assessment/Plan:      * Acute on chronic systolic congestive heart failure    - On admit (7/12), CXR shows worsening pulmonary edema, BMP 1413 (above baseline), tn 0.04, EKG shows afib with RVR without acute ischemic changes. most recent echo 6/2017 showed EF 40-45, pulm HTN.   - CT chest 7/21 shows moderate bilateral pleural effusions  - CXR 7/24 shows increased patchy infiltrate  - 2D Echo 7/24 shows no significant change from prior study (EF 43%, no valvular issues, PA pressure 41)  - No significant improvement in dyspnea thus far given clinical status and BNP of 1600 on 7/23 (from 1700 on admit). No evidence of serial BNP labs  - Poor candidate for thoracentesis, per  pulmonology. IR consulted (7/25) for thoracentesis. Recommended pulmonology manage this issue  - Received intermittent doses of metolazone and chlorothiazide to supplement diuresis with varying success  - cont on hydralazine 50 TID, isosorbide dinitirate 20 TID, Lopressor 75 BID  - Lasix 80 PO BID and metolazone 5 mg PO daily  -Cardiology signed off secondary to limit of diuretic potential. Contraction alkalosis with worsening BUN (44-->75) and no clinical signs of volume overload        Chronic kidney disease (CKD) stage G4/A1, severely decreased glomerular filtration rate (GFR) between 15-29 mL/min/1.73 square meter and albuminuria creatinine ratio less than 30 mg/g    - Cr on admission 2.5, stable (improved from 3.2 ~1 mo ago)  - nephrology following. Appreciate recommendations  - negative workup for nephrotic syndromes at this time  - Per nephrology, etiology of CKD likely uncontrolled HTN/ DM w/o other end-organ involvement at this time.        Insulin dependent diabetes mellitus    -daughter reports pt takes LA insulin 10 units qhs and SA insulin 5 units WM  -A1c 5.7 7/20  -restarted on detemir 5 u BID with SSI        HTN (hypertension)    -Lopressor 75 BID, hyralazine 50 TID, isordil 10 TID        H/O: CVA (cerebrovascular accident)    -hx of stroke before 2012 per daughter  -pt with residual right sided deficit   -continue atorvastatin 40 mg  -holding apixaban for possible thoracentesis. Attempting to coordinate between IR and pulmonology        Normocytic anemia    - On admit (7/12), Hgb 9.9 and dropped to 7.9 (7/13) w/ SOB  - Hemolysis work-up negative  - type and screen for Hgb 6.8  - 1 U PRBC transfused, improved Hgb 8.1. No change in patient's dyspnea and fatigue        Atrial fibrillation    - chadsvasc of 8  - holding Apixaban  - Coreg switched to metoprolol tartrate 75 BID        Acute on chronic respiratory failure with hypoxia    -on home O2 2.5 L  - acute resp failure 2/2 ADHF along with pleural  effusions          Depression    - D/C'd lexapro and wellbutrin because of difficulty with placement in SNF while on anti-depressants  - Seroquel qhs        Hyperlipidemia    -atorvastatin 40 mg          VTE Risk Mitigation         Ordered     Medium Risk of VTE  Once      07/12/17 6667        Dispo: To SNF pending improvement with dyspnea    Deuce Sexton MD  Department of Hospital Medicine   [facility]

## 2017-07-26 NOTE — PROGRESS NOTES
[facility]  Nephrology  Progress Note    Patient Name: Michael Salgado  MRN: [id number]  Admission Date: 7/12/2017  Hospital Length of Stay: 14 days  Attending Provider: Ken Sharif MD   Primary Care Physician: Wesley Watkins MD  Principal Problem:Acute on chronic systolic congestive heart failure    Subjective:     HPI: Ms. Salgado is a 72yo female with history of CVA, CAD s/p CABG x 3, ICM, HFrEF, CKD, anemia in CKD and afib on Aspirin and Warfarin who presents with acute hypoxic respiratory failure. She just completed a course of antibiotics for pneumonia 2 weeks prior, and reports feeling lethargic with poor recovery of baseline functional status since. This admission, she is being treated for CHF exacerbation per Cardiology given elevated BNP, CXR with congestion. She has received IV Lasix per Cardiology recommendations and has continued improvement in Cr, most recently to 2.5 from 2.7 2 days prior, and her presentation is consistent with cardiorenal syndrome. I/Os note ~500 cc initial response to Lasix and Nephrology was consulted for CKDIV renal disease on loop diuretics with possible suboptimal response in urine output.        Interval History: Patient with improving dyspnea, volume status- recently on metolazone/ Lasix with good UOP despite Purewick displaced and unrecorded UOP. Cr stable but contraction alkalosis noted.     Review of patient's allergies indicates:   Allergen Reactions    Daypro [oxaprozin] Itching     Tolerates other NSAIDs    Vibramycin [doxycycline calcium]      Current Facility-Administered Medications   Medication Frequency    acetaminophen tablet 650 mg Q6H PRN    albuterol-ipratropium 2.5mg-0.5mg/3mL nebulizer solution 3 mL Q4H WAKE    aspirin EC tablet 81 mg Daily    atorvastatin tablet 40 mg Daily    buPROPion TB24 tablet 150 mg Daily    dextrose 50% injection 12.5 g PRN    dextrose 50% injection 25 g PRN    escitalopram oxalate tablet 5  mg Daily    furosemide injection 80 mg BID    gabapentin capsule 300 mg QHS    glucagon (human recombinant) injection 1 mg PRN    glucose chewable tablet 16 g PRN    glucose chewable tablet 24 g PRN    hydrALAZINE tablet 50 mg TID    insulin aspart pen 1-10 Units QID (AC + HS) PRN    insulin detemir pen 5 Units BID    isosorbide dinitrate tablet 10 mg TID    metOLazone tablet 5 mg Daily    metoprolol tartrate tablet 75 mg BID    ondansetron disintegrating tablet 8 mg Q8H PRN    pantoprazole EC tablet 40 mg Daily    polyethylene glycol packet 17 g Daily PRN    quetiapine tablet 100 mg QHS    sodium chloride 0.9% flush 3 mL Q8H       Objective:     Vital Signs (Most Recent):  Temp: 97.6 °F (36.4 °C) (07/26/17 0525)  Pulse: 85 (07/26/17 0525)  Resp: 18 (07/26/17 0525)  BP: 120/74 (07/26/17 0525)  SpO2: 98 % (07/26/17 0525)  O2 Device (Oxygen Therapy): nasal cannula (07/26/17 0525) Vital Signs (24h Range):  Temp:  [97.6 °F (36.4 °C)-98.4 °F (36.9 °C)] 97.6 °F (36.4 °C)  Pulse:  [77-98] 85  Resp:  [15-18] 18  SpO2:  [95 %-99 %] 98 %  BP: (120-149)/(61-79) 120/74     Weight: 65.4 kg (144 lb 3.2 oz) (07/24/17 0600)  Body mass index is 24.74 kg/m².  Body surface area is 1.72 meters squared.    I/O last 3 completed shifts:  In: 925 [P.O.:925]  Out: 2377 [Urine:2375; Stool:2]    Physical Exam   Constitutional: She is oriented to person, place, and time. She appears well-developed and well-nourished. She appears.   HENT:   Head: Normocephalic and atraumatic.   Right Ear: External ear normal.   Left Ear: External ear normal.   Eyes: EOM are normal. Pupils are equal, round, and reactive to light. No scleral icterus.   Neck: Neck supple. No thyromegaly present.   Cardiovascular: Normal rate, normal heart sounds and intact distal pulses.    No murmur heard.  Irregular rhythm   Pulmonary/Chest: No respiratory distress. She has no wheezes. She has rales.   Coarse BS and crackles, -JVD  Abdominal: Soft. Bowel sounds  are normal. She exhibits no distension. There is no tenderness.   Musculoskeletal: Normal range of motion. She exhibits trace edema. She exhibits no tenderness or deformity.   Neurological: She is alert and oriented to person, place, and time. No cranial nerve deficit.   Skin: Skin is warm and dry.    Psychiatric: She has a normal mood and affect. Her behavior is normal.       Significant Labs:  ABGs:   Recent Labs  Lab 07/20/17  1146   PH 7.373   PCO2 54.0*   HCO3 31.4*   POCSATURATED 92*   BE 6     CBC:   Recent Labs  Lab 07/25/17  0341   WBC 6.77   RBC 2.92*   HGB 8.1*   HCT 26.7*      MCV 91   MCH 27.7   MCHC 30.3*     CMP:   Recent Labs  Lab 07/26/17  0544   *   CALCIUM 8.8   ALBUMIN 2.0*   PROT 5.1*      K 4.0   CO2 38*   CL 96   BUN 74*   CREATININE 2.4*   ALKPHOS 37*   ALT 6*   AST 19   BILITOT 0.4       Recent Labs  Lab 07/19/17  1442   COLORU Yellow   SPECGRAV 1.010   PHUR 5.0   PROTEINUA 2+*   BACTERIA None   NITRITE Negative   LEUKOCYTESUR 3+*   UROBILINOGEN Negative   HYALINECASTS 0     All labs within the past 24 hours have been reviewed.     Significant Imaging:  X-Ray: Reviewed    Assessment/Plan:     Chronic kidney disease (CKD) stage G4/A1, severely decreased glomerular filtration rate (GFR) between 15-29 mL/min/1.73 square meter and albuminuria creatinine ratio less than 30 mg/g    -continue diuresis titrated to volume status. thoracentesis canceled for pulmonary effusions from acute decompensated HF that have been difficult to diurese.   - Electrolyte panel shows presumed contraction alkalosis from diuresis; Recommend Lasix and can add Diamox to augment if repeat ABG shows alkalosis. Recommend repeat ABG   -patient with Nephrotic range proteinuria- extensive nephrotic workup unrevealing  -etiology of CKD likely uncontrolled HTN/ DM w/o other end-organ involvement at this time.   -Cardiology signed off             Thank you for your consult. I will follow-up with patient.  Please contact us if you have any additional questions.    Bulmaro Worley MD  Nephrology  [facility]    ATTENDING PHYSICIAN ATTESTATION  I have personally interviewed and examined the patient. I thoroughly reviewed the demographic, clinical, laboratorial and imaging information available in medical records. I agree with the assessment and recommendations provided by the subspecialty resident.   was under my supervision.

## 2017-07-26 NOTE — PLAN OF CARE
Problem: Patient Care Overview  Goal: Plan of Care Review  7/20 - Improve breathing.  Patient on Lasix gtt.   Plan of care discussed with patient. Patient is free of fall/trauma/injury. Denies CP, SOB, or pain/discomfort. SNF placement ordered. All questions addressed. Will continue to monitor

## 2017-07-26 NOTE — SUBJECTIVE & OBJECTIVE
Interval History:     Review of patient's allergies indicates:   Allergen Reactions    Daypro [oxaprozin] Itching     Tolerates other NSAIDs    Vibramycin [doxycycline calcium]      Current Facility-Administered Medications   Medication Frequency    acetaminophen tablet 650 mg Q6H PRN    albuterol-ipratropium 2.5mg-0.5mg/3mL nebulizer solution 3 mL Q4H WAKE    aspirin EC tablet 81 mg Daily    atorvastatin tablet 40 mg Daily    buPROPion TB24 tablet 150 mg Daily    dextrose 50% injection 12.5 g PRN    dextrose 50% injection 25 g PRN    escitalopram oxalate tablet 5 mg Daily    furosemide injection 80 mg BID    gabapentin capsule 300 mg QHS    glucagon (human recombinant) injection 1 mg PRN    glucose chewable tablet 16 g PRN    glucose chewable tablet 24 g PRN    hydrALAZINE tablet 50 mg TID    insulin aspart pen 1-10 Units QID (AC + HS) PRN    insulin detemir pen 5 Units BID    isosorbide dinitrate tablet 10 mg TID    metOLazone tablet 5 mg Daily    metoprolol tartrate tablet 75 mg BID    ondansetron disintegrating tablet 8 mg Q8H PRN    pantoprazole EC tablet 40 mg Daily    polyethylene glycol packet 17 g Daily PRN    quetiapine tablet 100 mg QHS    sodium chloride 0.9% flush 3 mL Q8H       Objective:     Vital Signs (Most Recent):  Temp: 97.6 °F (36.4 °C) (07/26/17 0525)  Pulse: 85 (07/26/17 0525)  Resp: 18 (07/26/17 0525)  BP: 120/74 (07/26/17 0525)  SpO2: 98 % (07/26/17 0525)  O2 Device (Oxygen Therapy): nasal cannula (07/26/17 0525) Vital Signs (24h Range):  Temp:  [97.6 °F (36.4 °C)-98.4 °F (36.9 °C)] 97.6 °F (36.4 °C)  Pulse:  [77-98] 85  Resp:  [15-18] 18  SpO2:  [95 %-99 %] 98 %  BP: (120-149)/(61-79) 120/74     Weight: 65.4 kg (144 lb 3.2 oz) (07/24/17 0600)  Body mass index is 24.74 kg/m².  Body surface area is 1.72 meters squared.    I/O last 3 completed shifts:  In: 925 [P.O.:925]  Out: 2377 [Urine:2375; Stool:2]    Physical Exam   Constitutional: She is oriented to person,  place, and time. She appears well-developed and well-nourished. She appears distressed.   HENT:   Head: Normocephalic and atraumatic.   Right Ear: External ear normal.   Left Ear: External ear normal.   Eyes: EOM are normal. Pupils are equal, round, and reactive to light. No scleral icterus.   Neck: Neck supple. No thyromegaly present.   Cardiovascular: Normal rate, normal heart sounds and intact distal pulses.    No murmur heard.  Irregular rhythm   Pulmonary/Chest: No respiratory distress. She has no wheezes. She has rales.   Coarse BS and crackles bilaterally   Abdominal: Soft. Bowel sounds are normal. She exhibits no distension. There is no tenderness.   Musculoskeletal: Normal range of motion. She exhibits edema. She exhibits no tenderness or deformity.   Neurological: She is alert and oriented to person, place, and time. No cranial nerve deficit.   Skin: Skin is warm and dry.   Right forehead hyperpigmentation, does not cross midline, from resolving shingles rash   Psychiatric: She has a normal mood and affect. Her behavior is normal.       Significant Labs:  ABGs:   Recent Labs  Lab 07/20/17  1146   PH 7.373   PCO2 54.0*   HCO3 31.4*   POCSATURATED 92*   BE 6     CBC:   Recent Labs  Lab 07/25/17  0341   WBC 6.77   RBC 2.92*   HGB 8.1*   HCT 26.7*      MCV 91   MCH 27.7   MCHC 30.3*     CMP:   Recent Labs  Lab 07/26/17  0544   *   CALCIUM 8.8   ALBUMIN 2.0*   PROT 5.1*      K 4.0   CO2 38*   CL 96   BUN 74*   CREATININE 2.4*   ALKPHOS 37*   ALT 6*   AST 19   BILITOT 0.4       Recent Labs  Lab 07/19/17  1442   COLORU Yellow   SPECGRAV 1.010   PHUR 5.0   PROTEINUA 2+*   BACTERIA None   NITRITE Negative   LEUKOCYTESUR 3+*   UROBILINOGEN Negative   HYALINECASTS 0     All labs within the past 24 hours have been reviewed.     Significant Imaging:  X-Ray: Reviewed

## 2017-07-26 NOTE — ASSESSMENT & PLAN NOTE
- On admit (7/12), CXR shows worsening pulmonary edema, BMP 1413 (above baseline), tn 0.04, EKG shows afib with RVR without acute ischemic changes. most recent echo 6/2017 showed EF 40-45, pulm HTN.   - CT chest 7/21 shows moderate bilateral pleural effusions  - CXR 7/24 shows increased patchy infiltrate  - 2D Echo 7/24 shows no significant change from prior study (EF 43%, no valvular issues, PA pressure 41)  - No significant improvement in dyspnea thus far given clinical status and BNP of 1600 on 7/23 (from 1700 on admit). No evidence of serial BNP labs  - Poor candidate for thoracentesis, per pulmonology. IR consulted (7/25) for thoracentesis. Recommended pulmonology manage this issue  - Received intermittent doses of metolazone and chlorothiazide to supplement diuresis with varying success  - cont on hydralazine 50 TID, isosorbide dinitirate 20 TID, Lopressor 75 BID  - Lasix 80 PO BID and metolazone 5 mg PO daily  -Cardiology signed off secondary to limit of diuretic potential. Contraction alkalosis with worsening BUN (44-->75) and no clinical signs of volume overload

## 2017-07-26 NOTE — PROGRESS NOTES
[facility]  Adult Nutrition  Progress Note    SUMMARY     Recommendations    Recommendation/Intervention: 1. continue w/ low Na diet w/ ONS Boost BID. 2.Encourage PO intake >/= 75% EEN/EPN w/HVP  each meal. 4. RD to follow  Goals: po intake >/= 75% EEN/EPN  Nutrition Goal Status: new  Communication of RD Recs: reviewed with RN    Reason for Assessment    Reason for Assessment: RD follow-up  Diagnosis: cardiac disease (HF)  Relevant Medical History: CAD, CKD stage IV, type 2 DM, HTN, CVA, HLD   Interdisciplinary Rounds: did not attend  General Information Comments: Pt with decreased appetite ~50% of meals, 2/2 abdominal pain, long term compliance to low  sodium fl res diet.    Nutrition Discharge Planning: d/c on cardiac diet >/= 75% EEN/EPN w/ ONS Boost BID    Nutrition Prescription Ordered    Current Diet Order: Low Na 2m - 1500 mL fluid restriction  Oral Nutrition Supplement: boost guillaume.     Evaluation of Received Nutrients/Fluid Intake          Intake/Output Summary (Last 24 hours) at 07/26/17 1815  Last data filed at 07/26/17 1500   Gross per 24 hour   Intake              640 ml   Output             1400 ml   Net             -760 ml     LBM:7/25/17     % Intake of Estimated Energy Needs: 25 - 50 %  % Meal Intake: 50%     Nutrition Risk Screen     Nutrition Risk Screen: no indicators present    Nutrition/Diet History    Patient Reported Diet/Restrictions/Preferences: diabetic diet, low salt  Typical Food/Fluid Intake: Adequate PTA  Food Preferences: no cultural or Nondenominational needs identified  Supplemental Drinks or Food Habits: Ensure Plus  Factors Affecting Nutritional Intake: decreased appetite    Labs/Tests/Procedures/Meds       Pertinent Labs Reviewed: reviewed  Pertinent Labs Comments: BUN-74, Cr-2.4, Glu-126, GFR-19.4, Alb-2.0, Alk Phos-37  Pertinent Medications Reviewed: reviewed  Pertinent Medications Comments: Statin, Lasix, insulin, Pantoprazole    Physical Findings    Overall  "Physical Appearance: on oxygen therapy  Tubes:  (PEG)  Oral/Mouth Cavity: tooth/teeth missing  Skin: intact    Anthropometrics    Temp: 98.5 °F (36.9 °C)  Height: 5' 4.02" (162.6 cm)  Weight Method: Bed Scale  Weight: 65.4 kg (144 lb 3.2 oz)  Ideal Body Weight (IBW), Female: 120.1 lb  % Ideal Body Weight, Female (lb): 132.16 lb  BMI (Calculated): 27.3  BMI Grade: 25 - 29.9 - overweight     Estimated/Assessed Needs    Weight Used For Calorie Calculations: 65.4 kg (144 lb 2.9 oz)   Height (cm): 162.6 cm  Energy Calorie Requirements (kcal): 1635-1962g/d  (25-30kcal/kg)  Energy Need Method: Kcal/kg   RMR (Custer-St. Jeor Equation): 1144.32  Weight Used For Protein Calculations: 65.2 kg (143 lb 11.8 oz)  Protein Requirements: 65-78g/d (1-1.2 g/kg)  Fluid Requirements (mL): per MD  RDA Method (mL): 1635  CHO Requirement: 50% total intake     Assessment and Plan    * Acute on chronic systolic congestive heart failure    Nutrition Problem  Inadequate nutrient intake    Related to (etiology):   CHF, depression w/ decreased appetite    Signs and Symptoms (as evidenced by):   ~50% PO intake    Interventions/Recommendations (treatment strategy):  1.Add Diabetic 2000 calorie restriction to Low Na diet   2. If Po intake <50% meals. Order Boost Glucose Control TID.    Nutrition Diagnosis Status:   Continues              Monitor and Evaluation    Food and Nutrient Intake: food and beverage intake, energy intake  Food and Nutrient Administration: diet order  Physical Activity and Function: nutrition-related ADLs and IADLs  Anthropometric Measurements: weight, weight change  Biochemical Data, Medical Tests and Procedures:  (All Labs)  Nutrition-Focused Physical Findings: overall appearance    Nutrition Risk    Level of Risk:  (1x/week)    Nutrition Follow-Up    RD Follow-up?: Yes  "

## 2017-07-26 NOTE — SUBJECTIVE & OBJECTIVE
Interval History: NAEON. Patient's relative believes her dyspnea improved overnight. Still unable to speak more than a few words at a time. Conversation about thoracentesis vs. continued diuresis. Will continue with diuresis.    Review of Systems   Respiratory: Positive for shortness of breath. Negative for cough and chest tightness.    Cardiovascular: Negative for chest pain and leg swelling.     Objective:     Vital Signs (Most Recent):  Temp: 97.5 °F (36.4 °C) (07/26/17 1200)  Pulse: 90 (07/26/17 1400)  Resp: 18 (07/26/17 1200)  BP: (!) 108/58 (07/26/17 1200)  SpO2: 96 % (07/26/17 1200) Vital Signs (24h Range):  Temp:  [97.5 °F (36.4 °C)-98.4 °F (36.9 °C)] 97.5 °F (36.4 °C)  Pulse:  [79-98] 90  Resp:  [15-18] 18  SpO2:  [96 %-99 %] 96 %  BP: (108-149)/(58-79) 108/58     Weight: 65.4 kg (144 lb 3.2 oz)  Body mass index is 24.74 kg/m².    Intake/Output Summary (Last 24 hours) at 07/26/17 1513  Last data filed at 07/26/17 0548   Gross per 24 hour   Intake              340 ml   Output             1300 ml   Net             -960 ml      Physical Exam   Neck: No JVD present.   Cardiovascular: Normal heart sounds.  An irregularly irregular rhythm present.   Pulmonary/Chest: She is in respiratory distress. She has rales.   Abdominal: Bowel sounds are normal. She exhibits distension.   Musculoskeletal: She exhibits no edema.   Skin: Skin is warm and dry.       Significant Labs:   BMP:   Recent Labs  Lab 07/26/17  0544   *      K 4.0   CL 96   CO2 38*   BUN 74*   CREATININE 2.4*   CALCIUM 8.8   MG 2.6       Significant Imaging: I have reviewed all pertinent imaging results/findings within the past 24 hours.

## 2017-07-26 NOTE — ASSESSMENT & PLAN NOTE
Nutrition Problem  Inadequate nutrient intake    Related to (etiology):   CHF, depression w/ decreased appetite    Signs and Symptoms (as evidenced by):   ~50% PO intake    Interventions/Recommendations (treatment strategy):  1.Add Diabetic 2000 calorie restriction to Low Na diet   2. If Po intake <50% meals. Order Boost Glucose Control TID.    Nutrition Diagnosis Status:   Continues

## 2017-07-26 NOTE — ASSESSMENT & PLAN NOTE
- D/C'd lexapro and wellbutrin because of difficulty with placement in SNF while on anti-depressants  - Seroquel qhs

## 2017-07-27 PROBLEM — E87.4 METABOLIC ALKALOSIS WITH RESPIRATORY ACIDOSIS: Status: ACTIVE | Noted: 2017-07-27

## 2017-07-27 LAB
ALBUMIN SERPL BCP-MCNC: 2 G/DL
ALLENS TEST: ABNORMAL
ALP SERPL-CCNC: 39 U/L
ALT SERPL W/O P-5'-P-CCNC: 6 U/L
ANION GAP SERPL CALC-SCNC: 12 MMOL/L
AST SERPL-CCNC: 13 U/L
BILIRUB SERPL-MCNC: 0.3 MG/DL
BUN SERPL-MCNC: 78 MG/DL
CALCIUM SERPL-MCNC: 8.4 MG/DL
CHLORIDE SERPL-SCNC: 92 MMOL/L
CO2 SERPL-SCNC: 39 MMOL/L
CREAT SERPL-MCNC: 2.6 MG/DL
DELSYS: ABNORMAL
EST. GFR  (AFRICAN AMERICAN): 20.3 ML/MIN/1.73 M^2
EST. GFR  (NON AFRICAN AMERICAN): 17.6 ML/MIN/1.73 M^2
FLOW: 2
GLUCOSE SERPL-MCNC: 182 MG/DL
HCO3 UR-SCNC: 44.8 MMOL/L (ref 24–28)
MAGNESIUM SERPL-MCNC: 2.4 MG/DL
MODE: ABNORMAL
PCO2 BLDA: 68.1 MMHG (ref 35–45)
PH SMN: 7.43 [PH] (ref 7.35–7.45)
PO2 BLDA: 97 MMHG (ref 80–100)
POC BE: 20 MMOL/L
POC SATURATED O2: 97 % (ref 95–100)
POC TCO2: 47 MMOL/L (ref 23–27)
POCT GLUCOSE: 186 MG/DL (ref 70–110)
POCT GLUCOSE: 228 MG/DL (ref 70–110)
POCT GLUCOSE: 305 MG/DL (ref 70–110)
POCT GLUCOSE: 316 MG/DL (ref 70–110)
POTASSIUM SERPL-SCNC: 4.1 MMOL/L
PROT SERPL-MCNC: 5 G/DL
SAMPLE: ABNORMAL
SITE: ABNORMAL
SODIUM SERPL-SCNC: 143 MMOL/L
SP02: 97

## 2017-07-27 PROCEDURE — 63600175 PHARM REV CODE 636 W HCPCS: Performed by: STUDENT IN AN ORGANIZED HEALTH CARE EDUCATION/TRAINING PROGRAM

## 2017-07-27 PROCEDURE — 36415 COLL VENOUS BLD VENIPUNCTURE: CPT

## 2017-07-27 PROCEDURE — 97530 THERAPEUTIC ACTIVITIES: CPT

## 2017-07-27 PROCEDURE — 25000003 PHARM REV CODE 250: Performed by: STUDENT IN AN ORGANIZED HEALTH CARE EDUCATION/TRAINING PROGRAM

## 2017-07-27 PROCEDURE — 80053 COMPREHEN METABOLIC PANEL: CPT

## 2017-07-27 PROCEDURE — 83735 ASSAY OF MAGNESIUM: CPT

## 2017-07-27 PROCEDURE — 94761 N-INVAS EAR/PLS OXIMETRY MLT: CPT

## 2017-07-27 PROCEDURE — 27000221 HC OXYGEN, UP TO 24 HOURS

## 2017-07-27 PROCEDURE — 36600 WITHDRAWAL OF ARTERIAL BLOOD: CPT

## 2017-07-27 PROCEDURE — 25000003 PHARM REV CODE 250: Performed by: HOSPITALIST

## 2017-07-27 PROCEDURE — 99233 SBSQ HOSP IP/OBS HIGH 50: CPT | Mod: GC,,, | Performed by: INTERNAL MEDICINE

## 2017-07-27 PROCEDURE — 99232 SBSQ HOSP IP/OBS MODERATE 35: CPT | Mod: GC,,, | Performed by: HOSPITALIST

## 2017-07-27 PROCEDURE — 20600001 HC STEP DOWN PRIVATE ROOM

## 2017-07-27 PROCEDURE — A4216 STERILE WATER/SALINE, 10 ML: HCPCS | Performed by: STUDENT IN AN ORGANIZED HEALTH CARE EDUCATION/TRAINING PROGRAM

## 2017-07-27 PROCEDURE — 82803 BLOOD GASES ANY COMBINATION: CPT

## 2017-07-27 RX ORDER — BUPROPION HYDROCHLORIDE 150 MG/1
150 TABLET ORAL DAILY
Status: DISCONTINUED | OUTPATIENT
Start: 2017-07-27 | End: 2017-07-29 | Stop reason: HOSPADM

## 2017-07-27 RX ORDER — ESCITALOPRAM OXALATE 5 MG/1
5 TABLET ORAL DAILY
Status: DISCONTINUED | OUTPATIENT
Start: 2017-07-27 | End: 2017-07-29 | Stop reason: HOSPADM

## 2017-07-27 RX ORDER — ACETAZOLAMIDE 250 MG/1
250 TABLET ORAL ONCE
Status: COMPLETED | OUTPATIENT
Start: 2017-07-27 | End: 2017-07-27

## 2017-07-27 RX ADMIN — ISOSORBIDE DINITRATE 20 MG: 10 TABLET ORAL at 05:07

## 2017-07-27 RX ADMIN — INSULIN DETEMIR 5 UNITS: 100 INJECTION, SOLUTION SUBCUTANEOUS at 08:07

## 2017-07-27 RX ADMIN — ACETAZOLAMIDE 250 MG: 250 TABLET ORAL at 03:07

## 2017-07-27 RX ADMIN — HYDRALAZINE HYDROCHLORIDE 50 MG: 50 TABLET ORAL at 05:07

## 2017-07-27 RX ADMIN — ESCITALOPRAM 5 MG: 5 TABLET, FILM COATED ORAL at 01:07

## 2017-07-27 RX ADMIN — INSULIN ASPART 4 UNITS: 100 INJECTION, SOLUTION INTRAVENOUS; SUBCUTANEOUS at 06:07

## 2017-07-27 RX ADMIN — ISOSORBIDE DINITRATE 20 MG: 10 TABLET ORAL at 08:07

## 2017-07-27 RX ADMIN — INSULIN ASPART 2 UNITS: 100 INJECTION, SOLUTION INTRAVENOUS; SUBCUTANEOUS at 09:07

## 2017-07-27 RX ADMIN — HYDRALAZINE HYDROCHLORIDE 50 MG: 50 TABLET ORAL at 01:07

## 2017-07-27 RX ADMIN — METOPROLOL TARTRATE 75 MG: 50 TABLET, FILM COATED ORAL at 09:07

## 2017-07-27 RX ADMIN — Medication 3 ML: at 06:07

## 2017-07-27 RX ADMIN — METOPROLOL TARTRATE 75 MG: 50 TABLET, FILM COATED ORAL at 08:07

## 2017-07-27 RX ADMIN — FUROSEMIDE 80 MG: 10 INJECTION, SOLUTION INTRAVENOUS at 06:07

## 2017-07-27 RX ADMIN — FUROSEMIDE 80 MG: 10 INJECTION, SOLUTION INTRAVENOUS at 09:07

## 2017-07-27 RX ADMIN — INSULIN DETEMIR 5 UNITS: 100 INJECTION, SOLUTION SUBCUTANEOUS at 09:07

## 2017-07-27 RX ADMIN — HYDRALAZINE HYDROCHLORIDE 50 MG: 50 TABLET ORAL at 08:07

## 2017-07-27 RX ADMIN — ISOSORBIDE DINITRATE 20 MG: 10 TABLET ORAL at 01:07

## 2017-07-27 RX ADMIN — ATORVASTATIN CALCIUM 40 MG: 20 TABLET, FILM COATED ORAL at 09:07

## 2017-07-27 RX ADMIN — GABAPENTIN 300 MG: 300 CAPSULE ORAL at 08:07

## 2017-07-27 RX ADMIN — BUPROPION HYDROCHLORIDE 150 MG: 150 TABLET, FILM COATED, EXTENDED RELEASE ORAL at 01:07

## 2017-07-27 RX ADMIN — ASPIRIN 81 MG: 81 TABLET, COATED ORAL at 09:07

## 2017-07-27 RX ADMIN — METOLAZONE 5 MG: 2.5 TABLET ORAL at 09:07

## 2017-07-27 RX ADMIN — PANTOPRAZOLE SODIUM 40 MG: 40 TABLET, DELAYED RELEASE ORAL at 09:07

## 2017-07-27 RX ADMIN — INSULIN ASPART 8 UNITS: 100 INJECTION, SOLUTION INTRAVENOUS; SUBCUTANEOUS at 01:07

## 2017-07-27 RX ADMIN — INSULIN ASPART 4 UNITS: 100 INJECTION, SOLUTION INTRAVENOUS; SUBCUTANEOUS at 08:07

## 2017-07-27 NOTE — ASSESSMENT & PLAN NOTE
-continue diuresis titrated to volume status. thoracentesis canceled for pulmonary effusions from acute decompensated HF that have been difficult to diurese.   - Electrolyte panel shows presumed contraction alkalosis from diuresis; Recommend Lasix and can add Diamox to augment if repeat ABG shows alkalosis. Recommend repeat ABG   -patient with Nephrotic range proteinuria- extensive nephrotic workup unrevealing  -etiology of CKD likely uncontrolled HTN/ DM w/o other end-organ involvement at this time.   -Cardiology signed off

## 2017-07-27 NOTE — ASSESSMENT & PLAN NOTE
-see plan for diuresis under CKD; patient is responding well to current diuresis but now had alkalosis, Cr increase.

## 2017-07-27 NOTE — ASSESSMENT & PLAN NOTE
- On admit (7/12), CXR shows worsening pulmonary edema, BMP 1413 (above baseline), tn 0.04, EKG shows afib with RVR without acute ischemic changes. 2D Echo 6/2017 showed EF 40-45, pulm HTN.   - CT chest 7/21 shows moderate bilateral pleural effusions  - CXR 7/24 shows increased patchy infiltrate  - 2D Echo 7/24 shows no significant change from prior study (EF 43%, no valvular issues, PA pressure 41)  - No significant improvement in dyspnea thus far given clinical status and BNP of 1600 on 7/23 (from 1700 on admit). No evidence of serial BNP labs  - Poor candidate for thoracentesis, per pulmonology. IR consulted (7/25) for thoracentesis. Recommended pulmonology manage this issue  - Received intermittent doses of metolazone and chlorothiazide to supplement diuresis with varying success  - cont on hydralazine 50 TID, isosorbide dinitirate 20 TID, Lopressor 75 BID  - Lasix 80 IV BID and metolazone 5 mg PO daily  - Start Diamox for alkalosis  - Repeat CXR (lateral decubitus) for pleural effusion evaluation

## 2017-07-27 NOTE — SUBJECTIVE & OBJECTIVE
Interval History: NAEON. Small incremental improvements in patient's dyspnea. Discussed plan for imaging to evaluate for pleural effusions and coordinate a thoracentesis if necessary. Patient and daughter were amendable to the plan.     Review of Systems   Respiratory: Positive for shortness of breath. Negative for cough and chest tightness.    Cardiovascular: Negative for chest pain and leg swelling.     Objective:     Vital Signs (Most Recent):  Temp: 97.8 °F (36.6 °C) (07/27/17 1209)  Pulse: 97 (07/27/17 1209)  Resp: 17 (07/27/17 1209)  BP: 132/61 (07/27/17 1209)  SpO2: (!) 94 % (07/27/17 1209) Vital Signs (24h Range):  Temp:  [97.8 °F (36.6 °C)-98.5 °F (36.9 °C)] 97.8 °F (36.6 °C)  Pulse:  [] 97  Resp:  [17-18] 17  SpO2:  [93 %-99 %] 94 %  BP: ()/(56-64) 132/61     Weight: 65.1 kg (143 lb 8 oz)  Body mass index is 24.62 kg/m².    Intake/Output Summary (Last 24 hours) at 07/27/17 1351  Last data filed at 07/27/17 0600   Gross per 24 hour   Intake              780 ml   Output              900 ml   Net             -120 ml      Physical Exam   Neck: No JVD present.   Cardiovascular: Normal heart sounds.  An irregularly irregular rhythm present.   Pulmonary/Chest: No respiratory distress. She has rales.   Abdominal: Bowel sounds are normal. She exhibits distension.   Musculoskeletal: She exhibits no edema.   Skin: Skin is warm and dry.       Significant Labs:   BMP:   Recent Labs  Lab 07/27/17  0336   *      K 4.1   CL 92*   CO2 39*   BUN 78*   CREATININE 2.6*   CALCIUM 8.4*   MG 2.4       Significant Imaging: I have reviewed all pertinent imaging results/findings within the past 24 hours.

## 2017-07-27 NOTE — PLAN OF CARE
Problem: Patient Care Overview  Goal: Plan of Care Review  7/20 - Improve breathing.  Patient on Lasix gtt.   Outcome: Ongoing (interventions implemented as appropriate)  Plan of care reviewed with pt. VS stable. Pain relieved with PRN meds. No acute events at this time. No complaints. Pt remains free from falls or injury. Bed low and locked, call light and personal belongings within reach. Will continue to monitor. Fabiana Ocampo RN

## 2017-07-27 NOTE — SUBJECTIVE & OBJECTIVE
Interval History: Patient is improved symptomatically with aggressive diuresis; -900cc UOP, Cr increased from 2.4 to 2.6 today with evidence of contraction alkalosis.     Review of patient's allergies indicates:   Allergen Reactions    Daypro [oxaprozin] Itching     Tolerates other NSAIDs    Vibramycin [doxycycline calcium]      Current Facility-Administered Medications   Medication Frequency    acetaminophen tablet 650 mg Q6H PRN    albuterol-ipratropium 2.5mg-0.5mg/3mL nebulizer solution 3 mL Q4H PRN    aspirin EC tablet 81 mg Daily    atorvastatin tablet 40 mg Daily    dextrose 50% injection 12.5 g PRN    dextrose 50% injection 25 g PRN    furosemide injection 80 mg BID    gabapentin capsule 300 mg QHS    glucagon (human recombinant) injection 1 mg PRN    glucose chewable tablet 16 g PRN    glucose chewable tablet 24 g PRN    hydrALAZINE tablet 50 mg TID    insulin aspart pen 1-10 Units QID (AC + HS) PRN    insulin detemir pen 5 Units BID    isosorbide dinitrate tablet 20 mg TID    metOLazone tablet 5 mg Daily    metoprolol tartrate tablet 75 mg BID    ondansetron disintegrating tablet 8 mg Q8H PRN    pantoprazole EC tablet 40 mg Daily    polyethylene glycol packet 17 g Daily PRN    quetiapine tablet 100 mg QHS    sodium chloride 0.9% flush 3 mL Q8H       Objective:     Vital Signs (Most Recent):  Temp: 98.2 °F (36.8 °C) (07/26/17 2350)  Pulse: 78 (07/27/17 0700)  Resp: 18 (07/27/17 0545)  BP: 124/63 (07/27/17 0545)  SpO2: 98 % (07/27/17 0545)  O2 Device (Oxygen Therapy): nasal cannula (07/27/17 0545) Vital Signs (24h Range):  Temp:  [97.5 °F (36.4 °C)-98.5 °F (36.9 °C)] 98.2 °F (36.8 °C)  Pulse:  [] 78  Resp:  [18] 18  SpO2:  [96 %-99 %] 98 %  BP: ()/(56-72) 124/63     Weight: 65.1 kg (143 lb 8 oz) (07/27/17 0545)  Body mass index is 24.62 kg/m².  Body surface area is 1.71 meters squared.    I/O last 3 completed shifts:  In: 1120 [P.O.:1120]  Out: 2200 [Urine:2200]    Physical  Exam   Constitutional: She is oriented to person, place, and time. She appears well-developed and well-nourished. No distress.   HENT:   Head: Normocephalic and atraumatic.   Right Ear: External ear normal.   Left Ear: External ear normal.   Eyes: EOM are normal. Pupils are equal, round, and reactive to light. No scleral icterus.   Neck: Neck supple. No thyromegaly present.   Cardiovascular: Normal rate, normal heart sounds and intact distal pulses.    No murmur heard.  Irregular rhythm   Pulmonary/Chest: No respiratory distress. She has no wheezes. She has rales.   Coarse BB BS   Abdominal: Soft. Bowel sounds are normal. She exhibits no distension. There is no tenderness.   Musculoskeletal: Normal range of motion. She exhibits no edema, tenderness or deformity.   Neurological: She is alert and oriented to person, place, and time. No cranial nerve deficit.   Skin: Skin is warm and dry.   Psychiatric: She has a normal mood and affect. Her behavior is normal.       Significant Labs:  ABGs:   Recent Labs  Lab 07/20/17  1146   PH 7.373   PCO2 54.0*   HCO3 31.4*   POCSATURATED 92*   BE 6     BMP:   Recent Labs  Lab 07/27/17  0336   *   CL 92*   CO2 39*   BUN 78*   CREATININE 2.6*   CALCIUM 8.4*   MG 2.4     CBC:   Recent Labs  Lab 07/26/17  0544   WBC 5.44   RBC 3.11*   HGB 8.5*   HCT 28.5*      MCV 92   MCH 27.3   MCHC 29.8*     CMP:   Recent Labs  Lab 07/27/17  0336   *   CALCIUM 8.4*   ALBUMIN 2.0*   PROT 5.0*      K 4.1   CO2 39*   CL 92*   BUN 78*   CREATININE 2.6*   ALKPHOS 39*   ALT 6*   AST 13   BILITOT 0.3     Coagulation: No results for input(s): INR, APTT in the last 168 hours.    Invalid input(s): PT  TSH: No results for input(s): TSH in the last 168 hours.  All labs within the past 24 hours have been reviewed.     Significant Imaging:  Labs: Reviewed

## 2017-07-27 NOTE — PT/OT/SLP PROGRESS
Occupational Therapy      Michael Salgado  MRN: [id number]    Patient not seen today secondary to Other (pt working with PT in AM attempt and leaving for x-ray in PM attempt). Will follow-up as scheduled.    LURDES Lizama  7/27/2017

## 2017-07-27 NOTE — PT/OT/SLP PROGRESS
"Physical Therapy  Treatment    Michael Salgado   MRN: [id number]   Admitting Diagnosis: Acute on chronic systolic congestive heart failure    PT Received On: 07/27/17                     Billable Minutes:  Therapeutic Activity 23    Treatment Type: Treatment  PT/PTA: PTA     PTA Visit Number: 1       General Precautions: Standard, fall  Orthopedic Precautions: N/A   Braces:      Do you have any cultural, spiritual, Zoroastrianism conflicts, given your current situation?: none stated    Subjective:  Communicated with RN prior to session.  "No."    Pain/Comfort  Pain Rating 1: 0/10  Pain Rating Post-Intervention 1: 0/10    Objective:   Patient found supine in bed with: peripheral IV, oxygen, telemetry, barraza catheter.  Family present    Functional Mobility:  Bed Mobility:   Supine > sit with mod/max A from bed.  HOB elevated     Transfers: Family presents to help with t/fs    sit > stand from EOB with RW and mod A  SPT from bed > BS chair with RW and mod A  Sit <> stand from BS chair with max A    Gait:   Pt took 5 sidesteps to BS chair with RW and mod A.    Balance:   Static Sit: FAIR: Maintains without assist, but unable to take any challenges   Dynamic Sit: FAIR: Cannot move trunk without losing balance  Static Stand: POOR+: Needs MINIMAL assist to maintain  Dynamic stand: POOR: N/A     Therapeutic Activities and Exercises:  BLEs seated therex x 20 reps includings: APs, GS, ADD/ABD, LAQs, hip flexion     Static standing tolerance x 1 minute 18 secs with min/mod A for balance    AM-PAC 6 CLICK MOBILITY  How much help from another person does this patient currently need?   1 = Unable, Total/Dependent Assistance  2 = A lot, Maximum/Moderate Assistance  3 = A little, Minimum/Contact Guard/Supervision  4 = None, Modified [place]/Independent         AM-PAC Raw Score CMS G-Code Modifier Level of Impairment Assistance   6 % Total / Unable   7 - 9 CM 80 - 100% Maximal Assist   10 - 14 CL 60 - 80% Moderate Assist "   15 - 19 CK 40 - 60% Moderate Assist   20 - 22 CJ 20 - 40% Minimal Assist   23 CI 1-20% SBA / CGA   24 CH 0% Independent/ Mod I     Patient left up in chair with all lines intact, call button in reach and pt's   present.    Assessment:  Michael Salgado is a 71 y.o. female with a medical diagnosis of Acute on chronic systolic congestive heart failure and presents with.  Pt tolerated session well today and is progressing towards goals. Pt required max encouragement for participation. Goals remain appropriate.    Rehab identified problem list/impairments: Rehab identified problem list/impairments: weakness, impaired endurance, impaired self care skills, impaired functional mobilty, gait instability, impaired balance, impaired cognition, decreased coordination, decreased lower extremity function, decreased safety awareness, decreased ROM, impaired coordination    Rehab potential is fair.    Activity tolerance: Fair    Discharge recommendations: Discharge Facility/Level Of Care Needs: nursing facility, skilled     Barriers to discharge: Barriers to Discharge: None    Equipment recommendations: Equipment Needed After Discharge: none     GOALS:    Physical Therapy Goals        Problem: Physical Therapy Goal    Goal Priority Disciplines Outcome Goal Variances Interventions   Physical Therapy Goal     PT/OT, PT Ongoing (interventions implemented as appropriate)     Description:  Goals to be met by: 2017    Patient will increase functional independence with mobility by performin. Supine to sit with MInimal Assistance  2. Sit to supine with MInimal Assistance  3. Rolling to Right with Minimal Assistance.  4. Sit to stand transfer with minimal assistance   5. Bed to chair transfer with minimal assisatnce using Rolling Walker  6. Gait  x 10 feet with Minimal Assistance using Rolling Walker.                            PLAN:    Patient to be seen 4 x/week  to address the above listed problems via gait  training, therapeutic activities, therapeutic exercises, neuromuscular re-education, wheelchair management/training  Plan of Care expires: 08/14/17  Plan of Care reviewed with: patient      Rome Morris, PTA  07/27/2017

## 2017-07-27 NOTE — PLAN OF CARE
Problem: Patient Care Overview  Goal: Plan of Care Review  7/20 - Improve breathing.  Patient on Lasix gtt.   Plan of care discussed with patient. Patient is free of fall/trauma/injury. Denies CP, SOB, or pain/discomfort. Pt waiting to go to SNF and transition to po lasix. All questions addressed. Will continue to monitor

## 2017-07-27 NOTE — PLAN OF CARE
Problem: Physical Therapy Goal  Goal: Physical Therapy Goal  Goals to be met by: 2017    Patient will increase functional independence with mobility by performin. Supine to sit with MInimal Assistance  2. Sit to supine with MInimal Assistance  3. Rolling to Right with Minimal Assistance.  4. Sit to stand transfer with minimal assistance   5. Bed to chair transfer with minimal assisatnce using Rolling Walker  6. Gait  x 10 feet with Minimal Assistance using Rolling Walker.           Outcome: Ongoing (interventions implemented as appropriate)  Goals remain appropriate

## 2017-07-27 NOTE — PROGRESS NOTES
[facility]  Nephrology  Progress Note    Patient Name: Michael Salgado  MRN: [id number]  Admission Date: 7/12/2017  Hospital Length of Stay: 15 days  Attending Provider: Ken Sharif MD   Primary Care Physician: Wesley Watkins MD  Principal Problem:Acute on chronic systolic congestive heart failure    Subjective:     HPI: Ms. Salgado is a 70yo female with history of CVA, CAD s/p CABG x 3, ICM, HFrEF, CKD, anemia in CKD and afib on Aspirin and Warfarin who presents with acute hypoxic respiratory failure. She just completed a course of antibiotics for pneumonia 2 weeks prior, and reports feeling lethargic with poor recovery of baseline functional status since. This admission, she is being treated for CHF exacerbation per Cardiology given elevated BNP, CXR with congestion. She has received IV Lasix per Cardiology recommendations and has continued improvement in Cr, most recently to 2.5 from 2.7 2 days prior, and her presentation is consistent with cardiorenal syndrome. I/Os note ~500 cc initial response to Lasix and Nephrology was consulted for CKDIV renal disease on loop diuretics with possible suboptimal response in urine output.            Interval History: Patient is improved symptomatically with aggressive diuresis; -900cc UOP, Cr increased from 2.4 to 2.6 today with evidence of contraction alkalosis. ABG obtained.    Review of patient's allergies indicates:   Allergen Reactions    Daypro [oxaprozin] Itching     Tolerates other NSAIDs    Vibramycin [doxycycline calcium]      Current Facility-Administered Medications   Medication Frequency    acetaminophen tablet 650 mg Q6H PRN    albuterol-ipratropium 2.5mg-0.5mg/3mL nebulizer solution 3 mL Q4H PRN    aspirin EC tablet 81 mg Daily    atorvastatin tablet 40 mg Daily    dextrose 50% injection 12.5 g PRN    dextrose 50% injection 25 g PRN    furosemide injection 80 mg BID    gabapentin capsule 300 mg QHS    glucagon (human  recombinant) injection 1 mg PRN    glucose chewable tablet 16 g PRN    glucose chewable tablet 24 g PRN    hydrALAZINE tablet 50 mg TID    insulin aspart pen 1-10 Units QID (AC + HS) PRN    insulin detemir pen 5 Units BID    isosorbide dinitrate tablet 20 mg TID    metOLazone tablet 5 mg Daily    metoprolol tartrate tablet 75 mg BID    ondansetron disintegrating tablet 8 mg Q8H PRN    pantoprazole EC tablet 40 mg Daily    polyethylene glycol packet 17 g Daily PRN    quetiapine tablet 100 mg QHS    sodium chloride 0.9% flush 3 mL Q8H       Objective:     Vital Signs (Most Recent):  Temp: 98.2 °F (36.8 °C) (07/26/17 2350)  Pulse: 78 (07/27/17 0700)  Resp: 18 (07/27/17 0545)  BP: 124/63 (07/27/17 0545)  SpO2: 98 % (07/27/17 0545)  O2 Device (Oxygen Therapy): nasal cannula (07/27/17 0545) Vital Signs (24h Range):  Temp:  [97.5 °F (36.4 °C)-98.5 °F (36.9 °C)] 98.2 °F (36.8 °C)  Pulse:  [] 78  Resp:  [18] 18  SpO2:  [96 %-99 %] 98 %  BP: ()/(56-72) 124/63     Weight: 65.1 kg (143 lb 8 oz) (07/27/17 0545)  Body mass index is 24.62 kg/m².  Body surface area is 1.71 meters squared.    I/O last 3 completed shifts:  In: 1120 [P.O.:1120]  Out: 2200 [Urine:2200]    Physical Exam   Constitutional: She is oriented to person, place, and time. She appears well-developed and well-nourished. No distress.   HENT:   Head: Normocephalic and atraumatic.   Right Ear: External ear normal.   Left Ear: External ear normal.   Eyes: EOM are normal. Pupils are equal, round, and reactive to light. No scleral icterus.   Neck: Neck supple. No thyromegaly present.   Cardiovascular: Normal rate, normal heart sounds and intact distal pulses.    No murmur heard.  Irregular rhythm   Pulmonary/Chest: No respiratory distress. She has no wheezes. She has rales.   Coarse BB BS   Abdominal: Soft. Bowel sounds are normal. She exhibits no distension. There is no tenderness.   Musculoskeletal: Normal range of motion. She exhibits no  edema, tenderness or deformity.   Neurological: She is alert and oriented to person, place, and time. No cranial nerve deficit.   Skin: Skin is warm and dry.   Psychiatric: She has a normal mood and affect. Her behavior is normal.       Significant Labs:  ABGs:   Recent Labs  Lab 07/20/17  1146   PH 7.373   PCO2 54.0*   HCO3 31.4*   POCSATURATED 92*   BE 6     BMP:   Recent Labs  Lab 07/27/17  0336   *   CL 92*   CO2 39*   BUN 78*   CREATININE 2.6*   CALCIUM 8.4*   MG 2.4     CBC:   Recent Labs  Lab 07/26/17  0544   WBC 5.44   RBC 3.11*   HGB 8.5*   HCT 28.5*      MCV 92   MCH 27.3   MCHC 29.8*     CMP:   Recent Labs  Lab 07/27/17  0336   *   CALCIUM 8.4*   ALBUMIN 2.0*   PROT 5.0*      K 4.1   CO2 39*   CL 92*   BUN 78*   CREATININE 2.6*   ALKPHOS 39*   ALT 6*   AST 13   BILITOT 0.3       Recent Labs  Lab 07/27/17  1210   PH 7.426   PCO2 68.1*   PO2 97   HCO3 44.8*   POCSATURATED 97   BE 20     Coagulation: No results for input(s): INR, APTT in the last 168 hours.    Invalid input(s): PT  TSH: No results for input(s): TSH in the last 168 hours.  All labs within the past 24 hours have been reviewed.     Significant Imaging:  Labs: Reviewed    Assessment/Plan:     Chronic kidney disease (CKD) stage G4/A1, severely decreased glomerular filtration rate (GFR) between 15-29 mL/min/1.73 square meter and albuminuria creatinine ratio less than 30 mg/g       -patient with Nephrotic range proteinuria- extensive nephrotic workup unrevealing  -etiology of CKD likely uncontrolled HTN/ DM w/o other end-organ involvement at this time.              * Acute on chronic systolic congestive heart failure    -see plan for diuresis under Metaolic Alkalosis; patient is responding well to current diuresis but now had alkalosis, Cr increase.          Mixed Metabolic Alkalosis and Respiratory Acidosis   -continue diuresis titrated to volume status. thoracentesis canceled for pulmonary effusions from acute  decompensated HF that have been difficult to diurese.   - Recommend Lasix and can add Diamox 500mg BID to loop diuretic, hold metolazone as repeat ABG shows alkalosis   -consider Non-invasive positive pressure ventilation for respiratory acidosis component    Thank you for your consult. I will follow-up with patient. Please contact us if you have any additional questions.    Bulmaro Worley MD  Nephrology  [facility]    ATTENDING PHYSICIAN ATTESTATION  I have personally interviewed and examined the patient. I thoroughly reviewed the demographic, clinical, laboratorial and imaging information available in medical records. I agree with the assessment and recommendations provided by the subspecialty resident. Dr. Worley was under my supervision.

## 2017-07-27 NOTE — PLAN OF CARE
Pt denied admission for SNF at both [facility] and [facility]. SW will have another discussion with pt's daughter about additional choices. Pt anticipating discharge Monday.

## 2017-07-27 NOTE — PROGRESS NOTES
[facility] Hospital Medicine  Progress Note    Patient Name: Michael Salgado  MRN: [id number]  Patient Class: IP- Inpatient   Admission Date: 7/12/2017  Length of Stay: 15 days  Attending Physician: Ken Sharif MD  Primary Care Provider: Wesley Watkins MD    [facility] Medicine Team: [facility] 1 Deuce Sexton MD    Subjective:     Principal Problem:Acute on chronic systolic congestive heart failure    HPI:  71 year old  yo female with history of CAD s/p CABGx3 2015, ICM, HFrEF (EF 40%, PA 41 echo 6/2017), CKD stage IV, IDDM2, HTN, 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 Patient has baseline MILIAN, orthopnea, PND, LE edema, and abdo swelling that has also progressively worsened over the past few days. She is compliant with her home medications which include lasix 20 BID, coreg, hydralazine, amlodipine, and warfarin. She follows a low salt, fluid restrict diet and she has not deviated from it recently. She reports similar symptoms about a year ago that required hospitalization for CHF exacerbation. Patient recently had pneumonia and shingles in May and has not recovered full function since. She previously was able to ambulate with a walker and now uses a wheelchair. Prior to the shingles and PNA, she only required 10 mg lasix, but her lasix was increased afterwords due to increased SOB and volume overload.  She uses home O2 2.5 L. She is complaining of decreased UO, dysuria, and abdominal discomfort. She denies chest pain, palpitations, constipation (last BM today), diarrhea, cough, fever, chills, or any other complaints at this time. She lives at home with her daughter who provides with with significant assistance with her ADL's. Her cardiologist is Dr. Davis. She was last seen in cardiology clinic today and was sent to the ED from  there due to her SOB and edema.     In the ED, CXR showed increased pulmonary edema, BNP 1413 (which is above her baseline), tn 0.044, EKG with a fib with RVR but no acute ischemic changes. Patient was given lasix IV 80 mg.    Hospital Course:  7/13: Admitted to IM1. Started on Lasix 40 mg IV BID. Change in HH, 9.9->7.9. Denies changes in color/consistency of BMs and FOBT negative. Hemolytic anemia work-up negative. ABG normal. Added duoneb q6h.  7/14: Continued respiratory effort despite duoneb therapy. Vitals have remained stable. Pulmonology consult advised against thoracentesis at this moment after ultrasound showed bilateral effusions are likely not contributing to SOB. Also recommends discontinuing antibiotics as pneumonia is unlikely and it is adding fluids to her volume overload. Additional recommendation to increase diuretic therapy despite CKD. Consults to cards and pulm placed.    7/15: Patient's respiratory effort improving. Decided to discontinue coumadin at home. Patient is now taking Apixaban and will continue at home upon discharge.  7/16: Patient markedly improved today. Restarted Aspirin now that hemoglobin has stabilized.  7/17: Continued improvement. Increased hydralazine to 50 TID and replaced coreg with metoprolol for better AFib control, per cardiology.  7/18: Pt's family is refusing stress test. Lasix gtt transitioned to IV pushes.  7/19: Patient regressed overnight per family. Patient is winded when speaking and can only make a few words/sounds. Lasix IV pushes transitioned back to Lasix infusion with increase to 20 mg/hr and an initial Lasix 80 mg IV push. Britt placed for better monitoring of I/Os. Nephrology consulted for low urine output. Appreciate recommendations when available.  7/20: Patient improved on Lasix gtt. Metolazone to enhance diuresis. Britt removed secondary to patient wishes.  7/21: Continued improvement. Patient responded well to metolazone with higher urine output. CT  chest showed moderate pleural effusions  7/22: Pulmonology consulted for pleural effusions. Effusions may be contributing to SOB more so than pulmonary edema as diuresis and urine output has been adequate. thora planned for 7/24, apixaban is held. Cont to refuse barraza and with unclear I/O, tenuous resp status with inadequate diuresis  7/23: Continued diuretic therapy. Patient remains SOB and has intermittent trouble breathing. Holding apixaban for thoracentesis tomorrow.  7/24: Pulmonology re-evaluated today and believes the risk of thoracentesis outweighs benefits - advises that IR may be able to perform a therapeutic thoracentesis. Patient continues to be SOB this morning. Hemoglobin at 6.8 this morning, but no more symptomatic than normal. Transfused 1 PRBC and continuing diuresis with lasix and doses of chlorothiazide. Repeat 2D echo unchanged from previous.  7/25: No change in patient's dyspnea. Planned to transition patient to Lasix 80 mg PO BID per cardiology recommendation however we will continue Lasix 80 mg IV BID with Metolazone 5 mg daily to encourage further diuresis in hopes of resolving pleural effusions. Consulted IR today who believe pulmonology would be better suited for thoracentesis. Holding apixaban until this is resolved.   7/26: Cardiology has signed off secondary to adequate diuresis with contraction alkalosis, elevated BUN, and no clinical signs of volume overload. Patient slightly improved, but dyspneic with coarse lung sounds. Thoracentesis for therapeutic tap still an option.  7/27: CXR lateral decubitus today for pleural effusions evaluation. Will coordinate with IR or pulmonology.    Interval History: NAEON. Small incremental improvements in patient's dyspnea. Discussed plan for imaging to evaluate for pleural effusions and coordinate a thoracentesis if necessary. Patient and daughter were amendable to the plan.     Review of Systems   Respiratory: Positive for shortness of breath.  Negative for cough and chest tightness.    Cardiovascular: Negative for chest pain and leg swelling.     Objective:     Vital Signs (Most Recent):  Temp: 97.8 °F (36.6 °C) (07/27/17 1209)  Pulse: 97 (07/27/17 1209)  Resp: 17 (07/27/17 1209)  BP: 132/61 (07/27/17 1209)  SpO2: (!) 94 % (07/27/17 1209) Vital Signs (24h Range):  Temp:  [97.8 °F (36.6 °C)-98.5 °F (36.9 °C)] 97.8 °F (36.6 °C)  Pulse:  [] 97  Resp:  [17-18] 17  SpO2:  [93 %-99 %] 94 %  BP: ()/(56-64) 132/61     Weight: 65.1 kg (143 lb 8 oz)  Body mass index is 24.62 kg/m².    Intake/Output Summary (Last 24 hours) at 07/27/17 1351  Last data filed at 07/27/17 0600   Gross per 24 hour   Intake              780 ml   Output              900 ml   Net             -120 ml      Physical Exam   Neck: No JVD present.   Cardiovascular: Normal heart sounds.  An irregularly irregular rhythm present.   Pulmonary/Chest: No respiratory distress. She has rales.   Abdominal: Bowel sounds are normal. She exhibits distension.   Musculoskeletal: She exhibits no edema.   Skin: Skin is warm and dry.       Significant Labs:   BMP:   Recent Labs  Lab 07/27/17  0336   *      K 4.1   CL 92*   CO2 39*   BUN 78*   CREATININE 2.6*   CALCIUM 8.4*   MG 2.4       Significant Imaging: I have reviewed all pertinent imaging results/findings within the past 24 hours.    Assessment/Plan:      * Acute on chronic systolic congestive heart failure    - On admit (7/12), CXR shows worsening pulmonary edema, BMP 1413 (above baseline), tn 0.04, EKG shows afib with RVR without acute ischemic changes. 2D Echo 6/2017 showed EF 40-45, pulm HTN.   - CT chest 7/21 shows moderate bilateral pleural effusions  - CXR 7/24 shows increased patchy infiltrate  - 2D Echo 7/24 shows no significant change from prior study (EF 43%, no valvular issues, PA pressure 41)  - No significant improvement in dyspnea thus far given clinical status and BNP of 1600 on 7/23 (from 1700 on admit). No  evidence of serial BNP labs  - Poor candidate for thoracentesis, per pulmonology. IR consulted (7/25) for thoracentesis. Recommended pulmonology manage this issue  - Received intermittent doses of metolazone and chlorothiazide to supplement diuresis with varying success  - cont on hydralazine 50 TID, isosorbide dinitirate 20 TID, Lopressor 75 BID  - Lasix 80 IV BID and metolazone 5 mg PO daily  - Start Diamox for alkalosis  - Repeat CXR (lateral decubitus) for pleural effusion evaluation        Chronic kidney disease (CKD) stage G4/A1, severely decreased glomerular filtration rate (GFR) between 15-29 mL/min/1.73 square meter and albuminuria creatinine ratio less than 30 mg/g    - Cr on admission 2.5, stable (improved from 3.2 ~1 mo ago)  - nephrology following. Appreciate recommendations  - negative workup for nephrotic syndromes at this time  - Per nephrology, etiology of CKD likely uncontrolled HTN/ DM w/o other end-organ involvement at this time.        Insulin dependent diabetes mellitus    -daughter reports pt takes LA insulin 10 units qhs and SA insulin 5 units WM  -A1c 5.7 7/20  -restarted on detemir 5 u BID with SSI        HTN (hypertension)    -Lopressor 75 BID, hyralazine 50 TID, isordil 10 TID        H/O: CVA (cerebrovascular accident)    -hx of stroke before 2012 per daughter  -pt with residual right sided deficit   -continue atorvastatin 40 mg  -holding apixaban for possible thoracentesis. Attempting to coordinate between IR and pulmonology        Normocytic anemia    - On admit (7/12), Hgb 9.9 and dropped to 7.9 (7/13) w/ SOB  - Hemolysis work-up negative  - type and screen for Hgb 6.8  - 1 U PRBC transfused, improved Hgb 8.1. No change in patient's dyspnea and fatigue        Atrial fibrillation    - chadsvasc of 8  - holding Apixaban  - Coreg switched to metoprolol tartrate 75 BID        Acute on chronic respiratory failure with hypoxia    -on home O2 2.5 L  - acute resp failure 2/2 ADHF along with  pleural effusions          Depression    - Cont lexapro and wellbutrin  - D/C'd Seroquel secondary to nursing home placement issues        Hyperlipidemia    -atorvastatin 40 mg          VTE Risk Mitigation         Ordered     Medium Risk of VTE  Once      07/12/17 6393        Dispo: To SNF or home with HH pending improvement in respiratory status.    Deuce Sexton MD  Department of Hospital Medicine   [facility]

## 2017-07-28 PROBLEM — E87.4 METABOLIC ALKALOSIS WITH RESPIRATORY ACIDOSIS: Status: ACTIVE | Noted: 2017-07-28

## 2017-07-28 LAB
ALBUMIN SERPL BCP-MCNC: 2.2 G/DL
ALP SERPL-CCNC: 40 U/L
ALT SERPL W/O P-5'-P-CCNC: 7 U/L
ANION GAP SERPL CALC-SCNC: 8 MMOL/L
AST SERPL-CCNC: 14 U/L
BASOPHILS # BLD AUTO: 0.05 K/UL
BASOPHILS NFR BLD: 0.8 %
BILIRUB SERPL-MCNC: 0.4 MG/DL
BUN SERPL-MCNC: 77 MG/DL
CALCIUM SERPL-MCNC: 8.6 MG/DL
CHLORIDE SERPL-SCNC: 95 MMOL/L
CO2 SERPL-SCNC: 42 MMOL/L
CREAT SERPL-MCNC: 2.4 MG/DL
DIFFERENTIAL METHOD: ABNORMAL
EOSINOPHIL # BLD AUTO: 0.3 K/UL
EOSINOPHIL NFR BLD: 4.7 %
ERYTHROCYTE [DISTWIDTH] IN BLOOD BY AUTOMATED COUNT: 15 %
EST. GFR  (AFRICAN AMERICAN): 22.4 ML/MIN/1.73 M^2
EST. GFR  (NON AFRICAN AMERICAN): 19.4 ML/MIN/1.73 M^2
GLUCOSE SERPL-MCNC: 148 MG/DL
HCT VFR BLD AUTO: 26.8 %
HGB BLD-MCNC: 7.9 G/DL
LYMPHOCYTES # BLD AUTO: 1.3 K/UL
LYMPHOCYTES NFR BLD: 20.3 %
MAGNESIUM SERPL-MCNC: 2.2 MG/DL
MCH RBC QN AUTO: 28.1 PG
MCHC RBC AUTO-ENTMCNC: 29.5 G/DL
MCV RBC AUTO: 95 FL
MONOCYTES # BLD AUTO: 0.6 K/UL
MONOCYTES NFR BLD: 9.9 %
NEUTROPHILS # BLD AUTO: 4 K/UL
NEUTROPHILS NFR BLD: 64 %
PLATELET # BLD AUTO: 261 K/UL
PMV BLD AUTO: 10.3 FL
POCT GLUCOSE: 158 MG/DL (ref 70–110)
POCT GLUCOSE: 230 MG/DL (ref 70–110)
POCT GLUCOSE: 234 MG/DL (ref 70–110)
POCT GLUCOSE: 319 MG/DL (ref 70–110)
POCT GLUCOSE: 359 MG/DL (ref 70–110)
POTASSIUM SERPL-SCNC: 3.6 MMOL/L
PROT SERPL-MCNC: 5.3 G/DL
RBC # BLD AUTO: 2.81 M/UL
SODIUM SERPL-SCNC: 145 MMOL/L
WBC # BLD AUTO: 6.17 K/UL

## 2017-07-28 PROCEDURE — A4216 STERILE WATER/SALINE, 10 ML: HCPCS | Performed by: STUDENT IN AN ORGANIZED HEALTH CARE EDUCATION/TRAINING PROGRAM

## 2017-07-28 PROCEDURE — 99232 SBSQ HOSP IP/OBS MODERATE 35: CPT | Mod: GC,,, | Performed by: HOSPITALIST

## 2017-07-28 PROCEDURE — 20600001 HC STEP DOWN PRIVATE ROOM

## 2017-07-28 PROCEDURE — 97535 SELF CARE MNGMENT TRAINING: CPT

## 2017-07-28 PROCEDURE — 63600175 PHARM REV CODE 636 W HCPCS: Performed by: HOSPITALIST

## 2017-07-28 PROCEDURE — 36415 COLL VENOUS BLD VENIPUNCTURE: CPT

## 2017-07-28 PROCEDURE — 25000003 PHARM REV CODE 250: Performed by: STUDENT IN AN ORGANIZED HEALTH CARE EDUCATION/TRAINING PROGRAM

## 2017-07-28 PROCEDURE — 83735 ASSAY OF MAGNESIUM: CPT

## 2017-07-28 PROCEDURE — 97530 THERAPEUTIC ACTIVITIES: CPT

## 2017-07-28 PROCEDURE — 99232 SBSQ HOSP IP/OBS MODERATE 35: CPT | Mod: GC,,, | Performed by: INTERNAL MEDICINE

## 2017-07-28 PROCEDURE — 25000003 PHARM REV CODE 250: Performed by: HOSPITALIST

## 2017-07-28 PROCEDURE — 85025 COMPLETE CBC W/AUTO DIFF WBC: CPT

## 2017-07-28 PROCEDURE — 80053 COMPREHEN METABOLIC PANEL: CPT

## 2017-07-28 RX ORDER — ACETAZOLAMIDE 250 MG/1
250 TABLET ORAL 2 TIMES DAILY
Status: DISCONTINUED | OUTPATIENT
Start: 2017-07-28 | End: 2017-07-28

## 2017-07-28 RX ORDER — ACETAZOLAMIDE 250 MG/1
500 TABLET ORAL 2 TIMES DAILY
Status: DISCONTINUED | OUTPATIENT
Start: 2017-07-28 | End: 2017-07-29

## 2017-07-28 RX ORDER — INSULIN ASPART 100 [IU]/ML
5 INJECTION, SOLUTION INTRAVENOUS; SUBCUTANEOUS
Status: DISCONTINUED | OUTPATIENT
Start: 2017-07-28 | End: 2017-07-29 | Stop reason: HOSPADM

## 2017-07-28 RX ORDER — FUROSEMIDE 80 MG/1
80 TABLET ORAL EVERY 8 HOURS
Status: DISCONTINUED | OUTPATIENT
Start: 2017-07-28 | End: 2017-07-29 | Stop reason: HOSPADM

## 2017-07-28 RX ADMIN — ATORVASTATIN CALCIUM 40 MG: 20 TABLET, FILM COATED ORAL at 10:07

## 2017-07-28 RX ADMIN — ASPIRIN 81 MG: 81 TABLET, COATED ORAL at 10:07

## 2017-07-28 RX ADMIN — Medication 3 ML: at 02:07

## 2017-07-28 RX ADMIN — FUROSEMIDE 80 MG: 80 TABLET ORAL at 09:07

## 2017-07-28 RX ADMIN — HYDRALAZINE HYDROCHLORIDE 50 MG: 50 TABLET ORAL at 09:07

## 2017-07-28 RX ADMIN — INSULIN ASPART 5 UNITS: 100 INJECTION, SOLUTION INTRAVENOUS; SUBCUTANEOUS at 06:07

## 2017-07-28 RX ADMIN — ISOSORBIDE DINITRATE 20 MG: 10 TABLET ORAL at 05:07

## 2017-07-28 RX ADMIN — Medication 3 ML: at 10:07

## 2017-07-28 RX ADMIN — INSULIN ASPART 8 UNITS: 100 INJECTION, SOLUTION INTRAVENOUS; SUBCUTANEOUS at 01:07

## 2017-07-28 RX ADMIN — ESCITALOPRAM 5 MG: 5 TABLET, FILM COATED ORAL at 10:07

## 2017-07-28 RX ADMIN — INSULIN ASPART 2 UNITS: 100 INJECTION, SOLUTION INTRAVENOUS; SUBCUTANEOUS at 09:07

## 2017-07-28 RX ADMIN — ISOSORBIDE DINITRATE 20 MG: 10 TABLET ORAL at 09:07

## 2017-07-28 RX ADMIN — ISOSORBIDE DINITRATE 20 MG: 10 TABLET ORAL at 01:07

## 2017-07-28 RX ADMIN — INSULIN ASPART 4 UNITS: 100 INJECTION, SOLUTION INTRAVENOUS; SUBCUTANEOUS at 06:07

## 2017-07-28 RX ADMIN — INSULIN DETEMIR 5 UNITS: 100 INJECTION, SOLUTION SUBCUTANEOUS at 09:07

## 2017-07-28 RX ADMIN — HYDRALAZINE HYDROCHLORIDE 50 MG: 50 TABLET ORAL at 05:07

## 2017-07-28 RX ADMIN — HYDRALAZINE HYDROCHLORIDE 50 MG: 50 TABLET ORAL at 01:07

## 2017-07-28 RX ADMIN — FUROSEMIDE 80 MG: 80 TABLET ORAL at 10:07

## 2017-07-28 RX ADMIN — ACETAZOLAMIDE 500 MG: 250 TABLET ORAL at 10:07

## 2017-07-28 RX ADMIN — INSULIN ASPART 5 UNITS: 100 INJECTION, SOLUTION INTRAVENOUS; SUBCUTANEOUS at 01:07

## 2017-07-28 RX ADMIN — ACETAMINOPHEN 650 MG: 325 TABLET ORAL at 10:07

## 2017-07-28 RX ADMIN — ACETAZOLAMIDE 500 MG: 250 TABLET ORAL at 09:07

## 2017-07-28 RX ADMIN — PANTOPRAZOLE SODIUM 40 MG: 40 TABLET, DELAYED RELEASE ORAL at 10:07

## 2017-07-28 RX ADMIN — INSULIN DETEMIR 5 UNITS: 100 INJECTION, SOLUTION SUBCUTANEOUS at 10:07

## 2017-07-28 RX ADMIN — GABAPENTIN 300 MG: 300 CAPSULE ORAL at 09:07

## 2017-07-28 RX ADMIN — BUPROPION HYDROCHLORIDE 150 MG: 150 TABLET, FILM COATED, EXTENDED RELEASE ORAL at 10:07

## 2017-07-28 RX ADMIN — METOPROLOL TARTRATE 75 MG: 50 TABLET, FILM COATED ORAL at 10:07

## 2017-07-28 RX ADMIN — METOPROLOL TARTRATE 75 MG: 50 TABLET, FILM COATED ORAL at 09:07

## 2017-07-28 NOTE — ASSESSMENT & PLAN NOTE
-patient with Nephrotic range proteinuria- extensive nephrotic workup unrevealing, etiology of CKD likely uncontrolled HTN/ DM w/o other end-organ involvement at this time.

## 2017-07-28 NOTE — ASSESSMENT & PLAN NOTE
- cont on hydralazine 50 TID, isosorbide dinitirate 20 TID, Lopressor 75 BID  - transition from Lasix 80 IV BI to lasix PO 80mg BID  - stop metolazone 5 mg PO daily  - started on Diamox 500mg BID for alkalosis  - no intervention at this time (flori)

## 2017-07-28 NOTE — ASSESSMENT & PLAN NOTE
-daughter reports pt takes LA insulin 10 units qhs and SA insulin 5 units WM  -A1c 5.7 7/20  -restarted on detemir 5 u BID with 5 u aspart with meals

## 2017-07-28 NOTE — PT/OT/SLP PROGRESS
Occupational Therapy  Treatment    Micahel Salgado   MRN: [id number]   Admitting Diagnosis: Acute on chronic systolic congestive heart failure    OT Date of Treatment: 07/28/17   OT Start Time: 1117  OT Stop Time: 1145  OT Total Time (min): 28 min    Billable Minutes:  Self Care/Home Management 13 and Therapeutic Activity 15    General Precautions: Standard, fall  Orthopedic Precautions: N/A  Braces: N/A    Subjective:  Communicated with RN prior to session.    Pain/Comfort  Pain Rating 1:  (Did not rate)  Location - Orientation 1: lower  Location 1: back  Pain Addressed 1: Reposition, Distraction    Objective:  Patient found with: telemetry, oxygen ([employer])     Functional Mobility:  Bed Mobility:  Scooting/Bridging: Maximum Assistance to EOB  Supine to Sit: Moderate Assistance at trunk    Transfers:  Sit <> Stand Assistance: Maximum Assistance from EOB  Sit <> Stand Assistive Device: No Assistive Device  Bed <> Chair Technique: Stand Pivot  Bed <> Chair Transfer Assistance: Maximum Assistance  Bed <> Chair Assistive Device: No Assistive Device    Activities of Daily Living:  Grooming Position: Seated, bedside chair  Grooming Level of Assistance: Stand by assistance for oral hygiene, to wash face, brush hair; able to use L UE for tasks and stabilize with R UE    Balance:   Static Sit: GOOD+: Takes MAXIMAL challenges from all directions.    Dynamic Sit: GOOD: Maintains balance through MODERATE excursions of active trunk movement  Static Stand: 0: Needs MAXIMAL assist to maintain   Dynamic stand: 0: N/A    Therapeutic Activities and Exercises:  Pt supine in bed upon OT entry; Mod A for supine to sit; sat EOB with CGA and cues to keep B feet on the floor for balance (repeatedly holding L LE in hip flexion with foot off of the floor for unknown reason); required Max A for sit to stand from EOB and stand pivot to bedside chair; able to perform grooming tasks in chair with SBA; left with B LE elevated and R UE elevated  "on pillow for edema reduction    AM-PAC 6 CLICK ADL   How much help from another person does this patient currently need?   1 = Unable, Total/Dependent Assistance  2 = A lot, Maximum/Moderate Assistance  3 = A little, Minimum/Contact Guard/Supervision  4 = None, Modified [place]/Independent    Putting on and taking off regular lower body clothing? : 2  Bathing (including washing, rinsing, drying)?: 2  Toileting, which includes using toilet, bedpan, or urinal? : 2  Putting on and taking off regular upper body clothing?: 2  Taking care of personal grooming such as brushing teeth?: 3  Eating meals?: 3  Total Score: 14     AM-PAC Raw Score CMS "G-Code Modifier Level of Impairment Assistance   6 % Total / Unable   7 - 8 CM 80 - 100% Maximal Assist   9-13 CL 60 - 80% Moderate Assist   14 - 19 CK 40 - 60% Moderate Assist   20 - 22 CJ 20 - 40% Minimal Assist   23 CI 1-20% SBA / CGA   24 CH 0% Independent/ Mod I       Patient left up in chair with all lines intact, call button in reach and daughter present    ASSESSMENT:  Michael Salgado is a 71 y.o. female with a medical diagnosis of Acute on chronic systolic congestive heart failure and presents with good effort and participation in therapy. Pt making progress and would continue to benefit from OT to increase independence. Recommend SNF upon D/C.    Rehab identified problem list/impairments: Rehab identified problem list/impairments: weakness, impaired endurance, impaired self care skills, impaired functional mobilty, impaired balance, decreased upper extremity function, decreased lower extremity function, impaired cardiopulmonary response to activity, decreased ROM    Rehab potential is good.    Activity tolerance: Good    Discharge recommendations: Discharge Facility/Level Of Care Needs: nursing facility, skilled     Barriers to discharge: Barriers to Discharge: None    Equipment recommendations: none     GOALS:    Occupational Therapy Goals        " Problem: Occupational Therapy Goal    Goal Priority Disciplines Outcome Interventions   Occupational Therapy Goal     OT, PT/OT Ongoing (interventions implemented as appropriate)    Description:  Goals to be met by: 07/30     Patient will increase functional independence with ADLs by performing:    UE Dressing with Stand-by Assistance.  LE Dressing with Moderate Assistance and Assistive Devices as needed.  Grooming while seated with Stand-by Assistance.-MET  Toileting from bedside commode with Moderate Assistance and Assistive Devices as needed for hygiene and clothing management.   Stand pivot transfers with Contact Guard Assistance.  Toilet transfer to bedside commode with Contact Guard Assistance.                       Plan:  Patient to be seen 4 x/week to address the above listed problems via self-care/home management, therapeutic activities, therapeutic exercises, neuromuscular re-education  Plan of Care reviewed with: patient, daughter     Christen LURDES Reeves  07/28/2017

## 2017-07-28 NOTE — SUBJECTIVE & OBJECTIVE
Interval History: Patient with persistent alkalosis on metabolic panel; continuing to have good UOP (-1.8L) with symptomatic improvement.     Review of patient's allergies indicates:   Allergen Reactions    Daypro [oxaprozin] Itching     Tolerates other NSAIDs    Vibramycin [doxycycline calcium]      Current Facility-Administered Medications   Medication Frequency    acetaminophen tablet 650 mg Q6H PRN    albuterol-ipratropium 2.5mg-0.5mg/3mL nebulizer solution 3 mL Q4H PRN    aspirin EC tablet 81 mg Daily    atorvastatin tablet 40 mg Daily    buPROPion TB24 tablet 150 mg Daily    dextrose 50% injection 12.5 g PRN    dextrose 50% injection 25 g PRN    escitalopram oxalate tablet 5 mg Daily    furosemide injection 80 mg BID    gabapentin capsule 300 mg QHS    glucagon (human recombinant) injection 1 mg PRN    glucose chewable tablet 16 g PRN    glucose chewable tablet 24 g PRN    hydrALAZINE tablet 50 mg TID    insulin aspart pen 1-10 Units QID (AC + HS) PRN    insulin detemir pen 5 Units BID    isosorbide dinitrate tablet 20 mg TID    metOLazone tablet 5 mg Daily    metoprolol tartrate tablet 75 mg BID    ondansetron disintegrating tablet 8 mg Q8H PRN    pantoprazole EC tablet 40 mg Daily    polyethylene glycol packet 17 g Daily PRN    sodium chloride 0.9% flush 3 mL Q8H       Objective:     Vital Signs (Most Recent):  Temp: 98.2 °F (36.8 °C) (07/28/17 0051)  Pulse: 97 (07/28/17 0700)  Resp: 18 (07/28/17 0500)  BP: 137/63 (07/28/17 0500)  SpO2: 97 % (07/28/17 0500)  O2 Device (Oxygen Therapy): nasal cannula w/ humidification (07/28/17 0500) Vital Signs (24h Range):  Temp:  [97.8 °F (36.6 °C)-98.4 °F (36.9 °C)] 98.2 °F (36.8 °C)  Pulse:  [] 97  Resp:  [17-18] 18  SpO2:  [93 %-99 %] 97 %  BP: (116-137)/(59-95) 137/63     Weight: 65.4 kg (144 lb 1.6 oz) (07/28/17 0515)  Body mass index is 24.72 kg/m².  Body surface area is 1.72 meters squared.    I/O last 3 completed shifts:  In: 1080  [P.O.:1080]  Out: 1800 [Urine:1800]    Physical Exam   Constitutional: She is oriented to person, place, and time. She appears well-developed and well-nourished. No distress.   HENT:   Head: Normocephalic and atraumatic.   Right Ear: External ear normal.   Left Ear: External ear normal.   Eyes: EOM are normal. Pupils are equal, round, and reactive to light. No scleral icterus.   Neck: Neck supple. No thyromegaly present.   Cardiovascular: Normal rate, normal heart sounds and intact distal pulses.    No murmur heard.  Irregular rhythm   Pulmonary/Chest: No respiratory distress. She has no wheezes. She has rales.   Coarse BB BS   Abdominal: Soft. Bowel sounds are normal. She exhibits no distension. There is no tenderness.   Musculoskeletal: Normal range of motion. She exhibits no edema, tenderness or deformity.   Neurological: She is alert and oriented to person, place, and time. No cranial nerve deficit.   Skin: Skin is warm and dry.   Psychiatric: She has a normal mood and affect. Her behavior is normal.       Significant Labs:  ABGs:   Recent Labs  Lab 07/27/17  1210   PH 7.426   PCO2 68.1*   HCO3 44.8*   POCSATURATED 97   BE 20     Cardiac Markers: No results for input(s): CKMB, TROPONINT, MYOGLOBIN in the last 168 hours.  CBC:   Recent Labs  Lab 07/28/17  0403   WBC 6.17   RBC 2.81*   HGB 7.9*   HCT 26.8*      MCV 95   MCH 28.1   MCHC 29.5*     CMP:   Recent Labs  Lab 07/28/17  0403   *   CALCIUM 8.6*   ALBUMIN 2.2*   PROT 5.3*      K 3.6   CO2 42*   CL 95   BUN 77*   CREATININE 2.4*   ALKPHOS 40*   ALT 7*   AST 14   BILITOT 0.4     Coagulation: No results for input(s): INR, APTT in the last 168 hours.    Invalid input(s): PT  No results for input(s): COLORU, CLARITYU, SPECGRAV, PHUR, PROTEINUA, GLUCOSEU, BILIRUBINCON, BLOODU, WBCU, RBCU, BACTERIA, MUCUS, NITRITE, LEUKOCYTESUR, UROBILINOGEN, HYALINECASTS in the last 168 hours.     Significant Imaging:  X-Ray: Reviewed  see below

## 2017-07-28 NOTE — PROGRESS NOTES
[facility]  Nephrology  Progress Note    Patient Name: Michael Salgado  MRN: [id number]  Admission Date: 7/12/2017  Hospital Length of Stay: 16 days  Attending Provider: Ken Sharif MD   Primary Care Physician: Wesley Watkins MD  Principal Problem:Acute on chronic systolic congestive heart failure    Subjective:     HPI: Ms. Salgado is a 72yo female with history of CVA, CAD s/p CABG x 3, ICM, HFrEF, CKD, anemia in CKD and afib on Aspirin and Warfarin who presents with acute hypoxic respiratory failure. She just completed a course of antibiotics for pneumonia 2 weeks prior, and reports feeling lethargic with poor recovery of baseline functional status since. This admission, she is being treated for CHF exacerbation per Cardiology given elevated BNP, CXR with congestion. She has received IV Lasix per Cardiology recommendations and has continued improvement in Cr, most recently to 2.5 from 2.7 2 days prior, and her presentation is consistent with cardiorenal syndrome. I/Os note ~500 cc initial response to Lasix and Nephrology was consulted for CKDIV renal disease on loop diuretics with possible suboptimal response in urine output.            Interval History: Patient with persistent alkalosis on metabolic panel; continuing to have good UOP (-1.8L) with symptomatic improvement.     Review of patient's allergies indicates:   Allergen Reactions    Daypro [oxaprozin] Itching     Tolerates other NSAIDs    Vibramycin [doxycycline calcium]      Current Facility-Administered Medications   Medication Frequency    acetaminophen tablet 650 mg Q6H PRN    albuterol-ipratropium 2.5mg-0.5mg/3mL nebulizer solution 3 mL Q4H PRN    aspirin EC tablet 81 mg Daily    atorvastatin tablet 40 mg Daily    buPROPion TB24 tablet 150 mg Daily    dextrose 50% injection 12.5 g PRN    dextrose 50% injection 25 g PRN    escitalopram oxalate tablet 5 mg Daily    furosemide injection 80 mg BID    gabapentin  capsule 300 mg QHS    glucagon (human recombinant) injection 1 mg PRN    glucose chewable tablet 16 g PRN    glucose chewable tablet 24 g PRN    hydrALAZINE tablet 50 mg TID    insulin aspart pen 1-10 Units QID (AC + HS) PRN    insulin detemir pen 5 Units BID    isosorbide dinitrate tablet 20 mg TID    metOLazone tablet 5 mg Daily    metoprolol tartrate tablet 75 mg BID    ondansetron disintegrating tablet 8 mg Q8H PRN    pantoprazole EC tablet 40 mg Daily    polyethylene glycol packet 17 g Daily PRN    sodium chloride 0.9% flush 3 mL Q8H       Objective:     Vital Signs (Most Recent):  Temp: 98.2 °F (36.8 °C) (07/28/17 0051)  Pulse: 97 (07/28/17 0700)  Resp: 18 (07/28/17 0500)  BP: 137/63 (07/28/17 0500)  SpO2: 97 % (07/28/17 0500)  O2 Device (Oxygen Therapy): nasal cannula w/ humidification (07/28/17 0500) Vital Signs (24h Range):  Temp:  [97.8 °F (36.6 °C)-98.4 °F (36.9 °C)] 98.2 °F (36.8 °C)  Pulse:  [] 97  Resp:  [17-18] 18  SpO2:  [93 %-99 %] 97 %  BP: (116-137)/(59-95) 137/63     Weight: 65.4 kg (144 lb 1.6 oz) (07/28/17 0515)  Body mass index is 24.72 kg/m².  Body surface area is 1.72 meters squared.    I/O last 3 completed shifts:  In: 1080 [P.O.:1080]  Out: 1800 [Urine:1800]    Physical Exam   Constitutional: She is oriented to person, place, and time. She appears well-developed and well-nourished. No distress.   HENT:   Head: Normocephalic and atraumatic.   Right Ear: External ear normal.   Left Ear: External ear normal.   Eyes: EOM are normal. Pupils are equal, round, and reactive to light. No scleral icterus.   Neck: Neck supple. No thyromegaly present.   Cardiovascular: Normal rate, normal heart sounds and intact distal pulses.    No murmur heard.  Irregular rhythm   Pulmonary/Chest: No respiratory distress. She has no wheezes. She has rales.   Coarse BB BS   Abdominal: Soft. Bowel sounds are normal. She exhibits no distension. There is no tenderness.   Musculoskeletal: Normal range  of motion. She exhibits no edema, tenderness or deformity.   Neurological: She is alert and oriented to person, place, and time. No cranial nerve deficit.   Skin: Skin is warm and dry.   Psychiatric: She has a normal mood and affect. Her behavior is normal.       Significant Labs:  ABGs:   Recent Labs  Lab 07/27/17  1210   PH 7.426   PCO2 68.1*   HCO3 44.8*   POCSATURATED 97   BE 20     Cardiac Markers: No results for input(s): CKMB, TROPONINT, MYOGLOBIN in the last 168 hours.  CBC:   Recent Labs  Lab 07/28/17  0403   WBC 6.17   RBC 2.81*   HGB 7.9*   HCT 26.8*      MCV 95   MCH 28.1   MCHC 29.5*     CMP:   Recent Labs  Lab 07/28/17  0403   *   CALCIUM 8.6*   ALBUMIN 2.2*   PROT 5.3*      K 3.6   CO2 42*   CL 95   BUN 77*   CREATININE 2.4*   ALKPHOS 40*   ALT 7*   AST 14   BILITOT 0.4     Coagulation: No results for input(s): INR, APTT in the last 168 hours.    Invalid input(s): PT  No results for input(s): COLORU, CLARITYU, SPECGRAV, PHUR, PROTEINUA, GLUCOSEU, BILIRUBINCON, BLOODU, WBCU, RBCU, BACTERIA, MUCUS, NITRITE, LEUKOCYTESUR, UROBILINOGEN, HYALINECASTS in the last 168 hours.     Significant Imaging:  X-Ray: Reviewed  see below    Assessment/Plan:     Metabolic alkalosis with respiratory acidosis    -continue diuresis titrated to volume status. thoracentesis canceled for pulmonary effusions from acute decompensated HF that have been difficult to diurese.   - Electrolyte panel shows presumed contraction alkalosis from diuresis; Recommend Lasix dose reduction or holiday and can add acetazolamide 500mg BID in place of metolazone   -consider non-invasive or Bilevel ventilation for respiratory acidosis component        Chronic kidney disease (CKD) stage G4/A1, severely decreased glomerular filtration rate (GFR) between 15-29 mL/min/1.73 square meter and albuminuria creatinine ratio less than 30 mg/g       -patient with Nephrotic range proteinuria- extensive nephrotic workup unrevealing,  etiology of CKD likely uncontrolled HTN/ DM w/o other end-organ involvement at this time.                * Acute on chronic systolic congestive heart failure    -see plan for diuresis under CKD; patient is responding well to current diuresis but now has alkalosis, Cr stable now at 2.4            Thank you for your consult. I will follow-up with patient. Please contact us if you have any additional questions.    Bulmaro Worley MD  Nephrology  [facility]    ATTENDING PHYSICIAN ATTESTATION  I have personally interviewed and examined the patient. I thoroughly reviewed the demographic, clinical, laboratorial and imaging information available in medical records. I agree with the assessment and recommendations provided by the subspecialty resident. Dr. Worley was under my supervision.

## 2017-07-28 NOTE — PLAN OF CARE
Spoke to the pt and her niece Yesenia at the bedside with Dr. Sharif and CHON Hernandes. Advised pt and her niece Yesenia that neither -[facility] nor [facility] were willing to accept her and they stated they were already aware. Yesenia stated she has spoken with the pt's other niece Abril, and they are going to go home with HH. Yesenia stated the pt already has HH, but they can not remember the name of the co. Yesenia stated the pt has an adjustable bed at home, a walker, w/c BSC, and O2. They have even established a call bell system for the pt using door bells. Pt stated she was ready to go home and in agreement to go home with family and HH. Per Dr. Sharif pt will d/c home tomorrow and he will resume HH. Will contact [facility] on day of d/c to re-arrange HH. Per pt and Yesenia they have a van and will transport her home.

## 2017-07-28 NOTE — PLAN OF CARE
Problem: Occupational Therapy Goal  Goal: Occupational Therapy Goal  Goals to be met by: 07/30     Patient will increase functional independence with ADLs by performing:    UE Dressing with Stand-by Assistance.  LE Dressing with Moderate Assistance and Assistive Devices as needed.  Grooming while seated with Stand-by Assistance.-MET  Toileting from bedside commode with Moderate Assistance and Assistive Devices as needed for hygiene and clothing management.   Stand pivot transfers with Contact Guard Assistance.  Toilet transfer to bedside commode with Contact Guard Assistance.     Outcome: Ongoing (interventions implemented as appropriate)  Continue OT plan of care.

## 2017-07-28 NOTE — SUBJECTIVE & OBJECTIVE
Interval History:         Review of Systems   Constitutional: Negative for activity change, appetite change and fatigue.   Respiratory: Positive for shortness of breath. Negative for cough and chest tightness.    Cardiovascular: Negative for chest pain and leg swelling.   Gastrointestinal: Negative for abdominal distention and abdominal pain.   Neurological: Negative for light-headedness.     Objective:     Vital Signs (Most Recent):  Temp: 98.7 °F (37.1 °C) (07/28/17 1730)  Pulse: 88 (07/28/17 1730)  Resp: 18 (07/28/17 1730)  BP: 124/70 (07/28/17 1730)  SpO2: 99 % (07/28/17 1730) Vital Signs (24h Range):  Temp:  [98.2 °F (36.8 °C)-98.9 °F (37.2 °C)] 98.7 °F (37.1 °C)  Pulse:  [] 88  Resp:  [18] 18  SpO2:  [95 %-99 %] 99 %  BP: (107-137)/(55-95) 124/70     Weight: 65.4 kg (144 lb 1.6 oz)  Body mass index is 24.72 kg/m².    Intake/Output Summary (Last 24 hours) at 07/28/17 1827  Last data filed at 07/28/17 1300   Gross per 24 hour   Intake              660 ml   Output             1700 ml   Net            -1040 ml      Physical Exam   Constitutional: She is oriented to person, place, and time. She appears well-developed and well-nourished. No distress.   HENT:   Head: Normocephalic and atraumatic.   Eyes: EOM are normal. Pupils are equal, round, and reactive to light. No scleral icterus.   Neck: Neck supple. No thyromegaly present.   Cardiovascular: Normal rate, normal heart sounds and intact distal pulses.    No murmur heard.  Irregular rhythm   Pulmonary/Chest: No respiratory distress. She has no wheezes.   Decreased BS at lung bases   Abdominal: Soft. Bowel sounds are normal. She exhibits no distension. There is no tenderness.   Musculoskeletal: Normal range of motion. She exhibits no edema, tenderness or deformity.   Neurological: She is alert and oriented to person, place, and time. No cranial nerve deficit.   Skin: Skin is warm and dry.   Psychiatric: She has a normal mood and affect. Her behavior is  normal.       Significant Labs:   CBC:   Recent Labs  Lab 07/28/17  0403   WBC 6.17   HGB 7.9*   HCT 26.8*        CMP:   Recent Labs  Lab 07/27/17  0336 07/28/17  0403    145   K 4.1 3.6   CL 92* 95   CO2 39* 42*   * 148*   BUN 78* 77*   CREATININE 2.6* 2.4*   CALCIUM 8.4* 8.6*   PROT 5.0* 5.3*   ALBUMIN 2.0* 2.2*   BILITOT 0.3 0.4   ALKPHOS 39* 40*   AST 13 14   ALT 6* 7*   ANIONGAP 12 8   EGFRNONAA 17.6* 19.4*

## 2017-07-28 NOTE — PLAN OF CARE
Problem: Patient Care Overview  Goal: Plan of Care Review  7/20 - Improve breathing.  Patient on Lasix gtt.   Outcome: Ongoing (interventions implemented as appropriate)  Plan of care reviewed with pt. VS stable. No acute events at this time. Purewick in place, draining properly. No complaints. Pt remains free from falls or injury. Bed low and locked, call light and personal belongings within reach. Will continue to monitor. Fabiana Ocampo RN

## 2017-07-28 NOTE — PROGRESS NOTES
[facility] Hospital Medicine  Progress Note    Patient Name: Michael Salgado  MRN: [id number]  Patient Class: IP- Inpatient   Admission Date: 7/12/2017  Length of Stay: 16 days  Attending Physician: Ken Sharif MD  Primary Care Provider: Wesley Watkins MD    [facility] Medicine Team: 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 Patient has baseline MILIAN, orthopnea, PND, LE edema, and abdo swelling that has also progressively worsened over the past few days. She is compliant with her home medications which include lasix 20 BID, coreg, hydralazine, amlodipine, and warfarin. She follows a low salt, fluid restrict diet and she has not deviated from it recently. She reports similar symptoms about a year ago that required hospitalization for CHF exacerbation. Patient recently had pneumonia and shingles in May and has not recovered full function since. She previously was able to ambulate with a walker and now uses a wheelchair. Prior to the shingles and PNA, she only required 10 mg lasix, but her lasix was increased afterwords due to increased SOB and volume overload.  She uses home O2 2.5 L. She is complaining of decreased UO, dysuria, and abdominal discomfort. She denies chest pain, palpitations, constipation (last BM today), diarrhea, cough, fever, chills, or any other complaints at this time. She lives at home with her daughter who provides with with significant assistance with her ADL's. Her cardiologist is Dr. Davis. She was last seen in cardiology clinic today and was sent to the ED from  there due to her SOB and edema.     In the ED, CXR showed increased pulmonary edema, BNP 1413 (which is above her baseline), tn 0.044, EKG with a fib with RVR but no acute ischemic changes. Patient was given lasix IV 80 mg.    Hospital Course:  7/13: Admitted to IM1. Started on Lasix 40 mg IV BID. Change in HH, 9.9->7.9. Denies changes in color/consistency of BMs and FOBT negative. Hemolytic anemia work-up negative. ABG normal. Added duoneb q6h.  7/14: Continued respiratory effort despite duoneb therapy. Vitals have remained stable. Pulmonology consult advised against thoracentesis at this moment after ultrasound showed bilateral effusions are likely not contributing to SOB. Also recommends discontinuing antibiotics as pneumonia is unlikely and it is adding fluids to her volume overload. Additional recommendation to increase diuretic therapy despite CKD. Consults to cards and pulm placed.    7/15: Patient's respiratory effort improving. Decided to discontinue coumadin at home. Patient is now taking Apixaban and will continue at home upon discharge.  7/16: Patient markedly improved today. Restarted Aspirin now that hemoglobin has stabilized.  7/17: Continued improvement. Increased hydralazine to 50 TID and replaced coreg with metoprolol for better AFib control, per cardiology.  7/18: Pt's family is refusing stress test. Lasix gtt transitioned to IV pushes.  7/19: Patient regressed overnight per family. Patient is winded when speaking and can only make a few words/sounds. Lasix IV pushes transitioned back to Lasix infusion with increase to 20 mg/hr and an initial Lasix 80 mg IV push. Britt placed for better monitoring of I/Os. Nephrology consulted for low urine output. Appreciate recommendations when available.  7/20: Patient improved on Lasix gtt. Metolazone to enhance diuresis. Britt removed secondary to patient wishes.  7/21: Continued improvement. Patient responded well to metolazone with higher urine output. CT  chest showed moderate pleural effusions  7/22: Pulmonology consulted for pleural effusions. Effusions may be contributing to SOB more so than pulmonary edema as diuresis and urine output has been adequate. thora planned for 7/24, apixaban is held. Cont to refuse barraza and with unclear I/O, tenuous resp status with inadequate diuresis  7/23: Continued diuretic therapy. Patient remains SOB and has intermittent trouble breathing. Holding apixaban for thoracentesis tomorrow.  7/24: Pulmonology re-evaluated today and believes the risk of thoracentesis outweighs benefits - advises that IR may be able to perform a therapeutic thoracentesis. Patient continues to be SOB this morning. Hemoglobin at 6.8 this morning, but no more symptomatic than normal. Transfused 1 PRBC and continuing diuresis with lasix and doses of chlorothiazide. Repeat 2D echo unchanged from previous.  7/25: No change in patient's dyspnea. Planned to transition patient to Lasix 80 mg PO BID per cardiology recommendation however we will continue Lasix 80 mg IV BID with Metolazone 5 mg daily to encourage further diuresis in hopes of resolving pleural effusions. Consulted IR today who believe pulmonology would be better suited for thoracentesis. Holding apixaban until this is resolved.   7/26: Cardiology has signed off secondary to adequate diuresis with contraction alkalosis, elevated BUN, and no clinical signs of volume overload. Patient slightly improved, but dyspneic with coarse lung sounds. Thoracentesis for therapeutic tap still an option.  7/27: CXR lateral decubitus today for pleural effusions evaluation. Will coordinate with IR or pulmonology  7/28 cont diuresis. No thora. Correcting metabolic alkalosis with diamox    Interval History:         Review of Systems   Constitutional: Negative for activity change, appetite change and fatigue.   Respiratory: Positive for shortness of breath. Negative for cough and chest tightness.    Cardiovascular:  Negative for chest pain and leg swelling.   Gastrointestinal: Negative for abdominal distention and abdominal pain.   Neurological: Negative for light-headedness.     Objective:     Vital Signs (Most Recent):  Temp: 98.7 °F (37.1 °C) (07/28/17 1730)  Pulse: 88 (07/28/17 1730)  Resp: 18 (07/28/17 1730)  BP: 124/70 (07/28/17 1730)  SpO2: 99 % (07/28/17 1730) Vital Signs (24h Range):  Temp:  [98.2 °F (36.8 °C)-98.9 °F (37.2 °C)] 98.7 °F (37.1 °C)  Pulse:  [] 88  Resp:  [18] 18  SpO2:  [95 %-99 %] 99 %  BP: (107-137)/(55-95) 124/70     Weight: 65.4 kg (144 lb 1.6 oz)  Body mass index is 24.72 kg/m².    Intake/Output Summary (Last 24 hours) at 07/28/17 1827  Last data filed at 07/28/17 1300   Gross per 24 hour   Intake              660 ml   Output             1700 ml   Net            -1040 ml      Physical Exam   Constitutional: She is oriented to person, place, and time. She appears well-developed and well-nourished. No distress.   HENT:   Head: Normocephalic and atraumatic.   Eyes: EOM are normal. Pupils are equal, round, and reactive to light. No scleral icterus.   Neck: Neck supple. No thyromegaly present.   Cardiovascular: Normal rate, normal heart sounds and intact distal pulses.    No murmur heard.  Irregular rhythm   Pulmonary/Chest: No respiratory distress. She has no wheezes.   Decreased BS at lung bases   Abdominal: Soft. Bowel sounds are normal. She exhibits no distension. There is no tenderness.   Musculoskeletal: Normal range of motion. She exhibits no edema, tenderness or deformity.   Neurological: She is alert and oriented to person, place, and time. No cranial nerve deficit.   Skin: Skin is warm and dry.   Psychiatric: She has a normal mood and affect. Her behavior is normal.       Significant Labs:   CBC:   Recent Labs  Lab 07/28/17  0403   WBC 6.17   HGB 7.9*   HCT 26.8*        CMP:   Recent Labs  Lab 07/27/17  0336 07/28/17  0403    145   K 4.1 3.6   CL 92* 95   CO2 39* 42*   GLU  182* 148*   BUN 78* 77*   CREATININE 2.6* 2.4*   CALCIUM 8.4* 8.6*   PROT 5.0* 5.3*   ALBUMIN 2.0* 2.2*   BILITOT 0.3 0.4   ALKPHOS 39* 40*   AST 13 14   ALT 6* 7*   ANIONGAP 12 8   EGFRNONAA 17.6* 19.4*     Assessment/Plan:      Acute pulmonary edema    - cont diuresis as above          * Acute on chronic systolic congestive heart failure    - cont on hydralazine 50 TID, isosorbide dinitirate 20 TID, Lopressor 75 BID  - transition from Lasix 80 IV BI to lasix PO 80mg BID  - stop metolazone 5 mg PO daily  - started on Diamox 500mg BID for alkalosis  - no intervention at this time (thora)        Acute on chronic respiratory failure with hypoxia    -on home O2 2.5 L  - acute resp failure 2/2 ADHF along with pleural effusions          Atrial fibrillation    - chadsvasc of 8  - holding Apixaban  - Coreg switched to metoprolol tartrate 75 BID        Normocytic anemia    - On admit (7/12), Hgb 9.9 and dropped to 7.9 (7/13) w/ SOB  - Hemolysis work-up negative  - type and screen for Hgb 6.8  - 1 U PRBC transfused, improved Hgb 8.1. No change in patient's dyspnea and fatigue        HTN (hypertension)    -Lopressor 75 BID, hyralazine 50 TID, isordil 10 TID        Chronic kidney disease (CKD) stage G4/A1, severely decreased glomerular filtration rate (GFR) between 15-29 mL/min/1.73 square meter and albuminuria creatinine ratio less than 30 mg/g    - Cr on admission 2.5, renal function stable despite diuresis        H/O: CVA (cerebrovascular accident)    -hx of stroke before 2012 per daughter  -pt with residual right sided deficit   -continue atorvastatin 40 mg  -holding apixaban for possible thoracentesis. Attempting to coordinate between IR and pulmonology        Insulin dependent diabetes mellitus    -daughter reports pt takes LA insulin 10 units qhs and SA insulin 5 units WM  -A1c 5.7 7/20  -restarted on detemir 5 u BID with 5 u aspart with meals        Hyperlipidemia    -atorvastatin 40 mg        Depression    - Cont  lexapro and wellbutrin          VTE Risk Mitigation         Ordered     Medium Risk of VTE  Once      07/12/17 2356        Likely d/c home with home health tomorrow    Ying Bell MD  Department of Hospital Medicine   [facility]

## 2017-07-28 NOTE — PLAN OF CARE
07/28/17 1458   Discharge Reassessment   Assessment Type Discharge Planning Reassessment   Can the patient answer the patient profile reliably? Yes, cognitively intact   How does the patient rate their overall health at the present time? Good   Describe the patient's ability to walk at the present time. Walks with the help of equipment   How often would a person be available to care for the patient? Whenever needed   Discharge Plan A Home;Home with family;Home Health   Discharge Plan B Home;Home with family;Home Health

## 2017-07-28 NOTE — ASSESSMENT & PLAN NOTE
-continue diuresis titrated to volume status. thoracentesis canceled for pulmonary effusions from acute decompensated HF that have been difficult to diurese.   - Electrolyte panel shows presumed contraction alkalosis from diuresis; Recommend Lasix and can add acetazolamide 500mg BID  -consider non-invasive or Bilevel ventilation for respiratory acidosis component

## 2017-07-28 NOTE — PLAN OF CARE
Problem: Patient Care Overview  Goal: Plan of Care Review  7/20 - Improve breathing.  Patient on Lasix gtt.   Plan of care discussed with patient. Patient is free of fall/trauma/injury. Denies CP, SOB, or pain/discomfort. Transitioned to PO lasix today. Will d/c tomorrow with home health.  All questions addressed. Will continue to monitor

## 2017-07-29 VITALS
WEIGHT: 144.13 LBS | SYSTOLIC BLOOD PRESSURE: 132 MMHG | RESPIRATION RATE: 18 BRPM | HEIGHT: 64 IN | OXYGEN SATURATION: 94 % | DIASTOLIC BLOOD PRESSURE: 70 MMHG | BODY MASS INDEX: 24.61 KG/M2 | TEMPERATURE: 97 F | HEART RATE: 87 BPM

## 2017-07-29 PROBLEM — I50.23 ACUTE ON CHRONIC SYSTOLIC CONGESTIVE HEART FAILURE: Status: RESOLVED | Noted: 2017-07-12 | Resolved: 2017-07-29

## 2017-07-29 PROBLEM — J81.0 ACUTE PULMONARY EDEMA: Status: RESOLVED | Noted: 2017-07-24 | Resolved: 2017-07-29

## 2017-07-29 PROBLEM — R80.1 PERSISTENT PROTEINURIA: Status: RESOLVED | Noted: 2017-07-20 | Resolved: 2017-07-29

## 2017-07-29 PROBLEM — J90 PLEURAL EFFUSION: Status: RESOLVED | Noted: 2017-07-24 | Resolved: 2017-07-29

## 2017-07-29 PROBLEM — J96.21 ACUTE ON CHRONIC RESPIRATORY FAILURE WITH HYPOXIA: Status: RESOLVED | Noted: 2017-07-18 | Resolved: 2017-07-29

## 2017-07-29 LAB
ALBUMIN SERPL BCP-MCNC: 2.1 G/DL
ALP SERPL-CCNC: 39 U/L
ALT SERPL W/O P-5'-P-CCNC: 7 U/L
ANION GAP SERPL CALC-SCNC: 9 MMOL/L
AST SERPL-CCNC: 15 U/L
BILIRUB SERPL-MCNC: 0.3 MG/DL
BUN SERPL-MCNC: 77 MG/DL
CALCIUM SERPL-MCNC: 8.5 MG/DL
CHLORIDE SERPL-SCNC: 95 MMOL/L
CO2 SERPL-SCNC: 40 MMOL/L
CREAT SERPL-MCNC: 2.5 MG/DL
EST. GFR  (AFRICAN AMERICAN): 21.3 ML/MIN/1.73 M^2
EST. GFR  (NON AFRICAN AMERICAN): 18.5 ML/MIN/1.73 M^2
GLUCOSE SERPL-MCNC: 213 MG/DL
POCT GLUCOSE: 217 MG/DL (ref 70–110)
POTASSIUM SERPL-SCNC: 3.8 MMOL/L
PROT SERPL-MCNC: 5.2 G/DL
SODIUM SERPL-SCNC: 144 MMOL/L

## 2017-07-29 PROCEDURE — A4216 STERILE WATER/SALINE, 10 ML: HCPCS | Performed by: STUDENT IN AN ORGANIZED HEALTH CARE EDUCATION/TRAINING PROGRAM

## 2017-07-29 PROCEDURE — 25000003 PHARM REV CODE 250: Performed by: STUDENT IN AN ORGANIZED HEALTH CARE EDUCATION/TRAINING PROGRAM

## 2017-07-29 PROCEDURE — 80053 COMPREHEN METABOLIC PANEL: CPT

## 2017-07-29 PROCEDURE — 25000003 PHARM REV CODE 250: Performed by: HOSPITALIST

## 2017-07-29 PROCEDURE — 99238 HOSP IP/OBS DSCHRG MGMT 30/<: CPT | Mod: GC,,, | Performed by: HOSPITALIST

## 2017-07-29 PROCEDURE — 36415 COLL VENOUS BLD VENIPUNCTURE: CPT

## 2017-07-29 RX ORDER — ISOSORBIDE DINITRATE 20 MG/1
20 TABLET ORAL 3 TIMES DAILY
Qty: 90 TABLET | Refills: 3 | Status: SHIPPED | OUTPATIENT
Start: 2017-07-29 | End: 2018-07-29

## 2017-07-29 RX ORDER — METOPROLOL TARTRATE 75 MG/1
75 TABLET, FILM COATED ORAL 2 TIMES DAILY
Qty: 60 TABLET | Refills: 3 | Status: SHIPPED | OUTPATIENT
Start: 2017-07-29 | End: 2018-07-29

## 2017-07-29 RX ORDER — HYDRALAZINE HYDROCHLORIDE 50 MG/1
50 TABLET, FILM COATED ORAL 3 TIMES DAILY
Qty: 90 TABLET | Refills: 3 | Status: SHIPPED | OUTPATIENT
Start: 2017-07-29 | End: 2017-08-15 | Stop reason: SDUPTHER

## 2017-07-29 RX ORDER — OMEGA-3-ACID ETHYL ESTERS 1 G/1
1 CAPSULE, LIQUID FILLED ORAL DAILY
Qty: 30 CAPSULE | Refills: 3 | Status: SHIPPED | OUTPATIENT
Start: 2017-07-29

## 2017-07-29 RX ORDER — METOLAZONE 2.5 MG/1
5 TABLET ORAL DAILY PRN
Qty: 60 TABLET | Refills: 3 | Status: SHIPPED | OUTPATIENT
Start: 2017-07-29 | End: 2018-07-29

## 2017-07-29 RX ORDER — FUROSEMIDE 80 MG/1
80 TABLET ORAL EVERY 8 HOURS
Qty: 90 TABLET | Refills: 3 | Status: SHIPPED | OUTPATIENT
Start: 2017-07-29 | End: 2018-07-29

## 2017-07-29 RX ORDER — POLYETHYLENE GLYCOL 3350 17 G/17G
17 POWDER, FOR SOLUTION ORAL DAILY PRN
Qty: 30 PACKET | Refills: 3 | Status: SHIPPED | OUTPATIENT
Start: 2017-07-29

## 2017-07-29 RX ADMIN — PANTOPRAZOLE SODIUM 40 MG: 40 TABLET, DELAYED RELEASE ORAL at 08:07

## 2017-07-29 RX ADMIN — ASPIRIN 81 MG: 81 TABLET, COATED ORAL at 08:07

## 2017-07-29 RX ADMIN — METOPROLOL TARTRATE 75 MG: 50 TABLET, FILM COATED ORAL at 08:07

## 2017-07-29 RX ADMIN — ESCITALOPRAM 5 MG: 5 TABLET, FILM COATED ORAL at 08:07

## 2017-07-29 RX ADMIN — Medication 3 ML: at 06:07

## 2017-07-29 RX ADMIN — INSULIN ASPART 5 UNITS: 100 INJECTION, SOLUTION INTRAVENOUS; SUBCUTANEOUS at 09:07

## 2017-07-29 RX ADMIN — ISOSORBIDE DINITRATE 20 MG: 10 TABLET ORAL at 05:07

## 2017-07-29 RX ADMIN — HYDRALAZINE HYDROCHLORIDE 50 MG: 50 TABLET ORAL at 05:07

## 2017-07-29 RX ADMIN — INSULIN ASPART 4 UNITS: 100 INJECTION, SOLUTION INTRAVENOUS; SUBCUTANEOUS at 09:07

## 2017-07-29 RX ADMIN — ACETAZOLAMIDE 500 MG: 250 TABLET ORAL at 08:07

## 2017-07-29 RX ADMIN — ATORVASTATIN CALCIUM 40 MG: 20 TABLET, FILM COATED ORAL at 08:07

## 2017-07-29 RX ADMIN — APIXABAN 5 MG: 5 TABLET, FILM COATED ORAL at 11:07

## 2017-07-29 RX ADMIN — INSULIN DETEMIR 5 UNITS: 100 INJECTION, SOLUTION SUBCUTANEOUS at 09:07

## 2017-07-29 RX ADMIN — BUPROPION HYDROCHLORIDE 150 MG: 150 TABLET, FILM COATED, EXTENDED RELEASE ORAL at 08:07

## 2017-07-29 RX ADMIN — FUROSEMIDE 80 MG: 80 TABLET ORAL at 05:07

## 2017-07-29 NOTE — PROGRESS NOTES
[facility]  Nephrology  Progress Note    Patient Name: Michael Salgado  MRN: [id number]  Admission Date: 7/12/2017  Hospital Length of Stay: 17 days  Attending Provider: Ken Sharif MD   Primary Care Physician: Wesley Watkins MD  Principal Problem:Acute on chronic systolic congestive heart failure    Subjective:     HPI: Ms. Salgado is a 72yo female with history of CVA, CAD s/p CABG x 3, ICM, HFrEF, CKD, anemia in CKD and afib on Aspirin and Warfarin who presents with acute hypoxic respiratory failure. She just completed a course of antibiotics for pneumonia 2 weeks prior, and reports feeling lethargic with poor recovery of baseline functional status since. This admission, she is being treated for CHF exacerbation per Cardiology given elevated BNP, CXR with congestion. She has received IV Lasix per Cardiology recommendations and has continued improvement in Cr, most recently to 2.5 from 2.7 2 days prior, and her presentation is consistent with cardiorenal syndrome. I/Os note ~500 cc initial response to Lasix and Nephrology was consulted for CKDIV renal disease on loop diuretics with possible suboptimal response in urine output.            Interval History: Patient reports no dyspnea today, Cr 2.5 with metabolic alkalosis and azotemia still noted. Patient to discharge today on PO Lasix regimen /metolazone PRN and follow with PCP. Myoclonic jerky movements of the lower ext noted, but not bothersome to the patient.     Review of patient's allergies indicates:   Allergen Reactions    Daypro [oxaprozin] Itching     Tolerates other NSAIDs    Vibramycin [doxycycline calcium]      Current Facility-Administered Medications   Medication Frequency    acetaminophen tablet 650 mg Q6H PRN    acetaZOLAMIDE tablet 500 mg BID    albuterol-ipratropium 2.5mg-0.5mg/3mL nebulizer solution 3 mL Q4H PRN    aspirin EC tablet 81 mg Daily    atorvastatin tablet 40 mg Daily    buPROPion TB24 tablet  150 mg Daily    dextrose 50% injection 12.5 g PRN    dextrose 50% injection 25 g PRN    escitalopram oxalate tablet 5 mg Daily    furosemide tablet 80 mg Q8H    gabapentin capsule 300 mg QHS    glucagon (human recombinant) injection 1 mg PRN    glucose chewable tablet 16 g PRN    glucose chewable tablet 24 g PRN    hydrALAZINE tablet 50 mg TID    insulin aspart pen 1-10 Units QID (AC + HS) PRN    insulin aspart pen 5 Units TIDWM    insulin detemir pen 5 Units BID    isosorbide dinitrate tablet 20 mg TID    metoprolol tartrate tablet 75 mg BID    ondansetron disintegrating tablet 8 mg Q8H PRN    pantoprazole EC tablet 40 mg Daily    polyethylene glycol packet 17 g Daily PRN    sodium chloride 0.9% flush 3 mL Q8H       Objective:     Vital Signs (Most Recent):  Temp: 97.4 °F (36.3 °C) (07/29/17 0854)  Pulse: 87 (07/29/17 0854)  Resp: 18 (07/29/17 0854)  BP: 132/70 (07/29/17 0854)  SpO2: (!) 94 % (07/29/17 0854)  O2 Device (Oxygen Therapy): room air (07/29/17 0854) Vital Signs (24h Range):  Temp:  [97.4 °F (36.3 °C)-98.7 °F (37.1 °C)] 97.4 °F (36.3 °C)  Pulse:  [77-98] 87  Resp:  [18] 18  SpO2:  [94 %-99 %] 94 %  BP: (114-134)/(56-70) 132/70     Weight: 65.4 kg (144 lb 1.6 oz) (07/28/17 0515)  Body mass index is 24.72 kg/m².  Body surface area is 1.72 meters squared.    I/O last 3 completed shifts:  In: 1140 [P.O.:1140]  Out: 2300 [Urine:2300]    Physical Exam   Constitutional: She is oriented to person, place, and time. She appears well-developed and well-nourished. No distress.   HENT:   Head: Normocephalic and atraumatic.   Eyes: EOM are normal. Pupils are equal, round, and reactive to light. No scleral icterus.   Neck: Neck supple. No thyromegaly present.   Cardiovascular: Normal rate, normal heart sounds and intact distal pulses.    No murmur heard.  Irregular rhythm   Pulmonary/Chest: No respiratory distress. She has no wheezes.   Decreased BS at lung bases   Abdominal: Soft. Bowel sounds are  "normal. She exhibits no distension. There is no tenderness.   Musculoskeletal: Normal range of motion. She exhibits no edema, tenderness or deformity.   Neurological: She is alert and oriented to person, place, and time. No cranial nerve deficit.   Repetitive myoclonic jerks of LLE   Skin: Skin is warm and dry.   Psychiatric: She has a normal mood and affect. Her behavior is normal.       Significant Labs:  CBC:   Recent Labs  Lab 07/28/17  0403   WBC 6.17   RBC 2.81*   HGB 7.9*   HCT 26.8*      MCV 95   MCH 28.1   MCHC 29.5*     CMP:   Recent Labs  Lab 07/29/17  0340   *   CALCIUM 8.5*   ALBUMIN 2.1*   PROT 5.2*      K 3.8   CO2 40*   CL 95   BUN 77*   CREATININE 2.5*   ALKPHOS 39*   ALT 7*   AST 15   BILITOT 0.3     All labs within the past 24 hours have been reviewed.     Significant Imaging:  X-Ray: Reviewed  as below Clinical History provided for exam:"interval eval for bilat pleural effusions".  Surgical changes remain. As on the chest CT of 7/21/17, there are bilateral pleural effusions.  No obvious detrimental change when compared to 7/24/17, allowing for differences in positioning.    Assessment/Plan:     Metabolic alkalosis with respiratory acidosis    -continue diuresis titrated to volume status. thoracentesis canceled for pulmonary effusions from acute decompensated HF that have been difficult to diurese. CXR resolving  - Electrolyte panel shows presumed contraction alkalosis from diuresis; Recommend Lasix, metalozone PRN for discharge. Can d/c acetazolamide if close follow up with PCP  -consider non-invasive or Bilevel ventilation for respiratory acidosis component        Chronic kidney disease (CKD) stage G4/A1, severely decreased glomerular filtration rate (GFR) between 15-29 mL/min/1.73 square meter and albuminuria creatinine ratio less than 30 mg/g       -patient with Nephrotic range proteinuria- extensive nephrotic workup unrevealing, etiology of CKD likely uncontrolled HTN/ DM " w/o other end-organ involvement at this time.                * Acute on chronic systolic congestive heart failure    -see plan for diuresis under CKD; patient is responding well to current diuresis but now had alkalosis, Cr increase.             Thank you for your consult. I will sign off. Please contact us if you have any additional questions.    Bulmaro Worley MD  Nephrology  [facility]

## 2017-07-29 NOTE — NURSING
Patient discharged per MD order. Peripheral IVs and telemetry removed. Patient and family state understanding of discharge instructions. Family at bedside for transportation home. Will continue to monitor.

## 2017-07-29 NOTE — DISCHARGE SUMMARY
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 Patient has baseline MILIAN, orthopnea, PND, LE edema, and abdo swelling that has also progressively worsened over the past few days. She is compliant with her home medications which include lasix 20 BID, coreg, hydralazine, amlodipine, and warfarin. She follows a low salt, fluid restrict diet and she has not deviated from it recently. She reports similar symptoms about a year ago that required hospitalization for CHF exacerbation. Patient recently had pneumonia and shingles in May and has not recovered full function since. She previously was able to ambulate with a walker and now uses a wheelchair. Prior to the shingles and PNA, she only required 10 mg lasix, but her lasix was increased afterwords due to increased SOB and volume overload.  She uses home O2 2.5 L. She is complaining of decreased UO, dysuria, and abdominal discomfort. She denies chest pain, palpitations, constipation (last BM today), diarrhea, cough, fever, chills, or any other complaints at this time. She lives at home with her daughter who provides with with significant assistance with her ADL's. Her cardiologist is Dr. Davis. She was last seen in cardiology clinic today and was sent to the ED from there due to  her SOB and edema.     In the ED, CXR showed increased pulmonary edema, BNP 1413 (which is above her baseline), tn 0.044, EKG with a fib with RVR but no acute ischemic changes. Patient was given lasix IV 80 mg.    Hospital Course:   Ms. Salgado presented with acute heart failure decompensation after several days of worsening dyspnea. Her hospital course included intensive diuresis with discordant urine output (possibly unmeasured). She slowly improved to the point where she only required PO lasix. She had received IV lasix blouses and gtts, metolazone, and clorothiazide. She developed contraction alakalosis and elevated BUN. Metolazone was added to decrease her bicarb. There was some discussion of a therapeutic thoracentesis to provide symptomatic relief of her SOB, however it was determined that the risks outweighed the benefit. Her other hospital problem of chronic AFib was addressed by replacing her home coreg with metoprolol for better control as per cardiology. She was also started on apixaban and her coumadin was discontinued. She was discharged home in fair condition to be followed by home health. Her detailed hospital course is as reported below.    7/13: Admitted to IM1. Started on Lasix 40 mg IV BID. Change in HH, 9.9->7.9. Denies changes in color/consistency of BMs and FOBT negative. Hemolytic anemia work-up negative. ABG normal. Added duoneb q6h.  7/14: Continued respiratory effort despite duoneb therapy. Vitals have remained stable. Pulmonology consult advised against thoracentesis at this moment after ultrasound showed bilateral effusions are likely not contributing to SOB. Also recommends discontinuing antibiotics as pneumonia is unlikely and it is adding fluids to her volume overload. Additional recommendation to increase diuretic therapy despite CKD. Consults to cards and pulm placed.    7/15: Patient's respiratory effort improving. Decided to discontinue coumadin at home. Patient is now taking  Apixaban and will continue at home upon discharge.  7/16: Patient markedly improved today. Restarted Aspirin now that hemoglobin has stabilized.  7/17: Continued improvement. Increased hydralazine to 50 TID and replaced coreg with metoprolol for better AFib control, per cardiology.  7/18: Pt's family is refusing stress test. Lasix gtt transitioned to IV pushes.  7/19: Patient regressed overnight per family. Patient is dyspneic when speaking and can only make a few words/sounds. Lasix IV pushes transitioned back to Lasix infusion with increase to 20 mg/hr and an initial Lasix 80 mg IV push. Barraza placed for better monitoring of I/Os. Nephrology consulted for low urine output. Appreciate recommendations when available.  7/20: Patient improved on Lasix gtt. Metolazone to enhance diuresis. Barraza removed secondary to patient wishes.  7/21: Continued improvement. Patient responded well to metolazone with higher urine output. CT chest showed moderate pleural effusions  7/22: Pulmonology consulted for pleural effusions. Effusions may be contributing to SOB more so than pulmonary edema as diuresis and urine output has been adequate. thora planned for 7/24, apixaban is held. Cont to refuse barraza and with unclear I/O, tenuous resp status with inadequate diuresis  7/23: Continued diuretic therapy. Patient remains SOB and has intermittent trouble breathing. Holding apixaban for thoracentesis tomorrow.  7/24: Pulmonology re-evaluated today and believes the risk of thoracentesis outweighs benefits - advises that IR may be able to perform a therapeutic thoracentesis. Patient continues to be SOB this morning. Hemoglobin at 6.8 this morning, but no more symptomatic than normal. Transfused 1 PRBC and continuing diuresis with lasix and doses of chlorothiazide. Repeat 2D echo unchanged from previous.  7/25: No change in patient's dyspnea. Planned to transition patient to Lasix 80 mg PO BID per cardiology recommendation however we will  continue Lasix 80 mg IV BID with Metolazone 5 mg daily to encourage further diuresis in hopes of resolving pleural effusions. Consulted IR today who believe pulmonology would be better suited for thoracentesis. Holding apixaban until this is resolved.   7/26: Cardiology has signed off secondary to adequate diuresis with contraction alkalosis, elevated BUN, and no clinical signs of volume overload. Patient slightly improved, but dyspneic with coarse lung sounds. Thoracentesis for therapeutic tap still an option.  7/27: CXR lateral decubitus today for pleural effusions evaluation. Will coordinate with IR or pulmonology  7/2: Cont diuresis. No thora. Correcting metabolic alkalosis with diamox  7/29: Patient improved back to baseline. She is speaking in more complete sentences without getting SOB. As she has been thoroughly diuresed and the pleural effusions remain unchanged, there is no further medical reason to have her remain inpatient. Both patient and family are eager for her to return home. She will continue her lasix and add metolazone as needed for weight gain. Home health will be coordinated as outpatient.       Consults:   Consults         Status Ordering Provider     Inpatient consult to Cardiology  Once     Provider:  (Not yet assigned)    Completed ASHLY MARADIAGA     Inpatient consult to Interventional Radiology  Once     Provider:  (Not yet assigned)    Completed ASHLY MARADIAGA     Inpatient consult to Nephrology  Once     Provider:  (Not yet assigned)    Completed MICHELLE PIÑA     Inpatient consult to Nephrology  Once     Provider:  (Not yet assigned)    Completed LUIGI JACKSON     Inpatient consult to PICC team ([facility])  Once     Provider:  (Not yet assigned)    Completed LUIGI JACKSON     Inpatient consult to Pulmonology  Once     Provider:  (Not yet assigned)    Completed BURT ALVAREZ     Inpatient consult to Pulmonology  Once     Provider:  (Not yet assigned)    Completed HIREN  ASHLY RAMIREZ     Inpatient consult to [facility]  Once     Provider:  (Not yet assigned)    Acknowledged ROGER ALTMAN     IP consult to case management  Once     Provider:  (Not yet assigned)    Acknowledged LUIGI JACKSON     IP consult to dietary  Once     Provider:  (Not yet assigned)    Completed LUIGI JACKSON        Review of Systems   Constitutional: Negative for activity change, appetite change and fatigue.   Respiratory: Positive for shortness of breath. Negative for cough and chest tightness.    Cardiovascular: Negative for chest pain and leg swelling.   Gastrointestinal: Negative for abdominal distention and abdominal pain.   Neurological: Negative for light-headedness.     Vital Signs (Most Recent):  Temp: 97.4 °F (36.3 °C) (07/29/17 0854)  Pulse: 87 (07/29/17 1200)  Resp: 18 (07/29/17 0854)  BP: 132/70 (07/29/17 0854)  SpO2: (!) 94 % (07/29/17 0854) Vital Signs (24h Range):  Temp:  [97.4 °F (36.3 °C)-98.7 °F (37.1 °C)] 97.4 °F (36.3 °C)  Pulse:  [77-98] 87  Resp:  [18] 18  SpO2:  [94 %-99 %] 94 %  BP: (114-134)/(62-70) 132/70     Weight: 65.4 kg (144 lb 1.6 oz)  Body mass index is 24.72 kg/m².    Intake/Output Summary (Last 24 hours) at 07/29/17 1258  Last data filed at 07/29/17 0900   Gross per 24 hour   Intake              660 ml   Output             1800 ml   Net            -1140 ml      Physical Exam   Constitutional: She is oriented to person, place, and time. She appears well-developed and well-nourished. No distress.   HENT:   Head: Normocephalic and atraumatic.   Eyes: EOM are normal. Pupils are equal, round, and reactive to light. No scleral icterus.   Neck: Neck supple. No thyromegaly present.   Cardiovascular: Normal rate, normal heart sounds and intact distal pulses.    No murmur heard.  Irregular rhythm   Pulmonary/Chest: No respiratory distress. She has no wheezes.   Decreased BS at lung bases   Abdominal: Soft. Bowel sounds are normal. She exhibits no distension. There is no  tenderness.   Musculoskeletal: Normal range of motion. She exhibits no edema, tenderness or deformity.   Neurological: She is alert and oriented to person, place, and time. No cranial nerve deficit.   Skin: Skin is warm and dry.   Psychiatric: She has a normal mood and affect. Her behavior is normal.     Significant Diagnostic Studies: Labs:   CMP   Recent Labs  Lab 07/28/17 0403 07/29/17 0340    144   K 3.6 3.8   CL 95 95   CO2 42* 40*   * 213*   BUN 77* 77*   CREATININE 2.4* 2.5*   CALCIUM 8.6* 8.5*   PROT 5.3* 5.2*   ALBUMIN 2.2* 2.1*   BILITOT 0.4 0.3   ALKPHOS 40* 39*   AST 14 15   ALT 7* 7*   ANIONGAP 8 9   ESTGFRAFRICA 22.4* 21.3*   EGFRNONAA 19.4* 18.5*    and CBC   Recent Labs  Lab 07/28/17 0403   WBC 6.17   HGB 7.9*   HCT 26.8*          Pending Diagnostic Studies:     Procedure Component Value Units Date/Time    Magnesium - if not done in ED [[id number]] Collected:  07/29/17 0340    Order Status:  Sent Lab Status:  In process Updated:  07/29/17 0340    Specimen:  Blood from Blood         Final Active Diagnoses:    Diagnosis Date Noted POA    Chronic kidney disease (CKD) stage G4/A1, severely decreased glomerular filtration rate (GFR) between 15-29 mL/min/1.73 square meter and albuminuria creatinine ratio less than 30 mg/g [N18.4] 01/26/2017 Yes    Insulin dependent diabetes mellitus [E11.9, Z79.4] 12/14/2015 Not Applicable    HTN (hypertension) [I10] 03/02/2015 Yes    H/O: CVA (cerebrovascular accident) [Z86.73] 03/02/2015 Not Applicable    Normocytic anemia [D64.9] 07/07/2016 Yes    Atrial fibrillation [I48.91]  Yes    Metabolic alkalosis with respiratory acidosis [E87.4] 07/28/2017 No    Depression [F32.9] 07/13/2017 Yes    Hyperlipidemia [E78.5] 03/02/2015 Yes      Problems Resolved During this Admission:    Diagnosis Date Noted Date Resolved POA    PRINCIPAL PROBLEM:  Acute on chronic systolic congestive heart failure [I50.23] 07/12/2017 07/29/2017 Yes    Pleural  effusion [J90] 07/24/2017 07/29/2017 Yes    Acute pulmonary edema [J81.0] 07/24/2017 07/29/2017 Yes    Persistent proteinuria [R80.1] 07/20/2017 07/29/2017 Yes    Acute on chronic respiratory failure with hypoxia [J96.21] 07/18/2017 07/29/2017 Yes    CAP (community acquired pneumonia) [J18.9] 07/14/2017 07/18/2017 Yes    Dysuria [R30.0] 07/13/2017 07/18/2017 Yes    History of CVA (cerebrovascular accident) [Z86.73] 01/16/2016 07/29/2017 Not Applicable    SOB (shortness of breath) [R06.02] 08/04/2015 07/18/2017 Yes      No new Assessment & Plan notes have been filed under this hospital service since the last note was generated.  Service: Hospital Medicine      Discharged Condition: fair    Disposition: Home or Self Care    Follow Up:  Follow-up Information     Wesley Watkins MD. Schedule an appointment as soon as possible for a visit in 1 week.    Specialty:  Internal Medicine  Why:  To follow-up on your recent hospitalization. We would like to have you recheck your labs at this time.  Contact information:  [facility]  [place] [address] [phone]                 Patient Instructions:   Please discontinue your Effient and warfarin. Instead, begin taking Eliquis as instructed for your atrial fibrillation. For your heart failure and blood pressure, coreg has been discontinued and the furosemide and hydralazine prescriptions have been increased. Please take note of this on your meds report. You have also been started on isosorbide dinitrate and metoprolol tartrate - take as instructed. The metolazone is to be taken as needed for increased weight gain due to fluid overload. We recommend you make an appointment within a week to follow-up with your doctor, Dr. Watkins, to discuss your recent hospitalization, evaluate your blood chemistry, and monitor the shaking in your left arm. Please have your blood work done prior to your appointment. Return to the ED if your breathing  worsens.    Medications:  Reconciled Home Medications:   Current Discharge Medication List      START taking these medications    Details   apixaban 5 mg Tab Take 1 tablet (5 mg total) by mouth 2 (two) times daily.  Qty: 60 tablet, Refills: 3      isosorbide dinitrate (ISORDIL) 20 MG tablet Take 1 tablet (20 mg total) by mouth 3 (three) times daily.  Qty: 90 tablet, Refills: 3      metOLazone (ZAROXOLYN) 2.5 MG tablet Take 2 tablets (5 mg total) by mouth daily as needed (For increased weight gain).  Qty: 60 tablet, Refills: 3      metoprolol tartrate 75 mg Tab Take 75 mg by mouth 2 (two) times daily.  Qty: 60 tablet, Refills: 3         CONTINUE these medications which have CHANGED    Details   furosemide (LASIX) 80 MG tablet Take 1 tablet (80 mg total) by mouth every 8 (eight) hours.  Qty: 90 tablet, Refills: 3      hydrALAZINE (APRESOLINE) 50 MG tablet Take 1 tablet (50 mg total) by mouth 3 (three) times daily.  Qty: 90 tablet, Refills: 3      omega-3 acid ethyl esters (LOVAZA) 1 gram capsule Take 1 capsule (1 g total) by mouth once daily.  Qty: 30 capsule, Refills: 3    Comments: **Patient requests 90 days supply**  Associated Diagnoses: Hyperlipidemia, unspecified hyperlipidemia type      polyethylene glycol (GLYCOLAX) 17 gram PwPk Take 17 g by mouth daily as needed (CONSTIPATION).  Qty: 30 packet, Refills: 3         CONTINUE these medications which have NOT CHANGED    Details   acetaminophen (TYLENOL) 500 MG tablet Take 1,000 mg by mouth daily as needed for Pain.      amlodipine (NORVASC) 5 MG tablet Take 5 mg by mouth once daily.      aspirin (ECOTRIN) 81 MG EC tablet Take 81 mg by mouth once daily.      atorvastatin (LIPITOR) 40 MG tablet Take 1 tablet (40 mg total) by mouth once daily.  Qty: 90 tablet, Refills: 3      azelastine (ASTELIN) 137 mcg (0.1 %) nasal spray 1 spray (137 mcg total) by Nasal route 2 (two) times daily.  Qty: 30 mL, Refills: 0    Associated Diagnoses: New abnormality on chest x-ray;  Post-nasal drip; Nasal congestion      blood sugar diagnostic (TRUETEST TEST STRIPS) Strp Twice daily blood sugar check.  Qty: 200 each, Refills: 11      buPROPion (WELLBUTRIN XL) 150 MG TB24 tablet Take 1 tablet (150 mg total) by mouth once daily.  Qty: 30 tablet, Refills: 12      butalbital-acetaminophen-caffeine -40 mg (FIORICET, ESGIC) -40 mg per tablet Take 1 tablet by mouth every 8 (eight) hours as needed for Headaches.  Qty: 10 tablet, Refills: 0      cyanocobalamin (VITAMIN B-12) 1000 MCG tablet Take 100 mcg by mouth once daily.      dextran 70-hypromellose (TEARS) ophthalmic solution One drop into affected eye 4-6 times daily as needed  Qty: 15 mL, Refills: 0      diazePAM (VALIUM) 5 MG tablet Take 1 tablet (5 mg total) by mouth every 8 (eight) hours as needed.  Qty: 45 tablet, Refills: 3      diclofenac sodium 1 % Gel Apply topically 2 (two) times daily.  Qty: 100 g, Refills: 0      docusate sodium (COLACE) 100 MG capsule Take 100 mg by mouth once daily.      erythromycin (ROMYCIN) ophthalmic ointment Place into the right eye every evening.      escitalopram oxalate (LEXAPRO) 5 MG Tab Take 5 mg by mouth once daily.      estradiol (ESTRACE) 0.01 % (0.1 mg/gram) vaginal cream Place 1 g vaginally twice a week.  Qty: 42.5 g, Refills: 11      fluticasone (FLONASE) 50 mcg/actuation nasal spray 1 spray by Each Nare route once daily.  Qty: 1 Bottle, Refills: 0    Associated Diagnoses: New abnormality on chest x-ray; Post-nasal drip; Nasal congestion      gabapentin (NEURONTIN) 300 MG capsule Take 1 capsule (300 mg total) by mouth once daily.  Qty: 90 capsule, Refills: 0    Associated Diagnoses: Pain      ibuprofen (ADVIL,MOTRIN) 400 MG tablet Take 400 mg by mouth daily as needed (PAIN).      insulin glargine (LANTUS) 100 unit/mL injection Inject 10 Units into the skin every evening.  Qty: 1 vial, Refills: 3      insulin lispro (HUMALOG) 100 unit/mL injection Inject 5 Units into the skin 3 (three) times  "daily before meals.  Qty: 100 mL, Refills: 12      ipratropium (ATROVENT) 0.02 % nebulizer solution USE VIA NEBULIZER DAILY AS NEEDED FOR SHORTNESS OF BREATH/WHEEZING      levalbuterol (XOPENEX) 1.25 mg/3 mL nebulizer solution USE 1 VIAL VIA NEBULIZER EVERY 8 HOURS AS NEEDED FOR WHEEZING OR SHORTNESS OF BREATH  Qty: 792 mL, Refills: 0    Comments: **Patient requests 90 days supply**      multivitamin (THERAGRAN) per tablet Take 0.5 tablets by mouth 2 (two) times daily.      pantoprazole (PROTONIX) 40 MG tablet TAKE 1 TABLET BY MOUTH EVERY DAY  Qty: 90 tablet, Refills: 0      pen needle, diabetic 30 gauge x 5/16" Ndle Use 4 disposable needles per day. Inject medication into skin daily  Qty: 120 each, Refills: 11      quetiapine (SEROQUEL) 100 MG Tab Take 100 mg by mouth nightly.       OXYGEN-AIR DELIVERY SYSTEMS MISC by Misc.(Non-Drug; Combo Route) route. Family states that patient is on 2.5 liters nasal cannula at home      underpads 23 X 36 " Pads Twice daily change as needed for incontinence.  Qty: 150 each, Refills: 11         STOP taking these medications       carvedilol (COREG) 25 MG tablet Comments:   Reason for Stopping:         prasugrel (EFFIENT) 10 mg Tab Comments:   Reason for Stopping:         warfarin (COUMADIN) 2.5 MG tablet Comments:   Reason for Stopping:             Time spent on the discharge of patient: 45 minutes    Deuce Sexton MD  Department of Hospital Medicine  [facility]  "

## 2017-07-29 NOTE — SUBJECTIVE & OBJECTIVE
Interval History: Patient reports no dyspnea today, Cr 2.5 with metabolic alkalosis and azotemia still noted. Patient to discharge today on PO Lasix regimen /metolazone PRN and follow with PCP. Myoclonic jerky movements of the lower ext noted, but not bothersome to the patient.     Review of patient's allergies indicates:   Allergen Reactions    Daypro [oxaprozin] Itching     Tolerates other NSAIDs    Vibramycin [doxycycline calcium]      Current Facility-Administered Medications   Medication Frequency    acetaminophen tablet 650 mg Q6H PRN    acetaZOLAMIDE tablet 500 mg BID    albuterol-ipratropium 2.5mg-0.5mg/3mL nebulizer solution 3 mL Q4H PRN    aspirin EC tablet 81 mg Daily    atorvastatin tablet 40 mg Daily    buPROPion TB24 tablet 150 mg Daily    dextrose 50% injection 12.5 g PRN    dextrose 50% injection 25 g PRN    escitalopram oxalate tablet 5 mg Daily    furosemide tablet 80 mg Q8H    gabapentin capsule 300 mg QHS    glucagon (human recombinant) injection 1 mg PRN    glucose chewable tablet 16 g PRN    glucose chewable tablet 24 g PRN    hydrALAZINE tablet 50 mg TID    insulin aspart pen 1-10 Units QID (AC + HS) PRN    insulin aspart pen 5 Units TIDWM    insulin detemir pen 5 Units BID    isosorbide dinitrate tablet 20 mg TID    metoprolol tartrate tablet 75 mg BID    ondansetron disintegrating tablet 8 mg Q8H PRN    pantoprazole EC tablet 40 mg Daily    polyethylene glycol packet 17 g Daily PRN    sodium chloride 0.9% flush 3 mL Q8H       Objective:     Vital Signs (Most Recent):  Temp: 97.4 °F (36.3 °C) (07/29/17 0854)  Pulse: 87 (07/29/17 0854)  Resp: 18 (07/29/17 0854)  BP: 132/70 (07/29/17 0854)  SpO2: (!) 94 % (07/29/17 0854)  O2 Device (Oxygen Therapy): room air (07/29/17 0854) Vital Signs (24h Range):  Temp:  [97.4 °F (36.3 °C)-98.7 °F (37.1 °C)] 97.4 °F (36.3 °C)  Pulse:  [77-98] 87  Resp:  [18] 18  SpO2:  [94 %-99 %] 94 %  BP: (114-134)/(56-70) 132/70     Weight: 65.4  "kg (144 lb 1.6 oz) (07/28/17 0515)  Body mass index is 24.72 kg/m².  Body surface area is 1.72 meters squared.    I/O last 3 completed shifts:  In: 1140 [P.O.:1140]  Out: 2300 [Urine:2300]    Physical Exam   Constitutional: She is oriented to person, place, and time. She appears well-developed and well-nourished. No distress.   HENT:   Head: Normocephalic and atraumatic.   Eyes: EOM are normal. Pupils are equal, round, and reactive to light. No scleral icterus.   Neck: Neck supple. No thyromegaly present.   Cardiovascular: Normal rate, normal heart sounds and intact distal pulses.    No murmur heard.  Irregular rhythm   Pulmonary/Chest: No respiratory distress. She has no wheezes.   Decreased BS at lung bases   Abdominal: Soft. Bowel sounds are normal. She exhibits no distension. There is no tenderness.   Musculoskeletal: Normal range of motion. She exhibits no edema, tenderness or deformity.   Neurological: She is alert and oriented to person, place, and time. No cranial nerve deficit.   Repetitive myoclonic jerks of LLE   Skin: Skin is warm and dry.   Psychiatric: She has a normal mood and affect. Her behavior is normal.       Significant Labs:  CBC:   Recent Labs  Lab 07/28/17  0403   WBC 6.17   RBC 2.81*   HGB 7.9*   HCT 26.8*      MCV 95   MCH 28.1   MCHC 29.5*     CMP:   Recent Labs  Lab 07/29/17  0340   *   CALCIUM 8.5*   ALBUMIN 2.1*   PROT 5.2*      K 3.8   CO2 40*   CL 95   BUN 77*   CREATININE 2.5*   ALKPHOS 39*   ALT 7*   AST 15   BILITOT 0.3     All labs within the past 24 hours have been reviewed.     Significant Imaging:  X-Ray: Reviewed  as below Clinical History provided for exam:"interval eval for bilat pleural effusions".  Surgical changes remain. As on the chest CT of 7/21/17, there are bilateral pleural effusions.  No obvious detrimental change when compared to 7/24/17, allowing for differences in positioning.  "

## 2017-07-29 NOTE — PLAN OF CARE
CM advised by Dr. Quiros pt will d/c home with HH. Advised will re-arrange HH as the pt is current with a company through [facility].       1350- Called N, advised Aerial with the answering service that I need to speak to the on call nurse to re-arrange HH for the pt as she is discharging home today. Aerial stated she would have the on call nurse call me back. HH referral and orders faxed to [facility] at [phone] with fax confirmation e-mail returned. Awaiting a call back from the [facility] on call nurse.     1450- Recv'd a call back from [place] with [facility], advised all necessary documents have been faxed to [facility], pt needs HH with  for lab draws, PT/OT. She stated she will call me back with the name of the agency that will be assigned as she is not showing she is current with one now.     1509- Recv'd a call back from [place] with N, she stated the pt will be seen by [facility].

## 2017-07-29 NOTE — DISCHARGE INSTRUCTIONS
Please discontinue your Effient and warfarin. Instead, begin taking Eliquis as instructed for your atrial fibrillation. For your heart failure and blood pressure, coreg has been discontinued and the furosemide and hydralazine prescriptions have been increased. Please take note of this on your meds report. You have also been started on isosorbide dinitrate and metoprolol tartrate - take as instructed. The metolazone is to be taken as needed for increased weight gain due to fluid overload. We recommend you make an appointment within a week to follow-up with your doctor, Dr. Watkins, to discuss your recent hospitalization, evaluate your blood chemistry, and monitor the shaking in your left arm. Please have your blood work done prior to your appointment. Return to the ED if your breathing worsens.

## 2017-07-29 NOTE — PLAN OF CARE
[facility]    HOME HEALTH ORDERS  FACE TO FACE ENCOUNTER    Patient Name: Michael Salgado  YOB: 1945    PCP: Wesley Watkins MD   PCP Address: [address] / [place]  PCP Phone Number: [phone]  PCP Fax: [phone]    Encounter Date: 07/29/2017    Admit to Home Health    Diagnoses:  Active Hospital Problems    Diagnosis  POA    Chronic kidney disease (CKD) stage G4/A1, severely decreased glomerular filtration rate (GFR) between 15-29 mL/min/1.73 square meter and albuminuria creatinine ratio less than 30 mg/g [N18.4]  Yes     Priority: 2     Insulin dependent diabetes mellitus [E11.9, Z79.4]  Not Applicable     Priority: 3     HTN (hypertension) [I10]  Yes     Priority: 4     H/O: CVA (cerebrovascular accident) [Z86.73]  Not Applicable     Priority: 5     Normocytic anemia [D64.9]  Yes     Priority: 6     Atrial fibrillation [I48.91]  Yes     Priority: 7      [id number] of 8      Metabolic alkalosis with respiratory acidosis [E87.4]  No    Depression [F32.9]  Yes    Hyperlipidemia [E78.5]  Yes      Resolved Hospital Problems    Diagnosis Date Resolved POA    *Acute on chronic systolic congestive heart failure [I50.23] 07/29/2017 Yes     Priority: 1 - High    Pleural effusion [J90] 07/29/2017 Yes    Acute pulmonary edema [J81.0] 07/29/2017 Yes    Persistent proteinuria [R80.1] 07/29/2017 Yes    Acute on chronic respiratory failure with hypoxia [J96.21] 07/29/2017 Yes    CAP (community acquired pneumonia) [J18.9] 07/18/2017 Yes    Dysuria [R30.0] 07/18/2017 Yes    History of CVA (cerebrovascular accident) [Z86.73] 07/29/2017 Not Applicable    SOB (shortness of breath) [R06.02] 07/18/2017 Yes       No future appointments.  Follow-up Information     Wesley Watkins MD. Schedule an appointment as soon as possible for a visit in 1 week.    Specialty:  Internal Medicine  Why:  To follow-up on your recent hospitalization. We would like to  have you recheck your labs at this time.  Contact information:  [address] [address]  [phone]                     I have seen and examined this patient face to face today. My clinical findings that support the need for the home health skilled services and home bound status are the following:  Weakness/numbness causing balance and gait disturbance due to Heart Failure making it taxing to leave home.    Allergies:  Review of patient's allergies indicates:   Allergen Reactions    Daypro [oxaprozin] Itching     Tolerates other NSAIDs    Vibramycin [doxycycline calcium]        Diet: cardiac diet    Activities: activity as tolerated    Nursing:   SN to complete comprehensive assessment including routine vital signs. Instruct on disease process and s/s of complications to report to MD. Review/verify medication list sent home with the patient at time of discharge  and instruct patient/caregiver as needed. Frequency may be adjusted depending on start of care date.    Notify MD if SBP > 160 or < 90; DBP > 90 or < 50; HR > 120 or < 50; Temp > 101      CONSULTS:    Physical Therapy to evaluate and treat. Evaluate for home safety and equipment needs; Establish/upgrade home exercise program. Perform / instruct on therapeutic exercises, gait training, transfer training, and Range of Motion.  Occupational Therapy to evaluate and treat. Evaluate home environment for safety and equipment needs. Perform/Instruct on transfers, ADL training, ROM, and therapeutic exercises.    MISCELLANEOUS CARE:  Please draw a CMP on 08/02/2017 and forward results to Dr. Watkins's office.    WOUND CARE ORDERS  n/a      Medications: Review discharge medications with patient and family and provide education.    Current Discharge Medication List      START taking these medications    Details   apixaban 5 mg Tab Take 1 tablet (5 mg total) by mouth 2 (two) times daily.  Qty: 60 tablet, Refills: 3      isosorbide dinitrate (ISORDIL) 20  MG tablet Take 1 tablet (20 mg total) by mouth 3 (three) times daily.  Qty: 90 tablet, Refills: 3      metOLazone (ZAROXOLYN) 2.5 MG tablet Take 2 tablets (5 mg total) by mouth daily as needed (For increased weight gain).  Qty: 60 tablet, Refills: 3      metoprolol tartrate 75 mg Tab Take 75 mg by mouth 2 (two) times daily.  Qty: 60 tablet, Refills: 3         CONTINUE these medications which have CHANGED    Details   furosemide (LASIX) 80 MG tablet Take 1 tablet (80 mg total) by mouth every 8 (eight) hours.  Qty: 90 tablet, Refills: 3      hydrALAZINE (APRESOLINE) 50 MG tablet Take 1 tablet (50 mg total) by mouth 3 (three) times daily.  Qty: 90 tablet, Refills: 3      omega-3 acid ethyl esters (LOVAZA) 1 gram capsule Take 1 capsule (1 g total) by mouth once daily.  Qty: 30 capsule, Refills: 3    Comments: **Patient requests 90 days supply**  Associated Diagnoses: Hyperlipidemia, unspecified hyperlipidemia type      polyethylene glycol (GLYCOLAX) 17 gram PwPk Take 17 g by mouth daily as needed (CONSTIPATION).  Qty: 30 packet, Refills: 3         CONTINUE these medications which have NOT CHANGED    Details   acetaminophen (TYLENOL) 500 MG tablet Take 1,000 mg by mouth daily as needed for Pain.      amlodipine (NORVASC) 5 MG tablet Take 5 mg by mouth once daily.      aspirin (ECOTRIN) 81 MG EC tablet Take 81 mg by mouth once daily.      atorvastatin (LIPITOR) 40 MG tablet Take 1 tablet (40 mg total) by mouth once daily.  Qty: 90 tablet, Refills: 3      azelastine (ASTELIN) 137 mcg (0.1 %) nasal spray 1 spray (137 mcg total) by Nasal route 2 (two) times daily.  Qty: 30 mL, Refills: 0    Associated Diagnoses: New abnormality on chest x-ray; Post-nasal drip; Nasal congestion      blood sugar diagnostic (TRUETEST TEST STRIPS) Strp Twice daily blood sugar check.  Qty: 200 each, Refills: 11      buPROPion (WELLBUTRIN XL) 150 MG TB24 tablet Take 1 tablet (150 mg total) by mouth once daily.  Qty: 30 tablet, Refills: 12       butalbital-acetaminophen-caffeine -40 mg (FIORICET, ESGIC) -40 mg per tablet Take 1 tablet by mouth every 8 (eight) hours as needed for Headaches.  Qty: 10 tablet, Refills: 0      cyanocobalamin (VITAMIN B-12) 1000 MCG tablet Take 100 mcg by mouth once daily.      dextran 70-hypromellose (TEARS) ophthalmic solution One drop into affected eye 4-6 times daily as needed  Qty: 15 mL, Refills: 0      diazePAM (VALIUM) 5 MG tablet Take 1 tablet (5 mg total) by mouth every 8 (eight) hours as needed.  Qty: 45 tablet, Refills: 3      diclofenac sodium 1 % Gel Apply topically 2 (two) times daily.  Qty: 100 g, Refills: 0      docusate sodium (COLACE) 100 MG capsule Take 100 mg by mouth once daily.      erythromycin (ROMYCIN) ophthalmic ointment Place into the right eye every evening.      escitalopram oxalate (LEXAPRO) 5 MG Tab Take 5 mg by mouth once daily.      estradiol (ESTRACE) 0.01 % (0.1 mg/gram) vaginal cream Place 1 g vaginally twice a week.  Qty: 42.5 g, Refills: 11      fluticasone (FLONASE) 50 mcg/actuation nasal spray 1 spray by Each Nare route once daily.  Qty: 1 Bottle, Refills: 0    Associated Diagnoses: New abnormality on chest x-ray; Post-nasal drip; Nasal congestion      gabapentin (NEURONTIN) 300 MG capsule Take 1 capsule (300 mg total) by mouth once daily.  Qty: 90 capsule, Refills: 0    Associated Diagnoses: Pain      ibuprofen (ADVIL,MOTRIN) 400 MG tablet Take 400 mg by mouth daily as needed (PAIN).      insulin glargine (LANTUS) 100 unit/mL injection Inject 10 Units into the skin every evening.  Qty: 1 vial, Refills: 3      insulin lispro (HUMALOG) 100 unit/mL injection Inject 5 Units into the skin 3 (three) times daily before meals.  Qty: 100 mL, Refills: 12      ipratropium (ATROVENT) 0.02 % nebulizer solution USE VIA NEBULIZER DAILY AS NEEDED FOR SHORTNESS OF BREATH/WHEEZING      levalbuterol (XOPENEX) 1.25 mg/3 mL nebulizer solution USE 1 VIAL VIA NEBULIZER EVERY 8 HOURS AS NEEDED FOR  "WHEEZING OR SHORTNESS OF BREATH  Qty: 792 mL, Refills: 0    Comments: **Patient requests 90 days supply**      multivitamin (THERAGRAN) per tablet Take 0.5 tablets by mouth 2 (two) times daily.      pantoprazole (PROTONIX) 40 MG tablet TAKE 1 TABLET BY MOUTH EVERY DAY  Qty: 90 tablet, Refills: 0      pen needle, diabetic 30 gauge x 5/16" Ndle Use 4 disposable needles per day. Inject medication into skin daily  Qty: 120 each, Refills: 11      quetiapine (SEROQUEL) 100 MG Tab Take 100 mg by mouth nightly.       OXYGEN-AIR DELIVERY SYSTEMS MISC by Misc.(Non-Drug; Combo Route) route. Family states that patient is on 2.5 liters nasal cannula at home      underpads 23 X 36 " Pads Twice daily change as needed for incontinence.  Qty: 150 each, Refills: 11         STOP taking these medications       carvedilol (COREG) 25 MG tablet Comments:   Reason for Stopping:         prasugrel (EFFIENT) 10 mg Tab Comments:   Reason for Stopping:         warfarin (COUMADIN) 2.5 MG tablet Comments:   Reason for Stopping:               I certify that this patient is confined to her home and needs intermittent skilled nursing care, physical therapy and occupational therapy.    [facility] Medicine, PGY-1  "

## 2017-07-31 ENCOUNTER — ANTI-COAG VISIT (OUTPATIENT)
Dept: CARDIOLOGY | Facility: CLINIC | Age: 72
End: 2017-07-31

## 2017-07-31 ENCOUNTER — TELEPHONE (OUTPATIENT)
Dept: FAMILY MEDICINE | Facility: CLINIC | Age: 72
End: 2017-07-31

## 2017-07-31 DIAGNOSIS — Z79.01 LONG TERM (CURRENT) USE OF ANTICOAGULANTS: ICD-10-CM

## 2017-07-31 DIAGNOSIS — I48.20 CHRONIC ATRIAL FIBRILLATION: ICD-10-CM

## 2017-07-31 NOTE — TELEPHONE ENCOUNTER
----- Message from Guerline Holt sent at 7/29/2017 10:25 AM CDT -----  Contact: caden Patterson  Calling to make an appt for Dr Watkins for a hospital f/u . Refused to see another doctor & refused N.P .      [phone] or [phone]     LL

## 2017-07-31 NOTE — PROGRESS NOTES
Per the pt's daughter and the discharge note on 7/29, she has stopped warfarin and started Eliquis for anti-coagulation.  I am discharging her from the [facility] at this time.

## 2017-07-31 NOTE — PT/OT/SLP DISCHARGE
Physical Therapy Discharge Summary    Michael Salgado  MRN: [id number]   Acute on chronic systolic congestive heart failure   Patient Discharged from acute Physical Therapy on 17.  Please refer to prior PT noted date on 17 for functional status.     Assessment:   Patient has not met goals.  GOALS:    Physical Therapy Goals     Not on file          Multidisciplinary Problems (Resolved)        Problem: Physical Therapy Goal    Goal Priority Disciplines Outcome Goal Variances Interventions   Physical Therapy Goal   (Resolved)     PT/OT, PT Outcome(s) achieved     Description:  Goals to be met by: 2017    Patient will increase functional independence with mobility by performin. Supine to sit with MInimal Assistance  2. Sit to supine with MInimal Assistance  3. Rolling to Right with Minimal Assistance.  4. Sit to stand transfer with minimal assistance   5. Bed to chair transfer with minimal assisatnce using Rolling Walker  6. Gait  x 10 feet with Minimal Assistance using Rolling Walker.                          Reasons for Discontinuation of Therapy Services  Transfer to alternate level of care.      Plan:  Patient Discharged to: Home with Home Health Service (PT/OT recommending SNF).    Irma Cantu, PT, DPT   2017  Pager: [phone]

## 2017-07-31 NOTE — PT/OT/SLP DISCHARGE
Occupational Therapy Discharge Summary    Michael Salgado  MRN: [id number]   Acute on chronic systolic congestive heart failure   Patient Discharged from acute Occupational Therapy on 7/29/17.  Please refer to prior OT note dated on 7/28/17 for functional status.     Assessment:   Patient was discharge unexpectedly.  Information required to complete and accurate discharge summary is unknown.  Refer to therapy initial evaluation and last progress note for initial and most recent functional status and goal achievement.  Recommendations made may be found in medical record. Patient appropriate for care in another setting.  GOALS:    Occupational Therapy Goals     Not on file          Multidisciplinary Problems (Resolved)        Problem: Occupational Therapy Goal    Goal Priority Disciplines Outcome Interventions   Occupational Therapy Goal   (Resolved)     OT, PT/OT Outcome(s) achieved    Description:  Goals to be met by: 07/30     Patient will increase functional independence with ADLs by performing:    UE Dressing with Stand-by Assistance.  LE Dressing with Moderate Assistance and Assistive Devices as needed.  Grooming while seated with Stand-by Assistance.-MET  Toileting from bedside commode with Moderate Assistance and Assistive Devices as needed for hygiene and clothing management.   Stand pivot transfers with Contact Guard Assistance.  Toilet transfer to bedside commode with Contact Guard Assistance.                     Reasons for Discontinuation of Therapy Services  Transfer to alternate level of care.      Plan:  Patient Discharged to: Home with Home Health Service (OT/PT recommending SNF)      LURDES Lizama  7/31/2017

## 2017-08-01 ENCOUNTER — PATIENT OUTREACH (OUTPATIENT)
Dept: ADMINISTRATIVE | Facility: CLINIC | Age: 72
End: 2017-08-01

## 2017-08-01 NOTE — PATIENT INSTRUCTIONS

## 2017-08-02 NOTE — TELEPHONE ENCOUNTER
Informed HH nurse, orders for lab did not come from PCP. Patient has hospital follow up appointment 8/15/17. Will do labs, if needed, during appointment.

## 2017-08-02 NOTE — TELEPHONE ENCOUNTER
----- Message from Meka Joy sent at 8/2/2017  1:50 PM CDT -----  Contact: mindi haskins nurse  Mindi stated that she was unable to get bloodwork done on the pt. Please advise. [phone]

## 2017-08-09 ENCOUNTER — OFFICE VISIT (OUTPATIENT)
Dept: NEPHROLOGY | Facility: CLINIC | Age: 72
End: 2017-08-09
Payer: MEDICARE

## 2017-08-09 ENCOUNTER — LAB VISIT (OUTPATIENT)
Dept: LAB | Facility: HOSPITAL | Age: 72
End: 2017-08-09
Payer: MEDICARE

## 2017-08-09 VITALS
DIASTOLIC BLOOD PRESSURE: 62 MMHG | BODY MASS INDEX: 24.59 KG/M2 | WEIGHT: 144 LBS | RESPIRATION RATE: 98 BRPM | HEIGHT: 64 IN | SYSTOLIC BLOOD PRESSURE: 118 MMHG | HEART RATE: 84 BPM

## 2017-08-09 DIAGNOSIS — D64.9 NORMOCYTIC ANEMIA: ICD-10-CM

## 2017-08-09 DIAGNOSIS — N18.1 CHRONIC KIDNEY DISEASE (CKD) STAGE G1/A3, GLOMERULAR FILTRATION RATE (GFR) EQUAL TO OR GREATER THAN 90 ML/MIN/1.73 SQUARE METER AND ALBUMINURIA CREATININE RATIO GREATER THAN 300 MG/G: Primary | ICD-10-CM

## 2017-08-09 DIAGNOSIS — N18.4 CHRONIC RENAL INSUFFICIENCY, STAGE 4 (SEVERE): ICD-10-CM

## 2017-08-09 DIAGNOSIS — N18.1 CHRONIC KIDNEY DISEASE (CKD) STAGE G1/A3, GLOMERULAR FILTRATION RATE (GFR) EQUAL TO OR GREATER THAN 90 ML/MIN/1.73 SQUARE METER AND ALBUMINURIA CREATININE RATIO GREATER THAN 300 MG/G: ICD-10-CM

## 2017-08-09 DIAGNOSIS — I10 ESSENTIAL HYPERTENSION: ICD-10-CM

## 2017-08-09 DIAGNOSIS — N04.9 NEPHROSIS: ICD-10-CM

## 2017-08-09 LAB
CREAT UR-MCNC: 86 MG/DL
PROT UR-MCNC: 478 MG/DL
PROT/CREAT RATIO, UR: 5.56

## 2017-08-09 PROCEDURE — 1126F AMNT PAIN NOTED NONE PRSNT: CPT | Mod: GC,S$GLB,, | Performed by: INTERNAL MEDICINE

## 2017-08-09 PROCEDURE — 99999 PR PBB SHADOW E&M-EST. PATIENT-LVL V: CPT | Mod: PBBFAC,GC,, | Performed by: INTERNAL MEDICINE

## 2017-08-09 PROCEDURE — 1159F MED LIST DOCD IN RCRD: CPT | Mod: GC,S$GLB,, | Performed by: INTERNAL MEDICINE

## 2017-08-09 PROCEDURE — 3078F DIAST BP <80 MM HG: CPT | Mod: GC,S$GLB,, | Performed by: INTERNAL MEDICINE

## 2017-08-09 PROCEDURE — 3074F SYST BP LT 130 MM HG: CPT | Mod: GC,S$GLB,, | Performed by: INTERNAL MEDICINE

## 2017-08-09 PROCEDURE — 84156 ASSAY OF PROTEIN URINE: CPT

## 2017-08-09 PROCEDURE — 99214 OFFICE O/P EST MOD 30 MIN: CPT | Mod: GC,S$GLB,, | Performed by: INTERNAL MEDICINE

## 2017-08-09 NOTE — ASSESSMENT & PLAN NOTE
Probably from underlying diabetes, review of old labs indicate significant proteinuria at least since 2015, she has not followed with nephrology, recently in the hospital and discharged on significant diuresis, not on ACE or ARB    Will send for labs, specifically:  1) CMP  2) CBC  3) UA  4) UPC ratio  5) PTH  6) iron studies  7) phosphorus

## 2017-08-09 NOTE — PROGRESS NOTES
Subjective:       Patient ID: Michael Salgado is a 71 y.o. White female who presents for follow-up evaluation of Chronic Kidney Disease    Patient was a recent admit to the hospital for CHF exacerbation and volume overload, requiring significant diuresis, she was noted to have borderline nephrotic range proteinuria and nephrology was consulted to assess this, help with diuresis and a metabolic alkalosis, she was discharged on 7/29 and give lasix 80mg PO TID, which she has been taking, she was also given PRN metolazone for refractory swelling and has not needed to use, her UPC ration in the hospital aroun 2.8, sCr bewtween 2.0 to 2.5 and this seems to have been baseline over the past few years, noted that she has had significant proteinuria since 2015.  She is a diabetic x ~7 years, but does not admit to retinopathy or significant neuropathy.  Today she presents to neprhrology clinic for follow up on her hospital stay, she has not had follow up blood work since being discharged from the hospital.      Review of Systems   Cardiovascular: Positive for leg swelling.   Gastrointestinal: Negative for abdominal pain.   Genitourinary: Negative for dysuria, flank pain and frequency.       Objective:      Physical Exam   Constitutional: She is oriented to person, place, and time.   HENT:   Head: Normocephalic and atraumatic.   Eyes: EOM are normal.   Neck: No JVD present.   Cardiovascular: Normal rate and regular rhythm.    Pulmonary/Chest: Effort normal and breath sounds normal.   Abdominal: Soft. She exhibits no distension.   Musculoskeletal: She exhibits edema (L leg and this is baseline per what she is telling me). She exhibits no tenderness.   Neurological: She is alert and oriented to person, place, and time.   Skin: Skin is warm.   Psychiatric: She has a normal mood and affect. Her behavior is normal.       Assessment & Plan:       Chronic renal insufficiency  Probably from underlying diabetes, review of old labs  indicate significant proteinuria at least since 2015, she has not followed with nephrology, recently in the hospital and discharged on significant diuresis, not on ACE or ARB    Will send for labs, specifically:  1) CMP  2) CBC  3) UA  4) UPC ratio  5) PTH  6) iron studies  7) phosphorus      HTN (hypertension)  Good control in the office today, family reports patient with off and on orthostatic symptoms at home, will recommend decreasing hydralazine to 25 (from 50) and to monitor BP and symptoms closely at home    Normocytic anemia  Possibly/probably related to her CKD    Will check a repeat CBC to follow up from hospital and check iron studies    Nephrosis  Close to nephrotic range proteinuria and significant edema with low albumin, some of her edema probably from her heart failure while in the hospital, this is now controlled with diuretic    We're checking a lipid panel, following up on albumin and another UPC ratio today, also needs to be on an ACE-I, however will hold off until follow up labs have been done and make an assessment at that time     Diagnoses & Labs Ordered       Michael was seen today for chronic kidney disease.    Diagnoses and all orders for this visit:    Chronic kidney disease (CKD) stage G1/A3, glomerular filtration rate (GFR) equal to or greater than 90 mL/min/1.73 square meter and albuminuria creatinine ratio greater than 300 mg/g  -     COMPREHENSIVE METABOLIC PANEL; Future  -     CBC auto differential; Future  -     PTH, intact; Future  -     IRON AND TIBC; Future  -     FERRITIN; Future  -     Urinalysis  -     Protein / creatinine ratio, urine; Future  -     PHOSPHORUS; Future  -     LIPID PANEL; Future    Chronic renal insufficiency, stage 4 (severe)    Essential hypertension    Normocytic anemia    Nephrosis

## 2017-08-09 NOTE — ASSESSMENT & PLAN NOTE
Good control in the office today, family reports patient with off and on orthostatic symptoms at home, will recommend decreasing hydralazine to 25 (from 50) and to monitor BP and symptoms closely at home

## 2017-08-09 NOTE — ASSESSMENT & PLAN NOTE
Possibly/probably related to her CKD    Will check a repeat CBC to follow up from hospital and check iron studies

## 2017-08-09 NOTE — ASSESSMENT & PLAN NOTE
Close to nephrotic range proteinuria and significant edema with low albumin, some of her edema probably from her heart failure while in the hospital, this is now controlled with diuretic    We're checking a lipid panel, following up on albumin and another UPC ratio today, also needs to be on an ACE-I, however will hold off until follow up labs have been done and make an assessment at that time

## 2017-08-14 NOTE — PROGRESS NOTES
[facility]    The note from the fellow/resident was reviewed. I have personally interviewed and examined the patient. There were no additional findings with regards to the history or physical exam.    I agree with the assessment and plan of  Dr. Jaime Ellison

## 2017-08-15 ENCOUNTER — OFFICE VISIT (OUTPATIENT)
Dept: FAMILY MEDICINE | Facility: CLINIC | Age: 72
End: 2017-08-15
Payer: MEDICARE

## 2017-08-15 ENCOUNTER — LAB VISIT (OUTPATIENT)
Dept: LAB | Facility: HOSPITAL | Age: 72
End: 2017-08-15
Attending: INTERNAL MEDICINE
Payer: MEDICARE

## 2017-08-15 VITALS
OXYGEN SATURATION: 99 % | WEIGHT: 144 LBS | TEMPERATURE: 98 F | HEART RATE: 94 BPM | DIASTOLIC BLOOD PRESSURE: 60 MMHG | BODY MASS INDEX: 23.99 KG/M2 | SYSTOLIC BLOOD PRESSURE: 110 MMHG | HEIGHT: 65 IN

## 2017-08-15 DIAGNOSIS — D64.9 ANEMIA OF UNKNOWN ETIOLOGY: ICD-10-CM

## 2017-08-15 DIAGNOSIS — I48.20 CHRONIC ATRIAL FIBRILLATION: ICD-10-CM

## 2017-08-15 DIAGNOSIS — N18.4 CHRONIC KIDNEY DISEASE (CKD) STAGE G4/A1, SEVERELY DECREASED GLOMERULAR FILTRATION RATE (GFR) BETWEEN 15-29 ML/MIN/1.73 SQUARE METER AND ALBUMINURIA CREATININE RATIO LESS THAN 30 MG/G: ICD-10-CM

## 2017-08-15 DIAGNOSIS — N18.4 CONTROLLED TYPE 2 DIABETES MELLITUS WITH STAGE 4 CHRONIC KIDNEY DISEASE, WITH LONG-TERM CURRENT USE OF INSULIN: ICD-10-CM

## 2017-08-15 DIAGNOSIS — Z79.01 CHRONIC ANTICOAGULATION: ICD-10-CM

## 2017-08-15 DIAGNOSIS — E11.22 CONTROLLED TYPE 2 DIABETES MELLITUS WITH STAGE 4 CHRONIC KIDNEY DISEASE, WITH LONG-TERM CURRENT USE OF INSULIN: ICD-10-CM

## 2017-08-15 DIAGNOSIS — Z79.4 CONTROLLED TYPE 2 DIABETES MELLITUS WITH STAGE 4 CHRONIC KIDNEY DISEASE, WITH LONG-TERM CURRENT USE OF INSULIN: ICD-10-CM

## 2017-08-15 DIAGNOSIS — I50.33 ACUTE ON CHRONIC DIASTOLIC CONGESTIVE HEART FAILURE: Primary | ICD-10-CM

## 2017-08-15 DIAGNOSIS — I50.21 ACUTE SYSTOLIC (CONGESTIVE) HEART FAILURE: ICD-10-CM

## 2017-08-15 DIAGNOSIS — I27.20 PULMONARY HYPERTENSION: ICD-10-CM

## 2017-08-15 PROCEDURE — 80048 BASIC METABOLIC PNL TOTAL CA: CPT

## 2017-08-15 PROCEDURE — 99999 PR PBB SHADOW E&M-EST. PATIENT-LVL III: CPT | Mod: PBBFAC,,, | Performed by: INTERNAL MEDICINE

## 2017-08-15 PROCEDURE — 83880 ASSAY OF NATRIURETIC PEPTIDE: CPT

## 2017-08-15 PROCEDURE — 36415 COLL VENOUS BLD VENIPUNCTURE: CPT | Mod: PO

## 2017-08-15 PROCEDURE — 99214 OFFICE O/P EST MOD 30 MIN: CPT | Mod: S$GLB,,, | Performed by: INTERNAL MEDICINE

## 2017-08-15 PROCEDURE — 99499 UNLISTED E&M SERVICE: CPT | Mod: S$GLB,,, | Performed by: INTERNAL MEDICINE

## 2017-08-15 RX ORDER — HYDRALAZINE HYDROCHLORIDE 25 MG/1
25 TABLET, FILM COATED ORAL EVERY 12 HOURS
Qty: 90 TABLET | Refills: 3 | Status: SHIPPED | OUTPATIENT
Start: 2017-08-15 | End: 2018-08-15

## 2017-08-15 RX ORDER — DIAZEPAM 5 MG/1
5 TABLET ORAL EVERY 8 HOURS PRN
Qty: 90 TABLET | Refills: 3 | Status: SHIPPED | OUTPATIENT
Start: 2017-08-15

## 2017-08-15 NOTE — PROGRESS NOTES
Chief complaint: Follow-up on TCC    71-year-old white female seen for hospital follow up.  Here with her daughter.  Still on oxygen.  Cannot get daily weights because she cannot stand's available measuring circumference of her legs.  Her right leg has increased in size but she has been sitting and is on Norvasc.  She is on the Lasix 80 mg 3 times per day and they were given metolazone to take if she gains weight and so forth and they're not sure if they should give it if she has had worsening shortness of breath for the past few days.  She does feel little bit more short of breath but has not been coughing.  She is also concerned about losing her hair for the past 3 weeks we did discuss to could be related to the illnesses and stress possibly some of her medications but nothing obvious.  She has had no distinct alopecia.  She would like a handicap tag for her  and that was given.  We reviewed the interval nephrology notes and cardiology notes.  We reviewed her most recent labs which showed some worsening azotemia and apparently there was some attempt from home health to draw blood recently but they could not get.      Hospital Course:   Ms. Salgado presented with acute heart failure decompensation after several days of worsening dyspnea. Her hospital course included intensive diuresis with discordant urine output (possibly unmeasured). She slowly improved to the point where she only required PO lasix. She had received IV lasix blouses and gtts, metolazone, and clorothiazide. She developed contraction alakalosis and elevated BUN. Metolazone was added to decrease her bicarb. There was some discussion of a therapeutic thoracentesis to provide symptomatic relief of her SOB, however it was determined that the risks outweighed the benefit. Her other hospital problem of chronic AFib was addressed by replacing her home coreg with metoprolol for better control as per cardiology. She was also started on apixaban and  her coumadin was discontinued. She was discharged home in fair condition to be followed by home health.    ROS:   CONST: weight stable. EYES: no vision change.  No other neurological deficits, no ENT symptoms, no constipation,     Past Medical History   Diagnosis Date    Blood clotting tendency     CAD (coronary artery disease)      stents    CHF (congestive heart failure) -DIASTOLIC      HFpEF    Chronic headaches     CKD stage 3 due to type 2 diabetes mellitus 3/18/2016    CVA (cerebral infarction)     Diabetes     Diabetes mellitus type I     Diverticulosis     Dysphagia as late effect of cerebrovascular disease     Encounter for blood transfusion     Heart attack     History of pleural effusion     HTN (hypertension)     Hyperlipidemia     MI, old     Multiple thyroid nodules     Pulmonary HTN     Right hemiparesis     S/P coronary artery stent placement     S/P percutaneous endoscopic gastrostomy (PEG) tube placement     Screening for colon cancer      normal 2015 -10 yrs    Stroke    Subdural hematoma 2016 with craniotomy  Zoster 2017      Past Surgical History:   Procedure Laterality Date    cardiac bypass      CORONARY ANGIOPLASTY WITH STENT PLACEMENT  7/29/15    [facility]    CORONARY ARTERY BYPASS GRAFT  6/20/15    [facility]    KNEE SURGERY      OVARY SURGERY      OVARY SURGERY      TONSILLECTOMY      TUBAL LIGATION       family history includes Cancer in her sister; Diabetes in her brother and mother; Heart disease in her brother, sister, sister, and son; Hyperlipidemia in her mother; Hypertension in her brother, daughter, and mother; No Known Problems in her daughter.     Social History     Social History    Marital status:      Spouse name: N/A    Number of children: N/A    Years of education: N/A     Occupational History    Not on file.     Social History Main Topics    Smoking status: Never Smoker    Smokeless tobacco: Never Used    Alcohol use No    Drug use: No     Sexual activity: Not on file     Other Topics Concern    Not on file     Social History Narrative    No narrative on file     Gen: no distress  EYES: conjunctiva clear, non-icteric, PERRL  ENT: nose clear, nasal mucosa normal, oropharynx clear and moist, teeth good  NECK:supple, thyroid non-palpable  RESP: effort is good, lungs clear  CV: heart irregular w/o murmur, gallops or rubs; no carotid bruits, +1 pretibial edema  GI: abdomen soft, non-distended, non-tender,   MS: In wheelchair, no clubbing or cyanosis of the digits  SKIN: no rashes, warm to touch      Michael was seen today for hospital follow up and shortness of breath.    Diagnoses and all orders for this visit:    Acute on chronic diastolic congestive heart failure, apparently rapid ventricular response related to A. fib is what initiated this.  He discussed the difficulty balance between acute renal failure and acute CHF.  We will check labs today and assess if we can follow-up BNP level.  Given that she has had worsening edema and 2 days of shortness of breath I instructed the daughter to use the metolazone as directed and monitor.  Monitor oxygen levels.  We may need to get input from cardiology and nephrology as well.  All these issues reviewed and patient counseled at length today.  She does get anxiety and the Valium seems to help her with her breathing is so we will refill.Total time over 25 minutes with over 50% counseling.    Chronic anticoagulation    Chronic atrial fibrillation, apparently fairly rate controlled    Chronic kidney disease (CKD) stage G4/A1, severely decreased glomerular filtration rate (GFR) between 15-29 mL/min/1.73 square meter and albuminuria creatinine ratio less than 30 mg/g  -     Basic metabolic panel; Future    Controlled type 2 diabetes mellitus with stage 4 chronic kidney disease, with long-term current use of insulin    Acute systolic (congestive) heart failure, recent echo reviewed and she has had a reduction  in systolic function  -     Basic metabolic panel; Future  -     Brain natriuretic peptide; Future    Pulmonary hypertension, noted on her x-rays    Anemia of unknown etiology    Other orders  -     hydrALAZINE (APRESOLINE) 25 MG tablet; Take 1 tablet (25 mg total) by mouth every 12 (twelve) hours.  -     diazePAM (VALIUM) 5 MG tablet; Take 1 tablet (5 mg total) by mouth every 8 (eight) hours as needed for Anxiety.

## 2017-08-16 LAB
ANION GAP SERPL CALC-SCNC: 13 MMOL/L
BNP SERPL-MCNC: 1738 PG/ML
BUN SERPL-MCNC: 55 MG/DL
CALCIUM SERPL-MCNC: 9.3 MG/DL
CHLORIDE SERPL-SCNC: 105 MMOL/L
CO2 SERPL-SCNC: 29 MMOL/L
CREAT SERPL-MCNC: 2.9 MG/DL
EST. GFR  (AFRICAN AMERICAN): 17.8 ML/MIN/1.73 M^2
EST. GFR  (NON AFRICAN AMERICAN): 15.5 ML/MIN/1.73 M^2
GLUCOSE SERPL-MCNC: 86 MG/DL
POTASSIUM SERPL-SCNC: 3.5 MMOL/L
SODIUM SERPL-SCNC: 147 MMOL/L

## 2017-08-18 ENCOUNTER — TELEPHONE (OUTPATIENT)
Dept: FAMILY MEDICINE | Facility: CLINIC | Age: 72
End: 2017-08-18

## 2017-08-18 NOTE — TELEPHONE ENCOUNTER
----- Message from Itzel Frank sent at 8/18/2017 11:17 AM CDT -----  Contact: Trinidad Rasheed calling to check on status on fax. Please call [phone]

## 2017-08-20 ENCOUNTER — TELEPHONE (OUTPATIENT)
Dept: FAMILY MEDICINE | Facility: CLINIC | Age: 72
End: 2017-08-20

## 2017-08-21 RX ORDER — PANTOPRAZOLE SODIUM 40 MG/1
TABLET, DELAYED RELEASE ORAL
Qty: 90 TABLET | Refills: 0 | Status: SHIPPED | OUTPATIENT
Start: 2017-08-21

## 2017-08-21 NOTE — TELEPHONE ENCOUNTER
Call family with results, the most recent blood work shows that the kidney function is still elevated but stable and the same as before.  Heart congestion tests are still elevated about the same as before.    Probably time to make follow-up with cardiology, Dr. REYNOSO does not see a cardiology appointment set

## 2017-08-25 DIAGNOSIS — R52 PAIN: ICD-10-CM

## 2017-08-25 RX ORDER — GABAPENTIN 300 MG/1
CAPSULE ORAL
Qty: 90 CAPSULE | Refills: 0 | Status: SHIPPED | OUTPATIENT
Start: 2017-08-25

## 2017-10-31 RX ORDER — BUPROPION HYDROCHLORIDE 150 MG/1
TABLET ORAL
Qty: 90 TABLET | Refills: 0 | Status: SHIPPED | OUTPATIENT
Start: 2017-10-31

## 2017-11-03 ENCOUNTER — TELEPHONE (OUTPATIENT)
Dept: FAMILY MEDICINE | Facility: CLINIC | Age: 72
End: 2017-11-03

## 2017-11-03 NOTE — TELEPHONE ENCOUNTER
----- Message from Tri Mac sent at 11/3/2017  9:36 AM CDT -----  Neil  [employer] is calling to confirm patient's hospice status. She is reviewing wrist/elbow splints and can be reached at [phone] ext [phone]. Thank you!

## 2017-12-29 NOTE — TELEPHONE ENCOUNTER
Spoke with patient's brother who states that she seems to be doing somewhat better today. Taking antibiotics, Lasix, and using nebulizer solution as prescribed. Cough is still non-productive. No worsening of shortness of breath. No leg swelling. No chest pain or palpitations. No confusion or lethargy. Increased urine output noted. Normal bowel movement this morning, not black tarry or bloody. ER precautions discussed with family again. Verbalized understanding and agreement.     Close follow-up with PCP and cardiologist next week.    ZACHARIAH TrinidadC   Simple: Patient demonstrates quick and easy understanding

## 2018-05-04 DIAGNOSIS — Z12.39 BREAST CANCER SCREENING: ICD-10-CM

## 2018-07-16 NOTE — PROGRESS NOTES
[facility]  Critical Care Medicine  Progress Note    Patient Name: Michael Salgado  MRN: [id number]  Admission Date: 7/12/2017  Hospital Length of Stay: 3 days  Code Status: Full Code  Attending Provider: Kira Ruiz MD  Primary Care Provider: Wesley Watkins MD   Principal Problem: Acute on chronic diastolic congestive heart failure    Subjective:     Interval History/Significant Events: Feels better, still on 2L NC. Still pretty SOB, does not feel she has made much urine.     Review of Systems       Positive findings are in BOLD. Otherwise negative ROS as below.     CONSTITUTIONAL: weight loss, fever, chills, weakness or fatigue.   HEENT: visual loss, blurred vision, double vision or yellow sclerae. Ears, Nose, Throat: No hearing loss, sneezing, congestion, runny nose or sore throat.   CARDIOVASCULAR: chest pain, chest pressure or chest discomfort. No palpitations or edema.   RESPIRATORY: shortness of breath, cough or sputum.   GASTROINTESTINAL:anorexia, nausea, vomiting or diarrhea. No abdominal pain or blood.   NEUROLOGICAL: headache, dizziness, syncope, paralysis, ataxia, numbness or tingling in the extremities. No change in bowel or bladder control.   MUSCULOSKELETAL: muscle, back pain, joint pain or stiffness.   HEMATOLOGIC: anemia, bleeding or bruising.   LYMPHATICS: enlarged nodes. No history of splenectomy.   PSYCHIATRIC: history of depression or anxiety.   ENDOCRINOLOGIC: No reports of sweating, cold or heat intolerance. No polyuria or polydipsia.     Objective:     Vital Signs (Most Recent):  Temp: 99.3 °F (37.4 °C) (07/15/17 1436)  Pulse: 107 (07/15/17 1501)  Resp: 20 (07/15/17 1436)  BP: 107/63 (07/15/17 1436)  SpO2: 98 % (07/15/17 1436) Vital Signs (24h Range):  Temp:  [97.3 °F (36.3 °C)-99.3 °F (37.4 °C)] 99.3 °F (37.4 °C)  Pulse:  [] 107  Resp:  [19-32] 20  SpO2:  [95 %-99 %] 98 %  BP: (107-134)/(63-84) 107/63   Weight: 69.8 kg (153 lb 14.1 oz)  Body mass index is  26.4 kg/m².      Intake/Output Summary (Last 24 hours) at 07/15/17 1633  Last data filed at 07/15/17 0500   Gross per 24 hour   Intake                0 ml   Output              200 ml   Net             -200 ml       Physical Exam     Gen: on NC   HEENT: oral mucosa moist, free of lesions, no dental abnormalities, neck free of lymphadenopathy. JVD negative, no eye abnormalities. Pupils appears equal and reactive.   Chest: crackles bilaterally.   Heart: RRR, No M/G/r.   Abdomen: soft, non tender, no masses. No g/r/r  Extremities: Moderate pitting edema.       Significant Labs:    CBC/Anemia Profile:    Recent Labs  Lab 07/14/17  0440 07/15/17  0355   WBC 4.38 4.41   HGB 8.1* 8.0*   HCT 26.7* 26.9*    202   MCV 91 91   RDW 16.2* 16.4*        Chemistries:    Recent Labs  Lab 07/14/17  0441 07/14/17  1731 07/15/17  0354 07/15/17  0918 07/15/17  1426    138  --  142 142   K 4.0 4.8  --  4.8 5.1    106  --  107 108   CO2 25 24  --  28 27   BUN 47* 47*  --  48* 49*   CREATININE 2.6* 2.6*  --  2.6* 2.6*   CALCIUM 7.7* 7.8*  --  8.2* 7.6*   ALBUMIN 1.8* 1.9*  --  2.0* 1.8*   PROT 4.8* 5.3*  --  5.3* 4.9*   BILITOT 0.3 0.2  --  0.3 0.3   ALKPHOS 56 59  --  59 56   ALT 9* 8*  --  8* 7*   AST 14 13  --  13 11   MG 1.4*  --  2.1  --   --    PHOS 3.9  --  3.4  --   --            Assessment/Plan:     Pulmonary   Acute on chronic respiratory failure with hypoxemia    Suspect secondary to heart failure.     Cardiology team following. Diuresis as directed by cardiology and primary team.             Will sign off, please re-consult as neccessary.     Indigo Montanez,   Critical Care Medicine  [facility]   Normal for race

## 2018-11-26 NOTE — ASSESSMENT & PLAN NOTE
Consult    Patient: Lillie Barfield MRN: [id number]  SSN: [id number]    YOB: 1978  Age: 41 y.o. Sex: male      Subjective:      Lillie Barfield is a 41 y.o. male who is being seen for necrotizing fasciitis of the R hip area. He has as history of heroin use which he snores. He also does skin pops. He was seen in the ER on Nov 18th and was found to have an abscess in the left upper extremity. He came back to the ER on 11/24 for severe pain in the R  Hip. A ct scan reported Abscess collections within the right lateral hip and proximal thigh soft tissues. He was seen by surgery and underwent surgical debridement on 11/25th. Blood cultures and wound cultures NTD. The debridement was very close to the hip joint. He is being managed with IV Vanco and IV zosyn. ID has recommended to continue IV antibiotics for possibly 4 weeks pending cultures. The hospitalist group was requested to take over the care of this patient. Past Medical History:   Diagnosis Date    GERD (gastroesophageal reflux disease)     Necrotizing fasciitis ([address] 11/26/2018     Past Surgical History:   Procedure Laterality Date    HX OTHER SURGICAL      hernia       History reviewed. No pertinent family history.   Social History     Tobacco Use    Smoking status: Current Every Day Smoker     Packs/day: 0.50    Smokeless tobacco: Never Used   Substance Use Topics    Alcohol use: No     Comment: seldom      Current Facility-Administered Medications   Medication Dose Route Frequency Provider Last Rate Last Dose    influenza vaccine 2018-19 (6 mos+)(PF) (FLUARIX QUAD/FLULAVAL QUAD) injection 0.5 mL  0.5 mL IntraMUSCular PRIOR TO DISCHARGE Ashlyn Paniagua,         lidocaine (XYLOCAINE) 10 mg/mL (1 %) injection 0.1 mL  0.1 mL SubCUTAneous PRN Snow Nagel MD        lactated Ringers infusion  75 mL/hr IntraVENous CONTINUOUS Snow Nagel MD 75 mL/hr at 11/25/18 1014 75 mL/hr at 11/25/18 1014    Suspect secondary to heart failure.     Cardiology team following. Diuresis as directed by cardiology and primary team.    nicotine (NICODERM CQ) 21 mg/24 hr patch 1 Patch  1 Patch TransDERmal DAILY Josephine Carlin MD   1 Patch at 11/25/18 2330    lactated Ringers infusion 1,000 mL  1,000 mL IntraVENous CONTINUOUS Raquel Dalton MD 0 mL/hr at 11/24/18 1654 1,000 mL at 11/24/18 1700    lactated Ringers infusion 1,000 mL  1,000 mL IntraVENous CONTINUOUS Coco Ash MD        sodium chloride (NS) flush 5-10 mL  5-10 mL IntraVENous Q8H Frommel, Kimberly A, NP   10 mL at 11/26/18 0543    sodium chloride (NS) flush 5-10 mL  5-10 mL IntraVENous PRN Elsa Benites NP        0.9% sodium chloride infusion  100 mL/hr IntraVENous CONTINUOUS Kamala JAY  mL/hr at 11/26/18 0500 100 mL/hr at 11/26/18 0500    oxyCODONE-acetaminophen (PERCOCET 7.5) 7.5-325 mg per tablet 1 Tab  1 Tab Oral Q4H PRN Elsa Bermudezhorkeith, NP   1 Tab at 11/26/18 0920    HYDROmorphone (PF) (DILAUDID) injection 1 mg  1 mg IntraVENous Q3H PRN Elsa Benites, NP   1 mg at 11/26/18 0732    naloxone (NARCAN) injection 0.4 mg  0.4 mg IntraVENous PRN Amalia Leghorn, NP        ondansetron (ZOFRAN) injection 4 mg  4 mg IntraVENous Q4H PRN Elsa Bermudezhorn, NP        piperacillin-tazobactam (ZOSYN) 3.375 g in 0.9% sodium chloride (MBP/ADV) 100 mL  3.375 g IntraVENous Q8H Frommel, Kimberly A, NP 25 mL/hr at 11/26/18 0910 3.375 g at 11/26/18 0910    vancomycin (VANCOCIN) 1,000 mg in 0.9% sodium chloride (MBP/ADV) 250 mL  1,000 mg IntraVENous Q8H Frommel, Kimberly A,  mL/hr at 11/26/18 0909 1,000 mg at 11/26/18 0909        No Known Allergies    Review of Systems:  Pertinent items are noted in the History of Present Illness.     Objective:     Vitals:    11/25/18 1953 11/26/18 0000 11/26/18 0400 11/26/18 0718   BP: 122/76 126/80 123/81 147/85   Pulse: 88 78 76 83   Resp: 16 16 16 16   Temp: 99 °F (37.2 °C) 98.9 °F (37.2 °C) 98.8 °F (37.1 °C) 98.9 °F (37.2 °C)   SpO2: 98% 98% 98% 100%   Weight:       Height:            Physical Exam:  GENERAL: alert, appears stated age  EYE: conjunctivae/corneas clear. LYMPHATIC: Cervical, supraclavicular, and axillary nodes normal.   THROAT & NECK: normal and no erythema or exudates noted. LUNG: clear to auscultation bilaterally  HEART: regular rate and rhythm, S1, S2 normal, no murmur, click, rub or gallop  ABDOMEN: soft, non-tender. Bowel sounds normal. No masses,  no organomegaly  EXTREMITIES:  Open wound in the upper lateral aspect of the thigh. Approximately 15 cm long, 8 cm wide, maybe 10-12 cm deep. SKIN: Normal.  NEUROLOGIC: AOx3. Gait normal.  grossly intact. PSYCHIATRIC: non focal      Assessment:     Hospital Problems  Never Reviewed          Codes Class Noted POA    * (Principal) Necrotizing fasciitis ([address] ICD-10-CM: M72.6  ICD-9-CM: 728.86  11/26/2018 Unknown        Abscess of thigh ICD-10-CM: L02.419  ICD-9-CM: 682.6  11/24/2018 Unknown              Plan:     # Necrotizing fasciitis of the R hip area   -continue IV vanco and zosyn for now   -IV fluids. Possibly will stop soon   -ID on board. Final treatment plan pending. May need up to 4 weeks of IV antibiotics.  Will need inpatient treatment  -pain management     #polysubstance abuse   -Drug screen in urine showed Cocaine, narcotics and THC   -advised to quit   -hepatitis panel and HIV test pending    DVT ppx: SQ heparin     Signed By: Dionicio Morgan MD     November 26, 2018

## 2021-06-02 NOTE — NURSING
Additional Notes: Patient consent was obtained to proceed with the visit and recommended plan of care after discussion of all risks and benefits, including the risks of COVID-19 exposure. Pt admitted from ER per stretcher. W/C bound at home on O2 2.5 liters per nasal cannula. Daughter with pt and helping with admit. 20 j to let hand. Skin warm and dry. Telemetry monitor on. Wick to vagina to assist with urine capture. Unable to move right arm at all due to past CVA. Moves leg a little bit on right side. Generally debilitated on right side.   Detail Level: Simple

## 2022-06-13 NOTE — PROGRESS NOTES
[facility]  Nephrology  Progress Note    Patient Name: Michael Salgado  MRN: [id number]  Admission Date: 7/12/2017  Hospital Length of Stay: 9 days  Attending Provider: Kira Ruiz MD   Primary Care Physician: Wesley Watkins MD  Principal Problem:Acute on chronic systolic congestive heart failure    Subjective:     HPI: Ms. Salgado is a 72yo female with history of CVA, CAD s/p CABG x 3, ICM, HFrEF, CKD, anemia in CKD and afib on Aspirin and Warfarin who presents with acute hypoxic respiratory failure. She just completed a course of antibiotics for pneumonia 2 weeks prior, and reports feeling lethargic with poor recovery of baseline functional status since. This admission, she is being treated for CHF exacerbation per Cardiology given elevated BNP, CXR with congestion. She has received IV Lasix per Cardiology recommendations and has continued improvement in Cr, most recently to 2.5 from 2.7 2 days prior, and her presentation is consistent with cardiorenal syndrome. I/Os note ~500 cc initial response to Lasix and Nephrology was consulted for CKDIV renal disease on loop diuretics with possible suboptimal response in urine output.            Interval History: Patient moved to Cardiology floor; no acute events overnight. Recorded UOP with Britt 2L out. Britt now d/c'd and patient is on a Purewick cath.     Review of patient's allergies indicates:   Allergen Reactions    Daypro [oxaprozin] Itching     Tolerates other NSAIDs    Vibramycin [doxycycline calcium]      Current Facility-Administered Medications   Medication Frequency    0.9%  NaCl infusion (for blood administration) Q24H PRN    acetaminophen tablet 650 mg Q6H PRN    acetaminophen tablet 650 mg PRN    albuterol-ipratropium 2.5mg-0.5mg/3mL nebulizer solution 3 mL Q6H    apixaban tablet 5 mg BID    aspirin EC tablet 81 mg Daily    atorvastatin tablet 40 mg Daily    buPROPion TB24 tablet 150 mg Daily     PA initiated for: chlorthalidone (HYGROTEN) 15 MG tablet   butalbital-acetaminophen-caffeine -40 mg per tablet 1 tablet Q8H PRN    dextrose 50% injection 12.5 g PRN    dextrose 50% injection 25 g PRN    diphenhydrAMINE capsule 25 mg PRN    escitalopram oxalate tablet 5 mg Daily    furosemide (LASIX) 500 mg in sodium chloride 0.9% 100 mL infusion Continuous    gabapentin capsule 300 mg QHS    glucagon (human recombinant) injection 1 mg PRN    glucose chewable tablet 16 g PRN    glucose chewable tablet 24 g PRN    hydrALAZINE tablet 50 mg TID    insulin aspart pen 1-10 Units QID (AC + HS) PRN    isosorbide dinitrate tablet 10 mg TID    metoprolol tartrate (LOPRESSOR) tablet 50 mg BID    ondansetron disintegrating tablet 8 mg Q8H PRN    pantoprazole EC tablet 40 mg Daily    quetiapine tablet 100 mg QHS    sodium chloride 0.9% flush 3 mL Q8H       Objective:     Vital Signs (Most Recent):  Temp: 97.2 °F (36.2 °C) (07/21/17 0512)  Pulse: 87 (07/21/17 0700)  Resp: 18 (07/21/17 0512)  BP: 135/69 (07/21/17 0512)  SpO2: 98 % (07/21/17 0512)  O2 Device (Oxygen Therapy): nasal cannula (07/21/17 0028) Vital Signs (24h Range):  Temp:  [97 °F (36.1 °C)-98 °F (36.7 °C)] 97.2 °F (36.2 °C)  Pulse:  [] 87  Resp:  [16-20] 18  SpO2:  [95 %-98 %] 98 %  BP: (100-137)/(56-71) 135/69     Weight: 69.3 kg (152 lb 12.8 oz) (07/21/17 0513)  Body mass index is 26.21 kg/m².  Body surface area is 1.77 meters squared.    I/O last 3 completed shifts:  In: 630 [P.O.:630]  Out: 2500 [Urine:2500]    Physical Exam   Constitutional: She is oriented to person, place, and time. She appears well-developed and well-nourished. No distress.   HENT:   Head: Normocephalic and atraumatic.   Nose: Nose normal.   Mouth/Throat: Oropharynx is clear and moist. No oropharyngeal exudate.   Eyes: EOM are normal. Pupils are equal, round, and reactive to light. No scleral icterus.   Neck: Neck supple. No JVD present. No thyromegaly present.   Cardiovascular: Normal rate, normal heart sounds and intact  distal pulses.    No murmur heard.  Irregular rhythm   Pulmonary/Chest: She has no wheezes. She has rales.   Coarse BS and crackles bilaterally  Increased dyspnea when talking   Abdominal: Soft. Bowel sounds are normal. She exhibits no distension. There is no tenderness.   Musculoskeletal: She exhibits edema (1+ LE's). She exhibits no tenderness or deformity.   Lymphadenopathy:     She has no cervical adenopathy.   Neurological: She is alert and oriented to person, place, and time. No cranial nerve deficit.   Skin: Skin is warm and dry.   Psychiatric: She has a normal mood and affect. Her behavior is normal.       Significant Labs:  CBC:     Recent Labs  Lab 07/21/17  0351   WBC 5.83   RBC 2.90*   HGB 7.9*   HCT 27.1*      MCV 93   MCH 27.2   MCHC 29.2*     CMP:   Recent Labs  Lab 07/18/17  0441  07/21/17  0351   *  < > 147*   CALCIUM 8.7  < > 8.4*   ALBUMIN 1.7*  --   --    PROT 4.6*  --   --      < > 141   K 4.3  < > 4.0   CO2 28  < > 32*     < > 102   BUN 51*  < > 58*   CREATININE 2.6*  < > 2.5*   ALKPHOS 43*  --   --    ALT 7*  --   --    AST 10  --   --    BILITOT 0.2  --   --    < > = values in this interval not displayed.    Recent Labs  Lab 07/19/17  1442   COLORU Yellow   SPECGRAV 1.010   PHUR 5.0   PROTEINUA 2+*   BACTERIA None   NITRITE Negative   LEUKOCYTESUR 3+*   UROBILINOGEN Negative   HYALINECASTS 0     All labs within the past 24 hours have been reviewed.     Significant Imaging:  Labs: Reviewed  Cr improving    Assessment/Plan:     Chronic kidney disease (CKD) stage G4/A1, severely decreased glomerular filtration rate (GFR) between 15-29 mL/min/1.73 square meter and albuminuria creatinine ratio less than 30 mg/g    -patient presents with Nephrotic range proteinuria-  P/Cr and urine protein elevated per chronic baseline;  urine sediment w/ microscopy. Etiology likely long-term DM/ HTN  -could not perform FeUrea given urine Cr not collected on floor.   -patient could be  in CRS; continue diuresis as Cr has improved. Consider augmenting Lasix with Zaroxolyn if continued concern for suboptimal response and recommend Lasix 80mg BID preferred to continuous.  -strict I/Os, avoid nephrotoxins  -if worsening of Cr, hold loop diuretics.  -last A1C 5.7 but family reports uncontrolled DM in prior years  -can consider further workup of nephrotic syndrome                   Thank you for your consult. I will follow-up with patient. Please contact us if you have any additional questions.    Bulmaro Worley MD  Nephrology  [facility]

## 2022-09-08 NOTE — PROGRESS NOTES
Cardiology Progress Note  Attending Physician: Kira Ruiz MD  Hospital Day: 6    Subjective:   Interval History: Patient states that she's feeling better and is able to lie much more flat. Purewick removed yesterday. I&O's still inaccurate.    Medications:   Continuous Infusions:   furosemide (LASIX) 5 mg/mL infusion (non-titrating) 15 mg/hr (07/16/17 1741)       Scheduled Meds:   albuterol-ipratropium 2.5mg-0.5mg/3mL  3 mL Nebulization Q6H    apixaban  5 mg Oral BID    aspirin  81 mg Oral Daily    atorvastatin  40 mg Oral Daily    buPROPion  150 mg Oral Daily    carvedilol  37.5 mg Oral BID WM    escitalopram oxalate  5 mg Oral Daily    gabapentin  300 mg Oral QHS    hydrALAZINE  25 mg Oral TID    isosorbide dinitrate  10 mg Oral TID    pantoprazole  40 mg Oral Daily    quetiapine  100 mg Oral QHS    sodium chloride 0.9%  3 mL Intravenous Q8H     PRN Meds:sodium chloride, acetaminophen, acetaminophen, butalbital-acetaminophen-caffeine -40 mg, dextrose 50%, dextrose 50%, diphenhydrAMINE, glucagon (human recombinant), glucose, glucose, insulin aspart, ondansetron  Objective:     Vitals:  Temp:  [97.5 °F (36.4 °C)-98.1 °F (36.7 °C)]   Pulse:  []   Resp:  [18-26]   BP: (101-119)/(62-76)   SpO2:  [93 %-99 %]  on 2L N/C I/O's:    Intake/Output Summary (Last 24 hours) at 07/17/17 0903  Last data filed at 07/16/17 1940   Gross per 24 hour   Intake                0 ml   Output              550 ml   Net             -550 ml        Constitutional: NAD, conversant  HEENT: Sclera anicteric, PERRLA, EOMI  Neck: 16-18 cm JVD, no carotid bruits  CV: Irregularly irregular, Tachy, no murmur  Pulm: Bibasilar crackles  GI: Abdomen soft, NTND, +BS  Extremities: 3+ doughy LE edema, warm and well perfused  Skin: No ecchymosis, erythema, or ulcers  Psych: AOx3, appropriate affect  Neuro: CNII-XII intact, no focal deficits    Labs:       CBC: CMP:      Recent Labs  Lab 07/15/17  0355 07/16/17  0345  Caller: PATIENT    Relationship to patient: SELF      Best call back number: [phone]    Chief complaint: BILATERAL WRIST AND HAND PAIN    Type of visit: CORTISONE INJECTIONS       Additional notes: PT. SAW DR. QUINTANA IN AUGUST.   WAS TOLD TO CALL BACK IF SHE WANTS TO SCHEDULE CORTISONE INJECTIONS.          "07/17/17  0444   WBC 4.41 5.02 5.10   HGB 8.0* 7.8* 7.6*   HCT 26.9* 25.3* 25.5*    226 219   MCV 91 91 92   RDW 16.4* 16.3* 16.3*      Recent Labs  Lab 07/15/17  0354  07/16/17  0348 07/16/17  1558 07/17/17  0444   NA  --   < > 142 141 141   K  --   < > 4.7 4.7 4.7   CL  --   < > 108 105 106   CO2  --   < > 29 27 26   BUN  --   < > 48* 50* 50*   CREATININE  --   < > 2.5* 2.5* 2.5*   CALCIUM  --   < > 7.8* 8.3* 7.9*   PROT  --   < > 5.0* 5.1* 4.9*   BILITOT  --   < > 0.3 0.3 0.2   ALKPHOS  --   < > 52* 54* 49*   ALT  --   < > 7* 9* 7*   AST  --   < > 12 11 12   MG 2.1  --  1.8  --  2.0   PHOS 3.4  --  3.7  --  4.1   < > = values in this interval not displayed.     Cholesterol: Coagulation   Lab Results   Component Value Date    CHOL 191 02/23/2017    HDL 25 (L) 02/23/2017    LDLCALC 130.6 02/23/2017    TRIG 177 (H) 02/23/2017      Recent Labs  Lab 07/15/17  0354   INR 1.1        Misc:     Recent Labs  Lab 07/13/17  1506   TROPONINI 0.037*      Lab Results   Component Value Date    HGBA1C 5.8 (H) 07/13/2017        Microbiology   Microbiology Results (last 7 days)     ** No results found for the last 168 hours. **           Imaging:     CXR (07/17/2017):   Sternal sutures are intact and aligned.  Abundant calcification noted at the level of the arch.    There is interstitial lung disease in perihilar distribution compatible with interstitial pulmonary edema similar to 7/14/2017.  Ovoid soft tissue opacity projected over the RIGHT lower lung zone at the level of the minor fissure suggests fluid retained in that fissure ("pseudotumor").  No other significant change compared to the earlier examination.    Degenerative changes are present at the acromioclavicular joints, shoulders and in the spine.     2D Echo (06/23/2017):    1 - Mildly to moderately depressed left ventricular systolic function (EF 40-45%). Abnormal septal motion due to underlying LBBB. Significant drop in LV function, compared to the prior " reported, Images could not be retrieved for comparison.     2 - Normal right ventricular systolic function .     3 - Pulmonary hypertension. The estimated PA systolic pressure is 41 mmHg.     4 - Mild tricuspid regurgitation.     5 - Small pericardial effusion.     6 - Intermediate central venous pressure.     7 - Biatrial enlargement.      Assessment:     71 y.o. 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, Chronic Atrial fibrillation RVR (on warfarin) who presents to the hospital with acute decompensated heart failure    Plan:     #Acute Onset Decompensated Systolic Heart Failure  #New Onset Heart Failure  --continue lasix 15/hr  --continue hydralazine 25 TID and isordil 10 TID  --strict I&O's with fluid restriction of 1.5L  --daily weights  --patient will require ischemic evaluation via PET scan when euvolemic, perhaps tomorrow/outpatient  --CPAP/BiPAP PRN for respiratory distress     #Chronic AFib  --patient is chronically in AFib at this time  --OCHEA0LGXA of 8 - patient requires anticoagulation. With her prior CVA, she must be on anticoagulation to prevent future strokes  --agree with discontinuation of effient.   --heart rate not controlled, would switch from coreg to metoprolol 50 BID and titrate up to control HR. Increase the hydralazine and isordil to accommodate for higher blood pressure with that switch.    Thank you for the consult. We will continue to follow. Staff addendum to follow     Signed:  Fabiana Mendoza MD  Cardiology Fellow, PGY-5  7/17/2017 9:03 AM

## 2022-11-07 NOTE — PROGRESS NOTES
Ms. Salgado is a patient of Dr. Davis and was last seen in [facility] 6/22/2017.    Subjective:   Patient ID:  Michael Salgado is a 71 y.o. 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  Patient denies chest pain with exertion or at rest, palpitations, dizziness, syncope, or claudication.  States that she feels worse.  She was unable to stand for her weight today in clinic.  She reports MILIAN, SOB, orthopnea and PND.  Denies any underlying lung disease and states that she was placed on oxygen following her CABG in 2015 and has been on it ever since.  Last echo was in 4/2016 which revealed diastolic dysfunction, pulmonary edema, eccentric remodeling, and a preserved EF of 60%.     Review of Systems   Constitution: Positive for malaise/fatigue and weight gain. Negative for decreased appetite, diaphoresis, weakness and weight loss.   HENT: Negative for headaches.    Eyes: Negative for visual disturbance.   Cardiovascular: Positive for dyspnea on exertion, leg swelling, orthopnea and paroxysmal nocturnal dyspnea. Negative for chest pain, claudication, irregular heartbeat, near-syncope, palpitations and syncope.        Denies chest pressure   Respiratory: Positive for shortness of breath and sleep disturbances due to breathing. Negative for cough, hemoptysis and snoring.    Endocrine: Negative for cold intolerance and heat intolerance.   Hematologic/Lymphatic: Negative for bleeding problem. Does not bruise/bleed easily.   Musculoskeletal: Negative for myalgias.   Gastrointestinal: Negative for bloating, abdominal pain, anorexia, change in bowel habit, constipation, diarrhea, nausea and vomiting.   Neurological: Negative for difficulty with concentration, disturbances in coordination,  Physician Triage: This note is for triage purposes only. This patient was seen and evaluated by myself. My evaluation is not meant to provide definitive treatment nor is this note intended to diagnose or disposition the patient unless otherwise stated.    29-year-old male with a history of hypertension and diabetes presents to the ED for evaluation of continued shortness of breath status post being diagnosed with pneumonia on October 31.  Patient states he feels like his cough is improved but shortness of breath is continued.  At that time he also had a right-sided chest pain which he said is improved as well denies any vomiting and diarrhea.  Patient notes he did finish his amoxicillin medication and is currently taking a Zithromax.    On exam patient noted to be visibly short of breath after walking to room  Diaphoretic and tachycardic  Clear anterior posterior, lungs are diminished throughout upper and lower right sided posterior clear left-sided posterior    Will order basic labs repeat chest x-ray   excessive daytime sleepiness, dizziness, light-headedness, loss of balance and numbness.   Psychiatric/Behavioral: The patient does not have insomnia.      Allergies and current medications updated and reviewed:  Review of patient's allergies indicates:   Allergen Reactions    Daypro [oxaprozin]      Tolerates other NSAIDs    Pcn [penicillins] Swelling     Patient states she is not allergic to Penicillin    Vibramycin [doxycycline calcium]      Current Outpatient Prescriptions   Medication Sig    aspirin (ECOTRIN) 81 MG EC tablet Take 81 mg by mouth once daily.    atorvastatin (LIPITOR) 40 MG tablet Take 1 tablet (40 mg total) by mouth once daily.    azelastine (ASTELIN) 137 mcg (0.1 %) nasal spray 1 spray (137 mcg total) by Nasal route 2 (two) times daily.    blood sugar diagnostic (TRUETEST TEST STRIPS) Strp Twice daily blood sugar check.    buPROPion (WELLBUTRIN XL) 150 MG TB24 tablet Take 1 tablet (150 mg total) by mouth once daily.    butalbital-acetaminophen-caffeine -40 mg (FIORICET, ESGIC) -40 mg per tablet Take 1 tablet by mouth every 8 (eight) hours as needed for Headaches.    carvedilol (COREG) 25 MG tablet Take 25 mg by mouth 2 (two) times daily with meals.    dextran 70-hypromellose (TEARS) ophthalmic solution One drop into affected eye 4-6 times daily as needed    diazePAM (VALIUM) 5 MG tablet Take 1 tablet (5 mg total) by mouth every 8 (eight) hours as needed.    diclofenac sodium 1 % Gel Apply topically 2 (two) times daily.    escitalopram oxalate (LEXAPRO) 10 MG tablet Take 1 tablet (10 mg total) by mouth once daily. (Patient taking differently: Take 5 mg by mouth once daily. )    escitalopram oxalate (LEXAPRO) 10 MG tablet TAKE 1 TABLET(10 MG) BY MOUTH EVERY DAY    estradiol (ESTRACE) 0.01 % (0.1 mg/gram) vaginal cream Place 1 g vaginally twice a week.    fluticasone (FLONASE) 50 mcg/actuation nasal spray 1 spray by Each Nare route once daily.    furosemide (LASIX) 20  "MG tablet Take 1 tablet (20 mg total) by mouth 2 (two) times daily.    gabapentin (NEURONTIN) 100 MG capsule TAKE 1 TO 2 CAPSULES BY MOUTH EVERY MORNING AND AFTERNOON ALONG WITH 300MG AT NIGHT    gabapentin (NEURONTIN) 300 MG capsule Take 1 capsule (300 mg total) by mouth once daily.    hydrALAZINE (APRESOLINE) 25 MG tablet TAKE 1 TABLET(25 MG) BY MOUTH BID    insulin glargine (LANTUS) 100 unit/mL injection Inject 10 Units into the skin every evening.    insulin lispro (HUMALOG) 100 unit/mL injection Inject 5 Units into the skin 3 (three) times daily before meals.    levalbuterol (XOPENEX) 1.25 mg/3 mL nebulizer solution USE 1 VIAL VIA NEBULIZER EVERY 8 HOURS AS NEEDED FOR WHEEZING OR SHORTNESS OF BREATH    omega-3 acid ethyl esters (LOVAZA) 1 gram capsule Take 2 capsules (2 g total) by mouth 2 (two) times daily.    OXYGEN-AIR DELIVERY SYSTEMS MISC by [facility].(Non-Drug; Combo Route) route. Family states that patient is on 2.5 liters nasal cannula at home    pantoprazole (PROTONIX) 40 MG tablet TAKE 1 TABLET BY MOUTH EVERY DAY    pen needle, diabetic 30 gauge x 5/16" Ndle Use 4 disposable needles per day. Inject medication into skin daily    polyethylene glycol (GLYCOLAX) 17 gram PwPk Take 17 g by mouth once daily.    quetiapine (SEROQUEL) 100 MG Tab Take 100 mg by mouth once daily.    underpads 23 X 36 " Pads Twice daily change as needed for incontinence.    warfarin (COUMADIN) 5 MG tablet Take 1 tablet (5 mg total) by mouth Daily.    amlodipine (NORVASC) 5 MG tablet Take 5 mg by mouth once daily.     No current facility-administered medications for this visit.      Objective:     Left Arm BP - Sittin/54 (17 1257)    BP (!) 107/54 (BP Location: Left arm, Patient Position: Sitting, BP Method: Automatic)   Pulse 90   Resp (!) 28   LMP  (LMP Unknown)   SpO2 97% 2.5L NC    Physical Exam   Constitutional: She is oriented to person, place, and time. Vital signs are normal. She appears " well-developed and well-nourished. She is active. No distress.   HENT:   Head: Normocephalic and atraumatic.   Eyes: Conjunctivae and lids are normal. No scleral icterus.   Neck: Neck supple. JVD present. Normal carotid pulses and no hepatojugular reflux present. Carotid bruit is not present.   Cardiovascular: S1 normal, S2 normal and intact distal pulses.  An irregularly irregular rhythm present. Tachycardia present.  PMI is not displaced.  Exam reveals gallop and S3. Exam reveals no friction rub.    No murmur heard.  Pulses:       Carotid pulses are 2+ on the right side, and 2+ on the left side.       Radial pulses are 2+ on the right side, and 2+ on the left side.        Dorsalis pedis pulses are 2+ on the right side, and 2+ on the left side.        Posterior tibial pulses are 1+ on the right side, and 1+ on the left side.   Pulmonary/Chest: Effort normal. No respiratory distress. She has decreased breath sounds in the right middle field and the left middle field. She has no wheezes. She has rhonchi in the right upper field, the right middle field, the left upper field and the left middle field. She has no rales. She exhibits no tenderness.   Absent breath sounds bilateral bases. Suspect pleural effusions   Abdominal: Soft. Normal appearance and bowel sounds are normal. She exhibits no distension, no fluid wave, no abdominal bruit, no ascites and no pulsatile midline mass. There is no hepatosplenomegaly. There is no tenderness.   Musculoskeletal: She exhibits edema (BLE +4 edema legs to knees, +1 in thighs, and sacral edema +1).   Neurological: She is alert and oriented to person, place, and time. Gait normal.   Skin: Skin is warm, dry and intact. No rash noted. She is not diaphoretic. Nails show no clubbing.   Psychiatric: She has a normal mood and affect. Her speech is normal and behavior is normal. Judgment and thought content normal. Cognition and memory are normal.   Nursing note and vitals reviewed.       Chemistry        Component Value Date/Time     06/22/2017 1315    K 4.8 06/22/2017 1315     (H) 06/22/2017 1315    CO2 18 (L) 06/22/2017 1315    BUN 50 (H) 06/22/2017 1315    CREATININE 2.8 (H) 06/22/2017 1315     (H) 06/22/2017 1315        Component Value Date/Time    CALCIUM 8.4 (L) 06/22/2017 1315    ALKPHOS 62 06/15/2017 1210    AST 10 06/15/2017 1210    ALT 11 06/15/2017 1210    BILITOT 0.2 06/15/2017 1210    ESTGFRAFRICA 18.6 (A) 06/22/2017 1315    EGFRNONAA 16.1 (A) 06/22/2017 1315        Lab Results   Component Value Date    HGBA1C 6.1 02/23/2017       Recent Labs  Lab 09/16/16  1112  02/09/17  1131 02/23/17  1609  06/15/17  1210 06/22/17  1315   WHITE BLOOD CELL COUNT 4.12  --  7.93 3.28 L  < > 9.03  --    HEMOGLOBIN 9.2 L  --  10.1 L 9.3 L  < > 8.1 L  --    HEMATOCRIT 29.3 L  --  31.7 L 30.7 L  < > 26.3 L  --    MCV 82  --  85 87  < > 90  --    PLATELETS 188  --  255 181  < > 314  --    BNP  --   < > 870 H  --   --  1,698 H 1,766 H   TSH 5.427 H  --   --   --   --   --   --    CHOLESTEROL 156  --   --  191  --   --   --    HDL 24 L  --   --  25 L  --   --   --    LDL CHOLESTEROL 95.4  --   --  130.6  --   --   --    TRIGLYCERIDES 183 H  --   --  177 H  --   --   --    HDL/CHOLESTEROL RATIO 15.4 L  --   --  13.1 L  --   --   --    < > = values in this interval not displayed.      Recent Labs  Lab 06/27/17  1024 06/30/17  0955 07/03/17  0921 07/12/17  1155   INR 2.5 H 1.8 H 2.2 H 4.8        Test(s) Reviewed  I have reviewed the following in detail:  [] Stress test   [] Angiography   [x] Echocardiogram   [x] Labs   [] Other:       Assessment:     1. Chronic atrial fibrillation with RVR  Rate not controlled, >120 bpm   2. Acute on chronic diastolic congestive heart failure, NYHA class 4 Fluid overloaded: JVD to jaw, SOB, MILIAN, severe pitting edema, and absent breath sounds in the bases   3. Coronary artery disease involving autologous artery coronary bypass graft without angina pectoris   Asymptomatic. Taking GDMT   4. Essential hypertension  Well controlled   5. Pure hypercholesterolemia   on atorvastatin 40mg   6. Pulmonary HTN     7. MI, old       Plan:     Chronic atrial fibrillation with RVR    Acute on chronic diastolic congestive heart failure    Coronary artery disease involving autologous artery coronary bypass graft without angina pectoris    Essential hypertension    Pure hypercholesterolemia    Pulmonary HTN    MI, old    Ms. Salgado is in decompensated diastolic heart failure that is worsened with atrial fibrillation that has a rapid ventricular response.  Report was called to the ED physician of the day and the cardiology fellow, Dr. Hargrove. She is being transferred via wheelchair to the emergency room.

## 2022-12-25 NOTE — ASSESSMENT & PLAN NOTE
-hx of stroke before 2012 per daughter  -pt with residual right sided deficit   -continue home ASA and atorvastatin 50 mg     abdominal pain